# Patient Record
Sex: MALE | Race: BLACK OR AFRICAN AMERICAN | Employment: OTHER | URBAN - METROPOLITAN AREA
[De-identification: names, ages, dates, MRNs, and addresses within clinical notes are randomized per-mention and may not be internally consistent; named-entity substitution may affect disease eponyms.]

---

## 2017-01-05 ENCOUNTER — HOSPITAL ENCOUNTER (OUTPATIENT)
Dept: GENERAL RADIOLOGY | Age: 72
Discharge: HOME | End: 2017-01-05
Payer: MEDICARE

## 2017-01-05 DIAGNOSIS — R05.9 COUGH: ICD-10-CM

## 2017-01-05 PROCEDURE — 71020 XR CHEST PA LAT: CPT

## 2017-01-10 ENCOUNTER — HOSPITAL ENCOUNTER (OUTPATIENT)
Dept: CT IMAGING | Age: 72
Discharge: HOME | End: 2017-01-10
Attending: INTERNAL MEDICINE
Payer: MEDICARE

## 2017-01-10 DIAGNOSIS — C18.0 MALIGNANT NEOPLASM OF CECUM (HCC): ICD-10-CM

## 2017-01-10 DIAGNOSIS — C79.89 SECONDARY MALIGNANT NEOPLASM OF OTHER SPECIFIED SITES (HCC): ICD-10-CM

## 2017-01-10 DIAGNOSIS — M54.50 LOWER BACK PAIN: ICD-10-CM

## 2017-01-10 PROCEDURE — 74011636320 HC RX REV CODE- 636/320: Performed by: INTERNAL MEDICINE

## 2017-01-10 PROCEDURE — 74177 CT ABD & PELVIS W/CONTRAST: CPT

## 2017-01-10 RX ADMIN — IOPAMIDOL 100 ML: 755 INJECTION, SOLUTION INTRAVENOUS at 13:51

## 2017-01-12 ENCOUNTER — HOSPITAL ENCOUNTER (OUTPATIENT)
Dept: VASCULAR SURGERY | Age: 72
Discharge: HOME | End: 2017-01-12
Attending: INTERNAL MEDICINE

## 2017-01-12 DIAGNOSIS — I82.409 DVT (DEEP VENOUS THROMBOSIS) (HCC): ICD-10-CM

## 2017-01-26 ENCOUNTER — OFFICE VISIT (OUTPATIENT)
Dept: NEUROLOGY | Facility: CLINIC | Age: 72
End: 2017-01-26
Payer: MEDICARE

## 2017-01-26 VITALS
WEIGHT: 250 LBS | HEIGHT: 70 IN | BODY MASS INDEX: 35.79 KG/M2 | DIASTOLIC BLOOD PRESSURE: 84 MMHG | RESPIRATION RATE: 20 BRPM | HEART RATE: 86 BPM | SYSTOLIC BLOOD PRESSURE: 121 MMHG

## 2017-01-26 DIAGNOSIS — R41.3 MEMORY LOSS: Primary | ICD-10-CM

## 2017-01-26 DIAGNOSIS — I10 ESSENTIAL HYPERTENSION: ICD-10-CM

## 2017-01-26 PROCEDURE — 99999 PR PBB SHADOW E&M-EST. PATIENT-LVL III: CPT | Mod: PBBFAC,,, | Performed by: PSYCHIATRY & NEUROLOGY

## 2017-01-26 PROCEDURE — 99213 OFFICE O/P EST LOW 20 MIN: CPT | Mod: PBBFAC,PN | Performed by: PSYCHIATRY & NEUROLOGY

## 2017-01-26 PROCEDURE — 99204 OFFICE O/P NEW MOD 45 MIN: CPT | Mod: S$PBB,,, | Performed by: PSYCHIATRY & NEUROLOGY

## 2017-01-26 RX ORDER — DOXYCYCLINE 100 MG/1
1 CAPSULE ORAL DAILY
Refills: 2 | COMMUNITY
Start: 2016-12-14 | End: 2017-01-26 | Stop reason: SDUPTHER

## 2017-01-26 NOTE — MR AVS SNAPSHOT
[facility]  [address]  [place] [address]  Phone: [phone]  Fax: [phone]                  Rohan Abarca   2017 8:20 AM   Office Visit    Description:  Male : 1945   Provider:  Glenn Abad Jr., MD   Department:  [facility]           Reason for Visit     Memory Loss           Diagnoses this Visit        Comments    Memory loss    -  Primary            To Do List           Future Appointments        Provider Department Dept Phone    2017 8:00 AM LAB, [place] [facility] [phone]    2017 9:00 AM [facility] MRI1 [facility] [phone]    2017 9:00 AM Glenn Abad Jr., MD [facility] [phone]      Goals (5 Years of Data)     Increase water intake     Reduce fast food intake       [facility] On Call     [facility] -  Assistance  Registered nurses in the [facility] provide clinical advisement, health education, appointment booking, and other advisory services.  Call for this free service at [phone].             Medications           Message regarding Medications     Verify the changes and/or additions to your medication regime listed below are the same as discussed with your clinician today.  If any of these changes or additions are incorrect, please notify your healthcare provider.        STOP taking these medications     celecoxib (CELEBREX) 200 MG capsule Take 200 mg by mouth once daily.    MINOCYCLINE HCL (MINOCIN ORAL) 50 mg daily as needed (as needed for ten days when facial breakout).     doxycycline (VIBRAMYCIN) 100 MG Cap Take 1 capsule by mouth once daily.           Verify that the below list of medications is an accurate representation of the medications you are currently taking.  If none reported, the list may be blank. If incorrect, please contact your healthcare provider. Carry this list with you in case of emergency.           Current  "Medications     amlodipine (NORVASC) 5 MG tablet Take 1 tablet (5 mg total) by mouth once daily.    doxycycline (VIBRAMYCIN) 100 mg injection     enalapril (VASOTEC) 20 MG tablet Take 2 tablets (40 mg total) by mouth once daily.    VIT A/VIT C/VIT E/ZINC/COPPER (ICAPS AREDS ORAL) Take by mouth once daily.           Clinical Reference Information           Vital Signs - Last Recorded  Most recent update: 1/26/2017  8:28 AM by Naomy Brito LPN    BP Pulse Resp Ht Wt BMI    (!) 149/92 88 20 5' 9.5" (1.765 m) 113.4 kg (250 lb) 36.39 kg/m2      Blood Pressure          Most Recent Value    BP  (!)  149/92      Allergies as of 1/26/2017     No Known Drug Allergies      Immunizations Administered on Date of Encounter - 1/26/2017     None      Orders Placed During Today's Visit     Future Labs/Procedures Expected by Expires    Ammonia  1/26/2017 1/26/2018    Folate  1/26/2017 1/26/2018    MRI Brain Without Contrast  1/26/2017 1/26/2018    RPR  1/26/2017 1/26/2018    TSH  1/26/2017 1/26/2018    Vitamin B12  1/26/2017 1/26/2018    Vitamin B1  1/26/2017 3/27/2018    Neuropsychological testing  As directed 1/26/2018      "

## 2017-01-26 NOTE — LETTER
January 29, 2017      Homa Ocampo, FNP-C  [address] [place] [address]           [facility]  [address] [place] [address]  Phone: [phone]  Fax: [phone]          Patient: Rohan Abarca   MR Number: [id number]   YOB: 1945   Date of Visit: 1/26/2017       Dear Homa Ocampo:    Thank you for referring Rohan Abarca to me for evaluation. Attached you will find relevant portions of my assessment and plan of care.    If you have questions, please do not hesitate to call me. I look forward to following Rohan Abarca along with you.    Sincerely,    Glenn Abad Jr., MD    Enclosure  CC:  No Recipients    If you would like to receive this communication electronically, please contact [email or web address] or [phone] to request more information on [employer] Link access.    For providers and/or their staff who would like to refer a patient to [facility], please contact us through our one-stop-shop provider referral line, [facility], at [phone].    If you feel you have received this communication in error or would no longer like to receive these types of communications, please e-mail [email or web address]

## 2017-01-26 NOTE — PROGRESS NOTES
Date of service:  1/26/2017    Chief complaint:  Memory Loss    History of present illness:  The patient is a 72 y.o. male referred for evaluation of memory loss.  He was seen with his wife who supplied additional history. This issue began a few years ago. It involves short-term memory more than long-term memory.  He reports no difficulties with executive function.  There are no clear issues with multiple step processes. He has no problems with ADLs. He does not get lost in familiar areas. There are no hallucinations. There are no personality changes.  He does not endorse depression.      Past Medical History   Diagnosis Date    Bilateral cataracts      hx    Colon polyp      benign    HBP (high blood pressure)     History of detached retina repair     Prostate cancer     Rosacea        Past Surgical History   Procedure Laterality Date    Rotator cuff repair       bilateral    Bunionectomy Right     Knee surgery       left , right    Cataract extraction bilateral w/ anterior vitrectomy Bilateral     Retinal detachment surgery       left    Mastoid surgery Right     Hernia repair       x 2    Tonsillectomy, adenoidectomy      Prostatectomy  2012    Joint replacement Right 2012     per Dr. Heriberto Colón    Joint replacement Left 2004     per Dr. Heriberto Colón    Shoulder surgery Left      RCR per Dr. Heriberto Colón    Shoulder surgery Right      RCR per Dr. Heriberto Colón       Family History   Problem Relation Age of Onset    Cancer Sister     Stroke Sister     Cancer Sister     Stroke      Heart attack         Social History     Social History    Marital status:      Spouse name: N/A    Number of children: N/A    Years of education: N/A     Occupational History    Not on file.     Social History Main Topics    Smoking status: Former Smoker     Packs/day: 0.50     Years: 5.00     Types: Cigarettes     Quit date: 12/19/1968    Smokeless tobacco: Never Used    Alcohol use 1.0 oz/week     2  "Standard drinks or equivalent per week      Comment: social    Drug use: No    Sexual activity: Yes     Partners: Female     Other Topics Concern    Not on file     Social History Narrative    Was working 4 days a week delivering seafood, unloading and loading truck by hand, retired Friday    No dietary restrictions        Review of Systems   General/Constitutional:  No unintentional weight loss, No change in appetite  Eyes/Vision:  No change in vision, No double vision  ENT:  No frequent nose bleeds, No ringing in the ears  Respiratory:  No cough, No wheezing  Cardiovascular:  No chest pain, No palpitations  Gastrointestinal:  No jaundice, No nausea/vomiting  Genitourinary:  No incontinence, No burning with urination  Hematologic/Lymphatic:  No easy bruising/bleeding, No night sweats  Neurological:  No numbness, No weakness  Endocrine:  No fatigue, No heat/cold intolerance  Allergy/Immunologic:  No fevers, No chills  Musculoskeletal:  + muscle pain, + joint pain   Psychiatric:  No thoughts of harming self/others, No depression  Integumentary:  No rashes, No sores that do not heal       Physical exam:  Visit Vitals    BP (!) 149/92    Pulse 88    Resp 20    Ht 5' 9.5" (1.765 m)    Wt 113.4 kg (250 lb)    BMI 36.39 kg/m2     General: Well developed, well nourished.  No acute distress.  HEENT: Atraumatic, normocephalic.  Neck: Supple, trachea midline.  Cardiovascular: Regular rate and rhythm.  Pulmonary: No increased work of breathing.  Abdomen/GI: No guarding.  Musculoskeletal: No obvious joint deformities, moves all extremities well.    Neurological exam:  Mental status: Awake and alert.  Oriented x4.  Speech fluent and appropriate.  Recent and remote memory appear to be slightly impaired.  Fund of knowledge normal.  MMSE = 27/30.  Cranial nerves: Pupils equal round and reactive to light, extraocular movements intact, facial strength and sensation intact bilaterally, palate and tongue midline, hearing " grossly intact bilaterally.  Motor: 5 out of 5 strength throughout the upper and lower extremities bilaterally. Normal bulk and tone.  Sensation: Intact to light touch and temperature bilaterally.  DTR: 1+ at the knees and biceps bilaterally.  Coordination: Finger-nose-finger testing intact bilaterally.  Gait: Nonfocal gait. Mild difficulty with tandem.      Data base:  Notes of the referring provider were reviewed.  Briefly summarized, these related to a check-up and addressed a number of issues including HTN, hearing loss, memory loss, and carotid stenosis.    The patient's most recent labs (10/16) included an unremarkable CMP and CBC.    Assessment and plan:  The patient is a 72 y.o. male referred for evaluation of memory loss. I feel that the differential diagnosis includes both MCI and pseudodementia. I think a reasonable workup would feature MRI of the brain, serologies, and neuropsychological testing. We will plan on seeing the patient back after the testing is complete.    The patient was noted to be hypertensive in clinic today.  Repeat BP check confirmed this.  I have asked the patient to follow up with their PCP about this and my staff to message the patient's PCP.

## 2017-01-27 ENCOUNTER — LAB VISIT (OUTPATIENT)
Dept: LAB | Facility: HOSPITAL | Age: 72
End: 2017-01-27
Attending: FAMILY MEDICINE
Payer: MEDICARE

## 2017-01-27 DIAGNOSIS — R41.3 MEMORY LOSS: ICD-10-CM

## 2017-01-27 LAB
AMMONIA PLAS-SCNC: 26 UMOL/L
FOLATE SERPL-MCNC: 14.7 NG/ML
TSH SERPL DL<=0.005 MIU/L-ACNC: 1.01 UIU/ML
VIT B12 SERPL-MCNC: 527 PG/ML

## 2017-01-27 PROCEDURE — 82140 ASSAY OF AMMONIA: CPT

## 2017-01-27 PROCEDURE — 84425 ASSAY OF VITAMIN B-1: CPT

## 2017-01-27 PROCEDURE — 84443 ASSAY THYROID STIM HORMONE: CPT

## 2017-01-27 PROCEDURE — 86592 SYPHILIS TEST NON-TREP QUAL: CPT

## 2017-01-27 PROCEDURE — 82607 VITAMIN B-12: CPT

## 2017-01-27 PROCEDURE — 82746 ASSAY OF FOLIC ACID SERUM: CPT

## 2017-01-28 LAB — RPR SER QL: NORMAL

## 2017-01-31 LAB — VIT B1 SERPL-MCNC: 55 UG/L (ref 38–122)

## 2017-02-09 ENCOUNTER — HOSPITAL ENCOUNTER (OUTPATIENT)
Dept: RADIOLOGY | Facility: HOSPITAL | Age: 72
Discharge: HOME | End: 2017-02-09
Attending: PSYCHIATRY & NEUROLOGY
Payer: MEDICARE

## 2017-02-09 DIAGNOSIS — R41.3 MEMORY LOSS: ICD-10-CM

## 2017-02-09 PROCEDURE — 70551 MRI BRAIN STEM W/O DYE: CPT | Mod: TC,PO

## 2017-02-09 PROCEDURE — 70551 MRI BRAIN STEM W/O DYE: CPT | Mod: 26,,, | Performed by: RADIOLOGY

## 2017-03-08 ENCOUNTER — TELEPHONE (OUTPATIENT)
Dept: GASTROENTEROLOGY | Facility: CLINIC | Age: 72
End: 2017-03-08

## 2017-03-08 NOTE — TELEPHONE ENCOUNTER
----- Message from Kadie Mayo sent at 3/8/2017 12:02 PM CST -----  Contact: self  [phone]  Patient need to schedule procedure. Thanks!

## 2017-03-08 NOTE — TELEPHONE ENCOUNTER
Pt has been scheduled for colon on 3/20. Reviewed mg citrate prep. Pt is aware I will be mailing instructions and confirmation letter.

## 2017-03-16 RX ORDER — DOXYCYCLINE HYCLATE 100 MG
100 TABLET ORAL DAILY
COMMUNITY
End: 2017-12-15

## 2017-03-20 ENCOUNTER — SURGERY (OUTPATIENT)
Age: 72
End: 2017-03-20

## 2017-03-20 ENCOUNTER — ANESTHESIA (OUTPATIENT)
Dept: ENDOSCOPY | Facility: HOSPITAL | Age: 72
End: 2017-03-20
Payer: MEDICARE

## 2017-03-20 ENCOUNTER — ANESTHESIA EVENT (OUTPATIENT)
Dept: ENDOSCOPY | Facility: HOSPITAL | Age: 72
End: 2017-03-20
Payer: MEDICARE

## 2017-03-20 ENCOUNTER — HOSPITAL ENCOUNTER (OUTPATIENT)
Facility: HOSPITAL | Age: 72
Discharge: HOME | End: 2017-03-20
Attending: INTERNAL MEDICINE | Admitting: INTERNAL MEDICINE
Payer: MEDICARE

## 2017-03-20 VITALS — RESPIRATION RATE: 9 BRPM

## 2017-03-20 DIAGNOSIS — Z12.11 SCREENING FOR COLON CANCER: ICD-10-CM

## 2017-03-20 PROCEDURE — 37000008 HC ANESTHESIA 1ST 15 MINUTES: Mod: PO | Performed by: INTERNAL MEDICINE

## 2017-03-20 PROCEDURE — D9220A PRA ANESTHESIA: Mod: PT,ANES,, | Performed by: ANESTHESIOLOGY

## 2017-03-20 PROCEDURE — 37000009 HC ANESTHESIA EA ADD 15 MINS: Mod: PO | Performed by: INTERNAL MEDICINE

## 2017-03-20 PROCEDURE — 88305 TISSUE EXAM BY PATHOLOGIST: CPT | Mod: 26,,, | Performed by: PATHOLOGY

## 2017-03-20 PROCEDURE — 25000003 PHARM REV CODE 250: Mod: PO | Performed by: INTERNAL MEDICINE

## 2017-03-20 PROCEDURE — 25000003 PHARM REV CODE 250: Mod: PO | Performed by: NURSE ANESTHETIST, CERTIFIED REGISTERED

## 2017-03-20 PROCEDURE — 27201012 HC FORCEPS, HOT/COLD, DISP: Mod: PO | Performed by: INTERNAL MEDICINE

## 2017-03-20 PROCEDURE — 88305 TISSUE EXAM BY PATHOLOGIST: CPT | Performed by: PATHOLOGY

## 2017-03-20 PROCEDURE — 63600175 PHARM REV CODE 636 W HCPCS: Mod: PO | Performed by: NURSE ANESTHETIST, CERTIFIED REGISTERED

## 2017-03-20 PROCEDURE — D9220A PRA ANESTHESIA: Mod: PT,CRNA,, | Performed by: NURSE ANESTHETIST, CERTIFIED REGISTERED

## 2017-03-20 PROCEDURE — 45380 COLONOSCOPY AND BIOPSY: CPT | Mod: PO | Performed by: INTERNAL MEDICINE

## 2017-03-20 PROCEDURE — 45380 COLONOSCOPY AND BIOPSY: CPT | Mod: PT,,, | Performed by: INTERNAL MEDICINE

## 2017-03-20 RX ORDER — LIDOCAINE HCL/PF 100 MG/5ML
SYRINGE (ML) INTRAVENOUS
Status: DISCONTINUED | OUTPATIENT
Start: 2017-03-20 | End: 2017-03-20

## 2017-03-20 RX ORDER — LIDOCAINE HYDROCHLORIDE 10 MG/ML
1 INJECTION, SOLUTION EPIDURAL; INFILTRATION; INTRACAUDAL; PERINEURAL ONCE
Status: COMPLETED | OUTPATIENT
Start: 2017-03-20 | End: 2017-03-20

## 2017-03-20 RX ORDER — PROPOFOL 10 MG/ML
VIAL (ML) INTRAVENOUS
Status: DISCONTINUED | OUTPATIENT
Start: 2017-03-20 | End: 2017-03-20

## 2017-03-20 RX ORDER — SODIUM CHLORIDE, SODIUM LACTATE, POTASSIUM CHLORIDE, CALCIUM CHLORIDE 600; 310; 30; 20 MG/100ML; MG/100ML; MG/100ML; MG/100ML
INJECTION, SOLUTION INTRAVENOUS CONTINUOUS
Status: DISCONTINUED | OUTPATIENT
Start: 2017-03-20 | End: 2017-03-20 | Stop reason: HOSPADM

## 2017-03-20 RX ADMIN — LIDOCAINE HYDROCHLORIDE 10 MG: 10 INJECTION, SOLUTION EPIDURAL; INFILTRATION; INTRACAUDAL; PERINEURAL at 09:03

## 2017-03-20 RX ADMIN — PROPOFOL 50 MG: 10 INJECTION, EMULSION INTRAVENOUS at 09:03

## 2017-03-20 RX ADMIN — PROPOFOL 30 MG: 10 INJECTION, EMULSION INTRAVENOUS at 10:03

## 2017-03-20 RX ADMIN — LIDOCAINE HYDROCHLORIDE 50 MG: 20 INJECTION, SOLUTION INTRAVENOUS at 09:03

## 2017-03-20 RX ADMIN — SODIUM CHLORIDE, SODIUM LACTATE, POTASSIUM CHLORIDE, AND CALCIUM CHLORIDE: .6; .31; .03; .02 INJECTION, SOLUTION INTRAVENOUS at 09:03

## 2017-03-20 NOTE — ANESTHESIA POSTPROCEDURE EVALUATION
"Anesthesia Post Evaluation    Patient: Rohan Abarca    Procedure(s) Performed: Procedure(s) (LRB):  COLONOSCOPY (N/A)    Final Anesthesia Type: general  Patient location during evaluation: PACU  Patient participation: Yes- Able to Participate  Level of consciousness: awake and alert and oriented  Post-procedure vital signs: reviewed and stable  Pain management: adequate  Airway patency: patent  PONV status at discharge: No PONV  Anesthetic complications: no      Cardiovascular status: blood pressure returned to baseline  Respiratory status: unassisted, spontaneous ventilation and room air  Hydration status: euvolemic  Follow-up not needed.        Visit Vitals    /64 (BP Location: Left arm, Patient Position: Lying, BP Method: Automatic)    Pulse 68    Temp 36.7 °C (98.1 °F) (Skin)    Resp 16    Ht 5' 9" (1.753 m)    Wt 111.1 kg (245 lb)    SpO2 (!) 94%    BMI 36.18 kg/m2       Pain/Rabia Score: Pain Assessment Performed: Yes (3/20/2017 10:19 AM)  Presence of Pain: non-verbal indicators absent (3/20/2017 10:19 AM)  Rabia Score: 7 (3/20/2017 10:19 AM)      "

## 2017-03-20 NOTE — H&P
History & Physical - Short Stay  Gastroenterology      SUBJECTIVE:     Procedure: Colonoscopy    Chief Complaint/Indication for Procedure: Previous Polyps    PTA Medications   Medication Sig    amlodipine (NORVASC) 5 MG tablet Take 1 tablet (5 mg total) by mouth once daily.    doxycycline (VIBRA-TABS) 100 MG tablet Take 100 mg by mouth once daily.    enalapril (VASOTEC) 20 MG tablet Take 2 tablets (40 mg total) by mouth once daily.    VIT A/VIT C/VIT E/ZINC/COPPER (ICAPS AREDS ORAL) Take by mouth once daily.       Review of patient's allergies indicates:   Allergen Reactions    No known drug allergies         Past Medical History:   Diagnosis Date    Bilateral cataracts     hx    Colon polyp     benign    HBP (high blood pressure)     History of detached retina repair     left    REGAN (obstructive sleep apnea)     uses CPAP    Prostate cancer     Rosacea      Past Surgical History:   Procedure Laterality Date    BUNIONECTOMY Right     CATARACT EXTRACTION BILATERAL W/ ANTERIOR VITRECTOMY Bilateral     HERNIA REPAIR      x 2    JOINT REPLACEMENT Right 2012    per Dr. Heriberto Colón    JOINT REPLACEMENT Left 2004    per Dr. Heriberto Colón    KNEE SURGERY      left , right    MASTOID SURGERY Right     right    PROSTATECTOMY  2012    RETINAL DETACHMENT SURGERY      left    ROTATOR CUFF REPAIR      bilateral    SHOULDER SURGERY Left     RCR per Dr. Heriberto Colón    SHOULDER SURGERY Right     RCR per Dr. Heriberto Colón    TONSILLECTOMY, ADENOIDECTOMY       Family History   Problem Relation Age of Onset    Cancer Sister     Stroke Sister     Cancer Sister     Stroke      Heart attack       Social History   Substance Use Topics    Smoking status: Former Smoker     Packs/day: 0.50     Years: 5.00     Types: Cigarettes     Quit date: 12/19/1968    Smokeless tobacco: Never Used    Alcohol use 1.0 oz/week     2 Standard drinks or equivalent per week      Comment: social         OBJECTIVE:     Vital Signs  (Most Recent)  Temp: 98.8 °F (37.1 °C) (03/20/17 0932)  Pulse: 70 (03/20/17 0932)  Resp: 16 (03/20/17 0932)  BP: (!) 149/84 (03/20/17 0932)  SpO2: 95 % (03/20/17 0932)    Physical Exam:                                                       GENERAL:  Comfortable, in no acute distress.                                 HEENT EXAM:  Nonicteric.  No adenopathy.  Oropharynx is clear.               NECK:  Supple.                                                               LUNGS:  Clear.                                                               CARDIAC:  Regular rate and rhythm.  S1, S2.  No murmur.                      ABDOMEN:  Soft, positive bowel sounds, nontender.  No hepatosplenomegaly or masses.  No rebound or guarding.                                             EXTREMITIES:  No edema.     MENTAL STATUS:  Normal, alert and oriented.      ASSESSMENT/PLAN:     Assessment: Previous Polyps    Plan: Colonoscopy    Anesthesia Plan: General    ASA Grade: ASA 2 - Patient with mild systemic disease with no functional limitations    MALLAMPATI SCORE:  I (soft palate, uvula, fauces, and tonsillar pillars visible)

## 2017-03-20 NOTE — TRANSFER OF CARE
"Anesthesia Transfer of Care Note    Patient: Rohan Abarca    Procedure(s) Performed: Procedure(s) (LRB):  COLONOSCOPY (N/A)    Patient location: PACU    Anesthesia Type: general    Transport from OR: Transported from OR on room air with adequate spontaneous ventilation    Post pain: adequate analgesia    Post assessment: no apparent anesthetic complications and tolerated procedure well    Post vital signs: stable    Level of consciousness: awake and sedated    Nausea/Vomiting: no nausea/vomiting    Complications: none          Last vitals:   Visit Vitals    BP (!) 149/84 (BP Location: Right arm, BP Method: Automatic)    Pulse 70    Temp 37.1 °C (98.8 °F) (Skin)    Resp 16    Ht 5' 9" (1.753 m)    Wt 111.1 kg (245 lb)    SpO2 95%    BMI 36.18 kg/m2     "

## 2017-03-20 NOTE — DISCHARGE INSTRUCTIONS
Procedural Sedation (Adult)  You have been given medicine by vein to make you sleep during your surgery. This may have included both a pain medicine and sleeping medicine. Most of the effects have worn off. But you may still have some drowsiness for the next 6 to 8 hours.  Home care  Follow these guidelines when you get home:  · For the next 8 hours, you should be watched by a responsible adult. This person should make sure your condition is not getting worse.  · Don't take any medicine by mouth for pain or for sleep during the next 4 hours. These might react with the medicines you were given in the hospital. This could cause a much stronger response than usual.  · Don't drink any alcohol for the next 24 hours.  · Don't drive, operate dangerous machinery, or make important business or personal decisions during the next 24 hours.  Follow-up care  Follow up with your healthcare provider if you are not alert and back to your usual level of activity within 12 hours.  When to seek medical advice  Call your healthcare provider right away if any of these occur:  · Drowsiness gets worse  · Weakness or dizziness gets worse  · Repeated vomiting  · You cannot be awakened   Date Last Reviewed: 10/18/2016  © 3130-7591 [employer]. [address] [place] [address], [place] [address]. All rights reserved. This information is not intended as a substitute for professional medical care. Always follow your healthcare professional's instructions.

## 2017-03-20 NOTE — IP AVS SNAPSHOT
[facility]  [address]  [place] [address]  Phone: [phone]           Patient Discharge Instructions     Our goal is to set you up for success. This packet includes information on your condition, medications, and your home care. It will help you to care for yourself so you don't get sicker and need to go back to the hospital.     Please ask your nurse if you have any questions.        There are many details to remember when preparing to leave the hospital. Here is what you will need to do:    1. Take your medicine. If you are prescribed medications, review your Medication List in the following pages. You may have new medications to  at the pharmacy and others that you'll need to stop taking. Review the instructions for how and when to take your medications. Talk with your doctor or nurses if you are unsure of what to do.     2. Go to your follow-up appointments. Specific follow-up information is listed in the following pages. Your may be contacted by a transition nurse or clinical provider about future appointments. Be sure we have all of the phone numbers to reach you, if needed. Please contact your provider's office if you are unable to make an appointment.     3. Watch for warning signs. Your doctor or nurse will give you detailed warning signs to watch for and when to call for assistance. These instructions may also include educational information about your condition. If you experience any of warning signs to your health, call your doctor.               [facility]  Unless otherwise directed by your provider, please contact [facility], our nurse care line that is available for 24/7 assistance.     [phone] (toll-free)    Registered nurses in the [facility] provide clinical advisement, health education, appointment booking, and other advisory services.                    ** Verify the list of medication(s) below is accurate and up  to date. Carry this with you in case of emergency. If your medications have changed, please notify your healthcare provider.             Medication List      CONTINUE taking these medications        Additional Info                      amlodipine 5 MG tablet   Commonly known as:  NORVASC   Quantity:  90 tablet   Refills:  3   Dose:  5 mg    Instructions:  Take 1 tablet (5 mg total) by mouth once daily.     Begin Date    AM    Noon    PM    Bedtime       doxycycline 100 MG tablet   Commonly known as:  VIBRA-TABS   Refills:  0   Dose:  100 mg    Instructions:  Take 100 mg by mouth once daily.     Begin Date    AM    Noon    PM    Bedtime       enalapril 20 MG tablet   Commonly known as:  VASOTEC   Quantity:  180 tablet   Refills:  3   Dose:  40 mg    Instructions:  Take 2 tablets (40 mg total) by mouth once daily.     Begin Date    AM    Noon    PM    Bedtime       ICAPS AREDS ORAL   Refills:  0    Instructions:  Take by mouth once daily.     Begin Date    AM    Noon    PM    Bedtime                  Please bring to all follow up appointments:    1. A copy of your discharge instructions.  2. All medicines you are currently taking in their original bottles.  3. Identification and insurance card.    Please arrive 15 minutes ahead of scheduled appointment time.    Please call 24 hours in advance if you must reschedule your appointment and/or time.        Your Scheduled Appointments     May 11, 2017  9:00 AM CDT   Established Patient Visit with Glenn Abad Jr., MD   [facility])    [address] [place] [address]   [phone]              Your Future Surgeries/Procedures     Mar 20, 2017   Surgery with Carl Marcelino MD   [facility]    [address] [place] [address]   [phone]              Follow-up Information     Follow up with Nithin Zuñiga MD.    Specialty:  Family Medicine    Contact information:    [address]  [facility]  [place] [address]  [phone]            Discharge Instructions         Procedural Sedation (Adult)  You have been given medicine by vein to make you sleep during your surgery. This may have included both a pain medicine and sleeping medicine. Most of the effects have worn off. But you may still have some drowsiness for the next 6 to 8 hours.  Home care  Follow these guidelines when you get home:  · For the next 8 hours, you should be watched by a responsible adult. This person should make sure your condition is not getting worse.  · Don't take any medicine by mouth for pain or for sleep during the next 4 hours. These might react with the medicines you were given in the hospital. This could cause a much stronger response than usual.  · Don't drink any alcohol for the next 24 hours.  · Don't drive, operate dangerous machinery, or make important business or personal decisions during the next 24 hours.  Follow-up care  Follow up with your healthcare provider if you are not alert and back to your usual level of activity within 12 hours.  When to seek medical advice  Call your healthcare provider right away if any of these occur:  · Drowsiness gets worse  · Weakness or dizziness gets worse  · Repeated vomiting  · You cannot be awakened   Date Last Reviewed: 10/18/2016  © 0356-2943 [employer]. [address] [place] [address], [place] [address]. All rights reserved. This information is not intended as a substitute for professional medical care. Always follow your healthcare professional's instructions.            Admission Information     Date & Time Provider Department CSN    3/20/2017  8:57 AM Carl Marcelino MD [facility] [id number]      Care Providers     Provider Role Specialty Primary office phone    Carl Marcelino MD Attending Provider Gastroenterology [phone]    Carl Marcelino MD Surgeon  Gastroenterology [phone]      Your Vitals Were     BP Pulse Temp Resp  "Height Weight    106/64 (BP Location: Left arm, Patient Position: Lying, BP Method: Automatic) 68 98.1 °F (36.7 °C) (Skin) 16 5' 9" (1.753 m) 111.1 kg (245 lb)    SpO2 BMI             94% 36.18 kg/m2         Recent Lab Values     No lab values to display.      Pending Labs     Order Current Status    Specimen to Pathology - Surgery Collected (03/20/17 1013)      Allergies as of 3/20/2017        Reactions    No Known Drug Allergies       Advance Directives     An advance directive is a document which, in the event you are no longer able to make decisions for yourself, tells your healthcare team what kind of treatment you do or do not want to receive, or who you would like to make those decisions for you.  If you do not currently have an advance directive, [facility] encourages you to create one.  For more information call:  [phone] ([phone]), [phone] ([phone]),  or log on to [email or web address].        Smoking Cessation     If you would like to quit smoking:   You may be eligible for free services if you are a [place] resident and started smoking cigarettes before September 1, 1988.  Call the Smoking Cessation Trust (SCT) toll free at (049) 125-1178 or (181) 832-0774.   Call 4-682-QUIT-NOW if you do not meet the above criteria.            Language Assistance Services     ATTENTION: Language assistance services are available, free of charge. Please call [phone].      ATENCIÓN: Si habla español, tiene a velasquez disposición servicios gratuitos de asistencia lingüística. Llame al [phone].     [facility] Ý: N?u b?n nói Ti?ng Vi?t, có các d?ch v? h? tr? ngôn ng? mi?n phí dành cho b?n. G?i s? [phone].         [facility] complies with applicable Federal civil rights laws and does not discriminate on the basis of race, color, national origin, age, disability, or sex.        "

## 2017-03-20 NOTE — ANESTHESIA PREPROCEDURE EVALUATION
03/20/2017  Rohan Abarca is a 72 y.o., male.    [facility] Anesthesia Evaluation    I have reviewed the Patient Summary Reports.    I have reviewed the Nursing Notes.   I have reviewed the Medications.     Review of Systems  Anesthesia Hx:  No problems with previous Anesthesia Denies Hx of Anesthetic complications    Social:  Former Smoker    Cardiovascular:   Exercise tolerance: good Hypertension Denies MI.  Denies CAD.     Denies Angina. History notable for bilateral carotid bruits, prev evaluated by carotid duplex at outside facility, no interventions per patient, being followed by medicine, pt unsure of study results     Exercises on regular basis, cycling, denies any history of syncope or ramirez,    Pulmonary:   Denies COPD.  Denies Asthma. Sleep Apnea    Renal/:   Denies Chronic Renal Disease.     Hepatic/GI:   Denies GERD. Denies Liver Disease.    Musculoskeletal:   Arthritis     Neurological:   Denies TIA. Denies CVA. Denies Seizures.    Endocrine:   Denies Diabetes. Denies Hypothyroidism.        Physical Exam  General:  Well nourished, Obesity    Airway/Jaw/Neck:  Airway Findings: Mouth Opening: Normal Tongue: Normal  General Airway Assessment: Adult, Good  Mallampati: III  Improves to II with phonation.  TM Distance: 4-6 cm      Dental:  Dental Findings: In tact   Chest/Lungs:  Chest/Lungs Findings: Clear to auscultation, Normal Respiratory Rate     Heart/Vascular:  Heart Findings: Rate: Normal  Rhythm: Regular Rhythm  Sounds: Normal  Heart murmur: negative       Mental Status:  Mental Status Findings:  Cooperative, Alert and Oriented         Anesthesia Plan  Type of Anesthesia, risks & benefits discussed:  Anesthesia Type:  general  Patient's Preference:   Intra-op Monitoring Plan:   Intra-op Monitoring Plan Comments:   Post Op Pain Control Plan:   Post Op Pain Control Plan Comments:   Induction:    IV  Beta Blocker:  Patient is not currently on a Beta-Blocker (No further documentation required).       Informed Consent: Patient understands risks and agrees with Anesthesia plan.  Questions answered. Anesthesia consent signed with patient.  ASA Score: 2     Day of Surgery Review of History & Physical:    H&P update referred to the surgeon.         Ready For Surgery From Anesthesia Perspective.

## 2017-03-20 NOTE — DISCHARGE SUMMARY
Discharge Note  Short Stay      SUMMARY     Admit Date: 3/20/2017    Attending Physician: Carl Marcelino MD     Discharge Physician: Carl Marcelino MD    Discharge Date: 3/20/2017 10:16 AM    Final Diagnosis: Screen for colon cancer [Z12.11]    Disposition: HOME OR SELF CARE    Patient Instructions:   Current Discharge Medication List      CONTINUE these medications which have NOT CHANGED    Details   amlodipine (NORVASC) 5 MG tablet Take 1 tablet (5 mg total) by mouth once daily.  Qty: 90 tablet, Refills: 3    Associated Diagnoses: Essential hypertension      doxycycline (VIBRA-TABS) 100 MG tablet Take 100 mg by mouth once daily.      enalapril (VASOTEC) 20 MG tablet Take 2 tablets (40 mg total) by mouth once daily.  Qty: 180 tablet, Refills: 3    Associated Diagnoses: Essential hypertension      VIT A/VIT C/VIT E/ZINC/COPPER (ICAPS AREDS ORAL) Take by mouth once daily.             Discharge Procedure Orders (must include Diet, Follow-up, Activity)    Follow Up:  Follow up with PCP as previously scheduled  Resume routine diet.  Activity as tolerated.    No driving day of procedure.

## 2017-03-21 VITALS
OXYGEN SATURATION: 99 % | HEIGHT: 69 IN | BODY MASS INDEX: 36.29 KG/M2 | DIASTOLIC BLOOD PRESSURE: 86 MMHG | RESPIRATION RATE: 20 BRPM | TEMPERATURE: 98 F | SYSTOLIC BLOOD PRESSURE: 132 MMHG | WEIGHT: 245 LBS | HEART RATE: 58 BPM

## 2017-05-03 ENCOUNTER — TELEPHONE (OUTPATIENT)
Dept: NEUROLOGY | Facility: CLINIC | Age: 72
End: 2017-05-03

## 2017-05-03 NOTE — TELEPHONE ENCOUNTER
Spoke with pt, neuro-psych testing reschedule for June 2nd. Follow up Appt with Dr. Abad has been scheduled accordingly.

## 2017-05-03 NOTE — TELEPHONE ENCOUNTER
----- Message from Kadie Mayo sent at 5/3/2017  2:29 PM CDT -----  Contact: self-  [phone]  Patient called asking to speak with the nurse. The patient was not able to have scheduled test prior to seeing the doctor.  Thanks!

## 2017-05-03 NOTE — TELEPHONE ENCOUNTER
This patient was informed he had a test at [facility] today. He arrived for the appt at [facility] as instructed, was told they did not see him as having an appt today. The pt could not recall the name of the test he was to have (neuro-psych testing) and he was turned away. The patient can not recall who he spoke with. He then drove to our office to inform us he was unable to do his testing and was confused as to why. I spoke with Nahomi Savage Phychometrician and was informed the patient did in fact have an appt with Dr. Call today for neuro-psych testing. Please contact the patient to reschedule the appt as we will need to reschedule his follow up with Dr. Abad for these results. Please inform our office of the rescheduled appt date and time and let us know approximately how long to book out for results. Please see that this pt is fit in as soon as possible due to his inconvenience on [facility] part. Thank you.

## 2017-05-22 ENCOUNTER — TELEPHONE (OUTPATIENT)
Dept: NEUROLOGY | Facility: CLINIC | Age: 72
End: 2017-05-22

## 2017-05-22 NOTE — TELEPHONE ENCOUNTER
Patient will keep his appointment.  Offered cancellation and he declined.  Did not want to wait to August.

## 2017-06-02 ENCOUNTER — OFFICE VISIT (OUTPATIENT)
Dept: PSYCHIATRY | Facility: CLINIC | Age: 72
End: 2017-06-02
Payer: MEDICARE

## 2017-06-02 DIAGNOSIS — R41.3 MEMORY LOSS: ICD-10-CM

## 2017-06-02 DIAGNOSIS — R41.89 COGNITIVE IMPAIRMENT: Primary | ICD-10-CM

## 2017-06-02 PROCEDURE — 90791 PSYCH DIAGNOSTIC EVALUATION: CPT | Mod: S$PBB,,, | Performed by: PSYCHOLOGIST

## 2017-06-02 PROCEDURE — 96118 PR NEUROPSYCH TESTING BY PSYCH/PHYS: CPT | Mod: 59,S$PBB,, | Performed by: PSYCHOLOGIST

## 2017-06-02 PROCEDURE — 96119 PR NEUROPSYCH TESTING BY TECHNICIAN: CPT | Mod: 59,S$PBB,, | Performed by: PSYCHOLOGIST

## 2017-06-11 ENCOUNTER — TELEPHONE (OUTPATIENT)
Dept: PSYCHIATRY | Facility: CLINIC | Age: 72
End: 2017-06-11

## 2017-06-11 NOTE — PROGRESS NOTES
Psychiatry Initial Visit (PhD/LCSW)    Date:  6/2/2017    CPT Code: 56858    Referred by: Glenn Abad Jr., M.D.    Chief complaint/reason for encounter:  Neuropsychological Evaluation    Clinical status of patient:  Outpatient    Met with:  Patient and wife    History of present illness: Mr. Rohan Abarca is a 72 year old white  male referred for Neuropsychological Evaluation on an outpatient basis due to possible memory decline for the few years. He reported he sometimes does not recall what people have told him and sometimes cannot find the exact word he is looking for. He reported the word-finding difficulty has been present for 3-4 years. He is not concerned about this. His wife reported he has been forgetful for 2 years or so. She reported he is hard of hearing and wonders if sometimes he did not hear what she said as opposed to not remembering what she said. She also reported he has become a bit gruff or irritable. There is no violence and he does not even seem particularly angry, just gruff. He admitted he gets frustrated when he drops things as this never used to happen to him. They also reported that he misplaces his pocket knife; has to use lists/notes/calendar to remind himself of things; has left food cooking on the grill too long; has difficulty operating the TV remote at home; and loses his train of thought in conversation. Word-finding difficulty was not observed in interview.  Mr. Abarca drives, reads, and manages his medications without difficulty. He completes crossword puzzles daily. His wife has always managed the family finances. No precipitant could be identified. Regarding family history of memory problems/dementia, he reported his mother may have had memory problems but he believes this was due to numerous mini-strokes. Mr. Abarca has no prior psychiatric history. He also denied current adjustment problems. He was pleasant and cooperative in interview.     Pain scale:  noncontributory    Symptoms:  Mood: denied  Anxiety:  denied  Substance abuse: denied  Cognitive functioning: possible memory decline    Psychiatric history: none    Medical history: memory loss, hard of hearing, HTN, bilateral cataracts, colon polyp, prostate cancer S/P prostatectomy, lumbar spondylosis, and rosacea. MRI of the brain completed on 2/9/2017 yielded these results: 1. No acute intracranial abnormality appreciated. 2. Probable chronic microvascular ischemic changes within the periventricular and subcortical white matter of the supratentorial brain which appear greater in extent than one would expect for patient of this chronologic age. MMSE completed in Neurology on 1/26/2017 yielded a score of 27/30, in the average range.    Family history of psychiatric illness: nephew with paranoia, committed suicide    Social history (marriage, employment, etc.):  2 years of college. 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 . 2 children. Lives with wife and granddaughter. No Pentecostal preference. Enjoys reading and doing crossword puzzles.    Substance use:   Alcohol: little bit   Drugs: no   Tobacco: quit smoking 1967   Caffeine: 3 cups coffee, 3-4 glasses of tea per day    Strengths and Liabilities:   Strength:  Pt is expressive/articulate.   Liability:  Pt has possbile memory loss.    Mental Status Exam:  General appearance:  appears stated age, neatly dressed, well groomed  Speech:  normal rate and tone  Level of cooperation:  cooperative  Thought processes:  logical, goal-directed  Mood:  euthymic  Thought content:  no illusions, no visual hallucinations, no auditory hallucinations, no delusions, no active or passive homicidal thoughts, no active or passive suicidal ideation, no obsessions, no compulsions, no violence  Affect:  appropriate  Orientation:  oriented to person, place, and time  Memory:  Recent memory:  3 of 3  objects after brief delay.    Remote memory - intact  Attention span and concentration:  spelled HOUSE forward and backwards  Fund of general knowledge: 4 of 4 recent presidents  Abstract reasoning:    Similarities: concrete   Proverbs: abstract.  Judgment and insight: fair  Language:  intact    Diagnostic impressions:   Cognitive impairment R41.89  Memory loss R41.3          Plan:  Pt will complete Neuropsychological testing.  Report of Neuropsychological Evaluation will follow. It can be accessed through the Chart Review activity in Epic under the Notes tab.  It will be titled Psych Testing.    Return to clinic:  as scheduled    Length of time:  45 minutes

## 2017-06-11 NOTE — PSYCH TESTING
[facility] [address] [place]  [address]  [phone]    REPORT OF NEUROPSYCHOLOGICAL EVALUATION    NAME: Rohan Abarca  OC #: [id number]  : 2017    REFERRED BY: Glenn Abad Jr., M.D.    EVALUATED BY:  Mercedez Call, Ph.D., Clinical Psychologist  Ian Dixon M.S., Psychometrician    DATE OF EVALUATION: 2017    EVALUATION PROCEDURES AND TIME:  Conducted by Psychologist:  Interpretation and report of test data  Review and integration of diagnostic interview, medical record, and test data  Conducted by Technician:  Repeatable Battery for the Assessment of Neuropsychological Status (RBANS)  Temporal Orientation Test  Naming Test from the Neuropsychological Assessment Battery (NAB)  Controlled Oral Word Association Test  Facial Recognition Test  Alfredo Visual Motor Gestalt Test  Wechsler Memory Scale IV Logical Memory & Visual Reproduction Battery (WMS-IV LMVR)  Wisconsin Card Sorting Test - 64 (WCST-64)  Trail Making Test, Parts A & B  Cid Depression Inventory - II (BDI-II)  Cid Anxiety Inventory (MARILIN)  Time: CPT Code 14003 - 2 hours; CPT Code 41376 - 2 hours    EVALUATION FINDINGS:  The diagnostic interview revealed Mr. Rohan Abarca is a 72 year old right-handed white male referred for Neuropsychological Evaluation on an outpatient basis due to possible memory decline for the past few years. He reported he sometimes does not recall what people have told him and sometimes cannot find the exact word he is looking for. He reported the word-finding difficulty has been present for 3-4 years. He is not concerned about this. His wife reported he has been forgetful for 2 years or so. She reported he is hard of hearing and wonders if sometimes he did not hear what she said as opposed to not remembering what she said. She also reported he has become a bit gruff or irritable. There is no violence and he does not even seem particularly angry, just gruff. He admitted he gets  frustrated when he drops things as this never used to happen to him. They also reported that he misplaces his pocket knife; has to use lists/notes/calendar to remind himself of things; has left food cooking on the grill too long; has difficulty operating the TV remote at home; and loses his train of thought in conversation. Word-finding difficulty was not observed in interview.  Mr. Abarca drives, reads, and manages his medications without difficulty. He completes crossword puzzles daily. His wife has always managed the family finances. No precipitant could be identified. Regarding family history of memory problems/dementia, he reported his mother may have had memory problems but he believes this was due to numerous mini-strokes. Mr. Abarca has no prior psychiatric history. He also denied current adjustment problems. He was pleasant and cooperative in interview. The Mental Status Exam suggested reduced abstraction ability but was otherwise within normal limits.    The medical record also revealed prior medical history of memory loss, hard of hearing, HTN, bilateral cataracts, colon polyp, prostate cancer S/P prostatectomy, lumbar spondylosis, and rosacea. MRI of the brain completed on 2/9/2017 yielded these results: 1. No acute intracranial abnormality appreciated. 2. Probable chronic microvascular ischemic changes within the periventricular and subcortical white matter of the supratentorial brain which appear greater in extent than one would expect for patient of this chronologic age. MMSE completed in Neurology on 1/26/2017 yielded a score of 27/30, in the average range     TEST DATA:  Neuropsychological tests administered by the technician revealed that the patient reported he completed 2 years of college. 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 He retired in 2001 when [employer] was bought out. He was alert and cooperative during the evaluation.  Effort on all tests was  satisfactory to produce valid results.    Mr. Abarca obtained a total score of 106 on the RBANS, which is at the 66th percentile, suggesting average general cognitive functioning.  Five areas were tested.  The Immediate Memory Index revealed high average ability to remember verbal information immediately after it is presented, with a score of 112 at the 79th percentile.  The Visuospatial/Constructional Index revealed average ability to perceive spatial relations and to construct a spatially accurate copy of a drawing, with a score of 92 at the 30th percentile. Visuospatial perception and constructional ability were average. The Language Index revealed average ability to respond verbally to either naming or retrieving learned material, with a score of 99 at the 47th percentile. Naming and verbal fluency were average. Attention, or the capacity to remember and manipulate both visually and orally presented information in short-term storage, was high average, with a score of 112 at the 79th percentile. Delayed memory, or anterograde memory capacity, was high average, with a score of 110 at the 75th percentile.  Subtest performances revealed superior story recall, high average list recall, and average list recognition and figure recall. For reference, the expected average score on each index is 100, which is at the 50th percentile.    The Temporal Orientation Test indicated he was oriented to day of the week, day of the month, month, year, and time of day.  His score on this measure suggested average temporal orientation.    Naming, as assessed by the NAB Test, was above average.  Verbal fluency, as assessed by the Controlled Oral Word Association Test, was in the high average range.    Performance on the Facial Recognition Test suggested no prosopagnosia was present, as his score was in the high average range.  Reproductions of Alfredo Visual Motor Gestalt Test designs revealed no evidence of significant constructional  dyspraxia.    The WMS-IV LMVR was administered to further assess memory.  His Immediate Memory Index of 119 and his Delayed Memory Index of 118 were in the high average range. His Auditory Memory Index of 121 was in the superior range and his Visual Memory Index of 110 was in the high average range. The expected average score for each index is 100. Scaled scores of the memory indexes revealed superior immediate and delayed auditory memory and high average immediate and delayed visual memory.    The WCST-64 was administered to assess abstract reasoning and conceptualization.  His categories completed score (4) was in the average range. His total errors score (11) and his perseverative errors score (6) were in the above average range.  His performance suggested above average abstract reasoning skills.    His score on the Trail Making Test, Part A, (49 seconds for completion) indicated that psychomotor speed was in the moderately impaired range.  His score on Part B (130 seconds for completion) indicated that his ability to handle complex relationships which require rapid change of set, or mental flexibility, was in the moderately impaired range.    The BDI-II was administered to assess for depression.  His score of 5 suggested minimal depression was present.  The MARILIN was administered to assess for anxiety.  His score of 2 suggested minimal anxiety was present.    SUMMARY AND RECOMMENDATIONS:  Mr. Abarca was referred for Neuropsychological Evaluation on an outpatient basis due to possible memory decline for the past few years.  His general cognitive abilities as assessed by the RBANS were in the average range, with average visuospatial/constructional abilities and language; and high average immediate verbal memory, attention, and delayed memory.  Further assessment of specific cognitive abilities revealed no deficits in temporal orientation, naming, verbal fluency, facial recognition, constructional ability, or  abstract reasoning. Psychomotor speed and mental flexibility were moderately impaired.  Additional memory assessment revealed superior immediate and delayed auditory memory and high average immediate and delayed visual memory.  Personality test data revealed no evidence of significant depression or anxiety.  Neuropsychological test results are within normal limits with the exception of some relatively insignificant impairment in psychomotor speed and mental flexibility. Memory test performances, in particular, were in the average, high average, and superior ranges. Reassurance that his memory appears unimpaired may be helpful.        Interpretation and report and coding were completed on 6/11/2017.

## 2017-06-23 ENCOUNTER — OFFICE VISIT (OUTPATIENT)
Dept: NEUROLOGY | Facility: CLINIC | Age: 72
End: 2017-06-23
Payer: MEDICARE

## 2017-06-23 VITALS
HEIGHT: 69 IN | SYSTOLIC BLOOD PRESSURE: 129 MMHG | HEART RATE: 82 BPM | DIASTOLIC BLOOD PRESSURE: 77 MMHG | RESPIRATION RATE: 20 BRPM | WEIGHT: 252.88 LBS | BODY MASS INDEX: 37.45 KG/M2

## 2017-06-23 DIAGNOSIS — I10 ESSENTIAL HYPERTENSION: ICD-10-CM

## 2017-06-23 DIAGNOSIS — R41.89 SUBJECTIVE MEMORY COMPLAINTS: Primary | ICD-10-CM

## 2017-06-23 DIAGNOSIS — I67.9 CEREBROVASCULAR DISEASE: ICD-10-CM

## 2017-06-23 PROCEDURE — 99213 OFFICE O/P EST LOW 20 MIN: CPT | Mod: PBBFAC,PN | Performed by: PSYCHIATRY & NEUROLOGY

## 2017-06-23 PROCEDURE — 99999 PR PBB SHADOW E&M-EST. PATIENT-LVL III: CPT | Mod: PBBFAC,,, | Performed by: PSYCHIATRY & NEUROLOGY

## 2017-06-23 PROCEDURE — 1126F AMNT PAIN NOTED NONE PRSNT: CPT | Mod: ,,, | Performed by: PSYCHIATRY & NEUROLOGY

## 2017-06-23 PROCEDURE — 1159F MED LIST DOCD IN RCRD: CPT | Mod: ,,, | Performed by: PSYCHIATRY & NEUROLOGY

## 2017-06-23 PROCEDURE — 99214 OFFICE O/P EST MOD 30 MIN: CPT | Mod: S$PBB,,, | Performed by: PSYCHIATRY & NEUROLOGY

## 2017-06-23 NOTE — PROGRESS NOTES
Date of service:  6/23/2017    Chief complaint:  Memory Loss    History of present illness:  The patient is a 72 y.o. male referred for evaluation of memory loss.  He was seen with his wife who supplied additional history. This issue began a few years ago. It involves short-term memory more than long-term memory.  He reports no difficulties with executive function.  There are no clear issues with multiple step processes. He has no problems with ADLs. He does not get lost in familiar areas. There are no hallucinations. There are no personality changes.  He does not endorse depression.      Past Medical History:   Diagnosis Date    Bilateral cataracts     hx    Colon polyp     benign    HBP (high blood pressure)     History of detached retina repair     left    REGAN (obstructive sleep apnea)     uses CPAP    Prostate cancer     Rosacea        Past Surgical History:   Procedure Laterality Date    BUNIONECTOMY Right     CATARACT EXTRACTION BILATERAL W/ ANTERIOR VITRECTOMY Bilateral     COLONOSCOPY N/A 3/20/2017    Procedure: COLONOSCOPY;  Surgeon: Carl Marcelino MD;  Location[facility];  Service: Endoscopy;  Laterality: N/A;    HERNIA REPAIR      x 2    JOINT REPLACEMENT Right 2012    per Dr. Heriberto Colón    JOINT REPLACEMENT Left 2004    per Dr. Heriberto Colón    KNEE SURGERY      left , right    MASTOID SURGERY Right     right    PROSTATECTOMY  2012    RETINAL DETACHMENT SURGERY      left    ROTATOR CUFF REPAIR      bilateral    SHOULDER SURGERY Left     RCR per Dr. Heriberto Colón    SHOULDER SURGERY Right     RCR per Dr. Heriberto Colón    TONSILLECTOMY, ADENOIDECTOMY         Family History   Problem Relation Age of Onset    Cancer Sister     Stroke Sister     Cancer Sister     Stroke      Heart attack         Social History     Social History    Marital status:      Spouse name: N/A    Number of children: N/A    Years of education: N/A     Occupational History    Not on file.  "    Social History Main Topics    Smoking status: Former Smoker     Packs/day: 0.50     Years: 5.00     Types: Cigarettes     Quit date: 12/19/1968    Smokeless tobacco: Never Used    Alcohol use 1.0 oz/week     2 Standard drinks or equivalent per week      Comment: social    Drug use: No    Sexual activity: Yes     Partners: Female     Other Topics Concern    Not on file     Social History Narrative    Was working 4 days a week delivering seafood, unloading and loading truck by hand, retired Friday    No dietary restrictions        Review of Systems   General/Constitutional:  No unintentional weight loss, No change in appetite  Eyes/Vision:  No change in vision, No double vision  ENT:  No frequent nose bleeds, No ringing in the ears  Respiratory:  No cough, No wheezing  Cardiovascular:  No chest pain, No palpitations  Gastrointestinal:  No jaundice, No nausea/vomiting  Genitourinary:  No incontinence, No burning with urination  Hematologic/Lymphatic:  No easy bruising/bleeding, No night sweats  Neurological:  No numbness, No weakness  Endocrine:  No fatigue, No heat/cold intolerance  Allergy/Immunologic:  No fevers, No chills  Musculoskeletal:  + muscle pain, + joint pain   Psychiatric:  No thoughts of harming self/others, No depression  Integumentary:  No rashes, No sores that do not heal       Physical exam:  /77   Pulse 82   Resp 20   Ht 5' 9" (1.753 m)   Wt 114.7 kg (252 lb 13.9 oz)   BMI 37.34 kg/m²   General: Well developed, well nourished.  No acute distress.  HEENT: Atraumatic, normocephalic.  Neck: Supple, trachea midline.  Cardiovascular: Regular rate and rhythm.  Pulmonary: No increased work of breathing.  Abdomen/GI: No guarding.  Musculoskeletal: No obvious joint deformities, moves all extremities well.    Neurological exam:  Mental status: Awake and alert.  Oriented x4.  Speech fluent and appropriate.  Recent and remote memory appear to be slightly impaired.  Fund of knowledge normal. " " MMSE = 27/30.  Cranial nerves: Pupils equal round and reactive to light, extraocular movements intact, facial strength and sensation intact bilaterally, palate and tongue midline, hearing grossly intact bilaterally.  Motor: 5 out of 5 strength throughout the upper and lower extremities bilaterally. Normal bulk and tone.  Sensation: Intact to light touch and temperature bilaterally.  DTR: 1+ at the knees and biceps bilaterally.  Coordination: Finger-nose-finger testing intact bilaterally.  Gait: Nonfocal gait. Mild difficulty with tandem.      Data base:  Notes of the referring provider were reviewed.  Briefly summarized, these related to a check-up and addressed a number of issues including HTN, hearing loss, memory loss, and carotid stenosis.    Labs ordered at our initial visit were unremarkable.    MRI brain (1/17):  "1. No acute intracranial abnormality appreciated.  2.  Probable chronic microvascular ischemic changes within the periventricular and subcortical white matter of the supratentorial brain which appear greater in extent than one would expect for patient of this chronologic age.  3.  Sinus disease  4.  Probable poor pneumatization of the right mastoid air cells and mild mucoperiosteal thickening within the left mastoid air cells."    Neuropsychological testing (6/17):  "Mr. Abarca was referred for Neuropsychological Evaluation on an outpatient basis due to possible memory decline for the past few years.  His general cognitive abilities as assessed by the RBANS were in the average range, with average visuospatial/constructional abilities and language; and high average immediate verbal memory, attention, and delayed memory.  Further assessment of specific cognitive abilities revealed no deficits in temporal orientation, naming, verbal fluency, facial recognition, constructional ability, or abstract reasoning. Psychomotor speed and mental flexibility were moderately impaired.  Additional memory " "assessment revealed superior immediate and delayed auditory memory and high average immediate and delayed visual memory.  Personality test data revealed no evidence of significant depression or anxiety.  Neuropsychological test results are within normal limits with the exception of some relatively insignificant impairment in psychomotor speed and mental flexibility. Memory test performances, in particular, were in the average, high average, and superior ranges. Reassurance that his memory appears unimpaired may be helpful."    Assessment and plan:  The patient is a 72 y.o. male referred for evaluation of memory loss. Ultimately, it was determined that his memory is intact.  I have provided reassurance.      We also discussed vascular health issues in light of his MRI results.  I have advised the patient to take ASA 81 mg daily.  He is to follow with his PCP for other stroke risk reduction approaches including BP regulation, lipid profile optimization, and so forth.    We will see him back on a prn basis.  "

## 2017-07-18 ENCOUNTER — TELEPHONE (OUTPATIENT)
Dept: DERMATOLOGY | Facility: CLINIC | Age: 72
End: 2017-07-18

## 2017-07-18 NOTE — TELEPHONE ENCOUNTER
----- Message from Tu Solano sent at 7/18/2017  1:35 PM CDT -----  Contact: same  Patient called in and is an established patient at [facility] but would be new to Dr. Gomez.  Patient would like to schedule a consult to check out some moles and also patient stated he has rosacea.    Patient call back number is [phone]

## 2017-07-19 ENCOUNTER — OFFICE VISIT (OUTPATIENT)
Dept: DERMATOLOGY | Facility: CLINIC | Age: 72
End: 2017-07-19
Payer: MEDICARE

## 2017-07-19 ENCOUNTER — TELEPHONE (OUTPATIENT)
Dept: DERMATOLOGY | Facility: CLINIC | Age: 72
End: 2017-07-19

## 2017-07-19 VITALS — HEIGHT: 69 IN | BODY MASS INDEX: 37.33 KG/M2 | WEIGHT: 252 LBS

## 2017-07-19 DIAGNOSIS — L82.1 SEBORRHEIC KERATOSES: ICD-10-CM

## 2017-07-19 DIAGNOSIS — L21.9 SEBORRHEIC DERMATITIS: ICD-10-CM

## 2017-07-19 DIAGNOSIS — L71.1 RHINOPHYMA: Primary | ICD-10-CM

## 2017-07-19 DIAGNOSIS — L81.8 HYPERPIGMENTATION DUE TO MINOCYCLINE: ICD-10-CM

## 2017-07-19 DIAGNOSIS — T36.4X5A HYPERPIGMENTATION DUE TO MINOCYCLINE: ICD-10-CM

## 2017-07-19 DIAGNOSIS — L81.4 LENTIGINES: ICD-10-CM

## 2017-07-19 PROCEDURE — 1126F AMNT PAIN NOTED NONE PRSNT: CPT | Mod: ,,, | Performed by: DERMATOLOGY

## 2017-07-19 PROCEDURE — 99203 OFFICE O/P NEW LOW 30 MIN: CPT | Mod: S$PBB,,, | Performed by: DERMATOLOGY

## 2017-07-19 PROCEDURE — 1159F MED LIST DOCD IN RCRD: CPT | Mod: ,,, | Performed by: DERMATOLOGY

## 2017-07-19 PROCEDURE — 99999 PR PBB SHADOW E&M-EST. PATIENT-LVL II: CPT | Mod: PBBFAC,,, | Performed by: DERMATOLOGY

## 2017-07-19 PROCEDURE — 99212 OFFICE O/P EST SF 10 MIN: CPT | Mod: PBBFAC,PO | Performed by: DERMATOLOGY

## 2017-07-19 RX ORDER — FLUTICASONE PROPIONATE 0.5 MG/ML
LOTION TOPICAL
Qty: 120 ML | Refills: 0 | Status: SHIPPED | OUTPATIENT
Start: 2017-07-19 | End: 2020-08-14

## 2017-07-19 RX ORDER — MINOCYCLINE HYDROCHLORIDE 100 MG/1
CAPSULE ORAL
Qty: 30 CAPSULE | Refills: 1 | Status: SHIPPED | OUTPATIENT
Start: 2017-07-19 | End: 2017-09-17 | Stop reason: SDUPTHER

## 2017-07-19 NOTE — PROGRESS NOTES
Subjective:       Patient ID:  Rohan Abarca is a 72 y.o. male who presents for No chief complaint on file.    Presents today with complaint of rosacea that has not resolved with 100 mg monodox for last 6 months. Red and bumpy area to nose.  Been using some type of OTC cream advised by another dermatologist but has not improved.  Does not use sunscreen but uses a wide brim hat when out in sun.    Also has collection of growths behind left ear within last two years.     Neg PMHx skin ca.  Neg FMHx skin ca.    previously on minocycline for rosacea, feels works better than doxy  Has been followed for rosacea for 20 years, c/o enlargement of nose during this time period.     Using cutivate q week for scaling on beard, brows, asx. Desires refill cutivate    Previously on minocycline for years, c/o darkening of skin on neck and forehead.     White spots on legs, asx, no tx.     Review of Systems   Constitutional: Negative for weight loss, weight gain and fatigue.   Skin: Positive for wears hat. Negative for daily sunscreen use and activity-related sunscreen use.   Hematologic/Lymphatic: Does not bruise/bleed easily.        Objective:    Physical Exam   Constitutional: He appears well-developed and well-nourished. No distress.   HENT:   Mouth/Throat: Lips normal.    Eyes: Lids are normal.  No conjunctival no injection.   Cardiovascular: There is no local extremity swelling and no dependent edema.     Neurological: He is alert and oriented to person, place, and time. He is not disoriented.   Psychiatric: He has a normal mood and affect.   Skin:   Areas Examined (abnormalities noted in diagram):   Scalp / Hair Palpated and Inspected  Head / Face Inspection Performed  Neck Inspection Performed  Chest / Axilla Inspection Performed  Abdomen Inspection Performed  Back Inspection Performed  RUE Inspected  LUE Inspection Performed  RLE Inspected  LLE Inspection Performed                   Diagram Legend     Erythematous scaling  macule/papule c/w actinic keratosis       Vascular papule c/w angioma      Pigmented verrucoid papule/plaque c/w seborrheic keratosis      Yellow umbilicated papule c/w sebaceous hyperplasia      Irregularly shaped tan macule c/w lentigo     1-2 mm smooth white papules consistent with Milia      Movable subcutaneous cyst with punctum c/w epidermal inclusion cyst      Subcutaneous movable cyst c/w pilar cyst      Firm pink to brown papule c/w dermatofibroma      Pedunculated fleshy papule(s) c/w skin tag(s)      Evenly pigmented macule c/w junctional nevus     Mildly variegated pigmented, slightly irregular-bordered macule c/w mildly atypical nevus      Flesh colored to evenly pigmented papule c/w intradermal nevus       Pink pearly papule/plaque c/w basal cell carcinoma      Erythematous hyperkeratotic cursted plaque c/w SCC      Surgical scar with no sign of skin cancer recurrence      Open and closed comedones      Inflammatory papules and pustules      Verrucoid papule consistent consistent with wart     Erythematous eczematous patches and plaques     Dystrophic onycholytic nail with subungual debris c/w onychomycosis     Umbilicated papule    Erythematous-base heme-crusted tan verrucoid plaque consistent with inflamed seborrheic keratosis     Erythematous Silvery Scaling Plaque c/w Psoriasis     See annotation      Assessment / Plan:        Rhinophyma  Discussed changes seen today are predominantly chronic  Likely will not improve with topicals or oral therapies  Rosacea handout given, triggers discussed   Will refer to plastics for consideration resurfacing of rhinophyma  Declines doxy, declines topical therapies  Discussed minocycline will exacerbate hyperpigmentation. Pt aware and desires to proceed.   -     minocycline (MINOCIN,DYNACIN) 100 MG capsule; 1 po daily  Dispense: 30 capsule; Refill: 1  Discussed benefits and risks of minocycline therapy including but not limited to vertigo, tinnitus, lupus-like  syndrome, drug-induced hepatitis, increased sun sensitivity, and blue-black pigmentation of skin.    -     Ambulatory referral to Plastic Surgery    Seborrheic dermatitis  Mild  Discussed TCS can exacerbate rosacea  -     CUTIVATE 0.05 % Lotn; AAA qd  Dispense: 120 mL; Refill: 0  discussed avoiding chronic use and to use only on affected areas- risk atrophy, striae, ecchymoses, hypopigmentation      Seborrheic keratoses, trunk and postauricular  These are benign inherited growths without a malignant potential. Reassurance given to patient. No treatment is necessary.       Lentigines  This is a benign hyperpigmented sun induced lesion. Daily sun protection will reduce the number of new lesions. Treatment of these benign lesions are considered cosmetic.    Hyperpigmentation due to minocycline  Face and neck  Discussed benefits and risks of minocycline therapy including but not limited to vertigo, tinnitus, lupus-like syndrome, drug-induced hepatitis, increased sun sensitivity, and blue-black pigmentation of skin.             Return if symptoms worsen or fail to improve.

## 2017-07-20 NOTE — TELEPHONE ENCOUNTER
----- Message from Filomena Gomez MD sent at 7/20/2017  3:51 PM CDT -----  Contact: Xavier  That's fine. Ok to give pharmacy verbal order  ----- Message -----  From: Gabi Cueto MA  Sent: 7/20/2017   3:37 PM  To: Filomena Gomez MD        ----- Message -----  From: Dary Zamudio  Sent: 7/20/2017   3:02 PM  To: Patricia Butt Staff    Patient is asking for Rx Minocycline to be changed to generic due to cost. Please call [phone]. Thanks!

## 2017-09-17 DIAGNOSIS — L71.1 RHINOPHYMA: ICD-10-CM

## 2017-09-18 RX ORDER — MINOCYCLINE HYDROCHLORIDE 100 MG/1
CAPSULE ORAL
Qty: 30 CAPSULE | Refills: 0 | Status: SHIPPED | OUTPATIENT
Start: 2017-09-18 | End: 2017-12-06

## 2017-10-16 DIAGNOSIS — L71.1 RHINOPHYMA: ICD-10-CM

## 2017-10-16 RX ORDER — MINOCYCLINE HYDROCHLORIDE 100 MG/1
CAPSULE ORAL
Qty: 30 CAPSULE | Refills: 0 | Status: SHIPPED | OUTPATIENT
Start: 2017-10-16 | End: 2017-12-15

## 2017-11-02 ENCOUNTER — CLINICAL SUPPORT (OUTPATIENT)
Dept: OCCUPATIONAL MEDICINE | Facility: CLINIC | Age: 72
End: 2017-11-02

## 2017-11-02 DIAGNOSIS — Z02.89 ENCOUNTER FOR OCCUPATIONAL PHYSICAL EXAMINATION: Primary | ICD-10-CM

## 2017-11-02 LAB
BILIRUB UR QL STRIP: NEGATIVE
GLUCOSE UR QL STRIP: NEGATIVE
KETONES UR QL STRIP: NEGATIVE
LEUKOCYTE ESTERASE UR QL STRIP: POSITIVE
PH, POC UA: 7 (ref 5–8)
POC BLOOD, URINE: NEGATIVE
POC NITRATES, URINE: NEGATIVE
PROT UR QL STRIP: NEGATIVE
SP GR UR STRIP: 1.01 (ref 1–1.03)
UROBILINOGEN UR STRIP-ACNC: POSITIVE (ref 0.3–2.2)

## 2017-11-02 PROCEDURE — 99499 UNLISTED E&M SERVICE: CPT | Mod: S$GLB,,, | Performed by: FAMILY MEDICINE

## 2017-11-14 DIAGNOSIS — L71.1 RHINOPHYMA: ICD-10-CM

## 2017-11-14 RX ORDER — MINOCYCLINE HYDROCHLORIDE 100 MG/1
CAPSULE ORAL
Qty: 30 CAPSULE | Refills: 0 | Status: SHIPPED | OUTPATIENT
Start: 2017-11-14 | End: 2017-12-06

## 2017-12-06 ENCOUNTER — LAB VISIT (OUTPATIENT)
Dept: LAB | Facility: HOSPITAL | Age: 72
End: 2017-12-06
Attending: NURSE PRACTITIONER
Payer: MEDICARE

## 2017-12-06 ENCOUNTER — TELEPHONE (OUTPATIENT)
Dept: FAMILY MEDICINE | Facility: CLINIC | Age: 72
End: 2017-12-06

## 2017-12-06 ENCOUNTER — OFFICE VISIT (OUTPATIENT)
Dept: FAMILY MEDICINE | Facility: CLINIC | Age: 72
End: 2017-12-06
Payer: MEDICARE

## 2017-12-06 VITALS
OXYGEN SATURATION: 95 % | DIASTOLIC BLOOD PRESSURE: 98 MMHG | HEART RATE: 62 BPM | SYSTOLIC BLOOD PRESSURE: 160 MMHG | TEMPERATURE: 99 F

## 2017-12-06 DIAGNOSIS — Z90.79 S/P PROSTATECTOMY: ICD-10-CM

## 2017-12-06 DIAGNOSIS — I10 ESSENTIAL HYPERTENSION: Primary | ICD-10-CM

## 2017-12-06 DIAGNOSIS — Z12.5 ENCOUNTER FOR SCREENING FOR MALIGNANT NEOPLASM OF PROSTATE: ICD-10-CM

## 2017-12-06 DIAGNOSIS — Z85.46 PERSONAL HISTORY OF PROSTATE CANCER: ICD-10-CM

## 2017-12-06 DIAGNOSIS — I10 ESSENTIAL HYPERTENSION: ICD-10-CM

## 2017-12-06 LAB
ALBUMIN SERPL BCP-MCNC: 3.8 G/DL
ALP SERPL-CCNC: 65 U/L
ALT SERPL W/O P-5'-P-CCNC: 22 U/L
ANION GAP SERPL CALC-SCNC: 6 MMOL/L
AST SERPL-CCNC: 23 U/L
BASOPHILS # BLD AUTO: 0.03 K/UL
BASOPHILS NFR BLD: 0.5 %
BILIRUB SERPL-MCNC: 0.8 MG/DL
BUN SERPL-MCNC: 18 MG/DL
CALCIUM SERPL-MCNC: 9.8 MG/DL
CHLORIDE SERPL-SCNC: 106 MMOL/L
CHOLEST SERPL-MCNC: 182 MG/DL
CHOLEST/HDLC SERPL: 4.4 {RATIO}
CO2 SERPL-SCNC: 33 MMOL/L
COMPLEXED PSA SERPL-MCNC: <0.01 NG/ML
CREAT SERPL-MCNC: 1 MG/DL
DIFFERENTIAL METHOD: ABNORMAL
EOSINOPHIL # BLD AUTO: 0.1 K/UL
EOSINOPHIL NFR BLD: 1.5 %
ERYTHROCYTE [DISTWIDTH] IN BLOOD BY AUTOMATED COUNT: 12.7 %
EST. GFR  (AFRICAN AMERICAN): >60 ML/MIN/1.73 M^2
EST. GFR  (NON AFRICAN AMERICAN): >60 ML/MIN/1.73 M^2
GLUCOSE SERPL-MCNC: 100 MG/DL
HCT VFR BLD AUTO: 46.2 %
HDLC SERPL-MCNC: 41 MG/DL
HDLC SERPL: 22.5 %
HGB BLD-MCNC: 15.9 G/DL
IMM GRANULOCYTES # BLD AUTO: 0.06 K/UL
IMM GRANULOCYTES NFR BLD AUTO: 0.9 %
LDLC SERPL CALC-MCNC: 116.8 MG/DL
LYMPHOCYTES # BLD AUTO: 1.5 K/UL
LYMPHOCYTES NFR BLD: 23.4 %
MCH RBC QN AUTO: 31.7 PG
MCHC RBC AUTO-ENTMCNC: 34.4 G/DL
MCV RBC AUTO: 92 FL
MONOCYTES # BLD AUTO: 0.8 K/UL
MONOCYTES NFR BLD: 12.6 %
NEUTROPHILS # BLD AUTO: 4 K/UL
NEUTROPHILS NFR BLD: 61.1 %
NONHDLC SERPL-MCNC: 141 MG/DL
NRBC BLD-RTO: 0 /100 WBC
PLATELET # BLD AUTO: 192 K/UL
PMV BLD AUTO: 11.3 FL
POTASSIUM SERPL-SCNC: 4.4 MMOL/L
PROT SERPL-MCNC: 6.7 G/DL
RBC # BLD AUTO: 5.02 M/UL
SODIUM SERPL-SCNC: 145 MMOL/L
TRIGL SERPL-MCNC: 121 MG/DL
TSH SERPL DL<=0.005 MIU/L-ACNC: 2.06 UIU/ML
WBC # BLD AUTO: 6.53 K/UL

## 2017-12-06 PROCEDURE — 80061 LIPID PANEL: CPT

## 2017-12-06 PROCEDURE — 84153 ASSAY OF PSA TOTAL: CPT

## 2017-12-06 PROCEDURE — 85025 COMPLETE CBC W/AUTO DIFF WBC: CPT

## 2017-12-06 PROCEDURE — 80053 COMPREHEN METABOLIC PANEL: CPT

## 2017-12-06 PROCEDURE — 36415 COLL VENOUS BLD VENIPUNCTURE: CPT | Mod: PO

## 2017-12-06 PROCEDURE — 99214 OFFICE O/P EST MOD 30 MIN: CPT | Mod: S$PBB,,, | Performed by: NURSE PRACTITIONER

## 2017-12-06 PROCEDURE — 84443 ASSAY THYROID STIM HORMONE: CPT

## 2017-12-06 PROCEDURE — 99999 PR PBB SHADOW E&M-EST. PATIENT-LVL III: CPT | Mod: PBBFAC,,, | Performed by: NURSE PRACTITIONER

## 2017-12-06 PROCEDURE — 99213 OFFICE O/P EST LOW 20 MIN: CPT | Mod: PBBFAC,PO | Performed by: NURSE PRACTITIONER

## 2017-12-06 RX ORDER — AMLODIPINE BESYLATE 5 MG/1
5 TABLET ORAL DAILY
Qty: 90 TABLET | Refills: 3 | Status: SHIPPED | OUTPATIENT
Start: 2017-12-06 | End: 2018-12-06 | Stop reason: SDUPTHER

## 2017-12-06 RX ORDER — ENALAPRIL MALEATE 20 MG/1
40 TABLET ORAL DAILY
Qty: 180 TABLET | Refills: 3 | Status: SHIPPED | OUTPATIENT
Start: 2017-12-06 | End: 2020-08-14

## 2017-12-06 NOTE — PROGRESS NOTES
Subjective:       Patient ID: Rohan Abarca is a 72 y.o. male.    Chief Complaint: Annual Exam    Pt reports, he ran out of his amlodipine last Friday.       Hypertension   This is a chronic problem. The current episode started more than 1 year ago. Pertinent negatives include no blurred vision, chest pain, palpitations or shortness of breath. Risk factors for coronary artery disease include male gender. Past treatments include calcium channel blockers and ACE inhibitors. The current treatment provides significant improvement. Compliance problems: Pt ran out of his amlodipine.      Vitals:    12/06/17 0813   BP: (!) 160/98   Pulse: 62   Temp: 98.9 °F (37.2 °C)     Review of Systems   Constitutional: Negative for chills, diaphoresis and fever.   HENT: Negative for congestion, ear discharge, ear pain, postnasal drip, rhinorrhea, sinus pain, sinus pressure, sneezing and sore throat.    Eyes: Negative for blurred vision and visual disturbance.   Respiratory: Negative for cough, chest tightness and shortness of breath.    Cardiovascular: Negative for chest pain and palpitations.   Gastrointestinal: Negative for abdominal pain, constipation, diarrhea, nausea and vomiting.   Genitourinary: Negative for decreased urine volume and difficulty urinating.       Objective:      Physical Exam   Constitutional: He is oriented to person, place, and time. He appears well-developed and well-nourished. He is cooperative.   HENT:   Head: Normocephalic and atraumatic.   Right Ear: External ear normal. Tympanic membrane is scarred. Decreased hearing is noted.   Left Ear: External ear normal. Decreased hearing is noted.   Ears:    Nose: Nose normal.   Mouth/Throat: Uvula is midline, oropharynx is clear and moist and mucous membranes are normal.   Eyes: Conjunctivae, EOM and lids are normal. Pupils are equal, round, and reactive to light.   Neck: Trachea normal, normal range of motion and phonation normal. Neck supple.   Cardiovascular:  Normal rate, regular rhythm, S1 normal, S2 normal and normal heart sounds.    Pulses:       Posterior tibial pulses are 2+ on the right side, and 2+ on the left side.   Pulmonary/Chest: Effort normal and breath sounds normal. No respiratory distress.   Abdominal: Soft. Bowel sounds are normal. There is no tenderness.   Musculoskeletal: Normal range of motion.   Lymphadenopathy:        Head (right side): No submental, no submandibular, no tonsillar, no preauricular, no posterior auricular and no occipital adenopathy present.        Head (left side): No submental, no submandibular, no tonsillar, no preauricular, no posterior auricular and no occipital adenopathy present.     He has no cervical adenopathy.   Neurological: He is alert and oriented to person, place, and time. He has normal strength.   Skin: Skin is warm, dry and intact. Capillary refill takes less than 2 seconds.        Psychiatric: He has a normal mood and affect. His speech is normal and behavior is normal. Judgment and thought content normal. Cognition and memory are normal.   Nursing note and vitals reviewed.      Assessment & Plan:       Essential hypertension  -     amLODIPine (NORVASC) 5 MG tablet; Take 1 tablet (5 mg total) by mouth once daily.  Dispense: 90 tablet; Refill: 3  -     CBC auto differential; Future; Expected date: 12/06/2017  -     Comprehensive metabolic panel; Future; Expected date: 12/06/2017  -     Lipid panel; Future; Expected date: 12/06/2017  -     TSH; Future; Expected date: 12/06/2017  -     enalapril (VASOTEC) 20 MG tablet; Take 2 tablets (40 mg total) by mouth once daily.  Dispense: 180 tablet; Refill: 3    S/P prostatectomy  -     PSA, Screening; Future; Expected date: 12/06/2017    Personal history of prostate cancer  -     PSA, Screening; Future; Expected date: 12/06/2017    Encounter for screening for malignant neoplasm of prostate   -     PSA, Screening; Future; Expected date: 12/06/2017    Pt declined  flu,   pneumonia, and tetanus  vaccines today.   Will consider Zoster vaccine.  Will call pt with lab results.       Return if symptoms worsen or fail to improve.

## 2017-12-06 NOTE — TELEPHONE ENCOUNTER
Spoke with pt on the telephone.  Notified that his labs were within acceptable range. Pt verbalized understanding.

## 2017-12-11 ENCOUNTER — OFFICE VISIT (OUTPATIENT)
Dept: OTOLARYNGOLOGY | Facility: CLINIC | Age: 72
End: 2017-12-11
Payer: MEDICARE

## 2017-12-11 VITALS
HEIGHT: 69 IN | BODY MASS INDEX: 38.33 KG/M2 | DIASTOLIC BLOOD PRESSURE: 87 MMHG | WEIGHT: 258.81 LBS | SYSTOLIC BLOOD PRESSURE: 152 MMHG

## 2017-12-11 DIAGNOSIS — H61.22 IMPACTED CERUMEN OF LEFT EAR: ICD-10-CM

## 2017-12-11 DIAGNOSIS — Q16.1 EXTERNAL AUDITORY CANAL ATRESIA: Primary | ICD-10-CM

## 2017-12-11 DIAGNOSIS — H90.3 ASYMMETRICAL SENSORINEURAL HEARING LOSS: ICD-10-CM

## 2017-12-11 PROCEDURE — 99213 OFFICE O/P EST LOW 20 MIN: CPT | Mod: PBBFAC,PO,25 | Performed by: NURSE PRACTITIONER

## 2017-12-11 PROCEDURE — 99213 OFFICE O/P EST LOW 20 MIN: CPT | Mod: 25,S$PBB,, | Performed by: NURSE PRACTITIONER

## 2017-12-11 PROCEDURE — 99999 PR PBB SHADOW E&M-EST. PATIENT-LVL III: CPT | Mod: PBBFAC,,, | Performed by: NURSE PRACTITIONER

## 2017-12-11 PROCEDURE — 69210 REMOVE IMPACTED EAR WAX UNI: CPT | Mod: S$PBB,LT,, | Performed by: NURSE PRACTITIONER

## 2017-12-11 PROCEDURE — 69210 REMOVE IMPACTED EAR WAX UNI: CPT | Mod: PBBFAC,PO,LT | Performed by: NURSE PRACTITIONER

## 2017-12-11 NOTE — PROGRESS NOTES
"Subjective:       Patient ID: Rohan Abarca is a 72 y.o. male.    Chief Complaint: Cerumen Impaction    HPI   Patient recently told at his annual physical that he has cerumen impaction AS. He has noticed decrease hearing AS X past couple of weeks. He has a hearing aid AS but hardly ever wears it. He is "deaf" AD.     Review of Systems   Constitutional: Negative.    HENT: Positive for hearing loss.    Eyes: Negative.    Respiratory: Negative.    Cardiovascular: Negative.    Gastrointestinal: Negative.    Musculoskeletal: Negative.    Skin: Negative.    Neurological: Negative.    Hematological: Negative.    Psychiatric/Behavioral: Negative.        Objective:      Physical Exam   Constitutional: He is oriented to person, place, and time. Vital signs are normal. He appears well-developed and well-nourished. He is cooperative. He does not appear ill. No distress.   HENT:   Head: Normocephalic and atraumatic.   Right Ear: External ear normal. Decreased hearing is noted.   Left Ear: Tympanic membrane, external ear and ear canal normal. Tympanic membrane is not erythematous.  No middle ear effusion. Decreased hearing is noted.   Nose: Nose normal. No mucosal edema or rhinorrhea. Right sinus exhibits no maxillary sinus tenderness and no frontal sinus tenderness. Left sinus exhibits no maxillary sinus tenderness and no frontal sinus tenderness.   Mouth/Throat: Uvula is midline, oropharynx is clear and moist and mucous membranes are normal. Mucous membranes are not pale, not dry and not cyanotic. No oral lesions. No oropharyngeal exudate, posterior oropharyngeal edema or posterior oropharyngeal erythema.     SEPARATE PROCEDURE IN OFFICE:   Procedure: Removal of impacted cerumen, LEFT  Pre Procedure Diagnosis: Cerumen Impaction   Post Procedure Diagnosis: Cerumen Impaction   Verbal informed consent in regards to risk of trauma to ear canal, ear drum or hearing, discomfort during procedure and/or inability to remove cerumen " impaction in one session or unforeseen events or complications.   No anesthesia.     Procedure in detail:   Ear canal visualized bilateral with appropriate size ear speculum utilizing Operating Head Binocular Otomicroscope   Utilizing the following: Ring curet, delicate alligator forceps, and/or suction cannula. The impacted cerumen of the ear canals was removed atraumatically. The TM and EAC were then inspected and found to be clear of wax. See description of TMs/EACs in PE above.   Complications: No   Condition: Improved/Good     Eyes: Conjunctivae, EOM and lids are normal. Pupils are equal, round, and reactive to light. Right eye exhibits no discharge. Left eye exhibits no discharge. No scleral icterus.   Neck: Trachea normal and normal range of motion. Neck supple. No tracheal deviation present. No thyroid mass and no thyromegaly present.   Cardiovascular: Normal rate.    Pulmonary/Chest: Effort normal. No stridor. No respiratory distress. He has no wheezes.   Musculoskeletal: Normal range of motion.   Lymphadenopathy:        Head (right side): No submental, no submandibular, no tonsillar, no preauricular and no posterior auricular adenopathy present.        Head (left side): No submental, no submandibular, no tonsillar, no preauricular and no posterior auricular adenopathy present.     He has no cervical adenopathy.        Right cervical: No superficial cervical and no posterior cervical adenopathy present.       Left cervical: No superficial cervical and no posterior cervical adenopathy present.   Neurological: He is alert and oriented to person, place, and time. He has normal strength. Coordination and gait normal.   Skin: Skin is warm, dry and intact. No lesion and no rash noted. He is not diaphoretic. No cyanosis. No pallor.   Psychiatric: He has a normal mood and affect. His speech is normal and behavior is normal. Judgment and thought content normal. Cognition and memory are normal.   Nursing note and  vitals reviewed.      Assessment:     Left cerumen impaction removed    Right EAC atresia  Plan:     Patient deferred audiogram today. He has a hearing aid AS from [employer] that he rarely uses.     Return to clinic as needed for any ENT concerns

## 2017-12-15 DIAGNOSIS — L71.1 RHINOPHYMA: ICD-10-CM

## 2017-12-15 RX ORDER — MINOCYCLINE HYDROCHLORIDE 100 MG/1
CAPSULE ORAL
Qty: 30 CAPSULE | Refills: 0 | Status: SHIPPED | OUTPATIENT
Start: 2017-12-15 | End: 2018-01-16 | Stop reason: SDUPTHER

## 2018-01-09 ENCOUNTER — OFFICE VISIT (OUTPATIENT)
Dept: PRIMARY CARE CLINIC | Facility: CLINIC | Age: 73
End: 2018-01-09
Payer: MEDICARE

## 2018-01-09 VITALS
SYSTOLIC BLOOD PRESSURE: 128 MMHG | HEART RATE: 80 BPM | BODY MASS INDEX: 36.47 KG/M2 | WEIGHT: 246.25 LBS | HEIGHT: 69 IN | OXYGEN SATURATION: 95 % | RESPIRATION RATE: 18 BRPM | DIASTOLIC BLOOD PRESSURE: 84 MMHG

## 2018-01-09 DIAGNOSIS — R05.9 COUGH: Primary | ICD-10-CM

## 2018-01-09 PROCEDURE — 99213 OFFICE O/P EST LOW 20 MIN: CPT | Mod: S$PBB,,, | Performed by: NURSE PRACTITIONER

## 2018-01-09 PROCEDURE — 99214 OFFICE O/P EST MOD 30 MIN: CPT | Mod: PBBFAC,PO | Performed by: NURSE PRACTITIONER

## 2018-01-09 PROCEDURE — 99999 PR PBB SHADOW E&M-EST. PATIENT-LVL IV: CPT | Mod: PBBFAC,,, | Performed by: NURSE PRACTITIONER

## 2018-01-09 PROCEDURE — 96372 THER/PROPH/DIAG INJ SC/IM: CPT | Mod: PBBFAC,PO

## 2018-01-09 RX ADMIN — METHYLPREDNISOLONE SODIUM SUCCINATE 125 MG: 125 INJECTION, POWDER, FOR SOLUTION INTRAMUSCULAR; INTRAVENOUS at 05:01

## 2018-01-09 NOTE — PATIENT INSTRUCTIONS
Your examination today is normal except for the cough.  We've given steroid injection to calm inflammation.  We'll call in 2 days and see how you're doing.    If you have any questions, please call.  You can reach us at [phone] Tuesday through Friday (except holidays) 1- a.m. to 6 p.m.    Thank you for using the [facility]!    JASMYNE George, CNP, FNP-BC  [facility]  [facility]

## 2018-01-09 NOTE — PROGRESS NOTES
Rohan Abarca is a 72 y.o. male patient of HEATH Parnell MD who presents to the clinic today for   Chief Complaint   Patient presents with    Cough     nonproductive [id number]   .    HPI    Pt, who is not known to me, reports a new problem to me:  Dry cough.    No RN, earache, ST, not ill, no fever .  Hard to fall asleep but, once asleep, he sleeps all night.    These symptoms began 1 mo  ago and status is unchanged.     Symptoms are + acute.    Pt denies the following symptoms:  Fever, illness, ST, RN.    Aggravating factors include nothing .    Relieving factors include nothing .    OTC Medications tried are none.    Prescription medications taken for symptoms are prescription cough suppressant.    Pertinent medical history:  H/o cough like this that lingered.  Got a shot and that helped him.  ? H/o allergies.    Smoking status:  Remote h/o smoking    ROS    Constitutional:   No  fever,.    Head:   No headache  Ears:   no pain.  Eyes:  No sxs  Nose:   No sinus pain, no congestion  Throat:  No ST pain.    Heart:  No palpitations, chest pain.    Lungs:  No difficulty breathing, + coughing, no sputum production, not wheezing.               Uses CPAP at night.  Coughing pushes the mask off but is able to go to sleep.    GI/:  No sxs.  Never gets heartburn.    MS:  No new bone, joint or muscle problems.    Skin:  No rashes, itching.      PAST MEDICAL HISTORY:  Past Medical History:   Diagnosis Date    Bilateral cataracts     hx    Colon polyp     benign    HBP (high blood pressure)     History of detached retina repair     left    REGAN (obstructive sleep apnea)     uses CPAP    Prostate cancer     Rosacea        PAST SURGICAL HISTORY:  Past Surgical History:   Procedure Laterality Date    BUNIONECTOMY Right     CATARACT EXTRACTION BILATERAL W/ ANTERIOR VITRECTOMY Bilateral     COLONOSCOPY N/A 3/20/2017    Procedure: COLONOSCOPY;  Surgeon: Carl Marcelino MD;  Location: [facility];  Service:  Endoscopy;  Laterality: N/A;    HERNIA REPAIR      x 2    JOINT REPLACEMENT Right 2012    per Dr. Heriberto Colón    JOINT REPLACEMENT Left 2004    per Dr. Heriberto Colón    KNEE SURGERY      left , right    MASTOID SURGERY Right     right    PROSTATECTOMY  2012    RETINAL DETACHMENT SURGERY      left    ROTATOR CUFF REPAIR      bilateral    SHOULDER SURGERY Left     RCR per Dr. Heriberto Colón    SHOULDER SURGERY Right     RCR per Dr. Heriberto Colón    TONSILLECTOMY, ADENOIDECTOMY         SOCIAL HISTORY:  Social History     Social History    Marital status:      Spouse name: N/A    Number of children: N/A    Years of education: N/A     Occupational History    Not on file.     Social History Main Topics    Smoking status: Former Smoker     Packs/day: 0.50     Years: 5.00     Types: Cigarettes     Quit date: 12/19/1968    Smokeless tobacco: Never Used    Alcohol use 1.0 oz/week     2 Standard drinks or equivalent per week      Comment: social    Drug use: No    Sexual activity: Yes     Partners: Female     Other Topics Concern    Not on file     Social History Narrative    Was working 4 days a week delivering seafood, unloading and loading truck by hand, retired Friday    No dietary restrictions       FAMILY HISTORY:  Family History   Problem Relation Age of Onset    Cancer Sister     Stroke Sister     Cancer Sister     Stroke      Heart attack         ALLERGIES AND MEDICATIONS: updated and reviewed.  Review of patient's allergies indicates:   Allergen Reactions    No known drug allergies      Current Outpatient Prescriptions   Medication Sig Dispense Refill    amLODIPine (NORVASC) 5 MG tablet Take 1 tablet (5 mg total) by mouth once daily. 90 tablet 3    CUTIVATE 0.05 % Lotn AAA qd 120 mL 0    enalapril (VASOTEC) 20 MG tablet Take 2 tablets (40 mg total) by mouth once daily. 180 tablet 3    minocycline (MINOCIN,DYNACIN) 100 MG capsule TAKE 1 CAPSULE BY MOUTH DAILY 30 capsule 0    VIT A/VIT  C/VIT E/ZINC/COPPER (PRESERVISION AREDS ORAL) Take by mouth.      VIT A/VIT C/VIT E/ZINC/COPPER (ICAPS AREDS ORAL) Take by mouth once daily.       No current facility-administered medications for this visit.              PHYSICAL EXAM    Alert, coop 72 y.o. male patient in no acute distress.    Vitals:    01/09/18 1604   BP: 128/84   Pulse: 80   Resp: 18     VS reviewed.  VS stable.  CC, nursing note, medications & PMH all reviewed today.    Head:  Normocephalic, atraumatic.    EENT:  EACs patent.  TMs no erythema, dull LR, no effusions, no TM perforation.                              Eye lids normal, no discharge present.       Sinus tenderness to palp--none.               Nares--no edema, no d/c present.    Pharynx not injected.                Tonsils not erythematous , not enlarged, no exudate present.    Bilat small anterior, no posterior cervical lymph nodes palpable.    No submental, submandibular or supraclavicular lymph nodes palp.             Resp:  Respirations even, unlabored.              Lungs CTA bilat.  No wheezing.  No crackles.  Moves air to bases bilat.    Heart:  RRR, no MRG.    MS:  Ambulates normally.             NEURO:  Alert and oriented x 4.  Responds appropriately during interaction.    Skin:  Warm, dry, color good.    Cough  -     methylPREDNISolone sod suc(PF) injection 125 mg; Inject 125 mg into the muscle one time.      Pt today presents with cough mostly during the daytime for nearly a month.  Is not ill.  Cough nonproductive.    H/o same about 30 yrs ago.  States he got a shot and that resolved the problem..    This is a new problem to me.  No work up is planned.        Pt advised to perform comfort measures recommended on patient instruction sheet .    If not better in 2 days, I will discuss the cough with Dr. Parnell.  If worse or concerns, the patient is advised to call us.  Explained exam findings, diagnosis and treatment plan to patient.  Questions answered and patient states  understanding.

## 2018-01-09 NOTE — Clinical Note
HEATH Parnell MD,  I saw your patient today in the [facility].  If you have any questions, please do not hesitate to contact me.  Thank you!  KIMBER George

## 2018-01-16 DIAGNOSIS — L71.1 RHINOPHYMA: ICD-10-CM

## 2018-01-18 RX ORDER — MINOCYCLINE HYDROCHLORIDE 100 MG/1
CAPSULE ORAL
Qty: 30 CAPSULE | Refills: 0 | Status: SHIPPED | OUTPATIENT
Start: 2018-01-18 | End: 2018-02-14 | Stop reason: SDUPTHER

## 2018-02-14 DIAGNOSIS — L71.1 RHINOPHYMA: ICD-10-CM

## 2018-02-14 RX ORDER — MINOCYCLINE HYDROCHLORIDE 100 MG/1
CAPSULE ORAL
Qty: 30 CAPSULE | Refills: 0 | Status: SHIPPED | OUTPATIENT
Start: 2018-02-14 | End: 2018-03-16 | Stop reason: SDUPTHER

## 2018-03-14 DIAGNOSIS — L71.1 RHINOPHYMA: ICD-10-CM

## 2018-03-14 RX ORDER — MINOCYCLINE HYDROCHLORIDE 100 MG/1
CAPSULE ORAL
Qty: 30 CAPSULE | Refills: 0 | OUTPATIENT
Start: 2018-03-14

## 2018-03-16 DIAGNOSIS — L71.1 RHINOPHYMA: ICD-10-CM

## 2018-03-19 RX ORDER — MINOCYCLINE HYDROCHLORIDE 100 MG/1
CAPSULE ORAL
Qty: 30 CAPSULE | Refills: 0 | Status: SHIPPED | OUTPATIENT
Start: 2018-03-19 | End: 2018-04-15 | Stop reason: SDUPTHER

## 2018-04-15 DIAGNOSIS — L71.1 RHINOPHYMA: ICD-10-CM

## 2018-04-17 RX ORDER — MINOCYCLINE HYDROCHLORIDE 100 MG/1
CAPSULE ORAL
Qty: 30 CAPSULE | Refills: 0 | Status: SHIPPED | OUTPATIENT
Start: 2018-04-17 | End: 2018-05-14 | Stop reason: SDUPTHER

## 2018-05-14 DIAGNOSIS — L71.1 RHINOPHYMA: ICD-10-CM

## 2018-05-15 RX ORDER — MINOCYCLINE HYDROCHLORIDE 100 MG/1
CAPSULE ORAL
Qty: 30 CAPSULE | Refills: 2 | Status: SHIPPED | OUTPATIENT
Start: 2018-05-15 | End: 2018-08-07 | Stop reason: SDUPTHER

## 2018-05-16 ENCOUNTER — OFFICE VISIT (OUTPATIENT)
Dept: FAMILY MEDICINE | Facility: CLINIC | Age: 73
End: 2018-05-16
Payer: MEDICARE

## 2018-05-16 VITALS
SYSTOLIC BLOOD PRESSURE: 138 MMHG | OXYGEN SATURATION: 97 % | RESPIRATION RATE: 18 BRPM | BODY MASS INDEX: 36.94 KG/M2 | WEIGHT: 249.38 LBS | HEIGHT: 69 IN | DIASTOLIC BLOOD PRESSURE: 86 MMHG | TEMPERATURE: 98 F | HEART RATE: 61 BPM

## 2018-05-16 DIAGNOSIS — M18.12 OSTEOARTHRITIS OF LEFT THUMB: ICD-10-CM

## 2018-05-16 DIAGNOSIS — Z85.46 HISTORY OF PROSTATE CANCER: ICD-10-CM

## 2018-05-16 DIAGNOSIS — Z11.59 ENCOUNTER FOR HEPATITIS C SCREENING TEST FOR LOW RISK PATIENT: ICD-10-CM

## 2018-05-16 DIAGNOSIS — Z23 NEED FOR SHINGLES VACCINE: ICD-10-CM

## 2018-05-16 DIAGNOSIS — Z23 NEED FOR PROPHYLACTIC VACCINATION AGAINST STREPTOCOCCUS PNEUMONIAE (PNEUMOCOCCUS): ICD-10-CM

## 2018-05-16 DIAGNOSIS — G47.33 OSA (OBSTRUCTIVE SLEEP APNEA): ICD-10-CM

## 2018-05-16 DIAGNOSIS — R35.1 NOCTURIA: ICD-10-CM

## 2018-05-16 DIAGNOSIS — E78.5 HYPERLIPIDEMIA, UNSPECIFIED HYPERLIPIDEMIA TYPE: ICD-10-CM

## 2018-05-16 DIAGNOSIS — I10 ESSENTIAL HYPERTENSION: Primary | ICD-10-CM

## 2018-05-16 DIAGNOSIS — Z23 NEED FOR DIPHTHERIA-TETANUS-PERTUSSIS (TDAP) VACCINE: ICD-10-CM

## 2018-05-16 PROCEDURE — G0009 ADMIN PNEUMOCOCCAL VACCINE: HCPCS | Mod: S$GLB,,, | Performed by: INTERNAL MEDICINE

## 2018-05-16 PROCEDURE — 99214 OFFICE O/P EST MOD 30 MIN: CPT | Mod: S$GLB,,, | Performed by: INTERNAL MEDICINE

## 2018-05-16 PROCEDURE — 90670 PCV13 VACCINE IM: CPT | Mod: S$GLB,,, | Performed by: INTERNAL MEDICINE

## 2018-05-16 RX ORDER — ASPIRIN 81 MG/1
81 TABLET ORAL DAILY
Refills: 0 | COMMUNITY
Start: 2018-05-16 | End: 2018-07-02

## 2018-05-16 RX ORDER — ATORVASTATIN CALCIUM 10 MG/1
10 TABLET, FILM COATED ORAL DAILY
Qty: 90 TABLET | Refills: 3 | Status: SHIPPED | OUTPATIENT
Start: 2018-05-16 | End: 2019-05-11 | Stop reason: SDUPTHER

## 2018-05-16 NOTE — PROGRESS NOTES
Subjective:       Patient ID: Rohan Abarca is a 73 y.o. male.    Medication List with Changes/Refills   New Medications    ASPIRIN (ECOTRIN) 81 MG EC TABLET    Take 1 tablet (81 mg total) by mouth once daily.    ATORVASTATIN (LIPITOR) 10 MG TABLET    Take 1 tablet (10 mg total) by mouth once daily.   Current Medications    AMLODIPINE (NORVASC) 5 MG TABLET    Take 1 tablet (5 mg total) by mouth once daily.    CUTIVATE 0.05 % LOTN    AAA qd    ENALAPRIL (VASOTEC) 20 MG TABLET    Take 2 tablets (40 mg total) by mouth once daily.    MINOCYCLINE (MINOCIN,DYNACIN) 100 MG CAPSULE    TAKE ONE CAPSULE BY MOUTH ONCE DAILY    VIT A/VIT C/VIT E/ZINC/COPPER (PRESERVISION AREDS ORAL)    Take by mouth.   Discontinued Medications    VIT A/VIT C/VIT E/ZINC/COPPER (ICAPS AREDS ORAL)    Take by mouth once daily.       Chief Complaint: Annual Exam; Advice Only, CPAP Supplies; and Establish Care  He is here today to establish care.     He has hypertension that is controlled on enalapril 20 mg qday and amlodipine 5 mg qday.  He has no known CAD. He denies chest pain or shortness of breath.     He has hyperlipidemia but is not on treatment. His last lipids were on 12/2017 were 182/121/41/116. He did have an MRI of the brain that showed chronic microvascular ischemic changes.  He is not on aspirin.     He has REGAN and feels great when he uses CPAP. He has a problem with his mask and needs a new one but can not find a sleep supply store.  He has been sleeping in his recliner because he can not use.     He has history of prostate cancer s/p radical prostatectomy in 2012.  He did not go on hormonal treatment. He no longer follows with urology.  His last PSA was undetectable on 12/2017.     He complains of frequency of urination and nocturia. He was tried on flomax in the past but this did not help. He does a lot better when he uses his CPAP at night.     He has left thumb OA and was seen yesterday by orthopedics and had small joint injection  with steroids.     He had complained of memory issues. He was seen by neurology in 6/2017 who ordered MRI (chronic microvascular ischemic changes) and neuropsych testing which was normal.  He was advised to start statin and aspirin.      He has rosacea on face and is followed annually by dermatology. He is using minocycline oral and cutivate topically.     He tries to eat healthy.  He does go to the gym 4-5 times a week and does stationary bike with weights.  He denies any balance issues.      Colonoscopy----3/2017 repeat in 8 years  Tdap---more than 10 years  Pneumovax----never  Prevnar---never  Influenza vaccine----none  Shingles vaccine----none  AAA screen---10/2016 neg    Review of Systems   Constitutional: Negative for activity change, appetite change, fatigue, fever and unexpected weight change.   HENT: Positive for hearing loss. Negative for congestion, ear pain, rhinorrhea, sore throat and trouble swallowing.    Eyes: Negative for pain, discharge and visual disturbance.   Respiratory: Negative for cough, chest tightness, shortness of breath and wheezing.    Cardiovascular: Negative for chest pain, palpitations and leg swelling.   Gastrointestinal: Negative for abdominal pain, blood in stool, constipation, diarrhea, nausea and vomiting.   Endocrine: Negative for polydipsia and polyuria.   Genitourinary: Positive for frequency and urgency. Negative for difficulty urinating, dysuria and hematuria.   Musculoskeletal: Positive for arthralgias (right and left thumb). Negative for back pain, joint swelling, myalgias and neck pain.   Skin: Negative for rash.   Neurological: Negative for dizziness, weakness, numbness and headaches.   Hematological: Does not bruise/bleed easily.   Psychiatric/Behavioral: Negative for confusion, dysphoric mood, sleep disturbance and suicidal ideas. The patient is not nervous/anxious.        Objective:      Vitals:    05/16/18 0914   BP: 138/86   BP Location: Left arm   Patient  "Position: Sitting   BP Method: Large (Manual)   Pulse: 61   Resp: 18   Temp: 98.1 °F (36.7 °C)   TempSrc: Oral   SpO2: 97%   Weight: 113.1 kg (249 lb 6.4 oz)   Height: 5' 9" (1.753 m)     Body mass index is 36.83 kg/m².  Physical Exam    General appearance: No acute distress, cooperative  Eyes: PERRL, EOMI, conjunctiva clear  Ears: normal external ear and pinna, tm clear without drainage, canals clear  Nose: Normal mucosa without drainage  Throat: no exudates or erythema, tonsils not enlarged  Mouth: no sores or lesions, moist mucous membranes  Neck: FROM, soft, supple, no thyromegaly, no bruits  Lymph: no anterior or posterior cervical adenopathy  Heart::  Regular rate and rhythm, no murmur  Lung: Clear to ascultation bilaterally, no wheezing, no rales, no rhonchi, no distress  Abdomen: Soft, nontender, no distention, no hepatosplenomegaly, bowel sounds normal, no guarding, no rebound, no peritoneal signs  Skin: no rashes, thickened skin on face and neck, sebaceous cysts on back, nose with erythematous papules (rhinophyma)  Extremities: 1+ pitting edema, no cyanosis  Neuro: CN 2-12 intact, 5/5 muscle strength upper and lower extremity bilaterally, 2+ DTRs UE and LE bilaterally, normal gait, normal sensation  Peripheral pulses: 2+ pedal pulses bilaterally, good perfusion and color  Musculoskeletal: FROM, good strenth, no tenderness  Joint: normal appearance, no swelling, no warmth, no deformity in all joints    Assessment:       1. Essential hypertension    2. Hyperlipidemia, unspecified hyperlipidemia type    3. REGAN (obstructive sleep apnea)    4. History of prostate cancer    5. Nocturia    6. Osteoarthritis of left thumb    7. Encounter for hepatitis C screening test for low risk patient    8. Need for prophylactic vaccination against Streptococcus pneumoniae (pneumococcus)    9. Need for diphtheria-tetanus-pertussis (Tdap) vaccine    10. Need for shingles vaccine        Plan:       Essential hypertension  Good " control on this regimen.     Hyperlipidemia, unspecified hyperlipidemia type  Uncontrolled and will start atorvastatin today. Will recheck lipids in 3 months.  Will start aspirin daily.   -     atorvastatin (LIPITOR) 10 MG tablet; Take 1 tablet (10 mg total) by mouth once daily.  Dispense: 90 tablet; Refill: 3  -     aspirin (ECOTRIN) 81 MG EC tablet; Take 1 tablet (81 mg total) by mouth once daily.; Refill: 0  -     Lipid panel; Future; Expected date: 08/16/2018  -     Comprehensive metabolic panel; Future; Expected date: 08/16/2018    REGAN (obstructive sleep apnea)  He does well when he uses his CPAP machine. Rx given for a new mask today.   -     CPAP/BIPAP SUPPLIES    History of prostate cancer with Nocturia  He no longer follows with urology. His most recent PSA is undetectable.  He tried flomax in the past but he reports that it did not help. He gets the most relief when he uses CPAP.     Osteoarthritis of left thumb  S/p steroid injection by orthopedics yesterday.     Encounter for hepatitis C screening test for low risk patient  -     Hepatitis C antibody; Future; Expected date: 08/16/2018    Need for prophylactic vaccination against Streptococcus pneumoniae (pneumococcus)  -     Pneumococcal Conjugate Vaccine (13 Valent) (IM)    Need for diphtheria-tetanus-pertussis (Tdap) vaccine  -     diphth,pertus,acell,,tetanus (BOOSTRIX TDAP) 2.5-8-5 Lf-mcg-Lf/0.5mL Susp; Inject 0.5 mLs into the muscle once.  Dispense: 0.5 mL; Refill: 0    Need for shingles vaccine  -     varicella-zoster [id number], PF, (SHINGRIX, PF,) 50 mcg/0.5 mL injection; Inject 0.5 mLs into the muscle once.  Dispense: 0.5 mL; Refill: 1    Follow-up in about 6 months (around 11/16/2018) for chronic medical issues.

## 2018-06-29 ENCOUNTER — TELEPHONE (OUTPATIENT)
Dept: DERMATOLOGY | Facility: CLINIC | Age: 73
End: 2018-06-29

## 2018-06-29 NOTE — TELEPHONE ENCOUNTER
Appt scheduled with Dr Gomez for 8/7/2018.  Patient request sooner appt with Dr. Lancaster if staff can get in next week.

## 2018-06-29 NOTE — TELEPHONE ENCOUNTER
----- Message from Jennifer Benson sent at 6/29/2018  2:09 PM CDT -----  Contact: Pt  Type:  Sooner Apoointment Request    Caller is requesting a sooner appointment.  Caller declined first available appointment listed below.  Caller will not accept being placed on the waitlist and is requesting a message be sent to doctor.    Name of Caller:  Rohan   When is the first available appointment?  10/2/18  Symptoms:  RT wrist has a spot that is crusting over and discolored/sensitive to the touch  Best Call Back Number:     Additional Information:  n/a

## 2018-07-02 ENCOUNTER — OFFICE VISIT (OUTPATIENT)
Dept: DERMATOLOGY | Facility: CLINIC | Age: 73
End: 2018-07-02
Payer: MEDICARE

## 2018-07-02 DIAGNOSIS — D48.5 NEOPLASM OF UNCERTAIN BEHAVIOR OF SKIN: ICD-10-CM

## 2018-07-02 DIAGNOSIS — T36.4X5A HYPERPIGMENTATION DUE TO MINOCYCLINE: Primary | ICD-10-CM

## 2018-07-02 DIAGNOSIS — L81.8 HYPERPIGMENTATION DUE TO MINOCYCLINE: Primary | ICD-10-CM

## 2018-07-02 PROCEDURE — 99214 OFFICE O/P EST MOD 30 MIN: CPT | Mod: 25,S$PBB,, | Performed by: DERMATOLOGY

## 2018-07-02 PROCEDURE — 11100 PR BIOPSY OF SKIN LESION: CPT | Mod: PBBFAC,PO | Performed by: DERMATOLOGY

## 2018-07-02 PROCEDURE — 99999 PR PBB SHADOW E&M-EST. PATIENT-LVL II: CPT | Mod: PBBFAC,,, | Performed by: DERMATOLOGY

## 2018-07-02 PROCEDURE — 11100 PR BIOPSY OF SKIN LESION: CPT | Mod: S$PBB,,, | Performed by: DERMATOLOGY

## 2018-07-02 PROCEDURE — 99212 OFFICE O/P EST SF 10 MIN: CPT | Mod: PBBFAC,PO | Performed by: DERMATOLOGY

## 2018-07-02 PROCEDURE — 88305 TISSUE EXAM BY PATHOLOGIST: CPT | Performed by: PATHOLOGY

## 2018-07-02 NOTE — PROGRESS NOTES
Subjective:       Patient ID:  Rohan Abarca is a 73 y.o. male who presents for   Chief Complaint   Patient presents with    Lesion     72 yo M presents today for initial visit with complaints of firm lesion on his right hand present for 3 weeks, tender to touch, not treated  On minocycline x 20 years; was not responding to doxycycline per his other dermatologist.  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  Neg FMHx skin ca.        Past Medical History:   Diagnosis Date    Bilateral cataracts     hx    Colon polyp     benign    HBP (high blood pressure)     History of detached retina repair     left    REGAN (obstructive sleep apnea)     uses CPAP    Prostate cancer     Rosacea      Review of Systems   Skin: Positive for wears hat. Negative for rash, daily sunscreen use, sensitivity to bandage adhesive and recent sunburn.   Hematologic/Lymphatic: Does not bruise/bleed easily.        Objective:    Physical Exam   Constitutional: He appears well-developed and well-nourished.   Neurological: He is alert and oriented to person, place, and time.   Psychiatric: He has a normal mood and affect.   Skin:   Areas Examined (abnormalities noted in diagram):   Scalp / Hair Palpated and Inspected  Head / Face Inspection Performed  Neck Inspection Performed  RUE Inspected  LUE Inspection Performed                      Diagram Legend     Erythematous scaling macule/papule c/w actinic keratosis       Vascular papule c/w angioma      Pigmented verrucoid papule/plaque c/w seborrheic keratosis      Yellow umbilicated papule c/w sebaceous hyperplasia      Irregularly shaped tan macule c/w lentigo     1-2 mm smooth white papules consistent with Milia      Movable subcutaneous cyst with punctum c/w epidermal inclusion cyst      Subcutaneous movable cyst c/w pilar cyst      Firm pink to brown papule c/w dermatofibroma      Pedunculated fleshy papule(s) c/w skin tag(s)      Evenly  pigmented macule c/w junctional nevus     Mildly variegated pigmented, slightly irregular-bordered macule c/w mildly atypical nevus      Flesh colored to evenly pigmented papule c/w intradermal nevus       Pink pearly papule/plaque c/w basal cell carcinoma      Erythematous hyperkeratotic cursted plaque c/w SCC      Surgical scar with no sign of skin cancer recurrence      Open and closed comedones      Inflammatory papules and pustules      Verrucoid papule consistent consistent with wart     Erythematous eczematous patches and plaques     Dystrophic onycholytic nail with subungual debris c/w onychomycosis     Umbilicated papule    Erythematous-base heme-crusted tan verrucoid plaque consistent with inflamed seborrheic keratosis     Erythematous Silvery Scaling Plaque c/w Psoriasis     See annotation      Assessment / Plan:      Pathology Orders:     Normal Orders This Visit    Tissue Specimen To Pathology, Dermatology     Questions:    Directional Terms:  Other(comment)    Clinical information:  r/o HAK vs SCC    Specific Site:  R dorsal hand        Neoplasm of uncertain behavior of skin  -     Tissue Specimen To Pathology, Dermatology  Shave biopsy procedure note:    Shave biopsy performed after verbal consent including risk of infection, scar, recurrence, need for additional treatment of site. Area prepped with alcohol, anesthetized with approximately 1.0cc of 1% lidocaine with epinephrine. Lesional tissue shaved with razor blade. Hemostasis achieved with application of aluminum chloride followed by hyfrecation. No complications. Dressing applied. Wound care explained.      Hyperpigmentation due to minocycline  Pt is aware that this discoloration is due to his long term minocycline use  Will consider switching back to doxycycline           Follow-up for pending Pathology.

## 2018-07-20 ENCOUNTER — TELEPHONE (OUTPATIENT)
Dept: DERMATOLOGY | Facility: CLINIC | Age: 73
End: 2018-07-20

## 2018-07-20 NOTE — TELEPHONE ENCOUNTER
----- Message from Sharon Lancaster MD sent at 7/19/2018  5:09 PM CDT -----  Skin, right dorsal hand, shave biopsy:  - INVASIVE SQUAMOUS CELL CARCINOMA, WELL-DIFFERENTIATED.  - THE TUMOR FOCALLY EXTENDS TO THE DEEP BIOPSY MARGIN.    Recommend meeting with Dr Auguste to discuss Mohs.  Photo in EPIC.  Please notify.  Thanks

## 2018-07-20 NOTE — TELEPHONE ENCOUNTER
7-2-18 Called patient with path report and he is scheduled for a consult with Dr. Auguste on 7-25-18

## 2018-07-25 ENCOUNTER — INITIAL CONSULT (OUTPATIENT)
Dept: DERMATOLOGY | Facility: CLINIC | Age: 73
End: 2018-07-25
Payer: MEDICARE

## 2018-07-25 VITALS
HEIGHT: 69 IN | HEART RATE: 66 BPM | BODY MASS INDEX: 36.29 KG/M2 | WEIGHT: 245 LBS | SYSTOLIC BLOOD PRESSURE: 157 MMHG | DIASTOLIC BLOOD PRESSURE: 104 MMHG

## 2018-07-25 DIAGNOSIS — C44.622 SQUAMOUS CELL CANCER OF SKIN OF RIGHT HAND: Primary | ICD-10-CM

## 2018-07-25 PROCEDURE — 99214 OFFICE O/P EST MOD 30 MIN: CPT | Mod: S$PBB,,, | Performed by: DERMATOLOGY

## 2018-07-25 PROCEDURE — 99999 PR PBB SHADOW E&M-EST. PATIENT-LVL III: CPT | Mod: PBBFAC,,, | Performed by: DERMATOLOGY

## 2018-07-25 PROCEDURE — 99213 OFFICE O/P EST LOW 20 MIN: CPT | Mod: PBBFAC,PO | Performed by: DERMATOLOGY

## 2018-07-25 NOTE — PROGRESS NOTES
ALLERGIES:  No known drug allergies    CHIEF COMPLAINT:  This 73 y.o. 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 Consultation requested by Sharon Lancaster M.D..    HISTORY OF PRESENT ILLNESS:   Location: right hand  Duration: 3 months or more  Quality: persistent  Context: status post biopsy by Sharon Lancaster M.D..; path = squamous cell carcinoma; pathology accession #[id number], [facility]   Prior Treatment: none    See also the handwritten notes/diagrams scanned to chart for additional details.    Defibrillator: No  Pacemaker: No  Artificial heart valves: No  Artificial joints: Yes:   bilateral knee    REVIEW OF SYSTEMS:   General: general health good  Skin: has no previous history of skin cancer(s)  CV: has hypertension, no artificial valves, has no chest pain  Resp: has no shortness of breath  Endo: has no diabetes  Hem/Lymph: not taking prescribed anticoagulants, has easy bruising/bleeding  Allergy/Immuno: has no allergies as noted above  GI: has no history of hepatitis  MS: as noted above     PAST MEDICAL HISTORY:  Past Medical History:   Diagnosis Date    Bilateral cataracts     hx    Colon polyp     benign    HBP (high blood pressure)     History of detached retina repair     left    REGAN (obstructive sleep apnea)     uses CPAP    Prostate cancer     Rosacea        PAST SURGICAL HISTORY:  Past Surgical History:   Procedure Laterality Date    BUNIONECTOMY Right     CATARACT EXTRACTION BILATERAL W/ ANTERIOR VITRECTOMY Bilateral     COLONOSCOPY N/A 3/20/2017    Procedure: COLONOSCOPY;  Surgeon: Carl Marcelino MD;  Location: [facility];  Service: Endoscopy;  Laterality: N/A;    HERNIA REPAIR      x 2    JOINT REPLACEMENT Right 2012    per Dr. Heriberto Colón    JOINT REPLACEMENT Left 2004    per Dr. Heriberto Colón    KNEE SURGERY      left , right    MASTOID SURGERY Right     right     PROSTATECTOMY  2012    RETINAL DETACHMENT SURGERY      left    ROTATOR CUFF REPAIR      bilateral    SHOULDER SURGERY Left     RCR per Dr. Heriberto Colón    SHOULDER SURGERY Right     RCR per Dr. Heriberto Colón    TONSILLECTOMY, ADENOIDECTOMY          SOCIAL HISTORY:  Dependencies: smoking status as noted below  Social History   Substance Use Topics    Smoking status: Former Smoker     Packs/day: 0.50     Years: 5.00     Types: Cigarettes     Quit date: 12/19/1968    Smokeless tobacco: Never Used    Alcohol use 1.0 oz/week     2 Standard drinks or equivalent per week      Comment: social       PERTINENT MEDICATIONS:  See medications list.    Current Outpatient Prescriptions:     amLODIPine (NORVASC) 5 MG tablet, Take 1 tablet (5 mg total) by mouth once daily., Disp: 90 tablet, Rfl: 3    atorvastatin (LIPITOR) 10 MG tablet, Take 1 tablet (10 mg total) by mouth once daily., Disp: 90 tablet, Rfl: 3    CUTIVATE 0.05 % Lotn, AAA qd, Disp: 120 mL, Rfl: 0    enalapril (VASOTEC) 20 MG tablet, Take 2 tablets (40 mg total) by mouth once daily., Disp: 180 tablet, Rfl: 3    minocycline (MINOCIN,DYNACIN) 100 MG capsule, TAKE ONE CAPSULE BY MOUTH ONCE DAILY, Disp: 30 capsule, Rfl: 2    VIT A/VIT C/VIT E/ZINC/COPPER (PRESERVISION AREDS ORAL), Take by mouth., Disp: , Rfl:     ALLERGIES:  No known drug allergies    EXAM:  See also the handwritten notes/diagrams scanned to chart for additional details.  Constitutional  General appearance: well-developed, well-nourished, well-kempt older white male    Eyes  Inspection of conjunctivae and lids reveals no abnormalities; sclerae anicteric  Neurologic/Psychiatric  Alert,  normal orientation to time, place, person  Normal mood and affect with no evidence of depression, anxiety, agitation  Skin: see photo(s)  Head: background marked solar damage to exposed areas of skin  Neck: examination reveals marked chronic solar damage  Right upper extremity: examination reveals marked  chronic solar damage; some ecchymosis/ecchymoses; on the right dorsal proximal hand there is an approximately 8 mm eschar which feels freely movable over the underlying tissues on palpation; site(s) confirmed by reference to the photograph(s) in the chart taken at the time of the biopsy/biopsies by the referring physician and he confirmed this as the site of the prior biopsy  Left upper extremity: examination reveals marked chronic solar damage; some ecchymosis/ecchymoses     ASSESSMENT: 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 In particular, the discussion included, but was not limited to, the following:    One alternative at this point would be to defer further treatment and observe the lesion. With small skin cancers of this kind, it is possible that a biopsy can be sufficient to definitively treat a small skin cancer of this kind. Alternatively, some skin cancers are slow growing and do not require immediate treatment. The potential advantage of this choice would be to avoid the need for possibly unnecessary additional surgery. Among the potential disadvantages of this would be the possibility of enlargement of the lesion, more extensive spread of the lesion or recurrence at a later date, which might necessitate a larger and more complex surgery.    Radiation treatment can be an effective treatment for this type of skin cancer. The usual course of treatment is every weekday for several weeks. Local irritation will result from treatment, although no systemic side effects are expected. The potential advantage of  radiation treatment is that it avoids the need for surgery. Among the disadvantages of radiation treatment are the length of treatment, the local inflammatory response, the absence of pathologic confirmation of the removal of the skin cancer, a possible increased risk of additional skin cancer in the treated area in later years, and a somewhat increased risk of recurrence at a later date.     Excisional surgery can be an effective treatment for this type of skin cancer. This would involve excision of the lesion with margin evaluation by submitting the specimen to a pathologist for either immediate marginal assessment via frozen section processing, or delayed marginal assessment by fixed-tissue processing. The potential advantage of this technique is that it offers a way of treating the lesion with some degree of histologic confirmation of tumor removal. Among the disadvantages of this treatment are the possible need for re-excision if marginal involvement is identified, a somewhat greater likelihood of recurrence as compared to Mohs' surgery because of the less comprehensive margin evaluation inherent in the technique, and the general potential risks of surgery, including allergic reactions to the anesthetic and other materials used, infection, injury to nerves in the area with consequent loss of sensation or muscle function, and scarring or distortion of surrounding structures.    Mohs' surgery is a very effective treatment for this type of skin cancer. The potential advantage of Mohs' surgery is that this technique offers the greatest possible certainty of knowing that the skin cancer has been completely removed, with the removal of the least amount of normal tissue. The potential disadvantages of Mohs' surgery include the duration of the surgery, the possible need for a separate surgery for reconstruction following tumor removal, and scarring as a result. In addition, general potential risks of surgery as noted  above also apply to treatment via Mohs' surgery.    In light of the nature of this tumor and the location on the hand in an area of increased risk of recurrence,  Mohs' micrographic surgery was thought to be the most appropriate management choice, and this diagnosis is appropriate for treatment by Mohs' micrographic surgery.     Sufficient time was available for questions, and all questions were answered to his satisfaction. He fully understands the aims, risks, alternatives, and possible complications, and has elected to proceed with the surgery, and verbally consented to do so. The procedure will be scheduled in the near future.    Routine pre-op instructions were given to him.    --------------------------------------  Note: Some or all of this note may have been generated using voice recognition software. There may be voice recognition errors including grammatical and/or spelling errors found in the text. Attempts were made to correct these errors prior to signature.

## 2018-07-25 NOTE — LETTER
July 25, 2018      Sharon Lancaster MD  [address]  [facility] [address]           [facility]  [address] [facility] [address] [place] [address]  Phone: [phone]          Patient: Rohan Abarca   MR Number: [id number]   YOB: 1945   Date of Visit: 7/25/2018       Dear Dr. Sharon Lancaster:    Thank you for referring Rohan Abarca to me for evaluation. Attached you will find relevant portions of my assessment and plan of care.    If you have questions, please do not hesitate to call me. I look forward to following Rohan Abarca along with you.    Sincerely,    Shawn Auguste MD    Enclosure  CC:  No Recipients    If you would like to receive this communication electronically, please contact [email or web address] or [phone] to request more information on [employer] access.    For providers and/or their staff who would like to refer a patient to [facility], please contact us through our one-stop-shop provider referral line, [facility], at [phone].    If you feel you have received this communication in error or would no longer like to receive these types of communications, please e-mail [email or web address]

## 2018-08-07 DIAGNOSIS — L71.1 RHINOPHYMA: ICD-10-CM

## 2018-08-07 RX ORDER — MINOCYCLINE HYDROCHLORIDE 100 MG/1
CAPSULE ORAL
Qty: 30 CAPSULE | Refills: 0 | Status: SHIPPED | OUTPATIENT
Start: 2018-08-07 | End: 2018-09-04 | Stop reason: SDUPTHER

## 2018-08-19 NOTE — PROGRESS NOTES
ALLERGIES:   No known drug allergies     Current Outpatient Medications:     amLODIPine (NORVASC) 5 MG tablet, Take 1 tablet (5 mg total) by mouth once daily., Disp: 90 tablet, Rfl: 3    atorvastatin (LIPITOR) 10 MG tablet, Take 1 tablet (10 mg total) by mouth once daily., Disp: 90 tablet, Rfl: 3    CUTIVATE 0.05 % Lotn, AAA qd, Disp: 120 mL, Rfl: 0    enalapril (VASOTEC) 20 MG tablet, Take 2 tablets (40 mg total) by mouth once daily., Disp: 180 tablet, Rfl: 3    minocycline (MINOCIN,DYNACIN) 100 MG capsule, TAKE ONE CAPSULE BY MOUTH ONCE DAILY, Disp: 30 capsule, Rfl: 0    VIT A/VIT C/VIT E/ZINC/COPPER (PRESERVISION AREDS ORAL), Take by mouth., Disp: , Rfl:   -------------------------------------------------------------  PROCEDURE: Mohs' Micrographic Surgery    SITE: right dorsal hand    INDICATION: squamous cell carcinoma in an area at increased risk of recurrence    CASE NUMBER: [id number]      ANESTHETIC: 2.5 mL 1% Lidocaine with Epinephrine 1:100,000    SURGICAL PREP: Ethanol and Hibiclens    SURGEON: Shawn Auguste MD    ASSISTANTS: Laura Mtz MD and  Veronica Dyer CST     PREOPERATIVE DIAGNOSIS: squamous cell carcinoma     POSTOPERATIVE DIAGNOSIS: squamous cell carcinoma     PATHOLOGIC DIAGNOSIS: squamous cell carcinoma     STAGES OF MOHS' SURGERY PERFORMED: one    TUMOR-FREE PLANE ACHIEVED: yes    HEMOSTASIS: Hyfrecation     SPECIMENS: one (one in stage A)    INITIAL LESION SIZE: 1.5 x 2.0 cm    FINAL DEFECT SIZE: 1.6 x 1.8 cm    WOUND REPAIR/DISPOSITION: see below    NARRATIVE:    The patient is a 73 y.o.male referred by Sharon Lancaster MD with a history of cancer on the right dorsal hand which was biopsied - pathology accession #[id number], [facility]. Findings revealed squamous cell carcinoma. Examination revealed a pink, sclerotic plaque on the right dorsal proximal hand at the site of prior biopsy, which was confirmed by reference to the photograph taken at the previous patient visit.  "In light of the nature of this tumor and the location on the hand, Mohs' micrographic surgery was thought to be the most appropriate management choice, and this diagnosis is appropriate for treatment by Mohs' micrographic surgery.  I discussed it with the patient and he fully understands the aims, risks, alternatives, and possible complications, and elects to proceed.  There are no medical or surgical contraindications to the procedure.     A signed informed consent was obtained.    PROCEDURE:  The patient was placed in the semi-recumbent position on the operating table in the Mohs' Surgery Suite, with his arm resting on a arm board. The area in question was thoroughly prepped with ethanol and Hibiclens. A sterile surgical marker was used to outline the clinically apparent margins of the involved area, and a narrow margin of normal-appearing skin. Reference marks were made at the periphery of the outlined area with the surgical marker. The proposed area of excision was measured and photographed. Local anesthesia as noted above was administered.  The total volume of anesthetic used throughout this portion of the procedure was as documented above. The area was prepared and draped in the standard manner. All of the grossly identifiable area of clinically abnormal tissue and an underlying/peripheral layer was taken and processed by the Mohs' technique.  Hemostasis was obtained with the hyfrecator. Tissue was taken from any areas of residual marginal involvement (if present) and processed by the Mohs' technique in as many stages as needed until a tumor-free plane was achieved.    Colors of inks used in the reference nicks at epidermal margins (if present) and/or inking of non-epithelial edges, if applicable, is represented on the Mohs map as follows: solid lines represent red ink, dots represent blue ink, jagged lines represent black ink, curlicues represent green ink, "xxx" represents yellow ink.    The first Mohs' " layer consisted of one section(s) with 3 slide(s) evaluated. No residual tumor was noted at the margins of the first Mohs' layer. Histology of the specimen(s) showed changes consistent with chronic solar damage.     A total of one section(s) and 3 slide(s) were examined under the microscope via the Mohs technique.  A cancer free plane was reached after layer number one. Defect final size was as noted above.      The wound was covered with a nonadherent dressing between stages, and the patient allowed to wait in the waiting area during these periods. The final defect was photographed at the completion of the Mohs' procedure.      See the separate procedure note which follows regarding repair of the defect following Mohs' surgery.        -----------------------------------------------    REPAIR FOLLOWING MOHS' MICROGRAPHIC SURGERY    PREOPERATIVE DIAGNOSIS: defect following Mohs' surgery for a squamous cell carcinoma    POSTOPERATIVE DIAGNOSIS: same    PROCEDURE PERFORMED: intermediate (layered) closure     ANESTHETIC: 6 mL 1% Lidocaine with Epinephrine 1:100,000     SURGICAL PREP: Hibiclens    SURGEON: Shawn Auguste MD     ASSISTANTS: as above    LOCATION: right hand      INDICATIONS:  Earlier in the day, the patient underwent Mohs' micrographic surgical excision of a squamous cell carcinoma on the right dorsal hand. Tumor free margins were achieved after layer number one.  Later in the day, the management of the resulting wound was addressed with the patient. I discussed the various wound management options with the patient and he fully understands the aims, risks, alternatives, and possible complications of the alternatives, and he elects to proceed with closure of the defect in the manner noted below.  There are no medical or surgical contraindications to the procedure.    A signed informed consent was previously obtained.    PROCEDURE:  Repair via intermediate closure:  The patient was returned to the  procedure room following completion of the Mohs' procedure and final slide review. Because of the size, shape and location of the defect, simple closure could not be achieved without excessive tension on the wound margins and an unacceptable risk of wound dehiscence and standing cone deformities. After surgical prepping, additional anesthetic was infiltrated into the tissues surrounding the defect and the anticipated area of repair, to maintain anesthesia during the procedure. Preparation of the site was carried out by extending the defect through excision of small triangles of superfluous tissue on either side of the wound to square the shoulders of the defect and to allow closure without distortion by standing cone deformities, creating a fusiform defect measuring 1.6 x 5.0 cm in size. Wound margins were minimally undermined to allow closure with minimal tension. After hemostasis was achieved with the hyfrecator, closure was accomplished in layered fashion with:      multiple #3-0 buried interrupted Vicryl suture(s) and    multiple #3-0 simple interrupted and vertical mattress Prolene suture(s) for final approximation of the wound margins.    Total length of the final closure was 5.0 cm.     The site was photographed following completion of the repair. Final dressing consisted of petrolatum, Telfa and tape.    Estimated blood loss for the total procedure was less than 5 mL.    Total operative time including tissue processing in the Mohs' laboratory and microscopic Mohs' frozen section slide review was 2 hour(s). Verbal and written wound care instructions were given to the patient, and he expressed understanding of these instructions. The patient tolerated the procedure well and left the operating room in good condition; he is to return in two weeks for suture removal.     Dr. Auguste's cell phone number was given to the patient with instructions to call prn with any problems.

## 2018-08-20 ENCOUNTER — PROCEDURE VISIT (OUTPATIENT)
Dept: DERMATOLOGY | Facility: CLINIC | Age: 73
End: 2018-08-20
Payer: MEDICARE

## 2018-08-20 VITALS
HEART RATE: 69 BPM | SYSTOLIC BLOOD PRESSURE: 132 MMHG | BODY MASS INDEX: 36.29 KG/M2 | WEIGHT: 245 LBS | HEIGHT: 69 IN | DIASTOLIC BLOOD PRESSURE: 91 MMHG

## 2018-08-20 DIAGNOSIS — C44.622 SQUAMOUS CELL CANCER OF SKIN OF HAND, RIGHT: Primary | ICD-10-CM

## 2018-08-20 PROCEDURE — 17311 MOHS 1 STAGE H/N/HF/G: CPT | Mod: PBBFAC | Performed by: DERMATOLOGY

## 2018-08-20 PROCEDURE — 99499 UNLISTED E&M SERVICE: CPT | Mod: S$PBB,,, | Performed by: DERMATOLOGY

## 2018-08-20 PROCEDURE — 12042 INTMD RPR N-HF/GENIT2.6-7.5: CPT | Mod: PBBFAC | Performed by: DERMATOLOGY

## 2018-08-20 PROCEDURE — 12042 INTMD RPR N-HF/GENIT2.6-7.5: CPT | Mod: S$PBB,51,, | Performed by: DERMATOLOGY

## 2018-08-20 PROCEDURE — 17311 MOHS 1 STAGE H/N/HF/G: CPT | Mod: S$PBB,,, | Performed by: DERMATOLOGY

## 2018-08-22 ENCOUNTER — PATIENT MESSAGE (OUTPATIENT)
Dept: FAMILY MEDICINE | Facility: CLINIC | Age: 73
End: 2018-08-22

## 2018-08-22 ENCOUNTER — LAB VISIT (OUTPATIENT)
Dept: LAB | Facility: HOSPITAL | Age: 73
End: 2018-08-22
Attending: INTERNAL MEDICINE
Payer: MEDICARE

## 2018-08-22 DIAGNOSIS — E78.5 HYPERLIPIDEMIA, UNSPECIFIED HYPERLIPIDEMIA TYPE: ICD-10-CM

## 2018-08-22 DIAGNOSIS — Z11.59 ENCOUNTER FOR HEPATITIS C SCREENING TEST FOR LOW RISK PATIENT: ICD-10-CM

## 2018-08-22 LAB
ALBUMIN SERPL BCP-MCNC: 4.1 G/DL
ALP SERPL-CCNC: 68 U/L
ALT SERPL W/O P-5'-P-CCNC: 23 U/L
ANION GAP SERPL CALC-SCNC: 7 MMOL/L
AST SERPL-CCNC: 21 U/L
BILIRUB SERPL-MCNC: 1 MG/DL
BUN SERPL-MCNC: 19 MG/DL
CALCIUM SERPL-MCNC: 9.9 MG/DL
CHLORIDE SERPL-SCNC: 109 MMOL/L
CHOLEST SERPL-MCNC: 115 MG/DL
CHOLEST/HDLC SERPL: 2.9 {RATIO}
CO2 SERPL-SCNC: 29 MMOL/L
CREAT SERPL-MCNC: 0.9 MG/DL
EST. GFR  (AFRICAN AMERICAN): >60 ML/MIN/1.73 M^2
EST. GFR  (NON AFRICAN AMERICAN): >60 ML/MIN/1.73 M^2
GLUCOSE SERPL-MCNC: 106 MG/DL
HDLC SERPL-MCNC: 40 MG/DL
HDLC SERPL: 34.8 %
LDLC SERPL CALC-MCNC: 62.2 MG/DL
NONHDLC SERPL-MCNC: 75 MG/DL
POTASSIUM SERPL-SCNC: 4.3 MMOL/L
PROT SERPL-MCNC: 6.6 G/DL
SODIUM SERPL-SCNC: 145 MMOL/L
TRIGL SERPL-MCNC: 64 MG/DL

## 2018-08-22 PROCEDURE — 80053 COMPREHEN METABOLIC PANEL: CPT

## 2018-08-22 PROCEDURE — 86803 HEPATITIS C AB TEST: CPT

## 2018-08-22 PROCEDURE — 80061 LIPID PANEL: CPT

## 2018-08-22 PROCEDURE — 36415 COLL VENOUS BLD VENIPUNCTURE: CPT | Mod: PO

## 2018-08-23 ENCOUNTER — PATIENT MESSAGE (OUTPATIENT)
Dept: FAMILY MEDICINE | Facility: CLINIC | Age: 73
End: 2018-08-23

## 2018-08-23 LAB — HCV AB SERPL QL IA: NEGATIVE

## 2018-08-29 ENCOUNTER — TELEPHONE (OUTPATIENT)
Dept: DERMATOLOGY | Facility: CLINIC | Age: 73
End: 2018-08-29

## 2018-08-29 NOTE — TELEPHONE ENCOUNTER
----- Message from Angeline Ames sent at 8/29/2018  1:00 PM CDT -----  Contact: patient   Needs Advice    Reason for call:  Patient would like to reschedule appt      Communication Preference: [phone]

## 2018-08-29 NOTE — TELEPHONE ENCOUNTER
----- Message from Tara Melchor sent at 8/29/2018 12:35 PM CDT -----  Contact: self  Patient would like to rescheduled appointment. Please call patient at [phone]. Thanks!

## 2018-09-04 DIAGNOSIS — L71.1 RHINOPHYMA: ICD-10-CM

## 2018-09-04 RX ORDER — MINOCYCLINE HYDROCHLORIDE 100 MG/1
CAPSULE ORAL
Qty: 30 CAPSULE | Refills: 0 | Status: SHIPPED | OUTPATIENT
Start: 2018-09-04 | End: 2018-10-01 | Stop reason: SDUPTHER

## 2018-09-06 ENCOUNTER — OFFICE VISIT (OUTPATIENT)
Dept: DERMATOLOGY | Facility: CLINIC | Age: 73
End: 2018-09-06
Payer: MEDICARE

## 2018-09-06 DIAGNOSIS — Z48.02 VISIT FOR SUTURE REMOVAL: Primary | ICD-10-CM

## 2018-09-06 PROCEDURE — 99212 OFFICE O/P EST SF 10 MIN: CPT | Mod: PBBFAC | Performed by: DERMATOLOGY

## 2018-09-06 PROCEDURE — 99999 PR PBB SHADOW E&M-EST. PATIENT-LVL II: CPT | Mod: PBBFAC,,, | Performed by: DERMATOLOGY

## 2018-09-06 PROCEDURE — 99024 POSTOP FOLLOW-UP VISIT: CPT | Mod: POP,,, | Performed by: DERMATOLOGY

## 2018-09-06 NOTE — PROGRESS NOTES
CC: 73 y.o.male patient is here for suture removal.     HPI: Patient is two week(s) s/p Mohs' micrographic surgery, fresh tissue technique of a squamous cell carcinoma on the right hand, with subsequent repair   Patient reports no problems.    EXAM:  Sutures intact.  Wound healing well.  Good approximation of skin edges.  No undue erythema to surrounding skin or signs or symptoms of infection.    IMPRESSION:  Healing well post Mohs' micrographic surgery and repair    PLAN:  Site cleaned with peroxide, sutures removed  Dressed with petrolatum   Reviewed further care and expected course  Followup 6 weeks; call prn sooner

## 2018-10-01 DIAGNOSIS — L71.1 RHINOPHYMA: ICD-10-CM

## 2018-10-02 RX ORDER — MINOCYCLINE HYDROCHLORIDE 100 MG/1
CAPSULE ORAL
Qty: 30 CAPSULE | Refills: 0 | Status: SHIPPED | OUTPATIENT
Start: 2018-10-02 | End: 2018-10-29 | Stop reason: SDUPTHER

## 2018-10-25 ENCOUNTER — OFFICE VISIT (OUTPATIENT)
Dept: DERMATOLOGY | Facility: CLINIC | Age: 73
End: 2018-10-25
Payer: MEDICARE

## 2018-10-25 DIAGNOSIS — C44.622 SQUAMOUS CELL CANCER OF SKIN OF HAND, RIGHT: ICD-10-CM

## 2018-10-25 DIAGNOSIS — D48.5 NEOPLASM OF UNCERTAIN BEHAVIOR OF SKIN: Primary | ICD-10-CM

## 2018-10-25 PROCEDURE — 17271 DSTR MAL LES S/N/H/F/G 0.6-1: CPT | Mod: 79,PBBFAC | Performed by: DERMATOLOGY

## 2018-10-25 PROCEDURE — 99212 OFFICE O/P EST SF 10 MIN: CPT | Mod: PBBFAC | Performed by: DERMATOLOGY

## 2018-10-25 PROCEDURE — 99999 PR PBB SHADOW E&M-EST. PATIENT-LVL II: CPT | Mod: PBBFAC,,, | Performed by: DERMATOLOGY

## 2018-10-25 PROCEDURE — 88305 TISSUE EXAM BY PATHOLOGIST: CPT | Performed by: PATHOLOGY

## 2018-10-25 PROCEDURE — 88305 TISSUE EXAM BY PATHOLOGIST: CPT | Mod: 26,,, | Performed by: PATHOLOGY

## 2018-10-25 PROCEDURE — 17271 DSTR MAL LES S/N/H/F/G 0.6-1: CPT | Mod: S$PBB,,, | Performed by: DERMATOLOGY

## 2018-10-25 NOTE — PROGRESS NOTES
CC: 73 y.o.male patient is here for followup     HPI: Patient is 8-9 week(s) s/p Mohs' micrographic surgery, fresh tissue technique, of a squamous cell carcinoma on the right hand; with subsequent repair   Patient reports a new growth to the area x several weeks    EXAM: Site appears well healed. Near the medial end of the scar, slightly distal, is an approximately 7 mm prurigo-like nodule which is clinically suggestive of a small keratoacanthoma-type squamous cell carcinoma vs prurigo nodularis    IMPRESSION: Well healed post Mohs' micrographic surgery  keratoacanthoma-type squamous cell carcinoma vs prurigo nodularis vs other    PLAN:  The diagnosis, options, risks, benefits and alternatives were discussed with the patient.   Will proceed with biopsy/destruction today given clinical findings  See the procedure note below    PROCEDURE NOTE: SHAVE BIOPSY AND DESTRUCTION    The diagnosis and management options and risk, benefits and alternatives were discussed with the patient. All questions were answered. Verbal consent was obtained.    Site: right hand  Indication: clinically suspicious lesion; suggestive of malignancy  Size: 0.7 cm  Prep: Alcohol  Anesthesia: 1% lidocaine plain, less than 2 mL  Shave biopsy performed  Electrodesiccation and curettage carried out for destruction, 3 repetitions  Hemostasis with electrodesiccation  Dressed with petrolatum and bandaid  Specimen placed in formalin to be submitted to pathology    Routine care instructions given  Followup: 2 weeks

## 2018-10-29 DIAGNOSIS — L71.1 RHINOPHYMA: ICD-10-CM

## 2018-10-30 RX ORDER — MINOCYCLINE HYDROCHLORIDE 100 MG/1
CAPSULE ORAL
Qty: 30 CAPSULE | Refills: 0 | Status: SHIPPED | OUTPATIENT
Start: 2018-10-30 | End: 2018-11-30 | Stop reason: SDUPTHER

## 2018-11-02 ENCOUNTER — TELEPHONE (OUTPATIENT)
Dept: DERMATOLOGY | Facility: CLINIC | Age: 73
End: 2018-11-02

## 2018-11-02 ENCOUNTER — PATIENT OUTREACH (OUTPATIENT)
Dept: ADMINISTRATIVE | Facility: HOSPITAL | Age: 73
End: 2018-11-02

## 2018-11-08 ENCOUNTER — OFFICE VISIT (OUTPATIENT)
Dept: DERMATOLOGY | Facility: CLINIC | Age: 73
End: 2018-11-08
Payer: MEDICARE

## 2018-11-08 DIAGNOSIS — D04.61 SQUAMOUS CELL CARCINOMA IN SITU OF SKIN OF HAND, RIGHT: Primary | ICD-10-CM

## 2018-11-08 PROCEDURE — 99213 OFFICE O/P EST LOW 20 MIN: CPT | Mod: S$PBB,,, | Performed by: DERMATOLOGY

## 2018-11-08 PROCEDURE — 99999 PR PBB SHADOW E&M-EST. PATIENT-LVL II: CPT | Mod: PBBFAC,,, | Performed by: DERMATOLOGY

## 2018-11-08 PROCEDURE — 99212 OFFICE O/P EST SF 10 MIN: CPT | Mod: PBBFAC | Performed by: DERMATOLOGY

## 2018-11-08 NOTE — PROGRESS NOTES
ALLERGIES:  No known drug allergies    CHIEF COMPLAINT: followup post biopsy    HISTORY OF PRESENT ILLNESS:  Location: right dorsal hand  Timing: biopsy 2 week(s) ago   Context: pathology as noted below; see pathology report in chart  See previous notes; he is status post Mohs surgery to a squamous cell carcinoma to this vicinity in August; this appears to be somewhat lateral/ulnar to the site of prior surgery, along the suture line; see photos below  Quality: scabbed at present    PATH:   FINAL PATHOLOGIC DIAGNOSIS  Skin, right hand, shave biopsy:  -SUPERFICIALLY INVASIVE SQUAMOUS CELL CARCINOMA, see comment  COMMENT: The superficially invasive squamous cell carcinoma is completely excised in the planes of section  examined. However, there is a hair follicle with actinic damage which extends to the deep edge of the biopsy. More  importantly, there are areas of squamous cell carcinoma in-situ which extend to the peripheral biopsy edge.  Diagnosed by: Liam Kelley  (Electronically Signed: 2018-10-30 18:21:08)    PAST MEDICAL HISTORY:  Past Medical History:   Diagnosis Date    Bilateral cataracts     hx    Colon polyp     benign    HBP (high blood pressure)     History of detached retina repair     left    REGAN (obstructive sleep apnea)     uses CPAP    Prostate cancer     Rosacea        PAST SURGICAL HISTORY:  Past Surgical History:   Procedure Laterality Date    BUNIONECTOMY Right     CATARACT EXTRACTION BILATERAL W/ ANTERIOR VITRECTOMY Bilateral     COLONOSCOPY N/A 3/20/2017    Procedure: COLONOSCOPY;  Surgeon: Carl Marcelino MD;  Location: [facility] END[facility];  Service: Endoscopy;  Laterality: N/A;    COLONOSCOPY N/A 3/20/2017    Performed by Carl Marcelino MD at [facility] END[facility]    HERNIA REPAIR      x 2    JOINT REPLACEMENT Right 2012    per Dr. Heriberto Colón    JOINT REPLACEMENT Left 2004    per Dr. Heriberto Colón    KNEE SURGERY      left , right    MASTOID SURGERY Right     right    PROSTATECTOMY  2012     RETINAL DETACHMENT SURGERY      left    ROTATOR CUFF REPAIR      bilateral    SHOULDER SURGERY Left     RCR per Dr. Heriberto Colón    SHOULDER SURGERY Right     RCR per Dr. Heriberto Colón    TONSILLECTOMY, ADENOIDECTOMY         SOCIAL HISTORY:  Social History     Tobacco Use    Smoking status: Former Smoker     Packs/day: 0.50     Years: 5.00     Pack years: 2.50     Types: Cigarettes     Last attempt to quit: 1968     Years since quittin.9    Smokeless tobacco: Never Used   Substance Use Topics    Alcohol use: Yes     Alcohol/week: 1.0 oz     Types: 2 Standard drinks or equivalent per week     Comment: social    Drug use: No        PERTINENT MEDICATIONS:  See medications list.    Current Outpatient Medications:     amLODIPine (NORVASC) 5 MG tablet, Take 1 tablet (5 mg total) by mouth once daily., Disp: 90 tablet, Rfl: 3    atorvastatin (LIPITOR) 10 MG tablet, Take 1 tablet (10 mg total) by mouth once daily., Disp: 90 tablet, Rfl: 3    CUTIVATE 0.05 % Lotn, AAA qd, Disp: 120 mL, Rfl: 0    enalapril (VASOTEC) 20 MG tablet, Take 2 tablets (40 mg total) by mouth once daily., Disp: 180 tablet, Rfl: 3    minocycline (MINOCIN,DYNACIN) 100 MG capsule, TAKE ONE CAPSULE BY MOUTH ONCE DAILY, Disp: 30 capsule, Rfl: 0    VIT A/VIT C/VIT E/ZINC/COPPER (PRESERVISION AREDS ORAL), Take by mouth., Disp: , Rfl:     EXAM:  Constitutional  General appearance: well-developed, well-nourished, well-kempt older white male    Eyes  Inspection of conjunctivae and lids reveals no abnormalities; sclerae anicteric  Neurologic/Psychiatric  Alert,  normal orientation to time, place, person  Normal mood and affect with no evidence of depression, anxiety, agitation  Skin: see photo(s)  Head: background marked solar damage to exposed areas of skin  Right upper extremity: on the right dorsal hand there is a residual 8-9 mm eschar at the site of recent biopsy                 ASSESSMENT:   status post biopsy, superficially  invasive squamous cell carcinoma; path as noted above; 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 In particular, the discussion included, but was not limited to, the following:    One alternative at this point would be to defer further treatment and observe the lesion. With small skin cancers of this kind, it is possible that a biopsy can be sufficient to definitively treat a small skin cancer of this kind. Alternatively, some skin cancers are slow growing and do not require immediate treatment. The potential advantage of this choice would be to avoid the need for possibly unnecessary additional surgery. Among the potential disadvantages of this would be the possibility of enlargement of the lesion, more extensive spread of the lesion or recurrence at a later date, which might necessitate a larger and more complex surgery.    Topical treatment with 5-fluorouracil or imiquimod or another similar agent could be an effective treatment for a superficial, early skin cancer of this kind.  The usual course of treatment is application of the medication for 2-3 months. Local irritation will result from treatment, although no systemic side effects are expected. The potential advantage of topical treatment is that it avoids the need for surgery. Among the disadvantages of this treatment are the length of treatment, the local inflammatory  response, the absence of pathologic confirmation of the removal of the skin cancer, and the possible risk of recurrence at a later date.    Radiation treatment can be an effective treatment for this type of skin cancer. The usual course of treatment is every weekday for several weeks. Local irritation will result from treatment, although no systemic side effects are expected. The potential advantage of radiation treatment is that it avoids the need for surgery. Among the disadvantages of radiation treatment are the length of treatment, the local inflammatory response, the absence of pathologic confirmation of the removal of the skin cancer, a possible increased risk of additional skin cancer in the treated area in later years, and a somewhat increased risk of recurrence at a later date.     Excisional surgery can be an effective treatment for this type of skin cancer. This would involve excision of the lesion with margin evaluation by submitting the specimen to a pathologist for either immediate marginal assessment via frozen section processing, or delayed marginal assessment by fixed-tissue processing. The potential advantage of this technique is that it offers a way of treating the lesion with some degree of histologic confirmation of tumor removal. Among the disadvantages of this treatment are the possible need for re-excision if marginal involvement is identified, a somewhat greater likelihood of recurrence as compared to Mohs' surgery because of the less comprehensive margin evaluation inherent in the technique, and the general potential risks of surgery, including allergic reactions to the anesthetic and other materials used, infection, injury to nerves in the area with consequent loss of sensation or muscle function, and scarring or distortion of surrounding structures.    Mohs' surgery is a very effective treatment for this type of skin cancer. The potential advantage of Mohs' surgery is that this technique  offers the greatest possible certainty of knowing that the skin cancer has been completely removed, with the removal of the least amount of normal tissue. The potential disadvantages of Mohs' surgery include the duration of the surgery, the possible need for a separate surgery for reconstruction following tumor removal, and scarring as a result. In addition, general potential risks of surgery as noted above also apply to treatment via Mohs' surgery.    Given the current clinical findings, we will defer treatment for the moment. He is to follow-up in two months for reevaluation, or sooner in the event that any changes arise to the area in the meantime.    I anticipate reassessment at his follow-up visit, and possible treatment with Efudex/imiquimod at that time, as a more effective way of addressing any residual squamous cell carcinoma in situ to the area and the surrounding vicinity.    Sufficient time was available for questions, and all questions were answered to his satisfaction.    --------------------------------------  Note: Some or all of this note may have been generated using voice recognition software. There may be voice recognition errors including grammatical and/or spelling errors found in the text. Attempts were made to correct these errors prior to signature.

## 2018-11-19 NOTE — TELEPHONE ENCOUNTER
----- Message from Angeline Ames sent at 8/29/2018  1:00 PM CDT -----  Contact: patient   Needs Advice    Reason for call:  Patient would like to reschedule appt      Communication Preference: [phone]      Bill [id number] For Specimen Handling/Conveyance To Laboratory?: no Electrodesiccation Text: The wound bed was treated with electrodesiccation after the biopsy was performed. Anesthesia Volume In Cc: 0 Depth Of Biopsy: dermis Size Of Lesion In Cm: 0.3 Type Of Destruction Used: Curettage Post-Care Instructions: I reviewed with the patient in detail post-care instructions. Patient is to keep the biopsy site bandaged overnight, and then wash once daily with soap and water and then apply a thin layer of petrolatum once daily until healed. Biopsy Type: H and E Electrodesiccation And Curettage Text: The wound bed was treated with electrodesiccation and curettage after the biopsy was performed. Billing Type: Third-Party Bill Hemostasis: Aluminum Chloride and Electrocautery Silver Nitrate Text: The wound bed was treated with silver nitrate after the biopsy was performed. Dressing: bandage Was A Bandage Applied: Yes Notification Instructions: Patient will be notified of biopsy results. However, patient instructed to call the office if not contacted within 2 weeks. Consent: Written consent was obtained and risks were reviewed including but not limited to scarring, infection, bleeding, scabbing, incomplete removal, nerve damage and allergy to anesthesia. Lab: 253 Curettage Text: The wound bed was treated with curettage after the biopsy was performed. Cryotherapy Text: The wound bed was treated with cryotherapy after the biopsy was performed. Wound Care: Petrolatum Detail Level: Detailed Lab Facility:  Anesthesia Type: 1% lidocaine with epinephrine

## 2018-11-28 ENCOUNTER — OFFICE VISIT (OUTPATIENT)
Dept: OTOLARYNGOLOGY | Facility: CLINIC | Age: 73
End: 2018-11-28
Payer: MEDICARE

## 2018-11-28 VITALS — WEIGHT: 242.94 LBS | BODY MASS INDEX: 35.98 KG/M2 | HEIGHT: 69 IN

## 2018-11-28 DIAGNOSIS — H61.22 IMPACTED CERUMEN OF LEFT EAR: Primary | ICD-10-CM

## 2018-11-28 DIAGNOSIS — Q16.1 EXTERNAL AUDITORY CANAL ATRESIA: ICD-10-CM

## 2018-11-28 DIAGNOSIS — H90.3 ASYMMETRICAL SENSORINEURAL HEARING LOSS: ICD-10-CM

## 2018-11-28 PROCEDURE — 99999 PR PBB SHADOW E&M-EST. PATIENT-LVL III: CPT | Mod: PBBFAC,,, | Performed by: NURSE PRACTITIONER

## 2018-11-28 PROCEDURE — 69210 REMOVE IMPACTED EAR WAX UNI: CPT | Mod: 50,PBBFAC,PO | Performed by: NURSE PRACTITIONER

## 2018-11-28 PROCEDURE — 99213 OFFICE O/P EST LOW 20 MIN: CPT | Mod: 25,S$PBB,, | Performed by: NURSE PRACTITIONER

## 2018-11-28 PROCEDURE — 69210 REMOVE IMPACTED EAR WAX UNI: CPT | Mod: S$PBB,,, | Performed by: NURSE PRACTITIONER

## 2018-11-28 PROCEDURE — 99213 OFFICE O/P EST LOW 20 MIN: CPT | Mod: PBBFAC,PO | Performed by: NURSE PRACTITIONER

## 2018-11-30 DIAGNOSIS — L71.1 RHINOPHYMA: ICD-10-CM

## 2018-11-30 RX ORDER — MINOCYCLINE HYDROCHLORIDE 100 MG/1
CAPSULE ORAL
Qty: 30 CAPSULE | Refills: 0 | Status: SHIPPED | OUTPATIENT
Start: 2018-11-30 | End: 2019-01-02 | Stop reason: SDUPTHER

## 2018-12-06 ENCOUNTER — OFFICE VISIT (OUTPATIENT)
Dept: FAMILY MEDICINE | Facility: CLINIC | Age: 73
End: 2018-12-06
Payer: MEDICARE

## 2018-12-06 VITALS
DIASTOLIC BLOOD PRESSURE: 76 MMHG | TEMPERATURE: 99 F | HEIGHT: 69 IN | HEART RATE: 68 BPM | WEIGHT: 243.19 LBS | BODY MASS INDEX: 36.02 KG/M2 | SYSTOLIC BLOOD PRESSURE: 120 MMHG

## 2018-12-06 DIAGNOSIS — L30.9 DERMATITIS: ICD-10-CM

## 2018-12-06 DIAGNOSIS — E78.5 HYPERLIPIDEMIA, UNSPECIFIED HYPERLIPIDEMIA TYPE: ICD-10-CM

## 2018-12-06 DIAGNOSIS — G47.33 OSA (OBSTRUCTIVE SLEEP APNEA): ICD-10-CM

## 2018-12-06 DIAGNOSIS — I10 ESSENTIAL HYPERTENSION: ICD-10-CM

## 2018-12-06 DIAGNOSIS — Z85.46 HISTORY OF PROSTATE CANCER: ICD-10-CM

## 2018-12-06 DIAGNOSIS — I10 ESSENTIAL HYPERTENSION: Primary | ICD-10-CM

## 2018-12-06 DIAGNOSIS — E66.09 CLASS 2 OBESITY DUE TO EXCESS CALORIES WITH BODY MASS INDEX (BMI) OF 35.0 TO 35.9 IN ADULT, UNSPECIFIED WHETHER SERIOUS COMORBIDITY PRESENT: ICD-10-CM

## 2018-12-06 DIAGNOSIS — Z23 NEED FOR SHINGLES VACCINE: ICD-10-CM

## 2018-12-06 DIAGNOSIS — C44.622 SQUAMOUS CELL CANCER OF SKIN OF HAND, RIGHT: ICD-10-CM

## 2018-12-06 DIAGNOSIS — Z23 NEED FOR IMMUNIZATION AGAINST INFLUENZA: ICD-10-CM

## 2018-12-06 DIAGNOSIS — Z23 NEED FOR PROPHYLACTIC VACCINATION AGAINST STREPTOCOCCUS PNEUMONIAE (PNEUMOCOCCUS): ICD-10-CM

## 2018-12-06 DIAGNOSIS — Z12.5 SCREENING FOR PROSTATE CANCER: ICD-10-CM

## 2018-12-06 DIAGNOSIS — Z23 NEED FOR DIPHTHERIA-TETANUS-PERTUSSIS (TDAP) VACCINE: ICD-10-CM

## 2018-12-06 DIAGNOSIS — R73.09 ELEVATED GLUCOSE: ICD-10-CM

## 2018-12-06 DIAGNOSIS — L71.9 ROSACEA: ICD-10-CM

## 2018-12-06 PROCEDURE — 99214 OFFICE O/P EST MOD 30 MIN: CPT | Mod: 25,S$GLB,, | Performed by: INTERNAL MEDICINE

## 2018-12-06 PROCEDURE — G0009 ADMIN PNEUMOCOCCAL VACCINE: HCPCS | Mod: S$GLB,,, | Performed by: INTERNAL MEDICINE

## 2018-12-06 PROCEDURE — G0008 ADMIN INFLUENZA VIRUS VAC: HCPCS | Mod: S$GLB,,, | Performed by: INTERNAL MEDICINE

## 2018-12-06 PROCEDURE — 90732 PPSV23 VACC 2 YRS+ SUBQ/IM: CPT | Mod: S$GLB,,, | Performed by: INTERNAL MEDICINE

## 2018-12-06 PROCEDURE — 90662 IIV NO PRSV INCREASED AG IM: CPT | Mod: S$GLB,,, | Performed by: INTERNAL MEDICINE

## 2018-12-06 RX ORDER — CLOBETASOL PROPIONATE 0.5 MG/G
CREAM TOPICAL 2 TIMES DAILY
Qty: 60 G | Refills: 3 | Status: SHIPPED | OUTPATIENT
Start: 2018-12-06 | End: 2020-08-14

## 2018-12-06 RX ORDER — AMLODIPINE BESYLATE 5 MG/1
5 TABLET ORAL DAILY
Qty: 90 TABLET | Refills: 3 | Status: SHIPPED | OUTPATIENT
Start: 2018-12-06 | End: 2019-11-21 | Stop reason: SDUPTHER

## 2018-12-06 NOTE — TELEPHONE ENCOUNTER
----- Message from Vicky Hewitt sent at 12/6/2018 11:30 AM CST -----  Contact: Patient  Type:  RX Refill Request    Who Called:  Patient  Refill or New Rx:  refill  RX Name and Strength:  amLODIPine (NORVASC) 5 MG tablet  How is the patient currently taking it? (ex. 1XDay):  1x day  Is this a 30 day or 90 day RX:  90 day  Preferred Pharmacy with phone number:    [facility] [id number] [facility] [id number] [place] [address]  [id number] [place] [id number]  Phone: [phone] Fax: [phone]    Local or Mail Order:  local  Ordering Provider:  Rosa Reddy Call Back Number:  [phone] (home)    Additional Information:  florecita

## 2018-12-06 NOTE — PROGRESS NOTES
Subjective:       Patient ID: Rohan Abarca is a 73 y.o. male.       Medication List           Accurate as of 12/6/18  3:07 PM. If you have any questions, ask your nurse or doctor.               START taking these medications    clobetasol 0.05 % cream  Commonly known as:  TEMOVATE  Apply topically 2 (two) times daily.  Started by:  Rosa Powell DO     diphth,pertus(acell),tetanus 2.5-8-5 Lf-mcg-Lf/0.5mL Susp  Commonly known as:  BOOSTRIX TDAP  Inject 0.5 mLs into the muscle once. for 1 dose  Started by:  Rosa Powell DO     varicella-zoster gE-AS01B (PF) 50 mcg/0.5 mL injection  Commonly known as:  SHINGRIX (PF)  Inject 0.5 mLs into the muscle once. for 1 dose  Started by:  Rosa Powell DO        CONTINUE taking these medications    amLODIPine 5 MG tablet  Commonly known as:  NORVASC  Take 1 tablet (5 mg total) by mouth once daily.     atorvastatin 10 MG tablet  Commonly known as:  LIPITOR  Take 1 tablet (10 mg total) by mouth once daily.     CUTIVATE 0.05 % Lotn  Generic drug:  fluticasone  AAA qd     enalapril 20 MG tablet  Commonly known as:  VASOTEC  Take 2 tablets (40 mg total) by mouth once daily.     minocycline 100 MG capsule  Commonly known as:  MINOCIN,DYNACIN  TAKE ONE CAPSULE BY MOUTH ONCE DAILY     PRESERVISION AREDS ORAL           Where to Get Your Medications      These medications were sent to [facility] [address] [place] [address] AT [facility] OF [address] [address]    Phone:  [phone]   · amLODIPine 5 MG tablet  · clobetasol 0.05 % cream     You can get these medications from any pharmacy    Bring a paper prescription for each of these medications  · diphth,pertus(acell),tetanus 2.5-8-5 Lf-mcg-Lf/0.5mL Susp  · varicella-zoster gE-AS01B (PF) 50 mcg/0.5 mL injection         Chief Complaint: Follow-up  He is here today to f/u on chronic medical issues. He has no complaints.     He has hypertension that is controlled on enalapril  20 mg qday and amlodipine 5 mg qday.  He has no known CAD. He denies chest pain or shortness of breath.      He has hyperlipidemia and is taking atorvastatin 10 mg qday. HIs last lipoids were on 8/2018 were 115/64/40/62.  He did have an MRI of the brain that showed chronic microvascular ischemic changes.  He is not on aspirin.      He has REGAN and and uses CPAP nightly. He is tolerating well.      He has history of prostate cancer s/p radical prostatectomy in 2012.  He did not go on hormonal treatment. He no longer follows with urology.  His last PSA was undetectable on 12/2017.      He complains of frequency of urination and nocturia. He was tried on flomax in the past but this did not help. He does a lot better when he uses his CPAP at night.      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 He had multiple excisions but bx still showed residual cancer.  He is to f/u with dermatology in 1/2019 for treatment.     He had complained of memory issues. He was seen by neurology in 6/2017 who ordered MRI (chronic microvascular ischemic changes) and neuropsych testing which was normal.  He was advised to start statin and aspirin.       He has rosacea on face and is followed annually by dermatology. He is using minocycline oral and cutivate topically.     He has a rash on left and right upper thigh x 2 months that is itchy.  It is red and getting larger.  He tried topical abx ointment but this has not improved rash.       He tries to eat healthy.  He does go to the gym 4-5 times a week and does stationary bike with weights.  He denies any balance issues.       Colonoscopy----3/2017 repeat in 8 years  Tdap---more than 10 years  Pneumovax----never  Prevnar----5/2018  Influenza vaccine----due  Shingles vaccine----none  AAA screen---10/2016 neg    Review of Systems   Constitutional: Negative for appetite change, fatigue, fever and unexpected weight change.   HENT: Negative for congestion, ear pain,  "hearing loss, sore throat and trouble swallowing.    Eyes: Negative for pain and visual disturbance.   Respiratory: Negative for cough, chest tightness, shortness of breath and wheezing.    Cardiovascular: Negative for chest pain, palpitations and leg swelling.   Gastrointestinal: Negative for abdominal pain, blood in stool, constipation, diarrhea, nausea and vomiting.   Endocrine: Negative for polyuria.   Genitourinary: Negative for dysuria and hematuria.   Musculoskeletal: Negative for arthralgias, back pain and myalgias.   Skin: Negative for rash.   Neurological: Negative for dizziness, weakness, numbness and headaches.   Hematological: Does not bruise/bleed easily.   Psychiatric/Behavioral: Negative for dysphoric mood, sleep disturbance and suicidal ideas. The patient is not nervous/anxious.        Objective:      Vitals:    12/06/18 0853   BP: 120/76   Pulse: 68   Temp: 98.6 °F (37 °C)   TempSrc: Oral   Weight: 110.3 kg (243 lb 3.2 oz)   Height: 5' 9" (1.753 m)     Body mass index is 35.91 kg/m².  Physical Exam    General appearance: No acute distress, cooperative  Neck: FROM, soft, supple, no thyromegaly, no bruits  Lymph: no anterior or posterior cervical adenopathy  Heart::  Regular rate and rhythm, no murmur  Lung: Clear to ascultation bilaterally, no wheezing, no rales, no rhonchi, no distress  Abdomen: Soft, nontender, no distention, no hepatosplenomegaly, bowel sounds normal, no guarding, no rebound, no peritoneal signs  Skin: upper left and right erythematous rash raised with slivery flakes, no satellite lesions  Extremities: no edema, no cyanosis  Neuro: no focal abnormalities, strength 5/5 b/l UE and LE, 2+ DTRs b/l UE and LE, normal gait  Peripheral pulses: 2+ pedal pulses bilaterally, good perfusion and color  Musculoskeletal: FROM, good strenth, no tenderness  Joint: normal appearance, no swelling, no warmth, no deformity in all joints    Assessment:       1. Essential hypertension    2. " Hyperlipidemia, unspecified hyperlipidemia type    3. REGAN (obstructive sleep apnea)    4. Squamous cell cancer of skin of hand, right    5. Rosacea    6. Dermatitis    7. History of prostate cancer    8. Class 2 obesity due to excess calories with body mass index (BMI) of 35.0 to 35.9 in adult, unspecified whether serious comorbidity present    9. Elevated glucose    10. Screening for prostate cancer    11. Need for diphtheria-tetanus-pertussis (Tdap) vaccine    12. Need for immunization against influenza    13. Need for prophylactic vaccination against Streptococcus pneumoniae (pneumococcus)    14. Need for shingles vaccine        Plan:       Essential hypertension  Good control on this regimen.  He is due for labs.   -     CBC auto differential; Future; Expected date: 12/06/2018  -     Comprehensive metabolic panel; Future; Expected date: 12/06/2018  -     Lipid panel; Future; Expected date: 12/06/2018  -     TSH; Future; Expected date: 12/06/2018    Hyperlipidemia, unspecified hyperlipidemia type  Good control since starting atorvastatin.      REGAN (obstructive sleep apnea)  Controlled and he is compliant with CPAP.     Squamous cell cancer of skin of hand, right  Still with residual disease. He continues to follow with dermatology and due to f/u in 1/2019 for treatment.     Rosacea  Controlled on minocycline and followed by dermatology.     Dermatitis  Uncontrolled. Will apply topical steroids bid and see if resolves. He will f/u with dermatology to have this checked.   -     clobetasol (TEMOVATE) 0.05 % cream; Apply topically 2 (two) times daily.  Dispense: 60 g; Refill: 3    History of prostate cancer  No active disease.  Will check PSA.     Class 2 obesity due to excess calories with body mass index (BMI) of 35.0 to 35.9 in adult, unspecified whether serious comorbidity present  Discussed the need for healthy eating. He is very active and goes to the gym regularly.     Elevated glucose  -     Hemoglobin A1c;  Future; Expected date: 12/06/2018    Screening for prostate cancer  -     PSA, Screening; Future; Expected date: 12/06/2018    Need for diphtheria-tetanus-pertussis (Tdap) vaccine  -     diphth,pertus,acell,,tetanus (BOOSTRIX TDAP) 2.5-8-5 Lf-mcg-Lf/0.5mL Susp; Inject 0.5 mLs into the muscle once. for 1 dose  Dispense: 0.5 mL; Refill: 0    Need for immunization against influenza  -     Influenza - High Dose (65+) (PF) (IM)    Need for prophylactic vaccination against Streptococcus pneumoniae (pneumococcus)  -     Pneumococcal Polysaccharide Vaccine (23 Valent) (SQ/IM)    Need for shingles vaccine  -     varicella-zoster gE-AS01B, PF, (SHINGRIX, PF,) 50 mcg/0.5 mL injection; Inject 0.5 mLs into the muscle once. for 1 dose  Dispense: 0.5 mL; Refill: 1    Follow-up in about 6 months (around 6/6/2019) for chronic medical issues.

## 2018-12-07 ENCOUNTER — LAB VISIT (OUTPATIENT)
Dept: LAB | Facility: HOSPITAL | Age: 73
End: 2018-12-07
Attending: INTERNAL MEDICINE
Payer: MEDICARE

## 2018-12-07 DIAGNOSIS — I10 ESSENTIAL HYPERTENSION: ICD-10-CM

## 2018-12-07 DIAGNOSIS — Z12.5 SCREENING FOR PROSTATE CANCER: ICD-10-CM

## 2018-12-07 DIAGNOSIS — R73.09 ELEVATED GLUCOSE: ICD-10-CM

## 2018-12-07 LAB
ALBUMIN SERPL BCP-MCNC: 3.8 G/DL
ALP SERPL-CCNC: 80 U/L
ALT SERPL W/O P-5'-P-CCNC: 27 U/L
ANION GAP SERPL CALC-SCNC: 8 MMOL/L
AST SERPL-CCNC: 33 U/L
BASOPHILS # BLD AUTO: 0.04 K/UL
BASOPHILS NFR BLD: 0.5 %
BILIRUB SERPL-MCNC: 0.8 MG/DL
BUN SERPL-MCNC: 13 MG/DL
CALCIUM SERPL-MCNC: 9.4 MG/DL
CHLORIDE SERPL-SCNC: 106 MMOL/L
CHOLEST SERPL-MCNC: 103 MG/DL
CHOLEST/HDLC SERPL: 2.5 {RATIO}
CO2 SERPL-SCNC: 27 MMOL/L
COMPLEXED PSA SERPL-MCNC: <0.01 NG/ML
CREAT SERPL-MCNC: 0.9 MG/DL
DIFFERENTIAL METHOD: ABNORMAL
EOSINOPHIL # BLD AUTO: 0.1 K/UL
EOSINOPHIL NFR BLD: 1.5 %
ERYTHROCYTE [DISTWIDTH] IN BLOOD BY AUTOMATED COUNT: 12.1 %
EST. GFR  (AFRICAN AMERICAN): >60 ML/MIN/1.73 M^2
EST. GFR  (NON AFRICAN AMERICAN): >60 ML/MIN/1.73 M^2
ESTIMATED AVG GLUCOSE: 100 MG/DL
GLUCOSE SERPL-MCNC: 104 MG/DL
HBA1C MFR BLD HPLC: 5.1 %
HCT VFR BLD AUTO: 45.9 %
HDLC SERPL-MCNC: 41 MG/DL
HDLC SERPL: 39.8 %
HGB BLD-MCNC: 15.3 G/DL
IMM GRANULOCYTES # BLD AUTO: 0.03 K/UL
IMM GRANULOCYTES NFR BLD AUTO: 0.4 %
LDLC SERPL CALC-MCNC: 50.6 MG/DL
LYMPHOCYTES # BLD AUTO: 1.5 K/UL
LYMPHOCYTES NFR BLD: 20.2 %
MCH RBC QN AUTO: 31.4 PG
MCHC RBC AUTO-ENTMCNC: 33.3 G/DL
MCV RBC AUTO: 94 FL
MONOCYTES # BLD AUTO: 0.8 K/UL
MONOCYTES NFR BLD: 10.8 %
NEUTROPHILS # BLD AUTO: 4.9 K/UL
NEUTROPHILS NFR BLD: 66.6 %
NONHDLC SERPL-MCNC: 62 MG/DL
NRBC BLD-RTO: 0 /100 WBC
PLATELET # BLD AUTO: 218 K/UL
PMV BLD AUTO: 11.1 FL
POTASSIUM SERPL-SCNC: 3.8 MMOL/L
PROT SERPL-MCNC: 6.5 G/DL
RBC # BLD AUTO: 4.88 M/UL
SODIUM SERPL-SCNC: 141 MMOL/L
TRIGL SERPL-MCNC: 57 MG/DL
TSH SERPL DL<=0.005 MIU/L-ACNC: 1.07 UIU/ML
WBC # BLD AUTO: 7.42 K/UL

## 2018-12-07 PROCEDURE — 80053 COMPREHEN METABOLIC PANEL: CPT

## 2018-12-07 PROCEDURE — 80061 LIPID PANEL: CPT

## 2018-12-07 PROCEDURE — 84443 ASSAY THYROID STIM HORMONE: CPT

## 2018-12-07 PROCEDURE — 36415 COLL VENOUS BLD VENIPUNCTURE: CPT | Mod: PO

## 2018-12-07 PROCEDURE — 85025 COMPLETE CBC W/AUTO DIFF WBC: CPT

## 2018-12-07 PROCEDURE — 84153 ASSAY OF PSA TOTAL: CPT

## 2018-12-07 PROCEDURE — 83036 HEMOGLOBIN GLYCOSYLATED A1C: CPT

## 2018-12-11 ENCOUNTER — PATIENT MESSAGE (OUTPATIENT)
Dept: FAMILY MEDICINE | Facility: CLINIC | Age: 73
End: 2018-12-11

## 2019-01-02 DIAGNOSIS — L71.1 RHINOPHYMA: ICD-10-CM

## 2019-01-02 RX ORDER — MINOCYCLINE HYDROCHLORIDE 100 MG/1
CAPSULE ORAL
Qty: 30 CAPSULE | Refills: 4 | Status: SHIPPED | OUTPATIENT
Start: 2019-01-02 | End: 2019-05-25 | Stop reason: SDUPTHER

## 2019-01-10 ENCOUNTER — TELEPHONE (OUTPATIENT)
Dept: DERMATOLOGY | Facility: CLINIC | Age: 74
End: 2019-01-10

## 2019-01-15 ENCOUNTER — OFFICE VISIT (OUTPATIENT)
Dept: PRIMARY CARE CLINIC | Facility: CLINIC | Age: 74
End: 2019-01-15
Payer: MEDICARE

## 2019-01-15 VITALS
TEMPERATURE: 98 F | BODY MASS INDEX: 34.87 KG/M2 | DIASTOLIC BLOOD PRESSURE: 68 MMHG | RESPIRATION RATE: 18 BRPM | WEIGHT: 235.44 LBS | HEART RATE: 76 BPM | HEIGHT: 69 IN | OXYGEN SATURATION: 97 % | SYSTOLIC BLOOD PRESSURE: 106 MMHG

## 2019-01-15 DIAGNOSIS — J98.8 VIRAL RESPIRATORY ILLNESS: Primary | ICD-10-CM

## 2019-01-15 DIAGNOSIS — B97.89 VIRAL RESPIRATORY ILLNESS: Primary | ICD-10-CM

## 2019-01-15 DIAGNOSIS — R05.9 COUGH: ICD-10-CM

## 2019-01-15 PROCEDURE — 99214 PR OFFICE/OUTPT VISIT, EST, LEVL IV, 30-39 MIN: ICD-10-PCS | Mod: S$PBB,,, | Performed by: NURSE PRACTITIONER

## 2019-01-15 PROCEDURE — 99214 OFFICE O/P EST MOD 30 MIN: CPT | Mod: S$PBB,,, | Performed by: NURSE PRACTITIONER

## 2019-01-15 PROCEDURE — 99215 OFFICE O/P EST HI 40 MIN: CPT | Mod: PBBFAC,PO | Performed by: NURSE PRACTITIONER

## 2019-01-15 PROCEDURE — 99999 PR PBB SHADOW E&M-EST. PATIENT-LVL V: ICD-10-PCS | Mod: PBBFAC,,, | Performed by: NURSE PRACTITIONER

## 2019-01-15 PROCEDURE — 99999 PR PBB SHADOW E&M-EST. PATIENT-LVL V: CPT | Mod: PBBFAC,,, | Performed by: NURSE PRACTITIONER

## 2019-01-15 RX ORDER — PROMETHAZINE HYDROCHLORIDE AND DEXTROMETHORPHAN HYDROBROMIDE 6.25; 15 MG/5ML; MG/5ML
5 SYRUP ORAL NIGHTLY PRN
Qty: 120 ML | Refills: 0 | Status: SHIPPED | OUTPATIENT
Start: 2019-01-15 | End: 2019-01-25

## 2019-01-15 RX ORDER — GUAIFENESIN 600 MG/1
1200 TABLET, EXTENDED RELEASE ORAL 2 TIMES DAILY PRN
COMMUNITY
End: 2019-06-06

## 2019-01-15 RX ORDER — BENZONATATE 200 MG/1
CAPSULE ORAL
Qty: 30 CAPSULE | Refills: 1 | Status: SHIPPED | OUTPATIENT
Start: 2019-01-15 | End: 2019-06-06

## 2019-01-15 NOTE — PROGRESS NOTES
"Rohan Abarca is a 73 y.o. male patient of Rosa Powell DO who presents to the clinic today for No chief complaint on file.  .    HPI    Pt, who is known to me, reports a new problem to me: typically gets a cold and it goes away in 2-3 days and doesn't get in his chest.  This one has been there in a week.   Cough occ productive.  No problems sleeping.  Ribs are sore from coughing.  Started with nasal congestion, drip, sore throat, then the coughing started.  No fever.  Concerned for pneumonia.  Has had his pneumonia shot.    These symptoms began 1 week  ago and status is "hair better".     Symptoms are + acute.    Pt denies the following symptoms:  fever    Aggravating factors include nothing .    Relieving factors include Mucinex--may be helping .    OTC Medications tried are Mucinex.    Prescription medications taken for symptoms are none.    Pertinent medical history:  No h/o lung problems..    Smoking status:  Remote h/o smoking    ROS    Constitutional:   No  fever, no fatigue, no change in appetite.    Head:   No headache  Ears:   No pain.  Eyes:  Mild achiness  Nose:   No sinus pain, mild congestion, + runny nose, + post nasal drip.  Throat:  + ST pain.    Heart:  No palpitations, chest pain.    Lungs:  No difficulty breathing, + coughing, occ sputum production, not wheezing.              Symptoms are community acquired.  No known sick contacts    GI/:  No sxs    MS:  No new bone, joint or muscle problems.    Skin:  No rashes, itching.      PAST MEDICAL HISTORY:  Past Medical History:   Diagnosis Date    Bilateral cataracts     hx    Colon polyp     benign    HBP (high blood pressure)     History of detached retina repair     left    REGAN (obstructive sleep apnea)     uses CPAP    Prostate cancer     Rosacea        PAST SURGICAL HISTORY:  Past Surgical History:   Procedure Laterality Date    BUNIONECTOMY Right     CATARACT EXTRACTION BILATERAL W/ ANTERIOR VITRECTOMY Bilateral     COLONOSCOPY " N/A 3/20/2017    [facility]formed by [facility]    HERNIA REPAIR      x 2    JOINT REPLACEMENT Right     per Dr. Heriberto Colón    JOINT REPLACEMENT Left     per Dr. Heriberto Colón    KNEE SURGERY      left , right    MASTOID SURGERY Right     right    PROSTATECTOMY  2012    RETINAL DETACHMENT SURGERY      left    ROTATOR CUFF REPAIR      bilateral    SHOULDER SURGERY Left     RCR per Dr. Heriberto Colón    SHOULDER SURGERY Right     RCR per Dr. Heriberto Colón    TONSILLECTOMY, ADENOIDECTOMY         SOCIAL HISTORY:  Social History     Socioeconomic History    Marital status:      Spouse name: Not on file    Number of children: Not on file    Years of education: Not on file    Highest education level: Not on file   Social Needs    Financial resource strain: Not on file    Food insecurity - worry: Not on file    Food insecurity - inability: Not on file    Transportation needs - medical: Not on file    Transportation needs - non-medical: Not on file   Occupational History    Not on file   Tobacco Use    Smoking status: Former Smoker     Packs/day: 0.50     Years: 5.00     Pack years: 2.50     Types: Cigarettes     Last attempt to quit: 1968     Years since quittin.1    Smokeless tobacco: Never Used   Substance and Sexual Activity    Alcohol use: Yes     Alcohol/week: 1.0 oz     Types: 2 Standard drinks or equivalent per week     Comment: social    Drug use: No    Sexual activity: Yes     Partners: Female   Other Topics Concern    Not on file   Social History Narrative    Was working 4 days a week delivering seafood, unloading and loading truck by hand, retired Friday    No dietary restrictions       FAMILY HISTORY:  Family History   Problem Relation Age of Onset    Cancer Sister     Stroke Sister     Cancer Sister     Stroke Unknown     Heart attack Unknown        ALLERGIES AND MEDICATIONS: updated and reviewed.  Review of patient's allergies indicates:  "  Allergen Reactions    No known drug allergies      Current Outpatient Medications   Medication Sig Dispense Refill    amLODIPine (NORVASC) 5 MG tablet Take 1 tablet (5 mg total) by mouth once daily. 90 tablet 3    atorvastatin (LIPITOR) 10 MG tablet Take 1 tablet (10 mg total) by mouth once daily. 90 tablet 3    clobetasol (TEMOVATE) 0.05 % cream Apply topically 2 (two) times daily. 60 g 3    CUTIVATE 0.05 % Lotn AAA qd 120 mL 0    enalapril (VASOTEC) 20 MG tablet Take 2 tablets (40 mg total) by mouth once daily. 180 tablet 3    minocycline (MINOCIN,DYNACIN) 100 MG capsule TAKE ONE CAPSULE BY MOUTH ONCE DAILY 30 capsule 4    VIT A/VIT C/VIT E/ZINC/COPPER (PRESERVISION AREDS ORAL) Take by mouth.       No current facility-administered medications for this visit.              PHYSICAL EXAM    Alert, coop 73 y.o. male patient in no acute distress.    Vitals:    01/15/19 1000   BP: 106/68   BP Location: Right arm   Patient Position: Sitting   Pulse: 76   Resp: 18   Temp: 98.2 °F (36.8 °C)   TempSrc: Oral   SpO2: 97%   Weight: 106.8 kg (235 lb 7.2 oz)   Height: 5' 9" (1.753 m)       VS reviewed.  VS stable.  CC, nursing note, medications & PMH all reviewed today.    Head:  Normocephalic, atraumatic.    EENT:  EACs patent.  Left TM only: no erythema, diffuse LR, no effusions, no TM perforation.                              Eye lids normal, no discharge present.       Sinus tenderness to palp--none.               Nares--+ edema, no d/c present.    Pharynx not injected.                Tonsils absent.    Several small, NT, bilat cervical lymph nodes palp.             Resp:  Respirations even, unlabored.  Coughing during the visit.   Lungs CTA bilat.  No wheezing.  No crackles.  Moves air to bases bilat.    Heart:  RRR, no MRG.                MS:  Ambulates normally.             NEURO:  Alert and oriented x 4.  Responds appropriately during interaction.    Skin:  Warm, dry, color good.    Viral respiratory " illness    Cough  -     benzonatate (TESSALON) 200 MG capsule; Take 1 capsule every 8 hours as needed for daytime cough.  Dispense: 30 capsule; Refill: 1  -     promethazine-dextromethorphan (PROMETHAZINE-DM) 6.25-15 mg/5 mL Syrp; Take 5 mLs by mouth nightly as needed.  Dispense: 120 mL; Refill: 0      Pt today presents with report of 1 week of cold sxs that are slightly better.    He's concerned about pneumonia.  Exam shows nasal congestion, cough but lungs are clear..    This is a new problem to me.  No work up is planned.        Pt advised to perform comfort measures recommended on patient instruction sheet .    If not better in 5-7 days, the patient is advised to call us.  If worse or concerns, the patient is advised to call us.  Explained exam findings, diagnosis and treatment plan to patient.  Questions answered and patient states understanding.

## 2019-01-15 NOTE — PATIENT INSTRUCTIONS
Your symptoms today are consistent with viral respiratory illness and cough.  This is likely a viral illness that needs to run its course.    Comfort measures:  Increase fluids  Get plenty of rest  Vaporizer or frequent showers may be helpful.  Take tylenol of ibuprofen as needed for pain or fever  For thick mucus secretions, you can take over the counter guaifenesin (mucinex), drink plenty of water while taking this to help loosen mucus.  The following is also prescribed for the cough benzonatate cough capsules every 8 hours during the daytime as needed and cough syrup at bedtime as needed.    Salt water gargles.  Saline nasal spray  Wash cloth with warm water placed over the sinus area can lessen discomfort and pressure.  Sleep with head of bed elevated to decrease drainage, cough and congestion.    I recommend frequent handwashing to limit spread of infection to others     If you are not better in 5-7 days, if worse or you have concerns or questions, please do not hesitate to call.  You can reach us at [phone] Monday through Thursday (except holidays) 10 a.m. to 5 p.m.  Or call Rosa Powell DO's nurse.   NOTE:  I do not work on Fridays.  If you have concerns on Fridays, call your primary care office.    Evenings and weekends [facility] is also available if symptoms worsen or fail to improve.  When you call the number, a registered nurse answers and takes your information.  The nurse can look at your medical record and make recommendations or call the on call physician, if warranted.      Urgent Cares associated with [facility] are open M-F evenings and on the weekends, as well.    [facility] Address: [address] [place] [address] [address], [place] [address] Phone: [phone]    [facility] Address:  Address: [address] [place] [address] [address] [place], [place] [address] Phone: [phone]    Thank you for using the [facility]!    Snehal Millan, APRN, CNP, FNP-BC  [facility]

## 2019-01-17 ENCOUNTER — OFFICE VISIT (OUTPATIENT)
Dept: DERMATOLOGY | Facility: CLINIC | Age: 74
End: 2019-01-17
Payer: MEDICARE

## 2019-01-17 DIAGNOSIS — D04.61 SQUAMOUS CELL CARCINOMA IN SITU OF SKIN OF HAND, RIGHT: Primary | ICD-10-CM

## 2019-01-17 DIAGNOSIS — L57.0 ACTINIC KERATOSIS: ICD-10-CM

## 2019-01-17 PROCEDURE — 99999 PR PBB SHADOW E&M-EST. PATIENT-LVL I: ICD-10-PCS | Mod: PBBFAC,,, | Performed by: DERMATOLOGY

## 2019-01-17 PROCEDURE — 99212 OFFICE O/P EST SF 10 MIN: CPT | Mod: S$PBB,,, | Performed by: DERMATOLOGY

## 2019-01-17 PROCEDURE — 99212 PR OFFICE/OUTPT VISIT, EST, LEVL II, 10-19 MIN: ICD-10-PCS | Mod: S$PBB,,, | Performed by: DERMATOLOGY

## 2019-01-17 PROCEDURE — 99999 PR PBB SHADOW E&M-EST. PATIENT-LVL I: CPT | Mod: PBBFAC,,, | Performed by: DERMATOLOGY

## 2019-01-17 PROCEDURE — 99211 OFF/OP EST MAY X REQ PHY/QHP: CPT | Mod: PBBFAC | Performed by: DERMATOLOGY

## 2019-01-17 RX ORDER — FLUOROURACIL 50 MG/G
CREAM TOPICAL
Qty: 40 G | Refills: 1 | Status: SHIPPED | OUTPATIENT
Start: 2019-01-17 | End: 2019-06-06 | Stop reason: ALTCHOICE

## 2019-01-17 NOTE — PROGRESS NOTES
CHIEF COMPLAINT: followup biopsy    HISTORY OF PRESENT ILLNESS:  Location: right dorsal hand  Timing: I last saw him 2 months ago  Quality: no real change noted  Context: see the previous notes; path from biopsy as noted below  He returns today to consider further options, and in particular treatment of the area with topical 5-FU    PATH:   FINAL PATHOLOGIC DIAGNOSIS  Skin, right hand, shave biopsy:  -SUPERFICIALLY INVASIVE SQUAMOUS CELL CARCINOMA, see comment  COMMENT: The superficially invasive squamous cell carcinoma is completely excised in the planes of section  examined. However, there is a hair follicle with actinic damage which extends to the deep edge of the biopsy. More  importantly, there are areas of squamous cell carcinoma in-situ which extend to the peripheral biopsy edge.  Diagnosed by: Liam Kelley  (Electronically Signed: 2018-10-30 18:21:08)     REVIEW OF SYSTEMS:  Skin:  He has a persistent rough area on his nose tip that he asks about  previous history of skin cancer on the right dorsal hand; see previous notes    PAST MEDICAL HISTORY:  Past Medical History:   Diagnosis Date    Bilateral cataracts     hx    Colon polyp     benign    HBP (high blood pressure)     History of detached retina repair     left    REGAN (obstructive sleep apnea)     uses CPAP    Prostate cancer     Rosacea        PAST SURGICAL HISTORY:  Past Surgical History:   Procedure Laterality Date    BUNIONECTOMY Right     CATARACT EXTRACTION BILATERAL W/ ANTERIOR VITRECTOMY Bilateral     COLONOSCOPY N/A 3/20/2017    [facility]    HERNIA REPAIR      x 2    JOINT REPLACEMENT Right 2012    per Dr. Heriberto Colón    JOINT REPLACEMENT Left 2004    per Dr. Heriberto Colón    KNEE SURGERY      left , right    MASTOID SURGERY Right     right    PROSTATECTOMY  2012    RETINAL DETACHMENT SURGERY      left    ROTATOR CUFF REPAIR      bilateral    SHOULDER SURGERY Left     RCR per Dr. Louis  Eldon    SHOULDER SURGERY Right     RCR per Dr. Heriberto Colón    TONSILLECTOMY, ADENOIDECTOMY          PERTINENT MEDICATIONS:  See medications list.  Current Outpatient Medications on File Prior to Visit   Medication Sig Dispense Refill    amLODIPine (NORVASC) 5 MG tablet Take 1 tablet (5 mg total) by mouth once daily. 90 tablet 3    atorvastatin (LIPITOR) 10 MG tablet Take 1 tablet (10 mg total) by mouth once daily. 90 tablet 3    benzonatate (TESSALON) 200 MG capsule Take 1 capsule every 8 hours as needed for daytime cough. 30 capsule 1    clobetasol (TEMOVATE) 0.05 % cream Apply topically 2 (two) times daily. 60 g 3    CUTIVATE 0.05 % Lotn AAA qd 120 mL 0    enalapril (VASOTEC) 20 MG tablet Take 2 tablets (40 mg total) by mouth once daily. 180 tablet 3    guaiFENesin (MUCINEX) 600 mg 12 hr tablet Take 1,200 mg by mouth 2 (two) times daily as needed for Congestion.      minocycline (MINOCIN,DYNACIN) 100 MG capsule TAKE ONE CAPSULE BY MOUTH ONCE DAILY 30 capsule 4    promethazine-dextromethorphan (PROMETHAZINE-DM) 6.25-15 mg/5 mL Syrp Take 5 mLs by mouth nightly as needed. 120 mL 0    VIT A/VIT C/VIT E/ZINC/COPPER (PRESERVISION AREDS ORAL) Take by mouth.       No current facility-administered medications on file prior to visit.        ALLERGIES:  No known drug allergies    EXAM:  See also the handwritten notes/diagrams scanned to chart for additional details  Constitutional  - General appearance: well-developed, well-nourished, well-kempt older white male    Skin: see photo(s)  -Face:  On his nose tip there is an approximately 5-6 mm area of irregular hyperkeratosis which is consistent with an actinic keratosis  - Left Upper Extremity:  There are several irregular hyperkeratotic lesions on his left dorsal hand which are consistent with actinic keratoses  - Right Upper Extremity:  See the photo below; there is some residual slight induration at the site of previous biopsy on the ulnar dorsal hand; in some  slight irregular hyperkeratosis surrounding this, and some other areas of irregular hyperkeratosis on his dorsal hand which are consistent with actinic keratoses    Photo from time of biopsy      Photos from previous Mohs' surgery          ASSESSMENT:   Actinic keratoses, nose tip, left dorsal hand, right dorsal hand  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 After discussing the alternatives and potential advantages and disadvantages of each, I feel that the most appropriate option would be to begin treatment with 5-fluorouracil cream (Efudex) to the areas in question, with the goal of clearing the lesions as thoroughly as possible. I reviewed in detail the use of the medication and anticipated effects of the treatment and the anticipated course with the patient. He  is to followup in two weeks, or sooner in the event that any problems arise in the meantime.

## 2019-01-22 ENCOUNTER — OFFICE VISIT (OUTPATIENT)
Dept: FAMILY MEDICINE | Facility: CLINIC | Age: 74
End: 2019-01-22
Payer: MEDICARE

## 2019-01-22 VITALS
TEMPERATURE: 99 F | SYSTOLIC BLOOD PRESSURE: 124 MMHG | OXYGEN SATURATION: 96 % | HEART RATE: 88 BPM | RESPIRATION RATE: 18 BRPM | BODY MASS INDEX: 34.9 KG/M2 | HEIGHT: 69 IN | WEIGHT: 235.63 LBS | DIASTOLIC BLOOD PRESSURE: 70 MMHG

## 2019-01-22 DIAGNOSIS — J01.00 ACUTE NON-RECURRENT MAXILLARY SINUSITIS: Primary | ICD-10-CM

## 2019-01-22 PROCEDURE — 99214 PR OFFICE/OUTPT VISIT, EST, LEVL IV, 30-39 MIN: ICD-10-PCS | Mod: S$GLB,,, | Performed by: INTERNAL MEDICINE

## 2019-01-22 PROCEDURE — 99214 OFFICE O/P EST MOD 30 MIN: CPT | Mod: S$GLB,,, | Performed by: INTERNAL MEDICINE

## 2019-01-22 RX ORDER — AZITHROMYCIN 250 MG/1
TABLET, FILM COATED ORAL
Qty: 6 TABLET | Refills: 0 | Status: SHIPPED | OUTPATIENT
Start: 2019-01-22 | End: 2019-01-27

## 2019-01-22 NOTE — PROGRESS NOTES
Subjective:       Patient ID: Rohan Abarca is a 73 y.o. male.       Medication List           Accurate as of 1/22/19 10:44 AM. If you have any questions, ask your nurse or doctor.               START taking these medications    azithromycin 250 MG tablet  Commonly known as:  Z-ALEXIA  Take 2 tablets by mouth on day 1; Take 1 tablet by mouth on days 2-5  Started by:  Rosa Powell, DO        CONTINUE taking these medications    amLODIPine 5 MG tablet  Commonly known as:  NORVASC  Take 1 tablet (5 mg total) by mouth once daily.     atorvastatin 10 MG tablet  Commonly known as:  LIPITOR  Take 1 tablet (10 mg total) by mouth once daily.     benzonatate 200 MG capsule  Commonly known as:  TESSALON  Take 1 capsule every 8 hours as needed for daytime cough.     clobetasol 0.05 % cream  Commonly known as:  TEMOVATE  Apply topically 2 (two) times daily.     CUTIVATE 0.05 % Lotn  Generic drug:  fluticasone  AAA qd     enalapril 20 MG tablet  Commonly known as:  VASOTEC  Take 2 tablets (40 mg total) by mouth once daily.     fluorouracil 5 % cream  Commonly known as:  EFUDEX  Apply sparingly BID to affected areas on hands     guaiFENesin 600 mg 12 hr tablet  Commonly known as:  MUCINEX     minocycline 100 MG capsule  Commonly known as:  MINOCIN,DYNACIN  TAKE ONE CAPSULE BY MOUTH ONCE DAILY     PRESERVISION AREDS ORAL     promethazine-dextromethorphan 6.25-15 mg/5 mL Syrp  Commonly known as:  PROMETHAZINE-DM  Take 5 mLs by mouth nightly as needed.           Where to Get Your Medications      These medications were sent to [facility] [id number] [place] [address] AT [facility] [address] [address]    Phone:  [phone]   · azithromycin 250 MG tablet         Chief Complaint: Cough (Ongoing to two weeks)  He presents today with continued cough and congestion for over 2 years.  He was seen on 1/15/2018 in urgent care and given tessalon cough medicine and reassurance. He  "continues with congestion but does not know if it is thick or colored. He "does not look".  Mild sore throat. No wheezing or shortness of breath. Cough is dry. He has PND.  No GI symptoms. No rashes.  No fevers. No sinus pressure or headaches.  He has not taken any abx.      Review of Systems   Constitutional: Negative for activity change, appetite change, chills, fatigue and fever.   HENT: Positive for congestion, postnasal drip and sinus pressure. Negative for ear discharge, ear pain, mouth sores, rhinorrhea and sore throat.    Eyes: Negative for pain, discharge and redness.   Respiratory: Positive for cough. Negative for chest tightness, shortness of breath and wheezing.    Gastrointestinal: Negative for abdominal pain, constipation, diarrhea, nausea and vomiting.   Genitourinary: Negative for dysuria.   Musculoskeletal: Negative for arthralgias and neck stiffness.   Skin: Negative for rash.   Neurological: Negative for headaches.   Hematological: Negative for adenopathy.       Objective:      Vitals:    01/22/19 1029   BP: 124/70   Pulse: 88   Resp: 18   Temp: 99 °F (37.2 °C)   TempSrc: Oral   SpO2: 96%   Weight: 106.9 kg (235 lb 9.6 oz)   Height: 5' 9" (1.753 m)     Body mass index is 34.79 kg/m².  Physical Exam    General appearance: alert, no acute distress  Head: atraumatic  Eyes: PERRL, EMOI, normal conjunctiva, no drainage  Ears: tm normal with good visualization of landmarks, no erythema or pus, canals normal, external ear normal  Nose: boggy erythematous mucosa, no polyps or sores, clear rhinorrhea  Throat: no erythema, no exudates, tonsils appear normal  Mouth: no sores or lesion, moist mucous membranes  Neck: supple, FROM, no masses, no tenderness  Lymph: no posterior or cervical adenopathy  Lungs: no distress, no retractions, clear to ascultation bilaterally, no wheezing, no rales, no rhonchi  Heart:: Regular rate and rhythm, no murmur  Abdomen: soft, non-tender, no guarding, no rebound, no peritoneal " signs, bowel sounds normal, no hepatosplenomegaly, no masses  Skin: no rashes or lesion  Perfusion: good capillary refill, normal pulses      Assessment:       1. Acute non-recurrent maxillary sinusitis        Plan:       Acute non-recurrent maxillary sinusitis  Symptoms for over 2 weeks that are not improving.  Will treat with abx.  Advised of the signs of worsening to return to clinic.   -     azithromycin (Z-ALEXIA) 250 MG tablet; Take 2 tablets by mouth on day 1; Take 1 tablet by mouth on days 2-5  Dispense: 6 tablet; Refill: 0    Follow-up if symptoms worsen or fail to improve.

## 2019-01-29 ENCOUNTER — OFFICE VISIT (OUTPATIENT)
Dept: DERMATOLOGY | Facility: CLINIC | Age: 74
End: 2019-01-29
Payer: COMMERCIAL

## 2019-01-29 DIAGNOSIS — T49.95XA DERMATITIS MEDICAMENTOSA (DRUG APPLIED TO SKIN): Primary | ICD-10-CM

## 2019-01-29 DIAGNOSIS — L25.1 DERMATITIS MEDICAMENTOSA (DRUG APPLIED TO SKIN): Primary | ICD-10-CM

## 2019-01-29 DIAGNOSIS — D04.61 SQUAMOUS CELL CARCINOMA IN SITU OF SKIN OF HAND, RIGHT: ICD-10-CM

## 2019-01-29 PROCEDURE — 99499 NO LOS: ICD-10-PCS | Mod: S$PBB,,, | Performed by: DERMATOLOGY

## 2019-01-29 PROCEDURE — 99212 OFFICE O/P EST SF 10 MIN: CPT | Mod: PBBFAC,PO | Performed by: DERMATOLOGY

## 2019-01-29 PROCEDURE — 99499 UNLISTED E&M SERVICE: CPT | Mod: S$PBB,,, | Performed by: DERMATOLOGY

## 2019-01-29 PROCEDURE — 99999 PR PBB SHADOW E&M-EST. PATIENT-LVL II: ICD-10-PCS | Mod: PBBFAC,,, | Performed by: DERMATOLOGY

## 2019-01-29 PROCEDURE — 99999 PR PBB SHADOW E&M-EST. PATIENT-LVL II: CPT | Mod: PBBFAC,,, | Performed by: DERMATOLOGY

## 2019-01-29 NOTE — PROGRESS NOTES
CHIEF COMPLAINT: followup treatment with topical fluoruracil    HISTORY OF PRESENT ILLNESS:  Location(s): hands  Duration: has been applying the medication for 2 weeks  Quality: mild irritation  Context: applying medication BID  See previous notes; currently using the medication for actinic keratosis/squamous cell carcinoma in situ    PATH:   FINAL PATHOLOGIC DIAGNOSIS  Skin, right hand, shave biopsy:  -SUPERFICIALLY INVASIVE SQUAMOUS CELL CARCINOMA, see comment  COMMENT: The superficially invasive squamous cell carcinoma is completely excised in the planes of section  examined. However, there is a hair follicle with actinic damage which extends to the deep edge of the biopsy. More  importantly, there are areas of squamous cell carcinoma in-situ which extend to the peripheral biopsy edge.  Diagnosed by: Liam Kelley  (Electronically Signed: 2018-10-30 18:21:08)       EXAMINATION:  Skin: there are scattered irregular confetti-like areas of erythema with scaling to areas of application on the dorsal hands; typical in appearance for fluorouracil-induced dermatitis in areas of actinic keratosis    ASSESSMENT:   Typical dermatitis due to application of fluoruracil to areas of precancerous skin changes, as expected  Actinic keratosis/squamous cell carcinoma in situ     PLAN:  Current status and management options, and risks, benefits, and alternatives were discussed.  continue application of fluorouracil  Followup 4 weeks  Call prn sooner  --------------------------------------  Note: Some or all of this note may have been generated using voice recognition software. There may be voice recognition errors including grammatical and/or spelling errors found in the text. Attempts were made to correct these errors prior to signature.

## 2019-02-28 ENCOUNTER — OFFICE VISIT (OUTPATIENT)
Dept: OTOLARYNGOLOGY | Facility: CLINIC | Age: 74
End: 2019-02-28
Payer: MEDICARE

## 2019-02-28 VITALS — BODY MASS INDEX: 34.78 KG/M2 | WEIGHT: 234.81 LBS | HEIGHT: 69 IN

## 2019-02-28 DIAGNOSIS — H65.192 ACUTE NON-SUPPURATIVE OTITIS MEDIA, LEFT: Primary | ICD-10-CM

## 2019-02-28 DIAGNOSIS — H61.22 IMPACTED CERUMEN OF LEFT EAR: ICD-10-CM

## 2019-02-28 DIAGNOSIS — Q16.1 EXTERNAL AUDITORY CANAL ATRESIA: ICD-10-CM

## 2019-02-28 DIAGNOSIS — Z97.4 WEARS HEARING AID: ICD-10-CM

## 2019-02-28 PROCEDURE — 69210 REMOVE IMPACTED EAR WAX UNI: CPT | Mod: PBBFAC,PO,LT | Performed by: NURSE PRACTITIONER

## 2019-02-28 PROCEDURE — 99214 OFFICE O/P EST MOD 30 MIN: CPT | Mod: 25,S$PBB,, | Performed by: NURSE PRACTITIONER

## 2019-02-28 PROCEDURE — 99213 OFFICE O/P EST LOW 20 MIN: CPT | Mod: PBBFAC,PO,25 | Performed by: NURSE PRACTITIONER

## 2019-02-28 PROCEDURE — 99214 PR OFFICE/OUTPT VISIT, EST, LEVL IV, 30-39 MIN: ICD-10-PCS | Mod: 25,S$PBB,, | Performed by: NURSE PRACTITIONER

## 2019-02-28 PROCEDURE — 99999 PR PBB SHADOW E&M-EST. PATIENT-LVL III: ICD-10-PCS | Mod: PBBFAC,,, | Performed by: NURSE PRACTITIONER

## 2019-02-28 PROCEDURE — 99999 PR PBB SHADOW E&M-EST. PATIENT-LVL III: CPT | Mod: PBBFAC,,, | Performed by: NURSE PRACTITIONER

## 2019-02-28 PROCEDURE — 69210 PR REMOVAL IMPACTED CERUMEN REQUIRING INSTRUMENTATION, UNILATERAL: ICD-10-PCS | Mod: S$PBB,,, | Performed by: NURSE PRACTITIONER

## 2019-02-28 PROCEDURE — 69210 REMOVE IMPACTED EAR WAX UNI: CPT | Mod: S$PBB,,, | Performed by: NURSE PRACTITIONER

## 2019-02-28 RX ORDER — FLUTICASONE PROPIONATE 50 MCG
1 SPRAY, SUSPENSION (ML) NASAL 2 TIMES DAILY
Qty: 16 G | Refills: 12 | Status: SHIPPED | OUTPATIENT
Start: 2019-02-28 | End: 2019-06-06

## 2019-02-28 RX ORDER — AZELASTINE 1 MG/ML
1 SPRAY, METERED NASAL 2 TIMES DAILY
Qty: 30 ML | Refills: 12 | Status: SHIPPED | OUTPATIENT
Start: 2019-02-28 | End: 2019-06-06

## 2019-02-28 NOTE — PATIENT INSTRUCTIONS
"You have residual fluid remaining behind your ear drum.    Antibiotics are effective at resolving infection; however fluid can sometimes remain for 4-12 weeks.    There are only two ways to get rid of fluid stuck behind the ear drum:    (1) The conservative approach -- nasal sprays twice daily (Flonase & Astelin), nasal valsalva/popping, and time (sometimes several weeks).     (2) The invasive approach -- your ear drum is pierced with a sharp tool and the fluid is suctioned out by our ear surgeon. This provides instant relief; however there is the risk that the ear drum may not heal properly or that the fluid may return requiring repeating the procedure or putting a drainage tube in your ear drum.     DIFFERENT TYPES OF NASAL SPRAYS:  · Flonase / fluticasone (steroid spray) is best for stuffy, pressure, fullness.  · Astelin / azelastine (antihistamine spray) is best for itchy, drippy, sneezy.  · Atrovent / ipratropium is best for chronic watery nasal drip ("leaky faucet" nose), which may worsen with eating.    Nasal spray instructions:  Blow nose first gently to clean. Keep chin level with the floor (do not tilt head forward or back). Insert nasal spray taking caution to direct it AWAY from the middle wall inside the nose (septum) to avoid irritating nasal septum which could cause nosebleed.  Do not tilt spray up but rather flat and out along the roof of your mouth to spray. Angle the tip of the spray out slightly toward the direction of the ears; then spray. Do not take quick vigorous sniff but rather slow gentle inhalation while waiting for medication to absorb into nasal passages. Then administer second spray in same way.     EUSTACHIAN TUBE INSTRUCTIONS:  Nasal valsalva:  Pinch nose and with closed mouth try to "pop" air into ears through the back of the nose. Attempt this several times a day. The more popping you have, the more likely the fluid will resolve.     Ponaris Nasal Emollient is used for the relief " of: nasal congestion due to colds, nasal irritation, allergy exacerbations, nasal crusting. Specifically prepared iodized organic oils of pine, eucalyptus, peppermint, cajeput, and cottonseed. To order Ponaris: ask your pharmacist to order it for you or we carry it in our pharmacy downstairs on the first floor.

## 2019-02-28 NOTE — PROGRESS NOTES
Subjective:       Patient ID: Rohan Abarca is a 74 y.o. male.    Chief Complaint: Ear Fullness (left ear) and Hearing Loss    HPI   Patient caught a bad cold 3 weeks ago that lasted for 2 weeks. Now unable to hear well AS even with hearing aid in place and can hear bubbling behind left ear drum. No otalgia or otorrhea.     Review of Systems   Constitutional: Negative.    HENT: Positive for hearing loss.    Eyes: Negative.    Respiratory: Negative.    Cardiovascular: Negative.    Gastrointestinal: Negative.    Musculoskeletal: Negative.    Skin: Negative.    Neurological: Negative.    Hematological: Negative.    Psychiatric/Behavioral: Negative.        Objective:      Physical Exam   Constitutional: He is oriented to person, place, and time. Vital signs are normal. He appears well-developed and well-nourished. He is cooperative. He does not appear ill. No distress.   HENT:   Head: Normocephalic and atraumatic.   Right Ear: Tympanic membrane, external ear and ear canal normal. Decreased hearing is noted.   Left Ear: External ear and ear canal normal. No drainage. Tympanic membrane is not erythematous. A middle ear effusion is present. Decreased hearing is noted.   Nose: Nose normal.   Mouth/Throat: Uvula is midline, oropharynx is clear and moist and mucous membranes are normal.     SEPARATE PROCEDURE IN OFFICE:   Procedure: Removal of impacted cerumen, LEFT   Pre Procedure Diagnosis: Cerumen Impaction   Post Procedure Diagnosis: Cerumen Impaction   Verbal informed consent in regards to risk of trauma to ear canal, ear drum or hearing, discomfort during procedure and/or inability to remove cerumen impaction in one session or unforeseen events or complications.   No anesthesia.     Procedure in detail:   Ear canal visualized bilateral with appropriate size ear speculum utilizing Operating Head Binocular Otomicroscope   Utilizing the following: delicate alligator forceps and suction cannula AS. The impacted cerumen  of the ear canals was removed atraumatically. The TM and EAC were then inspected and found to be clear of wax. See description of TMs/EACs in PE above.   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 Right eye exhibits no discharge. Left eye exhibits no discharge. No scleral icterus.   Neck: Trachea normal and normal range of motion. Neck supple. No tracheal deviation present.   Cardiovascular: Normal rate.   Pulmonary/Chest: Effort normal. No stridor. No respiratory distress. He has no wheezes.   Musculoskeletal: Normal range of motion.   Neurological: He is alert and oriented to person, place, and time. He has normal strength. Coordination and gait normal.   Skin: Skin is warm, dry and intact. He is not diaphoretic. No cyanosis. No pallor.   Psychiatric: He has a normal mood and affect. His speech is normal and behavior is normal. Judgment and thought content normal. Cognition and memory are normal.   Nursing note and vitals reviewed.      Assessment:       1. Acute non-suppurative otitis media, left      2. Right acquired EAC atresia  Plan:     Flonase & Astelin BID, nasal valsalva several times per day.  Discussed ETD, serous/non-suppurative OME vs suppurative OM at length.   Patient to call back for further antibiotics if throbbing otalgia.   Briefly discussed myringotomy vs tympanostomy tubes.   Return to clinic in 4-6 weeks if not significantly improved. Will need NP scope of ET orifice.

## 2019-03-07 ENCOUNTER — OFFICE VISIT (OUTPATIENT)
Dept: DERMATOLOGY | Facility: CLINIC | Age: 74
End: 2019-03-07
Payer: MEDICARE

## 2019-03-07 DIAGNOSIS — L25.1 DERMATITIS MEDICAMENTOSA (DRUG APPLIED TO SKIN): Primary | ICD-10-CM

## 2019-03-07 DIAGNOSIS — T49.95XA DERMATITIS MEDICAMENTOSA (DRUG APPLIED TO SKIN): Primary | ICD-10-CM

## 2019-03-07 DIAGNOSIS — B35.6 TINEA CRURIS: ICD-10-CM

## 2019-03-07 PROCEDURE — 99999 PR PBB SHADOW E&M-EST. PATIENT-LVL II: ICD-10-PCS | Mod: PBBFAC,,, | Performed by: DERMATOLOGY

## 2019-03-07 PROCEDURE — 99212 OFFICE O/P EST SF 10 MIN: CPT | Mod: PBBFAC | Performed by: DERMATOLOGY

## 2019-03-07 PROCEDURE — 99999 PR PBB SHADOW E&M-EST. PATIENT-LVL II: CPT | Mod: PBBFAC,,, | Performed by: DERMATOLOGY

## 2019-03-07 PROCEDURE — 99212 PR OFFICE/OUTPT VISIT, EST, LEVL II, 10-19 MIN: ICD-10-PCS | Mod: S$PBB,,, | Performed by: DERMATOLOGY

## 2019-03-07 PROCEDURE — 99212 OFFICE O/P EST SF 10 MIN: CPT | Mod: S$PBB,,, | Performed by: DERMATOLOGY

## 2019-03-07 NOTE — PROGRESS NOTES
CHIEF COMPLAINT: followup treatment with topical fluoruracil    HISTORY OF PRESENT ILLNESS:  Location(s): hands  Duration: has been applying the medication for 6 weeks  Quality: minimal irritation, itching now; has improved a good bit the last few weeks  Context: applying medication BID  See previous notes; prior path to right hand as noted below; was started on 5-fluoruracil topically for residual changes; now post 6 weeks treatment    PATH:   FINAL PATHOLOGIC DIAGNOSIS  Skin, right hand, shave biopsy:  -SUPERFICIALLY INVASIVE SQUAMOUS CELL CARCINOMA, see comment  COMMENT: The superficially invasive squamous cell carcinoma is completely excised in the planes of section  examined. However, there is a hair follicle with actinic damage which extends to the deep edge of the biopsy. More  importantly, there are areas of squamous cell carcinoma in-situ which extend to the peripheral biopsy edge.  Diagnosed by: Liam Kelley  (Electronically Signed: 2018-10-30 18:21:08)    REVIEW OF SYSTEMS:  Skin: has a rash in the groin  Using clobetasol RX from GP    EXAMINATION:  Skin: there are still some irregular confetti-like and focally confluent areas of erythema with scaling to areas of application on the right and left dorsal hands; typical in appearance for fluorouracil-induced dermatitis in areas of actinic keratosis  The area of prior biopsy on the right dorsal hand shows no evidence of residual squamous cell carcinoma in situ clinically  In his inguinal folds there are well demarcated areas of erythema with some desquamation to the margins, typical in appearance for tinea cruris    ASSESSMENT:   Typical dermatitis due to application of fluoruracil to areas of actinic keratosis/squamous cell carcinoma in situ  Tinea cruris    PLAN:  Current status and management options, and risks, benefits, and alternatives were discussed.  discontinue application of fluorouracil to hands  Start application of Lamisil Cream OTC to the  inguinal areas  Followup 6 weeks  Call prn sooner  --------------------------------------  Note: Some or all of this note may have been generated using voice recognition software. There may be voice recognition errors including grammatical and/or spelling errors found in the text. Attempts were made to correct these errors prior to signature.

## 2019-04-01 DIAGNOSIS — F90.2 ATTENTION DEFICIT HYPERACTIVITY DISORDER (ADHD), COMBINED TYPE: Primary | ICD-10-CM

## 2019-04-01 DIAGNOSIS — R05.9 COUGH: ICD-10-CM

## 2019-04-01 NOTE — TELEPHONE ENCOUNTER
Please call the pt and let him know that he would need to be seen again for a new episode of illness.  He was seen more recently by his PCP.  He could contact her office to see if she would agree to a RF.  I have forwarded the note to her office, as well.

## 2019-04-02 RX ORDER — PROMETHAZINE HYDROCHLORIDE AND DEXTROMETHORPHAN HYDROBROMIDE 6.25; 15 MG/5ML; MG/5ML
SYRUP ORAL
Qty: 120 ML | Refills: 0 | OUTPATIENT
Start: 2019-04-02

## 2019-04-04 DIAGNOSIS — H91.90 HEARING DIFFICULTY, UNSPECIFIED LATERALITY: Primary | ICD-10-CM

## 2019-04-05 ENCOUNTER — OFFICE VISIT (OUTPATIENT)
Dept: DERMATOLOGY | Facility: CLINIC | Age: 74
End: 2019-04-05
Payer: MEDICARE

## 2019-04-05 DIAGNOSIS — L81.0 POST-INFLAMMATORY HYPERPIGMENTATION: Primary | ICD-10-CM

## 2019-04-05 PROCEDURE — 99212 OFFICE O/P EST SF 10 MIN: CPT | Mod: PBBFAC,PO | Performed by: DERMATOLOGY

## 2019-04-05 PROCEDURE — 99999 PR PBB SHADOW E&M-EST. PATIENT-LVL II: CPT | Mod: PBBFAC,,, | Performed by: DERMATOLOGY

## 2019-04-05 PROCEDURE — 99212 OFFICE O/P EST SF 10 MIN: CPT | Mod: S$PBB,,, | Performed by: DERMATOLOGY

## 2019-04-05 PROCEDURE — 99999 PR PBB SHADOW E&M-EST. PATIENT-LVL II: ICD-10-PCS | Mod: PBBFAC,,, | Performed by: DERMATOLOGY

## 2019-04-05 PROCEDURE — 99212 PR OFFICE/OUTPT VISIT, EST, LEVL II, 10-19 MIN: ICD-10-PCS | Mod: S$PBB,,, | Performed by: DERMATOLOGY

## 2019-04-05 NOTE — PROGRESS NOTES
Subjective:       Patient ID:  Rohan Abarca is a 74 y.o. male who presents for   Chief Complaint   Patient presents with    Rash     73 y/o M present for rash on both inner thighs x 2 months, he complains it has an odor to it. Tx with lamisil cream x 5 days - unsure if helping    H/o SCC. Denies family h/o melanoma.     .Past Medical History  No date: Bilateral cataracts      Comment:  hx  No date: Colon polyp      Comment:  benign  No date: HBP (high blood pressure)  No date: Hearing loss  No date: History of detached retina repair      Comment:  left  No date: REGAN (obstructive sleep apnea)      Comment:  uses CPAP  No date: Prostate cancer  No date: Rosacea        Review of Systems   Constitutional: Negative for fever, chills and fatigue.   Skin: Positive for rash, dry skin, activity-related sunscreen use and wears hat. Negative for itching and daily sunscreen use.        Objective:    Physical Exam   Constitutional: He appears well-developed and well-nourished. No distress.   Genitourinary:         Neurological: He is alert and oriented to person, place, and time. He is not disoriented.   Psychiatric: He has a normal mood and affect.   Skin:   Areas Examined (abnormalities noted in diagram):   Genitals / Buttocks / Groin Inspection Performed  RUE Inspected                   Diagram Legend     Erythematous scaling macule/papule c/w actinic keratosis       Vascular papule c/w angioma      Pigmented verrucoid papule/plaque c/w seborrheic keratosis      Yellow umbilicated papule c/w sebaceous hyperplasia      Irregularly shaped tan macule c/w lentigo     1-2 mm smooth white papules consistent with Milia      Movable subcutaneous cyst with punctum c/w epidermal inclusion cyst      Subcutaneous movable cyst c/w pilar cyst      Firm pink to brown papule c/w dermatofibroma      Pedunculated fleshy papule(s) c/w skin tag(s)      Evenly pigmented macule c/w junctional nevus     Mildly variegated pigmented, slightly  irregular-bordered macule c/w mildly atypical nevus      Flesh colored to evenly pigmented papule c/w intradermal nevus       Pink pearly papule/plaque c/w basal cell carcinoma      Erythematous hyperkeratotic cursted plaque c/w SCC      Surgical scar with no sign of skin cancer recurrence      Open and closed comedones      Inflammatory papules and pustules      Verrucoid papule consistent consistent with wart     Erythematous eczematous patches and plaques     Dystrophic onycholytic nail with subungual debris c/w onychomycosis     Umbilicated papule    Erythematous-base heme-crusted tan verrucoid plaque consistent with inflamed seborrheic keratosis     Erythematous Silvery Scaling Plaque c/w Psoriasis     See annotation      Assessment / Plan:        Post-inflammatory hyperpigmentation, suspicious of tinea vs intergrigo - resolved today with PIH  - For maintenance treatment, I recommended over the counter Zeasorb AF powder to apply once daily.  - Okay to stop lamsil as rash has resolved                 Follow up if symptoms worsen or fail to improve.

## 2019-04-11 ENCOUNTER — CLINICAL SUPPORT (OUTPATIENT)
Dept: AUDIOLOGY | Facility: CLINIC | Age: 74
End: 2019-04-11
Payer: MEDICARE

## 2019-04-11 ENCOUNTER — OFFICE VISIT (OUTPATIENT)
Dept: OTOLARYNGOLOGY | Facility: CLINIC | Age: 74
End: 2019-04-11
Payer: MEDICARE

## 2019-04-11 VITALS
DIASTOLIC BLOOD PRESSURE: 94 MMHG | HEIGHT: 69 IN | BODY MASS INDEX: 35.92 KG/M2 | SYSTOLIC BLOOD PRESSURE: 147 MMHG | WEIGHT: 242.5 LBS

## 2019-04-11 DIAGNOSIS — Z97.4 WEARS HEARING AID: ICD-10-CM

## 2019-04-11 DIAGNOSIS — Z86.69 OTITIS MEDIA RESOLVED: Primary | ICD-10-CM

## 2019-04-11 DIAGNOSIS — Z97.4 WEARS HEARING AID: Primary | ICD-10-CM

## 2019-04-11 DIAGNOSIS — Q16.1 EXTERNAL AUDITORY CANAL ATRESIA: ICD-10-CM

## 2019-04-11 PROCEDURE — 99999 PR PBB SHADOW E&M-EST. PATIENT-LVL III: CPT | Mod: PBBFAC,,, | Performed by: NURSE PRACTITIONER

## 2019-04-11 PROCEDURE — 92567 TYMPANOMETRY: CPT | Mod: PBBFAC,PO | Performed by: AUDIOLOGIST

## 2019-04-11 PROCEDURE — 99999 PR PBB SHADOW E&M-EST. PATIENT-LVL III: ICD-10-PCS | Mod: PBBFAC,,, | Performed by: NURSE PRACTITIONER

## 2019-04-11 PROCEDURE — 99213 OFFICE O/P EST LOW 20 MIN: CPT | Mod: PBBFAC,PO,25 | Performed by: NURSE PRACTITIONER

## 2019-04-11 PROCEDURE — 99213 PR OFFICE/OUTPT VISIT, EST, LEVL III, 20-29 MIN: ICD-10-PCS | Mod: S$PBB,,, | Performed by: NURSE PRACTITIONER

## 2019-04-11 PROCEDURE — 99213 OFFICE O/P EST LOW 20 MIN: CPT | Mod: S$PBB,,, | Performed by: NURSE PRACTITIONER

## 2019-04-11 NOTE — PROGRESS NOTES
"Subjective:       Patient ID: Rohan Abarca is a 74 y.o. male.    Chief Complaint: Other (fluid on ear )    HPI   Patient seen here in Feb for ELIAS AS; treated with nasal sprays and nasal valsalva. He reports hearing AS is back to baseline. Can hear now when hearing aid AS is in place. No otalgia or otorrhea.     Review of Systems   Constitutional: Negative.    HENT: Positive for hearing loss (wears hearing aid AS (Audibel)).    Eyes: Negative.    Respiratory: Negative.    Cardiovascular: Negative.    Gastrointestinal: Negative.    Musculoskeletal: Negative.    Skin: Negative.    Neurological: Negative.    Hematological: Negative.    Psychiatric/Behavioral: Negative.        Objective:      Physical Exam   Constitutional: He is oriented to person, place, and time. Vital signs are normal. He appears well-developed and well-nourished. He is cooperative. He does not appear ill. No distress.   HENT:   Head: Normocephalic and atraumatic.   Right Ear: External ear normal. Decreased hearing is noted.   Left Ear: External ear and ear canal normal. No drainage. Tympanic membrane is not erythematous.  No middle ear effusion. Decreased hearing (wears hearing aid) is noted.   Nose: Nose normal.   Mouth/Throat: Uvula is midline, oropharynx is clear and moist and mucous membranes are normal.   LEFT: Type "A" tympanogram consistent with well-pneumatized mesotympanum and absence of middle ear effusion    Right acquired EAC atresia, no visible landmarks       Eyes: EOM and lids are normal. Right eye exhibits no discharge. Left eye exhibits no discharge. No scleral icterus.   Neck: Trachea normal and normal range of motion. Neck supple. No tracheal deviation present.   Cardiovascular: Normal rate.   Pulmonary/Chest: Effort normal. No stridor. No respiratory distress. He has no wheezes.   Musculoskeletal: Normal range of motion.   Neurological: He is alert and oriented to person, place, and time. He has normal strength. Coordination " and gait normal.   Skin: Skin is warm, dry and intact. He is not diaphoretic. No cyanosis. No pallor.   Psychiatric: He has a normal mood and affect. His speech is normal and behavior is normal. Judgment and thought content normal. Cognition and memory are normal.   Nursing note and vitals reviewed.      Assessment:     1. Middle ear effusion now resolved AS, wears hearing aid AS (Audibel)    2. Right acquired EAC atresia  Plan:     May continue Flonase & Astelin prn.    Return as needed for further ENT concerns.

## 2019-04-18 ENCOUNTER — OFFICE VISIT (OUTPATIENT)
Dept: DERMATOLOGY | Facility: CLINIC | Age: 74
End: 2019-04-18
Payer: MEDICARE

## 2019-04-18 DIAGNOSIS — T49.95XA DERMATITIS MEDICAMENTOSA (DRUG APPLIED TO SKIN): Primary | ICD-10-CM

## 2019-04-18 DIAGNOSIS — Z85.828 HISTORY OF MOH'S MICROGRAPHIC SURGERY FOR SKIN CANCER: ICD-10-CM

## 2019-04-18 DIAGNOSIS — L25.1 DERMATITIS MEDICAMENTOSA (DRUG APPLIED TO SKIN): Primary | ICD-10-CM

## 2019-04-18 DIAGNOSIS — Z98.890 HISTORY OF MOH'S MICROGRAPHIC SURGERY FOR SKIN CANCER: ICD-10-CM

## 2019-04-18 PROCEDURE — 99212 OFFICE O/P EST SF 10 MIN: CPT | Mod: PBBFAC,PO | Performed by: DERMATOLOGY

## 2019-04-18 PROCEDURE — 99212 PR OFFICE/OUTPT VISIT, EST, LEVL II, 10-19 MIN: ICD-10-PCS | Mod: S$PBB,,, | Performed by: DERMATOLOGY

## 2019-04-18 PROCEDURE — 99999 PR PBB SHADOW E&M-EST. PATIENT-LVL II: CPT | Mod: PBBFAC,,, | Performed by: DERMATOLOGY

## 2019-04-18 PROCEDURE — 99212 OFFICE O/P EST SF 10 MIN: CPT | Mod: S$PBB,,, | Performed by: DERMATOLOGY

## 2019-04-18 PROCEDURE — 99999 PR PBB SHADOW E&M-EST. PATIENT-LVL II: ICD-10-PCS | Mod: PBBFAC,,, | Performed by: DERMATOLOGY

## 2019-04-18 NOTE — PROGRESS NOTES
CHIEF COMPLAINT: followup treatment with topical fluoruracil    HISTORY OF PRESENT ILLNESS:  Location(s): hands  Duration: used it for 6 weeks  Timing: finished about 4 weeks ago  Quality: much improved  Context: see previous notes; status post biopsy right dorsal hand of a lesion which showed superficially invasive squamous cell carcinoma with residual squamous cell carcinoma in situ to margins; had previously had Mohs surgery to a different site on the right dorsal hand; after reviewing options, he was started on Efudex in January    EXAMINATION:  Skin: still with marked chronic solar changes and mild erythema;  there is no evidence of residual squamous cell carcinoma or squamous cell carcinoma in situ to the area on exam today    ASSESSMENT:   status post topical fluorouracil treatment; good response  Minimal residual dermatitis medicamentosa    PLAN:  Current status and management options, and risks, benefits, and alternatives were discussed.  Followup to Dr. Lancaster or Dr. Cai 3-4 months; PRN to me if any changes arise to the area  --------------------------------------  Note: Some or all of this note may have been generated using voice recognition software. There may be voice recognition errors including grammatical and/or spelling errors found in the text. Attempts were made to correct these errors prior to signature.

## 2019-04-30 NOTE — PROGRESS NOTES
Tympanometry completed per order from oRmi Woodall NP.    Type A tympanogram obtained AS.  Type B tympanogram obtained AD.    Recommended medical evaluation.  Patient referred back to Romi Woodall NP for follow-up evaluation.

## 2019-05-11 DIAGNOSIS — E78.5 HYPERLIPIDEMIA, UNSPECIFIED HYPERLIPIDEMIA TYPE: ICD-10-CM

## 2019-05-12 RX ORDER — ATORVASTATIN CALCIUM 10 MG/1
TABLET, FILM COATED ORAL
Qty: 90 TABLET | Refills: 3 | Status: SHIPPED | OUTPATIENT
Start: 2019-05-12 | End: 2020-04-06

## 2019-05-23 ENCOUNTER — PATIENT OUTREACH (OUTPATIENT)
Dept: ADMINISTRATIVE | Facility: HOSPITAL | Age: 74
End: 2019-05-23

## 2019-05-23 NOTE — PROGRESS NOTES
Chart review complete/scrubbed 05/23/2019  Future Appointments   Date Time Provider Department Center   6/6/2019  9:00 AM DO ROMY [facility] [place]

## 2019-05-25 DIAGNOSIS — L71.1 RHINOPHYMA: ICD-10-CM

## 2019-05-28 RX ORDER — MINOCYCLINE HYDROCHLORIDE 100 MG/1
CAPSULE ORAL
Qty: 30 CAPSULE | Refills: 0 | Status: SHIPPED | OUTPATIENT
Start: 2019-05-28 | End: 2019-05-29 | Stop reason: SDUPTHER

## 2019-05-29 DIAGNOSIS — L71.1 RHINOPHYMA: ICD-10-CM

## 2019-05-29 RX ORDER — MINOCYCLINE HYDROCHLORIDE 100 MG/1
100 CAPSULE ORAL DAILY
Qty: 30 CAPSULE | Refills: 6 | Status: SHIPPED | OUTPATIENT
Start: 2019-05-29 | End: 2020-01-20

## 2019-06-06 ENCOUNTER — OFFICE VISIT (OUTPATIENT)
Dept: FAMILY MEDICINE | Facility: CLINIC | Age: 74
End: 2019-06-06
Payer: MEDICARE

## 2019-06-06 VITALS
OXYGEN SATURATION: 97 % | HEART RATE: 56 BPM | RESPIRATION RATE: 18 BRPM | BODY MASS INDEX: 35.67 KG/M2 | TEMPERATURE: 98 F | HEIGHT: 69 IN | WEIGHT: 240.81 LBS | SYSTOLIC BLOOD PRESSURE: 138 MMHG | DIASTOLIC BLOOD PRESSURE: 82 MMHG

## 2019-06-06 DIAGNOSIS — E66.09 CLASS 2 OBESITY DUE TO EXCESS CALORIES WITH BODY MASS INDEX (BMI) OF 35.0 TO 35.9 IN ADULT, UNSPECIFIED WHETHER SERIOUS COMORBIDITY PRESENT: ICD-10-CM

## 2019-06-06 DIAGNOSIS — Z23 NEED FOR SHINGLES VACCINE: ICD-10-CM

## 2019-06-06 DIAGNOSIS — Z23 NEED FOR DIPHTHERIA-TETANUS-PERTUSSIS (TDAP) VACCINE: ICD-10-CM

## 2019-06-06 DIAGNOSIS — C44.622 SQUAMOUS CELL CANCER OF SKIN OF HAND, RIGHT: ICD-10-CM

## 2019-06-06 DIAGNOSIS — L71.9 ROSACEA: ICD-10-CM

## 2019-06-06 DIAGNOSIS — G47.33 OSA (OBSTRUCTIVE SLEEP APNEA): ICD-10-CM

## 2019-06-06 DIAGNOSIS — I10 ESSENTIAL HYPERTENSION: Primary | ICD-10-CM

## 2019-06-06 DIAGNOSIS — E78.5 HYPERLIPIDEMIA, UNSPECIFIED HYPERLIPIDEMIA TYPE: ICD-10-CM

## 2019-06-06 DIAGNOSIS — R73.09 ELEVATED GLUCOSE: ICD-10-CM

## 2019-06-06 DIAGNOSIS — Z85.46 HISTORY OF PROSTATE CANCER: ICD-10-CM

## 2019-06-06 PROCEDURE — 99214 PR OFFICE/OUTPT VISIT, EST, LEVL IV, 30-39 MIN: ICD-10-PCS | Mod: S$GLB,,, | Performed by: INTERNAL MEDICINE

## 2019-06-06 PROCEDURE — 99214 OFFICE O/P EST MOD 30 MIN: CPT | Mod: S$GLB,,, | Performed by: INTERNAL MEDICINE

## 2019-06-06 NOTE — PROGRESS NOTES
Subjective:       Patient ID: Rohan Abarca is a 74 y.o. male.    Medication List with Changes/Refills   New Medications    DIPHTH,PERTUS,ACELL,,TETANUS (BOOSTRIX TDAP) 2.5-8-5 LF-MCG-LF/0.5ML SUSP    Inject 0.5 mLs into the muscle once. for 1 dose    VARICELLA-ZOSTER GE-AS01B, PF, (SHINGRIX, PF,) 50 MCG/0.5 ML INJECTION    Inject 0.5 mLs into the muscle once. for 1 dose   Current Medications    AMLODIPINE (NORVASC) 5 MG TABLET    Take 1 tablet (5 mg total) by mouth once daily.    ATORVASTATIN (LIPITOR) 10 MG TABLET    TAKE 1 TABLET(10 MG) BY MOUTH EVERY DAY    CLOBETASOL (TEMOVATE) 0.05 % CREAM    Apply topically 2 (two) times daily.    CUTIVATE 0.05 % LOTN    AAA qd    ENALAPRIL (VASOTEC) 20 MG TABLET    Take 2 tablets (40 mg total) by mouth once daily.    MINOCYCLINE (MINOCIN,DYNACIN) 100 MG CAPSULE    Take 1 capsule (100 mg total) by mouth once daily.    VIT A/VIT C/VIT E/ZINC/COPPER (PRESERVISION AREDS ORAL)    Take by mouth.   Discontinued Medications    AZELASTINE (ASTELIN) 137 MCG (0.1 %) NASAL SPRAY    1 spray (137 mcg total) by Nasal route 2 (two) times daily.    BENZONATATE (TESSALON) 200 MG CAPSULE    Take 1 capsule every 8 hours as needed for daytime cough.    FLUOROURACIL (EFUDEX) 5 % CREAM    Apply sparingly BID to affected areas on hands    FLUTICASONE (FLONASE) 50 MCG/ACTUATION NASAL SPRAY    1 spray (50 mcg total) by Each Nare route 2 (two) times daily.    GUAIFENESIN (MUCINEX) 600 MG 12 HR TABLET    Take 1,200 mg by mouth 2 (two) times daily as needed for Congestion.       Chief Complaint: Hypertension (6 month follow up )  He is here today to f/u on chronic medical issues. He has no complaints.     He has hypertension that is controlled on enalapril 40 mg qday and amlodipine 5 mg qday.  He has no known CAD. He denies chest pain or shortness of breath. He does not check his BP at home.      He has hyperlipidemia and is taking atorvastatin 10 mg qday. HIs last lipid were on 12/2018 were  103/57/41/50.  He is on aspirin daily.      He has REGAN and and uses CPAP nightly. He is tolerating well.      He has history of prostate cancer s/p radical prostatectomy in 2012.  He did not go on hormonal treatment. He no longer follows with urology.  His last PSA was undetectable on 12/2018.      He complains of frequency of urination and nocturia. He was tried on flomax in the past but this did not help. He does a lot better when he uses his CPAP at night.      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 He had multiple excisions but bx still showed residual cancer.  He was treated with fluorouracil and did very well. He continues to follow with dermatology every 4 months.      He had complained of memory issues. He was seen by neurology in 6/2017 who ordered MRI (chronic microvascular ischemic changes) and neuropsych testing which was normal.  He was advised to start statin and aspirin.       He has rosacea on face and is followed annually by dermatology. He is using minocycline oral and cutivate topically.      He tries to eat healthy.  He is not exercising. 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  He denies any balance issues or falls.        Colonoscopy----3/2017 repeat in 8 years  Tdap---more than 10 years  Pneumovax---12/2018  Prevnar----5/2018  Influenza vaccine----12/2018  Shingles vaccine----none  AAA screen---10/2016 neg    Review of Systems   Constitutional: Negative for appetite change, fatigue, fever and unexpected weight change.   HENT: Negative for congestion, ear pain, hearing loss, sore throat and trouble swallowing.    Eyes: Negative for pain and visual disturbance.   Respiratory: Negative for cough, chest tightness, shortness of breath and wheezing.    Cardiovascular: Negative for chest pain, palpitations and leg swelling.   Gastrointestinal: Negative for abdominal pain, blood in stool, constipation, diarrhea, nausea and vomiting.  "  Endocrine: Negative for polyuria.   Genitourinary: Negative for dysuria and hematuria.   Musculoskeletal: Positive for back pain. Negative for arthralgias and myalgias.   Skin: Negative for rash.   Neurological: Negative for dizziness, weakness, numbness and headaches.   Hematological: Does not bruise/bleed easily.   Psychiatric/Behavioral: Negative for dysphoric mood, sleep disturbance and suicidal ideas. The patient is not nervous/anxious.        Objective:      Vitals:    06/06/19 0850 06/06/19 0900   BP: (!) 140/74 138/82   Pulse: (!) 56    Resp: 18    Temp: 98.4 °F (36.9 °C)    TempSrc: Oral    SpO2: 97%    Weight: 109.2 kg (240 lb 12.8 oz)    Height: 5' 9" (1.753 m)      Body mass index is 35.56 kg/m².  Physical Exam    General appearance: No acute distress, cooperative  Eyes: PERRL, EOMI, conjunctiva clear  Ears: normal external ear and pinna, tm clear without drainage, canals clear  Nose: Normal mucosa without drainage  Throat: no exudates or erythema, tonsils not enlarged  Mouth: no sores or lesions, moist mucous membranes  Neck: FROM, soft, supple, no thyromegaly, no bruits  Lymph: no anterior or posterior cervical adenopathy  Heart::  Regular rate and rhythm, no murmur  Lung: Clear to ascultation bilaterally, no wheezing, no rales, no rhonchi, no distress  Abdomen: Soft, nontender, no distention, no hepatosplenomegaly, bowel sounds normal, no guarding, no rebound, no peritoneal signs  Skin: no rashes, multiple skin lesions on face and upper chest, 2 large sebaceous cysts on back without erythema or tenderness  Extremities: no edema, no cyanosis  Neuro: CN 2-12 intact, 5/5 muscle strength upper and lower extremity bilaterally, 2+ DTRs UE and LE bilaterally, normal gait, normal sensation  Peripheral pulses: 2+ pedal pulses bilaterally, good perfusion and color  Musculoskeletal: FROM, good strenth, no tenderness  Joint: normal appearance, no swelling, no warmth, no deformity in all joints    Assessment:    "    1. Essential hypertension    2. Hyperlipidemia, unspecified hyperlipidemia type    3. Rosacea    4. Squamous cell cancer of skin of hand, right    5. History of prostate cancer    6. REGAN (obstructive sleep apnea)    7. Class 2 obesity due to excess calories with body mass index (BMI) of 35.0 to 35.9 in adult, unspecified whether serious comorbidity present    8. Elevated glucose    9. Need for diphtheria-tetanus-pertussis (Tdap) vaccine    10. Need for shingles vaccine        Plan:       Essential hypertension  Good control on this regimen. He is due for labs prior to his next visit.   -     Comprehensive metabolic panel; Future; Expected date: 12/01/2019  -     CBC auto differential; Future; Expected date: 12/01/2019  -     TSH; Future; Expected date: 12/01/2019  -     Lipid panel; Future; Expected date: 12/01/2019    Hyperlipidemia, unspecified hyperlipidemia type  Good control on low dose atorvastatin. He is on aspirin daily.     Rosacea  Controlled on minocycline and followed by dermatology.     Squamous cell cancer of skin of hand, right  S/p removal and continues to follow with dermatology every 3-4 months.     History of prostate cancer  No active disease.     REGAN (obstructive sleep apnea)  Good control and he is compliant with CPAP.     Class 2 obesity due to excess calories with body mass index (BMI) of 35.0 to 35.9 in adult, unspecified whether serious comorbidity present  Discussed the need for regular exercise and healthy eating to help lose weight.     Elevated glucose  -     Hemoglobin A1c; Future; Expected date: 12/01/2019    Need for diphtheria-tetanus-pertussis (Tdap) vaccine  -     diphth,pertus,acell,,tetanus (BOOSTRIX TDAP) 2.5-8-5 Lf-mcg-Lf/0.5mL Susp; Inject 0.5 mLs into the muscle once. for 1 dose  Dispense: 0.5 mL; Refill: 0    Need for shingles vaccine  -     varicella-zoster [id number]-AS01B, PF, (SHINGRIX, PF,) 50 mcg/0.5 mL injection; Inject 0.5 mLs into the muscle once. for 1 dose  Dispense:  0.5 mL; Refill: 1    Follow up in about 6 months (around 12/6/2019) for chronic medical issues.

## 2019-06-20 ENCOUNTER — OFFICE VISIT (OUTPATIENT)
Dept: PODIATRY | Facility: CLINIC | Age: 74
End: 2019-06-20
Payer: MEDICARE

## 2019-06-20 VITALS
HEIGHT: 69 IN | HEART RATE: 83 BPM | WEIGHT: 241.88 LBS | DIASTOLIC BLOOD PRESSURE: 88 MMHG | SYSTOLIC BLOOD PRESSURE: 138 MMHG | BODY MASS INDEX: 35.82 KG/M2

## 2019-06-20 DIAGNOSIS — B35.1 ONYCHOMYCOSIS DUE TO DERMATOPHYTE: Primary | ICD-10-CM

## 2019-06-20 PROCEDURE — 99999 PR PBB SHADOW E&M-EST. PATIENT-LVL III: CPT | Mod: PBBFAC,,, | Performed by: PODIATRIST

## 2019-06-20 PROCEDURE — 99203 OFFICE O/P NEW LOW 30 MIN: CPT | Mod: S$PBB,,, | Performed by: PODIATRIST

## 2019-06-20 PROCEDURE — 99999 PR PBB SHADOW E&M-EST. PATIENT-LVL III: ICD-10-PCS | Mod: PBBFAC,,, | Performed by: PODIATRIST

## 2019-06-20 PROCEDURE — 99203 PR OFFICE/OUTPT VISIT, NEW, LEVL III, 30-44 MIN: ICD-10-PCS | Mod: S$PBB,,, | Performed by: PODIATRIST

## 2019-06-20 PROCEDURE — 99213 OFFICE O/P EST LOW 20 MIN: CPT | Mod: PBBFAC,PN | Performed by: PODIATRIST

## 2019-06-20 NOTE — PATIENT INSTRUCTIONS
- Apply white vinegar soaked cotton balls to affected toenails for 5 minutes daily or perform 25% white vinegar and 75% lukewarm water foot soaks for 10 minutes daily to reduce fungal debris under toenails.  Apply thin layer of Vicks vaporub on top of and under affected nails to create environment that doesn't support fungal growth.  Don't perform any of these antifungal therapies if open wounds are present.    - Looks for Costello sneakers.    - Notify clinic if any new or worsening condition arises.    - Follow up as needed or for proc B appointment for routine nail care.

## 2019-07-01 PROBLEM — B35.1 ONYCHOMYCOSIS DUE TO DERMATOPHYTE: Status: ACTIVE | Noted: 2019-07-01

## 2019-07-01 NOTE — PROGRESS NOTES
"Subjective:      Patient ID: Rohan Abarca is a 74 y.o. male.    Chief Complaint: Nail Problem ("fungus") and Other Misc (PCP:  Dr Powell  6/6/19)      HPI:  Rohan Abarca is a 74 y.o. male who presents to clinic with a chief complaint of discolored, thickened toenails on both feet.  Patient states his toenails have been like this for several years but has noticed they have recently started to worsen.  He is having difficulty trimming his toenails and wants to try to get rid of with oversew causing them to worsen.  Patient wants to avoid any medications at this time.  He denies any methods of self-treatment.  Patient denies any other pedal complaints at this time.    PCP:  Rosa Powell, DO  Date last seen:  6/6/19    Review of Systems   Constitutional: Negative for appetite change, fever, chills, fatigue and unexpected weight change.   Respiratory: Negative for cough, wheezing, and shortness of breath.   Cardiovascular: Negative for chest pain, claudication, cyanosis, and leg swelling.  Endocrine:  Negative for intolerance to cold, intolerance to heat, polydipsia, polyphagia, and polyuria.    Gastrointestinal: Negative for nausea, vomiting, diarrhea, and constipation.   Musculoskeletal: Negative for back pain, arthritis, joint pain, joint swelling, myalgias, and stiffness.   Skin: Negative for rash, itching, poor wound healing, suspicious lesion, and unusual hair distribution.  Positive for nail bed changes, discoloration.  Neurological: Negative for loss of balance, sensory change, paresthesias, and numbness.   Hematological: Negative for adenopathy, bleeding, and bruising easily.   Psychiatric/Behavioral: The patient is not nervous/anxious.  Negative for altered mental status.    Hemoglobin A1C   Date Value Ref Range Status   12/07/2018 5.1 4.0 - 5.6 % Final     Comment:     ADA Screening Guidelines:  5.7-6.4%  Consistent with prediabetes  >or=6.5%  Consistent with diabetes  High levels of fetal hemoglobin " interfere with the HbA1C  assay. Heterozygous hemoglobin variants (HbS, HgC, etc)do  not significantly interfere with this assay.   However, presence of multiple variants may affect accuracy.         Past Medical History:   Diagnosis Date    Bilateral cataracts     hx    Colon polyp     benign    HBP (high blood pressure)     Hearing loss     History of detached retina repair     left    REGAN (obstructive sleep apnea)     uses CPAP    Prostate cancer     Rosacea      Past Surgical History:   Procedure Laterality Date    BUNIONECTOMY Right     CATARACT EXTRACTION BILATERAL W/ ANTERIOR VITRECTOMY Bilateral     COLONOSCOPY N/A 3/20/2017    Performed by [facility] at [facility]    HERNIA REPAIR      x 2    JOINT REPLACEMENT Right 2012    per Dr. Heriberto Colón    JOINT REPLACEMENT Left 2004    per Dr. Heriberto Colón    KNEE SURGERY      left , right    MASTOID SURGERY Right     right    PROSTATECTOMY  2012    RETINAL DETACHMENT SURGERY      left    ROTATOR CUFF REPAIR      bilateral    SHOULDER SURGERY Left     RCR per Dr. Heriberto Colón    SHOULDER SURGERY Right     RCR per Dr. Heriberto Colón    TONSILLECTOMY, ADENOIDECTOMY       Family History   Problem Relation Age of Onset    Cancer Sister     Stroke Sister     Cancer Sister     Stroke Unknown     Heart attack Unknown      Social History     Socioeconomic History    Marital status:      Spouse name: Not on file    Number of children: Not on file    Years of education: Not on file    Highest education level: Not on file   Occupational History    Not on file   Social Needs    Financial resource strain: Not on file    Food insecurity:     Worry: Not on file     Inability: Not on file    Transportation needs:     Medical: Not on file     Non-medical: Not on file   Tobacco Use    Smoking status: Former Smoker     Packs/day: 0.50     Years: 5.00     Pack years: 2.50     Types: Cigarettes     Last attempt to quit: 12/19/1968      "Years since quittin.5    Smokeless tobacco: Never Used   Substance and Sexual Activity    Alcohol use: Yes     Alcohol/week: 1.0 oz     Types: 2 Standard drinks or equivalent per week     Comment: social    Drug use: No    Sexual activity: Yes     Partners: Female   Lifestyle    Physical activity:     Days per week: Not on file     Minutes per session: Not on file    Stress: Not on file   Relationships    Social connections:     Talks on phone: Not on file     Gets together: Not on file     Attends Protestant service: Not on file     Active member of club or organization: Not on file     Attends meetings of clubs or organizations: Not on file     Relationship status: Not on file   Other Topics Concern    Not on file   Social History Narrative    Was working 4 days a week delivering seafood, unloading and loading truck by hand, retired Friday    No dietary restrictions           Objective:        /88   Pulse 83   Ht 5' 9" (1.753 m)   Wt 109.7 kg (241 lb 13.5 oz)   BMI 35.71 kg/m²     Physical Exam   Constitutional: Patient is oriented to person, place, and time. Patient appears well-developed and well-nourished. No acute distress.     Psychiatric: Patient has a normal mood and affect. Patient's speech is normal and behavior is normal. Judgment is normal. Cognition and memory are normal.     Bilateral pedal exam was performed today.  Vascular: Pedal pulses palpable 1/4 DP & PT.  CFT is = 3 seconds to the hallux.  Skin temperature is warm to cool proximal tibia to distal toes without localized increase in calor noted.  No erythema, edema, or ecchymosis noted to the foot or ankle.  Hair growth present distally to the LE.     Musculoskeletal: Ankle joint ROM is decreased. Subtalar joint ROM is decreased.  Midtarsal joint ROM is decreased.  1st ray ROM is decreased.  1st  MTPJ ROM is decreased.  Ankle joint dorsiflexion is restricted with the knee extended and flexed per Silfverskiold exam.    Muscle " strength is 5/5 for all LE muscle groups tested.    Neurological:  Epicritic sensation is grossly Intact to the foot.   Achilles DTR and Chaddock STR are Intact to bilateral lower extremities.   Negative Babinski sign bilateral lower extremities.  Negative clonus sign bilateral lower extremities.    Dermatological: Toenails 1-5 bilateral are WNL in length but are thickened, dystrophic, brittle, discolored, and mycotic with subungal debris present..  Webspaces 1-4 bilateral are clean, dry, and intact.  Skin turgor is supple.  No dry, flaky skin noted to the LE.  No open wounds or suspicious pigmented lesions appreciable to the foot or ankle.    Nursing note and vitals reviewed.        Assessment:       Encounter Diagnosis   Name Primary?    Onychomycosis due to dermatophyte Yes         Plan:       Rohan was seen today for nail problem and other misc.    Diagnoses and all orders for this visit:    Onychomycosis due to dermatophyte      I counseled the patient on his conditions, their implications and medical management.    - Discussed with patient antifungal treatment options.  Patient opted for home remedies at this time.    - Informed patient of proc B appointments available for routine nail care as patient is not qualify for this as a covered benefit.  Patient verbalized all understanding.    Patient was given the following recommendations and instructions:  Patient Instructions   - Apply white vinegar soaked cotton balls to affected toenails for 5 minutes daily or perform 25% white vinegar and 75% lukewarm water foot soaks for 10 minutes daily to reduce fungal debris under toenails.  Apply thin layer of Vicks vaporub on top of and under affected nails to create environment that doesn't support fungal growth.  Don't perform any of these antifungal therapies if open wounds are present.    - Looks for Costello sneakers.    - Notify clinic if any new or worsening condition arises.    - Follow up as needed or for proc  B appointment for routine nail care.        Trish Bernard DPM        Dictation was performed using M*Modal Fluency.  Transcription errors may be present.

## 2019-07-17 ENCOUNTER — TELEPHONE (OUTPATIENT)
Dept: FAMILY MEDICINE | Facility: CLINIC | Age: 74
End: 2019-07-17

## 2019-07-17 DIAGNOSIS — G47.33 OSA (OBSTRUCTIVE SLEEP APNEA): Primary | ICD-10-CM

## 2019-07-17 NOTE — TELEPHONE ENCOUNTER
----- Message from Alex Baez sent at 7/17/2019  1:54 PM CDT -----  Contact: Mayra Nunez called for authorization to change the pressure on the pt's CPAP machine. Pt states it is to high at this time. Fax or verbal auth is ok    Call Back #: [phone]  Fax: [phone]  Thanks

## 2019-07-17 NOTE — TELEPHONE ENCOUNTER
Mayra with [facility] states patient is requesting pressure change to CPAP as he feels it is too high at this time, please advise.

## 2019-07-18 NOTE — TELEPHONE ENCOUNTER
Patient requesting to receive new/updated CPAP machine order. Spoke to Mayra with [facility], CPAP machine is at 20cm (highest), states he is experiencing difficulty breathing with machine. Needs referral for sleep medicine. Please advise.

## 2019-11-21 DIAGNOSIS — I10 ESSENTIAL HYPERTENSION: ICD-10-CM

## 2019-11-21 RX ORDER — AMLODIPINE BESYLATE 5 MG/1
TABLET ORAL
Qty: 90 TABLET | Refills: 3 | Status: SHIPPED | OUTPATIENT
Start: 2019-11-21 | End: 2020-11-06

## 2019-11-22 ENCOUNTER — OFFICE VISIT (OUTPATIENT)
Dept: PODIATRY | Facility: CLINIC | Age: 74
End: 2019-11-22
Payer: MEDICARE

## 2019-11-22 VITALS
SYSTOLIC BLOOD PRESSURE: 131 MMHG | DIASTOLIC BLOOD PRESSURE: 87 MMHG | BODY MASS INDEX: 35.7 KG/M2 | HEART RATE: 67 BPM | HEIGHT: 69 IN | WEIGHT: 241 LBS

## 2019-11-22 DIAGNOSIS — L60.0 ONYCHOCRYPTOSIS: Primary | ICD-10-CM

## 2019-11-22 DIAGNOSIS — B35.1 ONYCHOMYCOSIS DUE TO DERMATOPHYTE: ICD-10-CM

## 2019-11-22 PROCEDURE — 99999 PR PBB SHADOW E&M-EST. PATIENT-LVL III: ICD-10-PCS | Mod: PBBFAC,,, | Performed by: PODIATRIST

## 2019-11-22 PROCEDURE — 99999 PR PBB SHADOW E&M-EST. PATIENT-LVL III: CPT | Mod: PBBFAC,,, | Performed by: PODIATRIST

## 2019-11-22 PROCEDURE — 1159F PR MEDICATION LIST DOCUMENTED IN MEDICAL RECORD: ICD-10-PCS | Mod: ,,, | Performed by: PODIATRIST

## 2019-11-22 PROCEDURE — 99213 PR OFFICE/OUTPT VISIT, EST, LEVL III, 20-29 MIN: ICD-10-PCS | Mod: S$PBB,,, | Performed by: PODIATRIST

## 2019-11-22 PROCEDURE — 1126F PR PAIN SEVERITY QUANTIFIED, NO PAIN PRESENT: ICD-10-PCS | Mod: ,,, | Performed by: PODIATRIST

## 2019-11-22 PROCEDURE — 99213 OFFICE O/P EST LOW 20 MIN: CPT | Mod: PBBFAC,PN | Performed by: PODIATRIST

## 2019-11-22 PROCEDURE — 1126F AMNT PAIN NOTED NONE PRSNT: CPT | Mod: ,,, | Performed by: PODIATRIST

## 2019-11-22 PROCEDURE — 1159F MED LIST DOCD IN RCRD: CPT | Mod: ,,, | Performed by: PODIATRIST

## 2019-11-22 PROCEDURE — 99213 OFFICE O/P EST LOW 20 MIN: CPT | Mod: S$PBB,,, | Performed by: PODIATRIST

## 2019-11-25 ENCOUNTER — PATIENT OUTREACH (OUTPATIENT)
Dept: ADMINISTRATIVE | Facility: HOSPITAL | Age: 74
End: 2019-11-25

## 2019-12-02 NOTE — PATIENT INSTRUCTIONS
- Perform wound care daily after shower/bath and whenever dressing becomes soiled or wet via removing dressing, cleansing with betadine or performing foot soak, patting area dry, applying antibiotic cream, and covering with bandaid.    - Apply white vinegar soaked cotton balls to affected toenails for 5 minutes daily or perform 25% white vinegar and 75% lukewarm water foot soaks for 10 minutes daily to reduce fungal debris under toenails.  Apply thin layer of Vicks vaporub on top of and under affected nails to create environment that doesn't support fungal growth.  Don't perform any of these antifungal therapies if open wounds are present.    - Notify clinic if any new or worsening condition arises.   Intake Note    Chief Complaint   Patient presents with   • Back Pain   • Fall         HPI: Pt is a 11 year old female who presents to the ED c/o lower back pain which began after she fell down 5 steps a few days ago. She also reports having mild neck pain. The pt has not taken any medication to manage her pain, but her pain has improved when a family member massages her back.    PE: TTP to lumbar and thoracic spine.    Plan: an XR of her T and L spine, further assess in [facility].     ________________________________________________________________    I have reviewed the information recorded by the scribe for accuracy and agree with its contents.    Brianne Rae acting as scribe for Inés Edmondson PA-C     Scribe: Brianne Rae  Physician's Assistant: Inés Edmondson PA-C   1/16/2017       Inés Edmondson PA-C  01/16/17 [id number]

## 2019-12-02 NOTE — PROGRESS NOTES
Subjective:      Patient ID: Rohan Abarca is a 74 y.o. male.    Chief Complaint: Nail Problem (fungus Dr Powell 6/2019 5.1 12/2018 )      HPI:  Rohan Abarca is a 74 y.o. male who presents to clinic with a chief complaint of ingrown toenail right 2nd toe.  Patient states he began noticing ingrown toenail couple days ago. He reports being unable to reach the toenail to try to trim it himself.  He relates pain occurs with pressure but denies any at this time.  Patient denies any other pedal complaints at this time.    PCP:  Rosa Powell, DO  Date last seen:  6/6/19    Review of Systems   Constitutional: Negative for appetite change, fever, chills, fatigue and unexpected weight change.   Cardiovascular: Negative for chest pain, claudication, cyanosis, and leg swelling.  Musculoskeletal: Negative for back pain, arthritis, joint pain, joint swelling, myalgias, and stiffness.   Skin: Negative for rash, itching, poor wound healing, suspicious lesion, and unusual hair distribution.  Positive for nail bed changes, discoloration.  Neurological: Negative for loss of balance, sensory change, paresthesias, and numbness.  Positive for pain.  Hematological: Negative for adenopathy, bleeding, and bruising easily.   Psychiatric/Behavioral: The patient is not nervous/anxious.  Negative for altered mental status.    Hemoglobin A1C   Date Value Ref Range Status   12/07/2018 5.1 4.0 - 5.6 % Final     Comment:     ADA Screening Guidelines:  5.7-6.4%  Consistent with prediabetes  >or=6.5%  Consistent with diabetes  High levels of fetal hemoglobin interfere with the HbA1C  assay. Heterozygous hemoglobin variants (HbS, HgC, etc)do  not significantly interfere with this assay.   However, presence of multiple variants may affect accuracy.         Past Medical History:   Diagnosis Date    Bilateral cataracts     hx    Colon polyp     benign    HBP (high blood pressure)     Hearing loss     History of detached retina repair      left    REGAN (obstructive sleep apnea)     uses CPAP    Prostate cancer     Rosacea      Past Surgical History:   Procedure Laterality Date    BUNIONECTOMY Right     CATARACT EXTRACTION BILATERAL W/ ANTERIOR VITRECTOMY Bilateral     COLONOSCOPY N/A 3/20/2017    Procedure: COLONOSCOPY;  Surgeon: Carl Marcelino MD;  Location: [facility];  Service: Endoscopy;  Laterality: N/A;    HERNIA REPAIR      x 2    JOINT REPLACEMENT Right     per Dr. Heriberto Colón    JOINT REPLACEMENT Left 2004    per Dr. Heriberto Colón    KNEE SURGERY      left , right    MASTOID SURGERY Right     right    PROSTATECTOMY  2012    RETINAL DETACHMENT SURGERY      left    ROTATOR CUFF REPAIR      bilateral    SHOULDER SURGERY Left     RCR per Dr. Heriberto Colón    SHOULDER SURGERY Right     RCR per Dr. Heriberto Colón    TONSILLECTOMY, ADENOIDECTOMY       Family History   Problem Relation Age of Onset    Cancer Sister     Stroke Sister     Cancer Sister     Stroke Unknown     Heart attack Unknown      Social History     Socioeconomic History    Marital status:      Spouse name: Not on file    Number of children: Not on file    Years of education: Not on file    Highest education level: Not on file   Occupational History    Not on file   Social Needs    Financial resource strain: Not on file    Food insecurity:     Worry: Not on file     Inability: Not on file    Transportation needs:     Medical: Not on file     Non-medical: Not on file   Tobacco Use    Smoking status: Former Smoker     Packs/day: 0.50     Years: 5.00     Pack years: 2.50     Types: Cigarettes     Last attempt to quit: 1968     Years since quittin.9    Smokeless tobacco: Never Used   Substance and Sexual Activity    Alcohol use: Yes     Alcohol/week: 1.7 standard drinks     Types: 2 Standard drinks or equivalent per week     Comment: social    Drug use: No    Sexual activity: Yes     Partners: Female   Lifestyle    Physical activity:  "    Days per week: Not on file     Minutes per session: Not on file    Stress: Not on file   Relationships    Social connections:     Talks on phone: Not on file     Gets together: Not on file     Attends Religion service: Not on file     Active member of club or organization: Not on file     Attends meetings of clubs or organizations: Not on file     Relationship status: Not on file   Other Topics Concern    Not on file   Social History Narrative    Was working 4 days a week delivering seafood, unloading and loading truck by hand, retired Friday    No dietary restrictions           Objective:        /87   Pulse 67   Ht 5' 9" (1.753 m)   Wt 109.3 kg (241 lb)   BMI 35.59 kg/m²     Physical Exam   Constitutional: Patient is oriented to person, place, and time. Patient appears well-developed and well-nourished. No acute distress.     Psychiatric: Patient has a normal mood and affect. Patient's speech is normal and behavior is normal. Judgment is normal. Cognition and memory are normal.     Bilateral pedal exam was performed today.  Vascular: Pedal pulses palpable 1/4 DP & PT.  CFT is = 3 seconds to the hallux.  Skin temperature is warm to cool proximal tibia to distal toes without localized increase in calor noted.  Minimal, localized erythema and edema noted to the medial aspect of the right 2nd toenail.  No ecchymosis noted to the foot or ankle.  Hair growth present distally to the LE.     Musculoskeletal: Ankle joint ROM is decreased. Subtalar joint ROM is decreased.  Midtarsal joint ROM is decreased.  1st ray ROM is decreased.  1st  MTPJ ROM is decreased.  Ankle joint dorsiflexion is restricted with the knee extended and flexed per Silfverskiold exam.    Muscle strength is 5/5 for all LE muscle groups tested.    Neurological:  Epicritic sensation is grossly Intact to the foot.   Achilles DTR and Chaddock STR are Intact to bilateral lower extremities.   Tenderness to palpation noted to the medial " border of the right 2nd toenail.    Dermatological: Toenails 1-5 bilateral are elongated, thickened, dystrophic, brittle, discolored, and mycotic with subungal debris present and incurvation of the right 2nd toenail medial border without adjacent wound appreciable..  Webspaces 1-4 bilateral are clean, dry, and intact.  Skin turgor is supple.  No dry, flaky skin noted to the LE.  No open wounds or suspicious pigmented lesions appreciable to the foot or ankle.    Nursing note and vitals reviewed.        Assessment:       Encounter Diagnoses   Name Primary?    Onychocryptosis Yes    Onychomycosis due to dermatophyte          Plan:       Rohan was seen today for nail problem.    Diagnoses and all orders for this visit:    Onychocryptosis    Onychomycosis due to dermatophyte      I counseled the patient on his conditions, their implications and medical management.    - Discussed with patient ingrown toenail treatment options.  Patient opted for slant back debridement at this time.    - With the patient's permission, performed slant back debridement of right 2nd toe medial nail border via nail nippers to patient's tolerance without incident.  Applied antibiotic cream and Band-Aid to toe.    - With the patient's permission, toenails 1, 2, 3, 4, and 5 of the right foot and 1, 2, 3, 4, and 5 of the left foot were debrided in length and thickness via nail nippers and electric  to patient's tolerance without incident as a courtesy to patient today.    Patient was given the following recommendations and instructions:  Patient Instructions   - Perform wound care daily after shower/bath and whenever dressing becomes soiled or wet via removing dressing, cleansing with betadine or performing foot soak, patting area dry, applying antibiotic cream, and covering with bandaid.    - Apply white vinegar soaked cotton balls to affected toenails for 5 minutes daily or perform 25% white vinegar and 75% lukewarm water foot soaks  for 10 minutes daily to reduce fungal debris under toenails.  Apply thin layer of Vicks vaporub on top of and under affected nails to create environment that doesn't support fungal growth.  Don't perform any of these antifungal therapies if open wounds are present.    - Notify clinic if any new or worsening condition arises.        Trish Bernard DPM        Dictation was performed using M*Modal Fluency.  Transcription errors may be present.

## 2019-12-03 ENCOUNTER — OFFICE VISIT (OUTPATIENT)
Dept: FAMILY MEDICINE | Facility: CLINIC | Age: 74
End: 2019-12-03
Payer: MEDICARE

## 2019-12-03 ENCOUNTER — LAB VISIT (OUTPATIENT)
Dept: LAB | Facility: HOSPITAL | Age: 74
End: 2019-12-03
Attending: INTERNAL MEDICINE
Payer: MEDICARE

## 2019-12-03 VITALS
OXYGEN SATURATION: 96 % | SYSTOLIC BLOOD PRESSURE: 140 MMHG | HEART RATE: 66 BPM | DIASTOLIC BLOOD PRESSURE: 90 MMHG | WEIGHT: 239.88 LBS | HEIGHT: 69 IN | BODY MASS INDEX: 35.53 KG/M2

## 2019-12-03 DIAGNOSIS — I10 ESSENTIAL HYPERTENSION: ICD-10-CM

## 2019-12-03 DIAGNOSIS — Z00.00 ENCOUNTER FOR PREVENTIVE HEALTH EXAMINATION: Primary | ICD-10-CM

## 2019-12-03 DIAGNOSIS — R73.09 ELEVATED GLUCOSE: ICD-10-CM

## 2019-12-03 LAB
ALBUMIN SERPL BCP-MCNC: 3.8 G/DL (ref 3.5–5.2)
ALP SERPL-CCNC: 79 U/L (ref 55–135)
ALT SERPL W/O P-5'-P-CCNC: 21 U/L (ref 10–44)
ANION GAP SERPL CALC-SCNC: 10 MMOL/L (ref 8–16)
AST SERPL-CCNC: 19 U/L (ref 10–40)
BASOPHILS # BLD AUTO: 0.03 K/UL (ref 0–0.2)
BASOPHILS NFR BLD: 0.6 % (ref 0–1.9)
BILIRUB SERPL-MCNC: 0.7 MG/DL (ref 0.1–1)
BUN SERPL-MCNC: 13 MG/DL (ref 8–23)
CALCIUM SERPL-MCNC: 9.3 MG/DL (ref 8.7–10.5)
CHLORIDE SERPL-SCNC: 107 MMOL/L (ref 95–110)
CHOLEST SERPL-MCNC: 118 MG/DL (ref 120–199)
CHOLEST/HDLC SERPL: 2.4 {RATIO} (ref 2–5)
CO2 SERPL-SCNC: 27 MMOL/L (ref 23–29)
CREAT SERPL-MCNC: 0.9 MG/DL (ref 0.5–1.4)
DIFFERENTIAL METHOD: NORMAL
EOSINOPHIL # BLD AUTO: 0.1 K/UL (ref 0–0.5)
EOSINOPHIL NFR BLD: 2.1 % (ref 0–8)
ERYTHROCYTE [DISTWIDTH] IN BLOOD BY AUTOMATED COUNT: 12.5 % (ref 11.5–14.5)
EST. GFR  (AFRICAN AMERICAN): >60 ML/MIN/1.73 M^2
EST. GFR  (NON AFRICAN AMERICAN): >60 ML/MIN/1.73 M^2
ESTIMATED AVG GLUCOSE: 105 MG/DL (ref 68–131)
GLUCOSE SERPL-MCNC: 96 MG/DL (ref 70–110)
HBA1C MFR BLD HPLC: 5.3 % (ref 4–5.6)
HCT VFR BLD AUTO: 46.6 % (ref 40–54)
HDLC SERPL-MCNC: 50 MG/DL (ref 40–75)
HDLC SERPL: 42.4 % (ref 20–50)
HGB BLD-MCNC: 15.1 G/DL (ref 14–18)
IMM GRANULOCYTES # BLD AUTO: 0.01 K/UL (ref 0–0.04)
IMM GRANULOCYTES NFR BLD AUTO: 0.2 % (ref 0–0.5)
LDLC SERPL CALC-MCNC: 61.8 MG/DL (ref 63–159)
LYMPHOCYTES # BLD AUTO: 1.5 K/UL (ref 1–4.8)
LYMPHOCYTES NFR BLD: 27.5 % (ref 18–48)
MCH RBC QN AUTO: 30.8 PG (ref 27–31)
MCHC RBC AUTO-ENTMCNC: 32.4 G/DL (ref 32–36)
MCV RBC AUTO: 95 FL (ref 82–98)
MONOCYTES # BLD AUTO: 0.7 K/UL (ref 0.3–1)
MONOCYTES NFR BLD: 12.2 % (ref 4–15)
NEUTROPHILS # BLD AUTO: 3.1 K/UL (ref 1.8–7.7)
NEUTROPHILS NFR BLD: 57.4 % (ref 38–73)
NONHDLC SERPL-MCNC: 68 MG/DL
NRBC BLD-RTO: 0 /100 WBC
PLATELET # BLD AUTO: 197 K/UL (ref 150–350)
PMV BLD AUTO: 11.3 FL (ref 9.2–12.9)
POTASSIUM SERPL-SCNC: 3.8 MMOL/L (ref 3.5–5.1)
PROT SERPL-MCNC: 6.3 G/DL (ref 6–8.4)
RBC # BLD AUTO: 4.9 M/UL (ref 4.6–6.2)
SODIUM SERPL-SCNC: 144 MMOL/L (ref 136–145)
TRIGL SERPL-MCNC: 31 MG/DL (ref 30–150)
TSH SERPL DL<=0.005 MIU/L-ACNC: 1.4 UIU/ML (ref 0.4–4)
WBC # BLD AUTO: 5.31 K/UL (ref 3.9–12.7)

## 2019-12-03 PROCEDURE — G0439 PR MEDICARE ANNUAL WELLNESS SUBSEQUENT VISIT: ICD-10-PCS | Mod: S$GLB,,, | Performed by: NURSE PRACTITIONER

## 2019-12-03 PROCEDURE — 80061 LIPID PANEL: CPT

## 2019-12-03 PROCEDURE — 36415 COLL VENOUS BLD VENIPUNCTURE: CPT | Mod: PO

## 2019-12-03 PROCEDURE — G0439 PPPS, SUBSEQ VISIT: HCPCS | Mod: S$GLB,,, | Performed by: NURSE PRACTITIONER

## 2019-12-03 PROCEDURE — 99999 PR PBB SHADOW E&M-EST. PATIENT-LVL IV: CPT | Mod: PBBFAC,,, | Performed by: NURSE PRACTITIONER

## 2019-12-03 PROCEDURE — 99999 PR PBB SHADOW E&M-EST. PATIENT-LVL IV: ICD-10-PCS | Mod: PBBFAC,,, | Performed by: NURSE PRACTITIONER

## 2019-12-03 PROCEDURE — 83036 HEMOGLOBIN GLYCOSYLATED A1C: CPT

## 2019-12-03 PROCEDURE — 84443 ASSAY THYROID STIM HORMONE: CPT

## 2019-12-03 PROCEDURE — 85025 COMPLETE CBC W/AUTO DIFF WBC: CPT

## 2019-12-03 PROCEDURE — 99214 OFFICE O/P EST MOD 30 MIN: CPT | Mod: PBBFAC,PO | Performed by: NURSE PRACTITIONER

## 2019-12-03 PROCEDURE — 80053 COMPREHEN METABOLIC PANEL: CPT

## 2019-12-03 NOTE — PATIENT INSTRUCTIONS
Counseling and Referral of Other Preventative  (Italic type indicates deductible and co-insurance are waived)    Patient Name: Rohan Abarca  Today's Date: 12/3/2019    Health Maintenance       Date Due Completion Date    TETANUS VACCINE 12/06/2019 (Originally 1/25/1963) ---    Influenza Vaccine (1) 12/01/2020 (Originally 9/1/2019) 12/6/2018    Shingles Vaccine (1 of 2) 12/03/2020 (Originally 1/25/1994) ---    Colonoscopy 03/20/2022 3/20/2017    Lipid Panel 12/07/2023 12/7/2018        No orders of the defined types were placed in this encounter.    The following information is provided to all patients.  This information is to help you find resources for any of the problems found today that may be affecting your health:                Living healthy guide: www.[facility].[place].[facility]      Understanding Diabetes: www.diabetes.org      Eating healthy: www.cdc.gov/healthyweight      CDC home safety checklist: www.cdc.gov/steadi/patient.html      Agency on Aging: www.goea.[place].gov      Alcoholics anonymous (AA): www.aa.org      Physical Activity: www.[place].nih.gov/nh2ujqf      Tobacco use: www.quitwithusla.org

## 2019-12-03 NOTE — PROGRESS NOTES
"Rohan Abarca presented for a  Medicare AWV and comprehensive Health Risk Assessment today. The following components were reviewed and updated:    · Medical history  · Family History  · Social history  · Allergies and Current Medications  · Health Risk Assessment  · Health Maintenance  · Care Team     ** See Completed Assessments for Annual Wellness Visit within the encounter summary.**       The following assessments were completed:  · Living Situation  · CAGE  · Depression Screening  · Timed Get Up and Go  · Whisper Test  · Cognitive Function Screening          · Nutrition Screening  · ADL Screening  · PAQ Screening    Vitals:    12/03/19 0850 12/03/19 0920   BP: (!) 142/82 (!) 140/90   BP Location: Left arm    Patient Position: Sitting    BP Method: Medium (Manual)    Pulse: 66    SpO2: 96%    Weight: 108.8 kg (239 lb 13.8 oz)    Height: 5' 9" (1.753 m)      Body mass index is 35.42 kg/m².  Physical Exam   Constitutional: He appears well-nourished. No distress.   HENT:   Head: Normocephalic.   Eyes: Conjunctivae are normal.   Cardiovascular: Normal rate and regular rhythm.   Pulmonary/Chest: Effort normal and breath sounds normal.   Skin: Skin is warm.   Psychiatric: He has a normal mood and affect. His behavior is normal.   Vitals reviewed.        Diagnoses and health risks identified today and associated recommendations/orders:    1. Encounter for preventive health examination  Reviewed health maintenance and provided recommendations    declines flu and shingrix vaccines at this time    2. Essential hypertension  Continue to monitor  Followed by DO Sushil Monzon nurse visit f/u .        Provided Rohan with a 5-10 year written screening schedule and personal prevention plan. Recommendations were developed using the USPSTF age appropriate recommendations. Education, counseling, and referrals were provided as needed. After Visit Summary printed and given to patient which includes a list of " additional screenings\tests needed.    Follow up in about 1 year (around 12/3/2020).    Ella Mendiola NP  I offered to discuss end of life issues, including information on how to make advance directives that the patient could use to name someone who would make medical decisions on their behalf if they became too ill to make themselves.    ___Patient declined  _X_Patient is interested, I provided paper work and offered to discuss.

## 2019-12-04 ENCOUNTER — PATIENT MESSAGE (OUTPATIENT)
Dept: FAMILY MEDICINE | Facility: CLINIC | Age: 74
End: 2019-12-04

## 2019-12-11 ENCOUNTER — OFFICE VISIT (OUTPATIENT)
Dept: FAMILY MEDICINE | Facility: CLINIC | Age: 74
End: 2019-12-11
Payer: MEDICARE

## 2019-12-11 VITALS
WEIGHT: 241.88 LBS | SYSTOLIC BLOOD PRESSURE: 132 MMHG | DIASTOLIC BLOOD PRESSURE: 84 MMHG | HEIGHT: 69 IN | HEART RATE: 66 BPM | BODY MASS INDEX: 35.82 KG/M2 | OXYGEN SATURATION: 99 % | TEMPERATURE: 99 F

## 2019-12-11 DIAGNOSIS — Z85.46 HISTORY OF PROSTATE CANCER: ICD-10-CM

## 2019-12-11 DIAGNOSIS — G31.84 MCI (MILD COGNITIVE IMPAIRMENT): ICD-10-CM

## 2019-12-11 DIAGNOSIS — I10 ESSENTIAL HYPERTENSION: Primary | ICD-10-CM

## 2019-12-11 DIAGNOSIS — Z23 NEED FOR SHINGLES VACCINE: ICD-10-CM

## 2019-12-11 DIAGNOSIS — Z23 NEED FOR DIPHTHERIA-TETANUS-PERTUSSIS (TDAP) VACCINE: ICD-10-CM

## 2019-12-11 DIAGNOSIS — L71.9 ROSACEA: ICD-10-CM

## 2019-12-11 DIAGNOSIS — E78.5 HYPERLIPIDEMIA, UNSPECIFIED HYPERLIPIDEMIA TYPE: ICD-10-CM

## 2019-12-11 PROCEDURE — 1126F PR PAIN SEVERITY QUANTIFIED, NO PAIN PRESENT: ICD-10-PCS | Mod: S$GLB,,, | Performed by: INTERNAL MEDICINE

## 2019-12-11 PROCEDURE — 1159F MED LIST DOCD IN RCRD: CPT | Mod: S$GLB,,, | Performed by: INTERNAL MEDICINE

## 2019-12-11 PROCEDURE — 99214 OFFICE O/P EST MOD 30 MIN: CPT | Mod: S$GLB,,, | Performed by: INTERNAL MEDICINE

## 2019-12-11 PROCEDURE — 99214 PR OFFICE/OUTPT VISIT, EST, LEVL IV, 30-39 MIN: ICD-10-PCS | Mod: S$GLB,,, | Performed by: INTERNAL MEDICINE

## 2019-12-11 PROCEDURE — 1159F PR MEDICATION LIST DOCUMENTED IN MEDICAL RECORD: ICD-10-PCS | Mod: S$GLB,,, | Performed by: INTERNAL MEDICINE

## 2019-12-11 PROCEDURE — 1126F AMNT PAIN NOTED NONE PRSNT: CPT | Mod: S$GLB,,, | Performed by: INTERNAL MEDICINE

## 2019-12-11 NOTE — PROGRESS NOTES
Subjective:       Patient ID: Rohan Abarca is a 74 y.o. male.    Medication List with Changes/Refills   New Medications    DIPHTH,PERTUS,ACELL,,TETANUS (BOOSTRIX TDAP) 2.5-8-5 LF-MCG-LF/0.5ML SUSP    Inject 0.5 mLs into the muscle once. for 1 dose    VARICELLA-ZOSTER GE-AS01B, PF, (SHINGRIX, PF,) 50 MCG/0.5 ML INJECTION    Inject 0.5 mLs into the muscle once. for 1 dose   Current Medications    AMLODIPINE (NORVASC) 5 MG TABLET    TAKE 1 TABLET(5 MG) BY MOUTH EVERY DAY    ATORVASTATIN (LIPITOR) 10 MG TABLET    TAKE 1 TABLET(10 MG) BY MOUTH EVERY DAY    CLOBETASOL (TEMOVATE) 0.05 % CREAM    Apply topically 2 (two) times daily.    CUTIVATE 0.05 % LOTN    AAA qd    ENALAPRIL (VASOTEC) 20 MG TABLET    Take 2 tablets (40 mg total) by mouth once daily.    MINOCYCLINE (MINOCIN,DYNACIN) 100 MG CAPSULE    Take 1 capsule (100 mg total) by mouth once daily.    VIT A/VIT C/VIT E/ZINC/COPPER (PRESERVISION AREDS ORAL)    Take by mouth.       Chief Complaint: Follow-up  He is here today to f/u on chronic medical issues. He has no complaints.     He has hypertension that is controlled on enalapril 40 mg qday and amlodipine 5 mg qday.  He has no known CAD. He denies chest pain or shortness of breath. He does not check his BP at home.      He has hyperlipidemia and is taking atorvastatin 10 mg qday. HIs last lipid were on 12/2019 were 118/31/50/61.  He is on aspirin daily.      He has REGAN and and uses CPAP nightly. He is tolerating well.      He has history of prostate cancer s/p radical prostatectomy in 2012.  He did not go on hormonal treatment. He no longer follows with urology.  His last PSA was undetectable on 12/2018.      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 He had multiple excisions but bx still showed residual cancer.  He was treated with fluorouracil and did very well. He continues to follow with dermatology every 4 months.      He had complained of memory issues. He was seen by  "neurology in 6/2017 who ordered MRI (chronic microvascular ischemic changes) and neuropsych testing which was normal.  He was advised to start statin and aspirin.       He has rosacea on face and is followed annually by dermatology. He is using minocycline oral and cutivate topically.      He tries to eat healthy.  He is not exercising. 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  He denies any balance issues or falls.        Colonoscopy----3/2017 repeat in 8 years  Tdap---more than 10 years  Pneumovax---12/2018  Prevnar----5/2018  Influenza vaccine----12/2018---refused  Shingles vaccine----none  AAA screen---10/2016 neg    Review of Systems   Constitutional: Negative for appetite change, fatigue, fever and unexpected weight change.   HENT: Negative for congestion, ear pain, hearing loss, sore throat and trouble swallowing.    Eyes: Negative for pain and visual disturbance.   Respiratory: Negative for cough, chest tightness, shortness of breath and wheezing.    Cardiovascular: Negative for chest pain, palpitations and leg swelling.   Gastrointestinal: Negative for abdominal pain, blood in stool, constipation, diarrhea, nausea and vomiting.   Endocrine: Negative for polyuria.   Genitourinary: Negative for dysuria and hematuria.   Musculoskeletal: Negative for arthralgias, back pain and myalgias.   Skin: Negative for rash.   Neurological: Negative for dizziness, weakness, numbness and headaches.   Hematological: Does not bruise/bleed easily.   Psychiatric/Behavioral: Negative for dysphoric mood, sleep disturbance and suicidal ideas. The patient is not nervous/anxious.        Objective:      Vitals:    12/11/19 1139   BP: 132/84   Pulse: 66   Temp: 99 °F (37.2 °C)   TempSrc: Oral   SpO2: 99%   Weight: 109.7 kg (241 lb 13.5 oz)   Height: 5' 9" (1.753 m)     Body mass index is 35.71 kg/m².  Physical Exam    General appearance: No acute distress, cooperative  Eyes: PERRL, EOMI, conjunctiva " clear  HEENT: ears normal, nose without rhinorrhea, normal turbinates,mouth no sores or lesions, throat no erythema or pus  Neck: FROM, soft, supple, no thyromegaly, no bruits  Lymph: no anterior or posterior cervical adenopathy  Heart::  Regular rate and rhythm, no murmur  Lung: Clear to ascultation bilaterally, no wheezing, no rales, no rhonchi, no distress  Abdomen: Soft, nontender, no distention, no hepatosplenomegaly, bowel sounds normal, no guarding, no rebound, no peritoneal signs  Skin: no rashes, no lesions  Extremities: no edema, no cyanosis  Neuro: no focal abnormalities, strength 5/5 b/l UE and LE, 2+ DTRs b/l UE and LE, normal gait  Peripheral pulses: 2+ pedal pulses bilaterally, good perfusion and color  Musculoskeletal: FROM, good strenth, no tenderness  Joint: normal appearance, no swelling, no warmth, no deformity in all joints    Assessment:       1. Essential hypertension    2. Hyperlipidemia, unspecified hyperlipidemia type    3. Rosacea    4. MCI (mild cognitive impairment)    5. History of prostate cancer    6. Need for diphtheria-tetanus-pertussis (Tdap) vaccine    7. Need for shingles vaccine        Plan:       Essential hypertension  Good control on current regimen.     Hyperlipidemia, unspecified hyperlipidemia type  Good control on atorvastatin.     Rosacea  Improved on minocycline.     MCI (mild cognitive impairment)  STable and continue to follow.     History of prostate cancer  No active disease.     Need for diphtheria-tetanus-pertussis (Tdap) vaccine  -     diphth,pertus,acell,,tetanus (BOOSTRIX TDAP) 2.5-8-5 Lf-mcg-Lf/0.5mL Susp; Inject 0.5 mLs into the muscle once. for 1 dose  Dispense: 0.5 mL; Refill: 0    Need for shingles vaccine  -     varicella-zoster gE-AS01B, PF, (SHINGRIX, PF,) 50 mcg/0.5 mL injection; Inject 0.5 mLs into the muscle once. for 1 dose  Dispense: 0.5 mL; Refill: 0    Follow up in about 9 months (around 9/11/2020) for chronic medical issues.

## 2020-01-06 ENCOUNTER — CLINICAL SUPPORT (OUTPATIENT)
Dept: AUDIOLOGY | Facility: CLINIC | Age: 75
End: 2020-01-06
Payer: MEDICARE

## 2020-01-06 ENCOUNTER — OFFICE VISIT (OUTPATIENT)
Dept: OTOLARYNGOLOGY | Facility: CLINIC | Age: 75
End: 2020-01-06
Payer: MEDICARE

## 2020-01-06 VITALS — HEIGHT: 69 IN | BODY MASS INDEX: 37.03 KG/M2 | WEIGHT: 250 LBS

## 2020-01-06 DIAGNOSIS — Q16.1 EXTERNAL AUDITORY CANAL ATRESIA: Primary | ICD-10-CM

## 2020-01-06 DIAGNOSIS — Z97.4 WEARS HEARING AID IN LEFT EAR: Primary | ICD-10-CM

## 2020-01-06 DIAGNOSIS — Z97.4 WEARS HEARING AID IN LEFT EAR: ICD-10-CM

## 2020-01-06 PROCEDURE — 92567 TYMPANOMETRY: CPT | Mod: 52,PBBFAC,PO | Performed by: AUDIOLOGIST

## 2020-01-06 PROCEDURE — 99213 OFFICE O/P EST LOW 20 MIN: CPT | Mod: PBBFAC,PO,25 | Performed by: NURSE PRACTITIONER

## 2020-01-06 PROCEDURE — 1126F AMNT PAIN NOTED NONE PRSNT: CPT | Mod: ,,, | Performed by: NURSE PRACTITIONER

## 2020-01-06 PROCEDURE — 99213 PR OFFICE/OUTPT VISIT, EST, LEVL III, 20-29 MIN: ICD-10-PCS | Mod: S$PBB,,, | Performed by: NURSE PRACTITIONER

## 2020-01-06 PROCEDURE — 1126F PR PAIN SEVERITY QUANTIFIED, NO PAIN PRESENT: ICD-10-PCS | Mod: ,,, | Performed by: NURSE PRACTITIONER

## 2020-01-06 PROCEDURE — 1159F PR MEDICATION LIST DOCUMENTED IN MEDICAL RECORD: ICD-10-PCS | Mod: ,,, | Performed by: NURSE PRACTITIONER

## 2020-01-06 PROCEDURE — 99999 PR PBB SHADOW E&M-EST. PATIENT-LVL III: ICD-10-PCS | Mod: PBBFAC,,, | Performed by: NURSE PRACTITIONER

## 2020-01-06 PROCEDURE — 1159F MED LIST DOCD IN RCRD: CPT | Mod: ,,, | Performed by: NURSE PRACTITIONER

## 2020-01-06 PROCEDURE — 99999 PR PBB SHADOW E&M-EST. PATIENT-LVL III: CPT | Mod: PBBFAC,,, | Performed by: NURSE PRACTITIONER

## 2020-01-06 PROCEDURE — 99213 OFFICE O/P EST LOW 20 MIN: CPT | Mod: S$PBB,,, | Performed by: NURSE PRACTITIONER

## 2020-01-06 NOTE — PROGRESS NOTES
Tympanometry completed AS per order from Romi Woodall NP.    Type A tympanogram obtained AS    Patient referred back to Romi Woodall NP for follow-up evaluation.

## 2020-01-06 NOTE — PROGRESS NOTES
"Subjective:       Patient ID: Rohan Abarca is a 74 y.o. male.    Chief Complaint: Ear Fullness    Ear Fullness    Associated symptoms include hearing loss (wears hearing aid AS ([employer])).      Patient was last seen 9 months ago for f/u left ELIAS. 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 He wears hearing aid AS only through [employer]. He states he cannot hear well even with left hearing aid in place.     Review of Systems   Constitutional: Negative.    HENT: Positive for hearing loss (wears hearing aid AS ([employer])).    Eyes: Negative.    Respiratory: Negative.    Cardiovascular: Negative.    Gastrointestinal: Negative.    Musculoskeletal: Negative.    Skin: Negative.    Neurological: Negative.    Hematological: Negative.    Psychiatric/Behavioral: Negative.        Objective:      Physical Exam   Constitutional: He is oriented to person, place, and time. Vital signs are normal. He appears well-developed and well-nourished. He is cooperative. He does not appear ill. No distress.   HENT:   Head: Normocephalic and atraumatic.   Right Ear: External ear normal. Decreased hearing is noted.   Left Ear: External ear and ear canal normal. No drainage. Tympanic membrane is not erythematous.  No middle ear effusion. Decreased hearing (wears hearing aid) is noted.   Nose: Nose normal.   Mouth/Throat: Uvula is midline, oropharynx is clear and moist and mucous membranes are normal.   LEFT: Type "A" tympanogram consistent with well-pneumatized mesotympanum and absence of middle ear effusion    Right acquired EAC atresia, no visible landmarks       Eyes: EOM and lids are normal. Right eye exhibits no discharge. Left eye exhibits no discharge. No scleral icterus.   Neck: Trachea normal and normal range of motion. Neck supple. No tracheal deviation present.   Cardiovascular: Normal rate.   Pulmonary/Chest: Effort normal. No stridor. No respiratory distress. He has no wheezes.   Musculoskeletal: Normal range of motion. " "  Neurological: He is alert and oriented to person, place, and time. He has normal strength. Coordination and gait normal.   Skin: Skin is warm, dry and intact. He is not diaphoretic. No cyanosis. No pallor.   Psychiatric: He has a normal mood and affect. His speech is normal and behavior is normal. Judgment and thought content normal. Cognition and memory are normal.   Nursing note and vitals reviewed.      Assessment:     1. Left middle ear clear with type "A" tympanogram    2. Right acquired EAC atresia  Plan:     Suggest he return to [facility] for hearing aid check.    Return as needed for further ENT concerns.   "

## 2020-01-20 DIAGNOSIS — L71.1 RHINOPHYMA: ICD-10-CM

## 2020-01-20 RX ORDER — MINOCYCLINE HYDROCHLORIDE 100 MG/1
CAPSULE ORAL
Qty: 30 CAPSULE | Refills: 6 | Status: SHIPPED | OUTPATIENT
Start: 2020-01-20 | End: 2020-09-11

## 2020-04-04 DIAGNOSIS — E78.5 HYPERLIPIDEMIA, UNSPECIFIED HYPERLIPIDEMIA TYPE: ICD-10-CM

## 2020-04-06 RX ORDER — ATORVASTATIN CALCIUM 10 MG/1
TABLET, FILM COATED ORAL
Qty: 90 TABLET | Refills: 3 | Status: SHIPPED | OUTPATIENT
Start: 2020-04-06 | End: 2021-03-08

## 2020-04-21 DIAGNOSIS — H65.192 ACUTE NON-SUPPURATIVE OTITIS MEDIA, LEFT: ICD-10-CM

## 2020-04-21 RX ORDER — FLUTICASONE PROPIONATE 50 MCG
SPRAY, SUSPENSION (ML) NASAL
Qty: 16 G | Refills: 12 | Status: SHIPPED | OUTPATIENT
Start: 2020-04-21 | End: 2020-08-14

## 2020-05-05 ENCOUNTER — PATIENT MESSAGE (OUTPATIENT)
Dept: ADMINISTRATIVE | Facility: HOSPITAL | Age: 75
End: 2020-05-05

## 2020-05-31 ENCOUNTER — PATIENT OUTREACH (OUTPATIENT)
Dept: ADMINISTRATIVE | Facility: OTHER | Age: 75
End: 2020-05-31

## 2020-06-02 ENCOUNTER — OFFICE VISIT (OUTPATIENT)
Dept: PODIATRY | Facility: CLINIC | Age: 75
End: 2020-06-02
Payer: MEDICARE

## 2020-06-02 VITALS — WEIGHT: 250 LBS | HEIGHT: 69 IN | BODY MASS INDEX: 37.03 KG/M2 | TEMPERATURE: 98 F

## 2020-06-02 DIAGNOSIS — B35.1 ONYCHOMYCOSIS DUE TO DERMATOPHYTE: Primary | ICD-10-CM

## 2020-06-02 PROCEDURE — 99213 OFFICE O/P EST LOW 20 MIN: CPT | Mod: PBBFAC,PN | Performed by: PODIATRIST

## 2020-06-02 PROCEDURE — 99213 OFFICE O/P EST LOW 20 MIN: CPT | Mod: S$PBB,,, | Performed by: PODIATRIST

## 2020-06-02 PROCEDURE — 99999 PR PBB SHADOW E&M-EST. PATIENT-LVL III: CPT | Mod: PBBFAC,,, | Performed by: PODIATRIST

## 2020-06-02 PROCEDURE — 99999 PR PBB SHADOW E&M-EST. PATIENT-LVL III: ICD-10-PCS | Mod: PBBFAC,,, | Performed by: PODIATRIST

## 2020-06-02 PROCEDURE — 99213 PR OFFICE/OUTPT VISIT, EST, LEVL III, 20-29 MIN: ICD-10-PCS | Mod: S$PBB,,, | Performed by: PODIATRIST

## 2020-06-14 NOTE — PATIENT INSTRUCTIONS
- Perform wound care daily after shower/bath and whenever dressing becomes soiled or wet via removing dressing, cleansing with betadine or performing foot soak, patting area dry, applying antibiotic cream, and covering with bandaid.    - Apply white vinegar soaked cotton balls to affected toenails for 5 minutes daily or perform 25% white vinegar and 75% lukewarm water foot soaks for 10 minutes daily to reduce fungal debris under toenails.  Apply thin layer of Vicks vaporub on top of and under affected nails to create environment that doesn't support fungal growth.  Don't perform any of these antifungal therapies if open wounds are present.    - Notify clinic if any new or worsening condition arises.

## 2020-06-14 NOTE — PROGRESS NOTES
Subjective:      Patient ID: Rohan Abarca is a 75 y.o. male.    Chief Complaint: Nail Care      HPI:  Rohan Abarca is a 75 y.o. male who presents to clinic with a chief complaint of discolored, thickened toenails.  Patient states he didn't get prescription medication when it was last prescribed but would like another prescription as he thinks he would be better able to comply with treatment regimen.  He informs that he has not been performing home remedies and had only tried them for about 2 weeks after his last visit.  He denies any pain today.  Patient denies any other pedal complaints at this time.    PCP:  Rosa Powell,   Date last seen:  12/11/2019    Review of Systems   Constitutional: Negative for appetite change, fever, chills, fatigue and unexpected weight change.   Respiratory: Negative for cough, wheezing, shortness of breath.  Cardiovascular: Negative for chest pain, claudication, cyanosis, and leg swelling.  Musculoskeletal: Negative for back pain, arthritis, joint pain, joint swelling, myalgias, and stiffness.   Skin: Negative for rash, itching, poor wound healing, suspicious lesion, and unusual hair distribution.  Positive for nail bed changes, discoloration.  Neurological: Negative for loss of balance, sensory change, paresthesias, and numbness.  Positive for pain.  Hematological: Negative for adenopathy, bleeding, and bruising easily.   Psychiatric/Behavioral: The patient is not nervous/anxious.  Negative for altered mental status.    Hemoglobin A1C   Date Value Ref Range Status   12/03/2019 5.3 4.0 - 5.6 % Final     Comment:     ADA Screening Guidelines:  5.7-6.4%  Consistent with prediabetes  >or=6.5%  Consistent with diabetes  High levels of fetal hemoglobin interfere with the HbA1C  assay. Heterozygous hemoglobin variants (HbS, HgC, etc)do  not significantly interfere with this assay.   However, presence of multiple variants may affect accuracy.     12/07/2018 5.1 4.0 - 5.6 % Final      Comment:     ADA Screening Guidelines:  5.7-6.4%  Consistent with prediabetes  >or=6.5%  Consistent with diabetes  High levels of fetal hemoglobin interfere with the HbA1C  assay. Heterozygous hemoglobin variants (HbS, HgC, etc)do  not significantly interfere with this assay.   However, presence of multiple variants may affect accuracy.         Past Medical History:   Diagnosis Date    Bilateral cataracts     hx    Colon polyp     benign    HBP (high blood pressure)     Hearing loss     History of detached retina repair     left    REGAN (obstructive sleep apnea)     uses CPAP    Prostate cancer     Rosacea      Past Surgical History:   Procedure Laterality Date    BUNIONECTOMY Right     CATARACT EXTRACTION BILATERAL W/ ANTERIOR VITRECTOMY Bilateral     COLONOSCOPY N/A 3/20/2017    Procedure: COLONOSCOPY;  Surgeon: Carl Marcelino MD;  Location: [facility];  Service: Endoscopy;  Laterality: N/A;    HERNIA REPAIR      x 2    JOINT REPLACEMENT Right 2012    per Dr. Heriberto Colón    JOINT REPLACEMENT Left 2004    per Dr. Heriberto Colón    KNEE SURGERY      left , right    MASTOID SURGERY Right     right    PROSTATECTOMY  2012    RETINAL DETACHMENT SURGERY      left    ROTATOR CUFF REPAIR      bilateral    SHOULDER SURGERY Left     RCR per Dr. Heriberto Colón    SHOULDER SURGERY Right     RCR per Dr. Heriberto Colón    TONSILLECTOMY, ADENOIDECTOMY       Family History   Problem Relation Age of Onset    Cancer Sister     Stroke Sister     Cancer Sister     Stroke Unknown     Heart attack Unknown      Social History     Socioeconomic History    Marital status:      Spouse name: Not on file    Number of children: Not on file    Years of education: Not on file    Highest education level: Not on file   Occupational History    Not on file   Social Needs    Financial resource strain: Not on file    Food insecurity     Worry: Not on file     Inability: Not on file    Transportation needs      "Medical: Not on file     Non-medical: Not on file   Tobacco Use    Smoking status: Former Smoker     Packs/day: 0.50     Years: 5.00     Pack years: 2.50     Types: Cigarettes     Quit date: 1968     Years since quittin.5    Smokeless tobacco: Never Used   Substance and Sexual Activity    Alcohol use: Yes     Alcohol/week: 1.7 standard drinks     Types: 2 Standard drinks or equivalent per week     Comment: social    Drug use: No    Sexual activity: Yes     Partners: Female   Lifestyle    Physical activity     Days per week: Not on file     Minutes per session: Not on file    Stress: Not on file   Relationships    Social connections     Talks on phone: Not on file     Gets together: Not on file     Attends Confucianist service: Not on file     Active member of club or organization: Not on file     Attends meetings of clubs or organizations: Not on file     Relationship status: Not on file   Other Topics Concern    Not on file   Social History Narrative    Was working 4 days a week delivering seafood, unloading and loading truck by hand, retired Friday    No dietary restrictions           Objective:        Temp 98.1 °F (36.7 °C)   Ht 5' 9" (1.753 m)   Wt 113.4 kg (250 lb)   BMI 36.92 kg/m²     Physical Exam   Constitutional: Patient is oriented to person, place, and time. Patient appears well-developed and well-nourished. No acute distress.     Psychiatric: Patient has a normal mood and affect. Patient's speech is normal and behavior is normal. Judgment is normal. Cognition and memory are normal.     Bilateral pedal exam was performed today.  Vascular: Pedal pulses palpable 1/4 DP & PT.  CFT is = 3 seconds to the hallux.  Skin temperature is warm to cool proximal tibia to distal toes without localized increase in calor noted.  No erythema, edema, or ecchymosis noted to the foot or ankle.  Hair growth present distally to the LE.     Musculoskeletal: Ankle joint ROM is decreased. Subtalar joint ROM " is decreased.  Midtarsal joint ROM is decreased.  1st ray ROM is decreased.  1st MTPJ ROM is decreased.  Ankle joint dorsiflexion is restricted with the knee extended and flexed per Silfverskiold exam.  Muscle strength is 5/5 for all LE muscle groups tested.    Neurological:  Epicritic sensation is grossly Intact to the foot.   Chaddock STR is Intact to the LE.   No tenderness to palpation noted to the foot or ankle.    Dermatological: Toenails 1-5 bilateral are elongated, thickened, dystrophic, brittle, discolored, and mycotic with subungal debris present and incurvation of the right 2nd toenail medial border without adjacent wound appreciable..  Webspaces 1-4 bilateral are clean, dry, and intact.  Skin turgor is supple.    Nursing note and vitals reviewed.        Assessment:       Encounter Diagnosis   Name Primary?    Onychomycosis due to dermatophyte Yes         Plan:       Rohan was seen today for nail care.    Diagnoses and all orders for this visit:    Onychomycosis due to dermatophyte      I counseled the patient on his conditions, their implications and medical management.    - Reviewed with patient need for continued use of antifungal regimen.  Patient opted for new prescription of topical antifungal compounded solution.    - Prescribed topical compound antifungal nail solution through [facility].    - With the patient's permission, toenails 1, 2, 3, 4, and 5 of the right foot and 1, 2, 3, 4, and 5 of the left foot were debrided in length and thickness via nail nippers and electric  to patient's tolerance without incident as a courtesy to patient today.    Patient was given the following recommendations and instructions:  Patient Instructions   - Perform wound care daily after shower/bath and whenever dressing becomes soiled or wet via removing dressing, cleansing with betadine or performing foot soak, patting area dry, applying antibiotic cream, and covering with bandaid.    -  Apply white vinegar soaked cotton balls to affected toenails for 5 minutes daily or perform 25% white vinegar and 75% lukewarm water foot soaks for 10 minutes daily to reduce fungal debris under toenails.  Apply thin layer of Vicks vaporub on top of and under affected nails to create environment that doesn't support fungal growth.  Don't perform any of these antifungal therapies if open wounds are present.    - Notify clinic if any new or worsening condition arises.        Trish Bernard DPM        Dictation was performed using M*Modal Fluency.  Transcription errors may be present.

## 2020-07-15 ENCOUNTER — TELEPHONE (OUTPATIENT)
Dept: FAMILY MEDICINE | Facility: CLINIC | Age: 75
End: 2020-07-15

## 2020-07-15 NOTE — TELEPHONE ENCOUNTER
I spoke to Mr. Abarca and offered the 7a for Friday. No availability at this time on Thursday. Pt declined 7a appt~ states it is too early. He r/s for next Tuesday at 2:30p. Pt voiced understanding of date/ time of new appt.

## 2020-07-15 NOTE — TELEPHONE ENCOUNTER
----- Message from Cierra Barrera sent at 7/14/2020  3:29 PM CDT -----  Regarding: reschedule AWV  Contact: TERESA REGALADO [[id number]]  TERESA REGALADO [[id number]], PH#[phone]   Called rescheduling to reschedule his AWV until later this week.  Please call upon request.

## 2020-07-21 ENCOUNTER — TELEPHONE (OUTPATIENT)
Dept: FAMILY MEDICINE | Facility: CLINIC | Age: 75
End: 2020-07-21

## 2020-07-21 NOTE — TELEPHONE ENCOUNTER
----- Message from Nicolasa Vasquez sent at 7/21/2020 12:22 PM CDT -----  Regarding: pt needs to reschedule annual wellness w/ NP  Contact: Rohan Davidson: Needs Medical Advice  Who Called:  Vickey Reddy Call Back Number: [phone]  Additional Information: Pls call pt to reschedule his appt for today. He can't make it,. Computer will not allow me to reschedule that type of appt

## 2020-07-21 NOTE — TELEPHONE ENCOUNTER
Left msg asking pt to contact the office at     Reason: assist with scheduling his AWV appt. Pt was unable to make his appt today.

## 2020-07-24 ENCOUNTER — OFFICE VISIT (OUTPATIENT)
Dept: FAMILY MEDICINE | Facility: CLINIC | Age: 75
End: 2020-07-24
Payer: MEDICARE

## 2020-07-24 VITALS
DIASTOLIC BLOOD PRESSURE: 80 MMHG | HEART RATE: 68 BPM | WEIGHT: 242.75 LBS | HEIGHT: 69 IN | OXYGEN SATURATION: 98 % | SYSTOLIC BLOOD PRESSURE: 136 MMHG | BODY MASS INDEX: 35.95 KG/M2

## 2020-07-24 DIAGNOSIS — Z00.00 ENCOUNTER FOR PREVENTIVE HEALTH EXAMINATION: Primary | ICD-10-CM

## 2020-07-24 DIAGNOSIS — R09.89 BILATERAL CAROTID BRUITS: ICD-10-CM

## 2020-07-24 DIAGNOSIS — I65.23 BILATERAL CAROTID ARTERY STENOSIS: ICD-10-CM

## 2020-07-24 DIAGNOSIS — I10 ESSENTIAL HYPERTENSION: ICD-10-CM

## 2020-07-24 PROCEDURE — G0439 PPPS, SUBSEQ VISIT: HCPCS | Mod: S$GLB,,, | Performed by: NURSE PRACTITIONER

## 2020-07-24 PROCEDURE — 99999 PR PBB SHADOW E&M-EST. PATIENT-LVL III: ICD-10-PCS | Mod: PBBFAC,,, | Performed by: NURSE PRACTITIONER

## 2020-07-24 PROCEDURE — 99213 OFFICE O/P EST LOW 20 MIN: CPT | Mod: PBBFAC,PO | Performed by: NURSE PRACTITIONER

## 2020-07-24 PROCEDURE — G0439 PR MEDICARE ANNUAL WELLNESS SUBSEQUENT VISIT: ICD-10-PCS | Mod: S$GLB,,, | Performed by: NURSE PRACTITIONER

## 2020-07-24 PROCEDURE — 99999 PR PBB SHADOW E&M-EST. PATIENT-LVL III: CPT | Mod: PBBFAC,,, | Performed by: NURSE PRACTITIONER

## 2020-07-24 NOTE — PROGRESS NOTES
"  Rohan Abarca presented for a  Medicare AWV and comprehensive Health Risk Assessment today. The following components were reviewed and updated:    · Medical history  · Family History  · Social history  · Allergies and Current Medications  · Health Risk Assessment  · Health Maintenance  · Care Team     ** See Completed Assessments for Annual Wellness Visit within the encounter summary.**         The following assessments were completed:  · Living Situation  · CAGE  · Depression Screening  · Timed Get Up and Go  · Whisper Test  · Cognitive Function Screening          · Nutrition Screening  · ADL Screening  · PAQ Screening        Vitals:    07/24/20 0859   BP: 136/80   BP Location: Left arm   Patient Position: Sitting   BP Method: Large (Manual)   Pulse: 68   SpO2: 98%   Weight: 110.1 kg (242 lb 11.6 oz)   Height: 5' 9" (1.753 m)     Body mass index is 35.84 kg/m².  Physical Exam  Vitals signs reviewed.   Constitutional:       General: He is not in acute distress.  HENT:      Head: Normocephalic.   Eyes:      Conjunctiva/sclera: Conjunctivae normal.   Cardiovascular:      Rate and Rhythm: Normal rate and regular rhythm.   Pulmonary:      Effort: Pulmonary effort is normal.      Breath sounds: Normal breath sounds. No wheezing.   Skin:     General: Skin is warm.   Neurological:      Mental Status: He is alert.   Psychiatric:         Behavior: Behavior normal.               Diagnoses and health risks identified today and associated recommendations/orders:    1. Encounter for preventive health examination  Reviewed health maintenance and provided recommendations   Declines shingrix vaccine at this time     2. Essential hypertension  Continue to monitor  Followed by  Rosa Powell DO      3. Bilateral carotid bruits  Continue to monitor  Followed by Rosa Powell DO .      4. Bilateral carotid artery stenosis  Continue to monitor  Followed by Rosa Powell DO   Taking statin .        Provided Rohan with a 5-10 " year written screening schedule and personal prevention plan. Recommendations were developed using the USPSTF age appropriate recommendations. Education, counseling, and referrals were provided as needed. After Visit Summary printed and given to patient which includes a list of additional screenings\tests needed.    Follow up in one year  Ella Mendiola NP  I offered to discuss end of life issues, including information on how to make advance directives that the patient could use to name someone who would make medical decisions on their behalf if they became too ill to make themselves.    ___Patient declined  _X_Patient is interested, I provided paper work and offered to discuss.

## 2020-07-30 PROBLEM — I65.23 BILATERAL CAROTID ARTERY STENOSIS: Status: ACTIVE | Noted: 2020-07-30

## 2020-08-12 ENCOUNTER — PATIENT OUTREACH (OUTPATIENT)
Dept: ADMINISTRATIVE | Facility: OTHER | Age: 75
End: 2020-08-12

## 2020-08-13 NOTE — PROGRESS NOTES
Health Maintenance Due   Topic Date Due    TETANUS VACCINE  01/25/1963    Aspirin/Antiplatelet Therapy  01/25/1963     Updates were requested from care everywhere.  Chart was reviewed for overdue Proactive [facility] Encounters ([facility]) topics (CRS, Breast Cancer Screening, Eye exam)  Health Maintenance has been updated.  LINKS immunization registry triggered.  Immunizations were reconciled.

## 2020-08-14 ENCOUNTER — OFFICE VISIT (OUTPATIENT)
Dept: DERMATOLOGY | Facility: CLINIC | Age: 75
End: 2020-08-14
Payer: MEDICARE

## 2020-08-14 ENCOUNTER — IMMUNIZATION (OUTPATIENT)
Dept: PHARMACY | Facility: CLINIC | Age: 75
End: 2020-08-14
Payer: MEDICARE

## 2020-08-14 DIAGNOSIS — L82.1 SEBORRHEIC KERATOSES: ICD-10-CM

## 2020-08-14 DIAGNOSIS — L71.9 ROSACEA: Primary | ICD-10-CM

## 2020-08-14 DIAGNOSIS — L81.8 HYPERPIGMENTATION DUE TO MINOCYCLINE: ICD-10-CM

## 2020-08-14 DIAGNOSIS — L71.1 RHINOPHYMA: ICD-10-CM

## 2020-08-14 DIAGNOSIS — T36.4X5A HYPERPIGMENTATION DUE TO MINOCYCLINE: ICD-10-CM

## 2020-08-14 DIAGNOSIS — L72.0 EPIDERMAL INCLUSION CYST: ICD-10-CM

## 2020-08-14 PROCEDURE — 99213 PR OFFICE/OUTPT VISIT, EST, LEVL III, 20-29 MIN: ICD-10-PCS | Mod: S$PBB,,, | Performed by: DERMATOLOGY

## 2020-08-14 PROCEDURE — 99999 PR PBB SHADOW E&M-EST. PATIENT-LVL III: CPT | Mod: PBBFAC,,, | Performed by: DERMATOLOGY

## 2020-08-14 PROCEDURE — 99213 OFFICE O/P EST LOW 20 MIN: CPT | Mod: S$PBB,,, | Performed by: DERMATOLOGY

## 2020-08-14 PROCEDURE — 99213 OFFICE O/P EST LOW 20 MIN: CPT | Mod: PBBFAC,PO | Performed by: DERMATOLOGY

## 2020-08-14 PROCEDURE — 99999 PR PBB SHADOW E&M-EST. PATIENT-LVL III: ICD-10-PCS | Mod: PBBFAC,,, | Performed by: DERMATOLOGY

## 2020-08-14 RX ORDER — MINOCYCLINE HYDROCHLORIDE 100 MG/1
100 CAPSULE ORAL EVERY OTHER DAY
Qty: 15 CAPSULE | Refills: 1 | Status: SHIPPED | OUTPATIENT
Start: 2020-08-14 | End: 2020-10-07

## 2020-08-14 RX ORDER — METRONIDAZOLE 10 MG/G
GEL TOPICAL
Qty: 60 G | Refills: 6 | Status: ON HOLD | OUTPATIENT
Start: 2020-08-14 | End: 2020-12-30

## 2020-08-14 NOTE — PROGRESS NOTES
Subjective:       Patient ID:  Rohan Abarca is a 75 y.o. male who presents for   Chief Complaint   Patient presents with    Rash     LOV: 04/05/2019    Patient present today for a follow up for rosacea (last seen by LT for Rosacea on 7/2017) and medication refill. Patient requesting Minocycline.     yes Phx of NMSC. (SCC 8/20/18 on right dorsal hand, SCC on right hand bx 10/25/18, efudex)  no Fhx of melanoma.    Denies use of OTC creams for rosacea.   Past Medical History:  No date: Bilateral cataracts      Comment:  hx  No date: Colon polyp      Comment:  benign  No date: HBP (high blood pressure)  No date: Hearing loss  No date: History of detached retina repair      Comment:  left  No date: REGAN (obstructive sleep apnea)      Comment:  uses CPAP  No date: Prostate cancer  No date: Rosacea        Review of Systems   Constitutional: Negative for fever and chills.   HENT: Negative for congestion and sore throat.    Respiratory: Negative for cough and shortness of breath.    Gastrointestinal: Negative for nausea and vomiting.   Skin: Positive for wears hat. Negative for itching, rash, dry skin, daily sunscreen use, activity-related sunscreen use and recent sunburn.        Objective:    Physical Exam   Skin:   Areas Examined (abnormalities noted in diagram):   Scalp / Hair Palpated and Inspected  Head / Face Inspection Performed  Neck Inspection Performed  Chest / Axilla Inspection Performed  Back Inspection Performed                   Diagram Legend     Erythematous scaling macule/papule c/w actinic keratosis       Vascular papule c/w angioma      Pigmented verrucoid papule/plaque c/w seborrheic keratosis      Yellow umbilicated papule c/w sebaceous hyperplasia      Irregularly shaped tan macule c/w lentigo     1-2 mm smooth white papules consistent with Milia      Movable subcutaneous cyst with punctum c/w epidermal inclusion cyst      Subcutaneous movable cyst c/w pilar cyst      Firm pink to brown papule c/w  dermatofibroma      Pedunculated fleshy papule(s) c/w skin tag(s)      Evenly pigmented macule c/w junctional nevus     Mildly variegated pigmented, slightly irregular-bordered macule c/w mildly atypical nevus      Flesh colored to evenly pigmented papule c/w intradermal nevus       Pink pearly papule/plaque c/w basal cell carcinoma      Erythematous hyperkeratotic cursted plaque c/w SCC      Surgical scar with no sign of skin cancer recurrence      Open and closed comedones      Inflammatory papules and pustules      Verrucoid papule consistent consistent with wart     Erythematous eczematous patches and plaques     Dystrophic onycholytic nail with subungual debris c/w onychomycosis     Umbilicated papule    Erythematous-base heme-crusted tan verrucoid plaque consistent with inflamed seborrheic keratosis     Erythematous Silvery Scaling Plaque c/w Psoriasis     See annotation      Assessment / Plan:        Rosacea and rhinophyma   -     minocycline (MINOCIN,DYNACIN) 100 MG capsule; Take 1 capsule (100 mg total) by mouth every other day.  Dispense: 15 capsule; Refill: 1  -     metronidazole 1% (METROGEL) 1 % Gel; AAA face qday  Dispense: 60 g; Refill: 6    - Pt is hesitant to discontinue Minocycline. Discussed switching to doxycyline. Discussed weaning off minocycline slowly. Pt is okay with taking minocycline every other day. Discussed the goal would be to wean him off an oral antibiotic and treat with topicals/avoids of triggers. Pt is unsure of his triggers due to long term use of minocycline and rare flares.   - Recommend minocycline every other day along with metrogel.  - Avoid possible triggers.   - discussed minocycline hyperpigmentation - pt is aware.   - discussed High-Frequency Electrosurgery with wire loop electrocautery for rhinophyma    Hyperpigmentation due to minocycline  - pt is aware. Will try to wean off now. Consider doxycycline in the future    Seborrheic keratoses  These are benign inherited  growths without a malignant potential. Reassurance given to patient. No treatment is necessary.       Epidermal inclusion cyst  - The benign nature of these lesions was discussed with the patient and that no treatment is indicated today.   - Discussed that when not inflamed, definite treatment includes surgical excision.                 Follow up in about 3 months (around 11/14/2020) for rosacea.

## 2020-09-08 ENCOUNTER — OFFICE VISIT (OUTPATIENT)
Dept: PODIATRY | Facility: CLINIC | Age: 75
End: 2020-09-08
Payer: MEDICARE

## 2020-09-08 VITALS — WEIGHT: 242 LBS | HEIGHT: 69 IN | BODY MASS INDEX: 35.84 KG/M2

## 2020-09-08 DIAGNOSIS — B35.1 ONYCHOMYCOSIS DUE TO DERMATOPHYTE: Primary | ICD-10-CM

## 2020-09-08 PROCEDURE — 99999 PR PBB SHADOW E&M-EST. PATIENT-LVL III: ICD-10-PCS | Mod: PBBFAC,,, | Performed by: PODIATRIST

## 2020-09-08 PROCEDURE — 99213 PR OFFICE/OUTPT VISIT, EST, LEVL III, 20-29 MIN: ICD-10-PCS | Mod: S$PBB,,, | Performed by: PODIATRIST

## 2020-09-08 PROCEDURE — 99213 OFFICE O/P EST LOW 20 MIN: CPT | Mod: PBBFAC,PN | Performed by: PODIATRIST

## 2020-09-08 PROCEDURE — 99213 OFFICE O/P EST LOW 20 MIN: CPT | Mod: S$PBB,,, | Performed by: PODIATRIST

## 2020-09-08 PROCEDURE — 99999 PR PBB SHADOW E&M-EST. PATIENT-LVL III: CPT | Mod: PBBFAC,,, | Performed by: PODIATRIST

## 2020-09-11 ENCOUNTER — OFFICE VISIT (OUTPATIENT)
Dept: FAMILY MEDICINE | Facility: CLINIC | Age: 75
End: 2020-09-11
Payer: MEDICARE

## 2020-09-11 VITALS
BODY MASS INDEX: 36.13 KG/M2 | HEIGHT: 69 IN | DIASTOLIC BLOOD PRESSURE: 80 MMHG | OXYGEN SATURATION: 98 % | RESPIRATION RATE: 20 BRPM | HEART RATE: 74 BPM | TEMPERATURE: 98 F | WEIGHT: 243.94 LBS | SYSTOLIC BLOOD PRESSURE: 144 MMHG

## 2020-09-11 DIAGNOSIS — E66.01 CLASS 2 SEVERE OBESITY WITH SERIOUS COMORBIDITY AND BODY MASS INDEX (BMI) OF 36.0 TO 36.9 IN ADULT, UNSPECIFIED OBESITY TYPE: ICD-10-CM

## 2020-09-11 DIAGNOSIS — Z85.46 HISTORY OF PROSTATE CANCER: ICD-10-CM

## 2020-09-11 DIAGNOSIS — I10 ESSENTIAL HYPERTENSION: Primary | ICD-10-CM

## 2020-09-11 DIAGNOSIS — R60.0 LOWER LEG EDEMA: ICD-10-CM

## 2020-09-11 DIAGNOSIS — E78.5 HYPERLIPIDEMIA, UNSPECIFIED HYPERLIPIDEMIA TYPE: ICD-10-CM

## 2020-09-11 DIAGNOSIS — G31.84 MCI (MILD COGNITIVE IMPAIRMENT): ICD-10-CM

## 2020-09-11 DIAGNOSIS — R94.31 ABNORMAL EKG: ICD-10-CM

## 2020-09-11 DIAGNOSIS — M16.11 PRIMARY OSTEOARTHRITIS OF RIGHT HIP: ICD-10-CM

## 2020-09-11 DIAGNOSIS — L71.9 ROSACEA: ICD-10-CM

## 2020-09-11 DIAGNOSIS — G47.33 OSA (OBSTRUCTIVE SLEEP APNEA): ICD-10-CM

## 2020-09-11 DIAGNOSIS — I65.23 BILATERAL CAROTID ARTERY STENOSIS: ICD-10-CM

## 2020-09-11 DIAGNOSIS — R01.1 MURMUR: ICD-10-CM

## 2020-09-11 DIAGNOSIS — Z12.5 SCREENING FOR PROSTATE CANCER: ICD-10-CM

## 2020-09-11 PROBLEM — E66.812 CLASS 2 SEVERE OBESITY WITH SERIOUS COMORBIDITY AND BODY MASS INDEX (BMI) OF 36.0 TO 36.9 IN ADULT: Status: ACTIVE | Noted: 2020-09-11

## 2020-09-11 PROCEDURE — 93010 EKG 12-LEAD: ICD-10-PCS | Mod: S$GLB,,, | Performed by: INTERNAL MEDICINE

## 2020-09-11 PROCEDURE — 93005 EKG 12-LEAD: ICD-10-PCS | Mod: S$GLB,,, | Performed by: INTERNAL MEDICINE

## 2020-09-11 PROCEDURE — 99214 OFFICE O/P EST MOD 30 MIN: CPT | Mod: S$GLB,,, | Performed by: INTERNAL MEDICINE

## 2020-09-11 PROCEDURE — 93005 ELECTROCARDIOGRAM TRACING: CPT | Mod: S$GLB,,, | Performed by: INTERNAL MEDICINE

## 2020-09-11 PROCEDURE — 99214 PR OFFICE/OUTPT VISIT, EST, LEVL IV, 30-39 MIN: ICD-10-PCS | Mod: S$GLB,,, | Performed by: INTERNAL MEDICINE

## 2020-09-11 PROCEDURE — 93010 ELECTROCARDIOGRAM REPORT: CPT | Mod: S$GLB,,, | Performed by: INTERNAL MEDICINE

## 2020-09-11 RX ORDER — ASPIRIN 81 MG/1
81 TABLET ORAL DAILY
Qty: 100 TABLET | Refills: 3 | Status: SHIPPED | OUTPATIENT
Start: 2020-09-11 | End: 2022-03-28

## 2020-09-11 RX ORDER — ENALAPRIL MALEATE 20 MG/1
20 TABLET ORAL DAILY
Qty: 90 TABLET | Refills: 3 | Status: SHIPPED | OUTPATIENT
Start: 2020-09-11 | End: 2020-10-23

## 2020-09-11 NOTE — PROGRESS NOTES
Subjective:       Patient ID: Rohan Abarca is a 75 y.o. male.    Medication List with Changes/Refills   New Medications    ASPIRIN (ECOTRIN) 81 MG EC TABLET    Take 1 tablet (81 mg total) by mouth once daily.   Current Medications    AMLODIPINE (NORVASC) 5 MG TABLET    TAKE 1 TABLET(5 MG) BY MOUTH EVERY DAY    ATORVASTATIN (LIPITOR) 10 MG TABLET    TAKE 1 TABLET(10 MG) BY MOUTH EVERY DAY    METRONIDAZOLE 1% (METROGEL) 1 % GEL    AAA face qday    MINOCYCLINE (MINOCIN,DYNACIN) 100 MG CAPSULE    Take 1 capsule (100 mg total) by mouth every other day.    UNABLE TO FIND    CLEAN NAILS, SHAKE BOTTLE WELL, APPLY TWICE DAILY TO ALL AFFECTED NAILS USING APPLICATOR. (UP TO 2 MLS/DAY)    VIT A/VIT C/VIT E/ZINC/COPPER (PRESERVISION AREDS ORAL)    Take by mouth.   Changed and/or Refilled Medications    Modified Medication Previous Medication    ENALAPRIL (VASOTEC) 20 MG TABLET enalapril (VASOTEC) 20 MG tablet       Take 1 tablet (20 mg total) by mouth once daily.    Take 2 tablets (40 mg total) by mouth once daily.   Discontinued Medications    MINOCYCLINE (MINOCIN,DYNACIN) 100 MG CAPSULE    TAKE 1 CAPSULE(100 MG) BY MOUTH EVERY DAY       Chief Complaint: Follow-up  He is here today to f/u on chronic medical issues.      He has hypertension and is taking amlodipine 5 mg qday. In the past he was on enalapril 40 mg qday but stopped for unknown reason. He reports his home BP run 135-140s/70-80s.  He has no known CAD. He denies chest pain or shortness of breath but he does have worsening lower leg swelling and increasingly poor endurance.      He had a carotid u/s in 2014 that showed 50% stenosis at carotid bifurcations but no significant stenosis.       He has hyperlipidemia and is taking atorvastatin 10 mg qday. HIs last lipid were on 12/2019 were 118/31/50/61.  He stopped taking aspirin daily.      He has REGAN and and uses CPAP nightly. He is tolerating well.      He has history of prostate cancer s/p radical prostatectomy in  2012.  He did not go on hormonal treatment. He no longer follows with urology.  His last PSA was undetectable on 12/2018.      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 He had multiple excisions but bx still showed residual cancer.  He was treated with fluorouracil and did very well. He continues to follow with dermatology every 4 months.     He has rosacea and was started on minocycline 100 mg qday. He does have some discoloration around his neck and on his face but does not want to stop treatment. He was last seen by dermatology on 8/2020.       He had complained of memory issues. He was seen by neurology in 6/2017 who ordered MRI (chronic microvascular ischemic changes) and neuropsych testing which was normal.  He was advised to start statin and aspirin.       He complain of continue right hip pain diagnosed by xray as OA. He was seen by orthopedics on 6/2020 and has a steroid injection without much relief. He is not able to walk or do cardio exercises at the gym due to his right hip pain. His pain is a constant dull low ache.  Worse with walking and flexing. Better with rest.       He lives with his wife and feels safe at home. He is exercising at the gym 4 days a week. He tries to eat healthy.  He is not exercising. He denies any balance issues or falls.        Colonoscopy----3/2017 repeat in 8 years  Tdap---more than 10 years  Pneumovax---12/2018  Prevnar----5/2018  Influenza vaccine----12/2018---refused  Shingrex vaccine----8/2020  AAA screen---10/2016 neg     Review of Systems   Constitutional: Negative for appetite change, fatigue, fever and unexpected weight change.   HENT: Negative for congestion, ear pain, hearing loss, sore throat and trouble swallowing.    Eyes: Negative for pain and visual disturbance.   Respiratory: Negative for cough, chest tightness, shortness of breath and wheezing.    Cardiovascular: Positive for leg swelling. Negative for chest pain and  "palpitations.   Gastrointestinal: Negative for abdominal pain, blood in stool, constipation, diarrhea, nausea and vomiting.   Endocrine: Negative for polyuria.   Genitourinary: Negative for dysuria and hematuria.   Musculoskeletal: Negative for arthralgias, back pain and myalgias.   Skin: Negative for rash.   Neurological: Negative for dizziness, weakness, numbness and headaches.   Hematological: Does not bruise/bleed easily.   Psychiatric/Behavioral: Negative for dysphoric mood, sleep disturbance and suicidal ideas. The patient is not nervous/anxious.        Objective:      Vitals:    09/11/20 0752   BP: (!) 144/80   Pulse: 74   Resp: 20   Temp: 98.1 °F (36.7 °C)   TempSrc: Temporal   SpO2: 98%   Weight: 110.6 kg (243 lb 15 oz)   Height: 5' 9" (1.753 m)     Body mass index is 36.02 kg/m².  Physical Exam    General appearance: No acute distress, cooperative  Eyes: PERRL, EOMI, conjunctiva clear  Ears: normal external ear and pinna, tm clear without drainage, canals clear  Nose: Normal mucosa without drainage  Throat: no exudates or erythema, tonsils not enlarged  Mouth: no sores or lesions, moist mucous membranes, enlarged tongue  Neck: FROM, soft, supple, no thyromegaly, no bruits  Lymph: no anterior or posterior cervical adenopathy  Heart::  Regular rate and rhythm, 2/6 systolic murmur  Lung: Clear to ascultation bilaterally, no wheezing, no rales, no rhonchi, no distress  Abdomen: Soft, nontender, no distention, no hepatosplenomegaly, bowel sounds normal, no guarding, no rebound, no peritoneal signs  Skin: no rashes, purplish hyperpigmentation on neck and forehead  Extremities: 1+ pitting  edema, no cyanosis  Neuro: CN 2-12 intact, 5/5 muscle strength upper and lower extremity bilaterally, 2+ DTRs UE and LE bilaterally, normal gai  Peripheral pulses: 2+ pedal pulses bilaterally, good perfusion and color  Musculoskeletal: FROM, good strenth, no tenderness  Joint: normal appearance, no swelling, no warmth, no " deformity in all joints    Assessment:       1. Essential hypertension    2. Hyperlipidemia, unspecified hyperlipidemia type    3. Murmur    4. Abnormal EKG    5. Lower leg edema    6. Bilateral carotid artery stenosis    7. Rosacea    8. MCI (mild cognitive impairment)    9. History of prostate cancer    10. REGAN (obstructive sleep apnea)    11. Primary osteoarthritis of right hip    12. Class 2 severe obesity with serious comorbidity and body mass index (BMI) of 36.0 to 36.9 in adult, unspecified obesity type    13. Screening for prostate cancer        Plan:       Essential hypertension  Uncontrolled. Will restart enalapril at 20 mg qday and continue amlodipine. Recheck BP at his next visit. He is due for labs.   -     CBC auto differential; Future; Expected date: 09/11/2020  -     Comprehensive metabolic panel; Future; Expected date: 09/11/2020  -     Lipid Panel; Future; Expected date: 09/11/2020  -     TSH; Future; Expected date: 09/11/2020  -     IN OFFICE EKG 12-LEAD (to Muse)  -     enalapril (VASOTEC) 20 MG tablet; Take 1 tablet (20 mg total) by mouth once daily.  Dispense: 90 tablet; Refill: 3    Hyperlipidemia, unspecified hyperlipidemia type  Continue atorvastatin and will restart aspirin daily.   -     aspirin (ECOTRIN) 81 MG EC tablet; Take 1 tablet (81 mg total) by mouth once daily.  Dispense: 100 tablet; Refill: 3    Murmur  Noted on exam with new lower leg swelling. Will get echo and BNP.    -     Echo Color Flow Doppler? Yes    Abnormal EKG  Abnormal EKG and concern about his change in endurance. Will get nuclear stress test.   -     Nuclear Stress - Cardiology Interpreted; Future    Lower leg edema  Mild but new so will check echo.   -     Brain Natriuretic Peptide; Future; Expected date: 09/11/2020    Bilateral carotid artery stenosis  Continue atorvastatin and start aspirin daily.  Will check carotid u/s because last done over 6 years ago.   -     US Carotid Bilateral; Future; Expected date:  09/11/2020    Rosacea  Improved on minocycline but has significant discoloration. He is followed by dermatology and would like to continue treatment.     MCI (mild cognitive impairment)  STable and workup was reassuring.     History of prostate cancer  No active disease. Due to recheck PSA.     REGAN (obstructive sleep apnea)  Stable and patient is compliant with use. Patient benefits from regular use of CPAP.     Primary osteoarthritis of right hip  Uncontrolled.  Will address at his f/u. He has seen orthopedics and did not get relief from steroid injection. Okay to use tylenol for pain.     Class 2 severe obesity with serious comorbidity and body mass index (BMI) of 36.0 to 36.9 in adult, unspecified obesity type  Long discussion on the benefits of healthy eating and regular exercise to help lose weight and help control hypertension, hyperlipidemia, REGAN and hip arthritis.     Screening for prostate cancer  -     PSA, Screening; Future; Expected date: 09/11/2020    Follow up in about 4 weeks (around 10/9/2020) for recheck nuclear stress/echo/ carotid u/s and labs and recheck BP .

## 2020-09-11 NOTE — PATIENT INSTRUCTIONS
Continue amlodipine and add enalapril 20 mg once a day to medications.     Start aspirin once a day

## 2020-09-21 NOTE — PROGRESS NOTES
Subjective:      Patient ID: Rohan Abarca is a 75 y.o. male.    Chief Complaint: No chief complaint on file.      HPI:  Rohan Abarca is a 75 y.o. male who presents to clinic with a chief complaint of discolored, thickened toenails.  Patient states he has been inconsistently applying antifungal medication to toenails.  Patient denies any other pedal complaints at this time.    PCP:  Rosa Powell,   Date last seen:  9/11/2020    Review of Systems   Constitutional: Negative for appetite change, fever, chills, fatigue and unexpected weight change.   Respiratory: Negative for cough, wheezing, shortness of breath.  Cardiovascular: Negative for chest pain, claudication, cyanosis, and leg swelling.  Musculoskeletal: Negative for back pain, arthritis, joint pain, joint swelling, myalgias, and stiffness.   Skin: Negative for rash, itching, poor wound healing, suspicious lesion, and unusual hair distribution.  Positive for nail bed changes, discoloration.  Neurological: Negative for loss of balance, sensory change, paresthesias, and numbness.  Positive for pain.  Hematological: Negative for adenopathy, bleeding, and bruising easily.   Psychiatric/Behavioral: The patient is not nervous/anxious.  Negative for altered mental status.    Hemoglobin A1C   Date Value Ref Range Status   12/03/2019 5.3 4.0 - 5.6 % Final     Comment:     ADA Screening Guidelines:  5.7-6.4%  Consistent with prediabetes  >or=6.5%  Consistent with diabetes  High levels of fetal hemoglobin interfere with the HbA1C  assay. Heterozygous hemoglobin variants (HbS, HgC, etc)do  not significantly interfere with this assay.   However, presence of multiple variants may affect accuracy.     12/07/2018 5.1 4.0 - 5.6 % Final     Comment:     ADA Screening Guidelines:  5.7-6.4%  Consistent with prediabetes  >or=6.5%  Consistent with diabetes  High levels of fetal hemoglobin interfere with the HbA1C  assay. Heterozygous hemoglobin variants (HbS, HgC,  etc)do  not significantly interfere with this assay.   However, presence of multiple variants may affect accuracy.         Past Medical History:   Diagnosis Date    Bilateral cataracts     hx    Colon polyp     benign    HBP (high blood pressure)     Hearing loss     History of detached retina repair     left    REGAN (obstructive sleep apnea)     uses CPAP    Prostate cancer     Rosacea      Past Surgical History:   Procedure Laterality Date    BUNIONECTOMY Right     CATARACT EXTRACTION BILATERAL W/ ANTERIOR VITRECTOMY Bilateral     COLONOSCOPY N/A 3/20/2017    Procedure: COLONOSCOPY;  Surgeon: Carl Marcelino MD;  Location: [facility];  Service: Endoscopy;  Laterality: N/A;    HERNIA REPAIR      x 2    JOINT REPLACEMENT Right 2012    per Dr. Heriberto Colón    JOINT REPLACEMENT Left 2004    per Dr. Heriberto Colón    KNEE SURGERY      left , right    MASTOID SURGERY Right     right    PROSTATECTOMY  2012    RETINAL DETACHMENT SURGERY      left    ROTATOR CUFF REPAIR      bilateral    SHOULDER SURGERY Left     RCR per Dr. Heriberto Colón    SHOULDER SURGERY Right     RCR per Dr. Heriberto Colón    TONSILLECTOMY, ADENOIDECTOMY       Family History   Problem Relation Age of Onset    Cancer Sister     Stroke Sister     Cancer Sister     Stroke Unknown     Heart attack Unknown      Social History     Socioeconomic History    Marital status:      Spouse name: Not on file    Number of children: Not on file    Years of education: Not on file    Highest education level: Not on file   Occupational History    Not on file   Social Needs    Financial resource strain: Not on file    Food insecurity     Worry: Not on file     Inability: Not on file    Transportation needs     Medical: Not on file     Non-medical: Not on file   Tobacco Use    Smoking status: Former Smoker     Packs/day: 0.50     Years: 5.00     Pack years: 2.50     Types: Cigarettes     Quit date: 12/19/1968     Years since quitting:  "51.7    Smokeless tobacco: Never Used   Substance and Sexual Activity    Alcohol use: Yes     Alcohol/week: 1.7 standard drinks     Types: 2 Standard drinks or equivalent per week     Comment: social    Drug use: No    Sexual activity: Yes     Partners: Female   Lifestyle    Physical activity     Days per week: Not on file     Minutes per session: Not on file    Stress: Not on file   Relationships    Social connections     Talks on phone: Not on file     Gets together: Not on file     Attends Mosque service: Not on file     Active member of club or organization: Not on file     Attends meetings of clubs or organizations: Not on file     Relationship status: Not on file   Other Topics Concern    Not on file   Social History Narrative    Was working 4 days a week delivering seafood, unloading and loading truck by hand, retired Friday    No dietary restrictions           Objective:        Ht 5' 9" (1.753 m)   Wt 109.8 kg (242 lb)   BMI 35.74 kg/m²     Physical Exam   Constitutional: Patient is oriented to person, place, and time. Patient appears well-developed and well-nourished. No acute distress.     Psychiatric: Patient has a normal mood and affect. Patient's speech is normal and behavior is normal. Judgment is normal. Cognition and memory are normal.     Bilateral pedal exam was performed today.  Vascular: Pedal pulses palpable 1/4 DP & PT.  CFT is = 3 seconds to the hallux.  Skin temperature is warm to cool proximal tibia to distal toes without localized increase in calor noted.  No erythema, edema, or ecchymosis noted to the foot or ankle.  Hair growth present distally to the LE.     Musculoskeletal: Ankle and pedal joint ROM are decreased.  Ankle joint dorsiflexion is restricted with the knee extended and flexed per Silfverskiold exam.  Muscle strength is 5/5 for all LE muscle groups tested.    Neurological:  Epicritic sensation is grossly Intact to the foot.   Chaddock STR is Intact to the LE.   No " tenderness to palpation noted to the foot or ankle.    Dermatological: Toenails 1-5 bilateral are elongated, thickened, dystrophic, brittle, discolored, and mycotic with subungal debris present and incurvation of the right 2nd toenail medial border without adjacent wound appreciable..  Webspaces 1-4 bilateral are clean, dry, and intact.  Skin turgor is supple.    Nursing note and vitals reviewed.        Assessment:       Encounter Diagnosis   Name Primary?    Onychomycosis due to dermatophyte Yes         Plan:       Diagnoses and all orders for this visit:    Onychomycosis due to dermatophyte      I counseled the patient on his conditions, their implications and medical management.    - Instructed patient to continue applying topical antifungal compounded solution as prescribed.    - With the patient's permission, toenails 1, 2, 3, 4, and 5 of the right foot and 1, 2, 3, 4, and 5 of the left foot were debrided in length and thickness via nail nippers and electric  to patient's tolerance without incident - performed as a courtesy to patient today.    Patient was given the following recommendations and instructions:  Patient Instructions   - Perform wound care daily after shower/bath and whenever dressing becomes soiled or wet via removing dressing, cleansing with betadine or performing foot soak, patting area dry, applying antibiotic cream, and covering with bandaid.    - Apply white vinegar soaked cotton balls to affected toenails for 5 minutes daily or perform 25% white vinegar and 75% lukewarm water foot soaks for 10 minutes daily to reduce fungal debris under toenails.  Apply thin layer of Vicks vaporub on top of and under affected nails to create environment that doesn't support fungal growth.  Don't perform any of these antifungal therapies if open wounds are present.    - Notify clinic if any new or worsening condition arises.        Trish Bernard DPM        Dictation was performed using M*Modal  Fluency.  Transcription errors may be present.

## 2020-09-29 ENCOUNTER — PATIENT MESSAGE (OUTPATIENT)
Dept: OTHER | Facility: OTHER | Age: 75
End: 2020-09-29

## 2020-09-29 NOTE — PATIENT INSTRUCTIONS
Counseling and Referral of Other Preventative  (Italic type indicates deductible and co-insurance are waived)    Patient Name: Rohan Abarca  Today's Date: 7/24/2020    Health Maintenance       Date Due Completion Date    TETANUS VACCINE 01/25/1963 ---    Shingles Vaccine (1 of 2) 12/03/2020 (Originally 1/25/1994) ---    Influenza Vaccine (1) 09/01/2020 12/6/2018    Colonoscopy 03/20/2022 3/20/2017    Lipid Panel 12/03/2024 12/3/2019        No orders of the defined types were placed in this encounter.    The following information is provided to all patients.  This information is to help you find resources for any of the problems found today that may be affecting your health:                Living healthy guide: www.[facility].[place].gov      Understanding Diabetes: www.diabetes.org      Eating healthy: www.cdc.gov/healthyweight      [facility] home safety checklist: www.cdc.gov/steadi/patient.html      Agency on Aging: www.goea.[place].gov      Alcoholics anonymous (AA): www.aa.org      Physical Activity: www.[place].nih.gov/we9absr      Tobacco use: www.quitwithusla.org      Food & Nutrition  Education    Diet Education: stroke   Time Spent: 1 minute  Learners: patient    Nutrition Education provided with handouts:   Stroke nutrition therapy     Comments:  Pt EDIN at time of visit. Lunch tray to bedside untouched. Provided handouts on stroke nutrition therapy to bedside. Will follow up and review with pt.     All questions and concerns answered. Dietitian's contact information provided.     Follow-Up: 10/5/20    Please Re-consult as needed    Thanks!  Almaz Sanders RD, LDN

## 2020-10-05 ENCOUNTER — PATIENT OUTREACH (OUTPATIENT)
Dept: ADMINISTRATIVE | Facility: HOSPITAL | Age: 75
End: 2020-10-05

## 2020-10-05 NOTE — PROGRESS NOTES
Chart review completed 10/05/2020.  Care Everywhere updates requested and reviewed.  Immunizations reconciled. Media reports reviewed.  Duplicate HM overrides and  orders removed.  Overdue HM topic chart audit and/or requested.  Overdue lab testing linked to upcoming lab appointments if applies.        Health Maintenance Due   Topic Date Due    TETANUS VACCINE  1963

## 2020-10-13 ENCOUNTER — HOSPITAL ENCOUNTER (OUTPATIENT)
Dept: RADIOLOGY | Facility: HOSPITAL | Age: 75
Discharge: HOME | End: 2020-10-13
Attending: INTERNAL MEDICINE
Payer: MEDICARE

## 2020-10-13 ENCOUNTER — CLINICAL SUPPORT (OUTPATIENT)
Dept: CARDIOLOGY | Facility: CLINIC | Age: 75
End: 2020-10-13
Attending: INTERNAL MEDICINE
Payer: MEDICARE

## 2020-10-13 VITALS — WEIGHT: 243 LBS | HEIGHT: 69 IN | BODY MASS INDEX: 35.99 KG/M2

## 2020-10-13 DIAGNOSIS — I65.23 BILATERAL CAROTID ARTERY STENOSIS: ICD-10-CM

## 2020-10-13 DIAGNOSIS — R94.31 ABNORMAL EKG: ICD-10-CM

## 2020-10-13 LAB
CV STRESS BASE HR: 53 BPM
DIASTOLIC BLOOD PRESSURE: 79 MMHG
NUC REST EJECTION FRACTION: 62
OHS CV CPX 1 MINUTE RECOVERY HEART RATE: 75 BPM
OHS CV CPX 85 PERCENT MAX PREDICTED HEART RATE MALE: 122
OHS CV CPX MAX PREDICTED HEART RATE: 144
OHS CV CPX PATIENT IS FEMALE: 0
OHS CV CPX PATIENT IS MALE: 1
OHS CV CPX PEAK DIASTOLIC BLOOD PRESSURE: 80 MMHG
OHS CV CPX PEAK HEAR RATE: 86 BPM
OHS CV CPX PEAK RATE PRESSURE PRODUCT: NORMAL
OHS CV CPX PEAK SYSTOLIC BLOOD PRESSURE: 144 MMHG
OHS CV CPX PERCENT MAX PREDICTED HEART RATE ACHIEVED: 60
OHS CV CPX RATE PRESSURE PRODUCT PRESENTING: 7473
SYSTOLIC BLOOD PRESSURE: 141 MMHG

## 2020-10-13 PROCEDURE — 93018 PR CARDIAC STRESS TST,INTERP/REPT ONLY: ICD-10-PCS | Mod: ,,, | Performed by: INTERNAL MEDICINE

## 2020-10-13 PROCEDURE — 93880 EXTRACRANIAL BILAT STUDY: CPT | Mod: 26,,, | Performed by: RADIOLOGY

## 2020-10-13 PROCEDURE — 93016 STRESS TEST WITH MYOCARDIAL PERFUSION (CUPID ONLY): ICD-10-PCS | Mod: ,,, | Performed by: INTERNAL MEDICINE

## 2020-10-13 PROCEDURE — 93016 CV STRESS TEST SUPVJ ONLY: CPT | Mod: ,,, | Performed by: INTERNAL MEDICINE

## 2020-10-13 PROCEDURE — 93018 CV STRESS TEST I&R ONLY: CPT | Mod: ,,, | Performed by: INTERNAL MEDICINE

## 2020-10-13 PROCEDURE — 99211 OFF/OP EST MAY X REQ PHY/QHP: CPT | Mod: PBBFAC,25,PO

## 2020-10-13 PROCEDURE — 99999 PR PBB SHADOW E&M-EST. PATIENT-LVL I: ICD-10-PCS | Mod: PBBFAC,,,

## 2020-10-13 PROCEDURE — 93880 US CAROTID BILATERAL: ICD-10-PCS | Mod: 26,,, | Performed by: RADIOLOGY

## 2020-10-13 PROCEDURE — 78452 STRESS TEST WITH MYOCARDIAL PERFUSION (CUPID ONLY): ICD-10-PCS | Mod: 26,,, | Performed by: INTERNAL MEDICINE

## 2020-10-13 PROCEDURE — 93880 EXTRACRANIAL BILAT STUDY: CPT | Mod: TC,PO

## 2020-10-13 PROCEDURE — 78452 HT MUSCLE IMAGE SPECT MULT: CPT | Mod: 26,,, | Performed by: INTERNAL MEDICINE

## 2020-10-13 PROCEDURE — 93017 CV STRESS TEST TRACING ONLY: CPT

## 2020-10-13 PROCEDURE — 99999 PR PBB SHADOW E&M-EST. PATIENT-LVL I: CPT | Mod: PBBFAC,,,

## 2020-10-16 ENCOUNTER — PATIENT MESSAGE (OUTPATIENT)
Dept: FAMILY MEDICINE | Facility: CLINIC | Age: 75
End: 2020-10-16

## 2020-10-16 ENCOUNTER — CLINICAL SUPPORT (OUTPATIENT)
Dept: CARDIOLOGY | Facility: CLINIC | Age: 75
End: 2020-10-16
Attending: INTERNAL MEDICINE
Payer: MEDICARE

## 2020-10-16 VITALS — HEIGHT: 69 IN | HEART RATE: 75 BPM | WEIGHT: 243 LBS | BODY MASS INDEX: 35.99 KG/M2

## 2020-10-16 LAB
ASCENDING AORTA: 3.29 CM
AV INDEX (PROSTH): 0.53
AV MEAN GRADIENT: 8 MMHG
AV PEAK GRADIENT: 14 MMHG
AV VALVE AREA: 1.82 CM2
AV VELOCITY RATIO: 0.56
BSA FOR ECHO PROCEDURE: 2.32 M2
CV ECHO LV RWT: 0.45 CM
DOP CALC AO PEAK VEL: 1.9 M/S
DOP CALC AO VTI: 47.54 CM
DOP CALC LVOT AREA: 3.5 CM2
DOP CALC LVOT DIAMETER: 2.1 CM
DOP CALC LVOT PEAK VEL: 1.06 M/S
DOP CALC LVOT STROKE VOLUME: 86.51 CM3
DOP CALCLVOT PEAK VEL VTI: 24.99 CM
E WAVE DECELERATION TIME: 273.96 MSEC
E/A RATIO: 1
E/E' RATIO: 19 M/S
ECHO LV POSTERIOR WALL: 1.19 CM (ref 0.6–1.1)
FRACTIONAL SHORTENING: 32 % (ref 28–44)
INTERVENTRICULAR SEPTUM: 1.16 CM (ref 0.6–1.1)
IVRT: 108.47 MSEC
LA MAJOR: 4.84 CM
LA MINOR: 5.63 CM
LA WIDTH: 4.05 CM
LEFT ATRIUM SIZE: 3.62 CM
LEFT ATRIUM VOLUME INDEX: 28.9 ML/M2
LEFT ATRIUM VOLUME: 64.87 CM3
LEFT INTERNAL DIMENSION IN SYSTOLE: 3.59 CM (ref 2.1–4)
LEFT VENTRICLE DIASTOLIC VOLUME INDEX: 60.51 ML/M2
LEFT VENTRICLE DIASTOLIC VOLUME: 135.77 ML
LEFT VENTRICLE MASS INDEX: 111 G/M2
LEFT VENTRICLE SYSTOLIC VOLUME INDEX: 24.1 ML/M2
LEFT VENTRICLE SYSTOLIC VOLUME: 53.98 ML
LEFT VENTRICULAR INTERNAL DIMENSION IN DIASTOLE: 5.31 CM (ref 3.5–6)
LEFT VENTRICULAR MASS: 249.98 G
LV LATERAL E/E' RATIO: 19 M/S
LV SEPTAL E/E' RATIO: 19 M/S
MV PEAK A VEL: 1.14 M/S
MV PEAK E VEL: 1.14 M/S
MV STENOSIS PRESSURE HALF TIME: 79.45 MS
MV VALVE AREA P 1/2 METHOD: 2.77 CM2
PISA MRMAX VEL: 0.06 M/S
PISA TR MAX VEL: 2.56 M/S
PULM VEIN S/D RATIO: 1.15
PV PEAK D VEL: 0.41 M/S
PV PEAK S VEL: 0.47 M/S
RA MAJOR: 6.09 CM
RA PRESSURE: 3 MMHG
RA WIDTH: 4.18 CM
RIGHT VENTRICULAR END-DIASTOLIC DIMENSION: 4.2 CM
SINUS: 3.5 CM
STJ: 2.92 CM
TDI LATERAL: 0.06 M/S
TDI SEPTAL: 0.06 M/S
TDI: 0.06 M/S
TR MAX PG: 26 MMHG
TRICUSPID ANNULAR PLANE SYSTOLIC EXCURSION: 2.16 CM
TV REST PULMONARY ARTERY PRESSURE: 29 MMHG

## 2020-10-16 PROCEDURE — 99211 OFF/OP EST MAY X REQ PHY/QHP: CPT | Mod: PBBFAC,PO

## 2020-10-16 PROCEDURE — 99999 PR PBB SHADOW E&M-EST. PATIENT-LVL I: ICD-10-PCS | Mod: PBBFAC,,,

## 2020-10-16 PROCEDURE — 99999 PR PBB SHADOW E&M-EST. PATIENT-LVL I: CPT | Mod: PBBFAC,,,

## 2020-10-17 ENCOUNTER — PATIENT MESSAGE (OUTPATIENT)
Dept: FAMILY MEDICINE | Facility: CLINIC | Age: 75
End: 2020-10-17

## 2020-10-23 ENCOUNTER — OFFICE VISIT (OUTPATIENT)
Dept: FAMILY MEDICINE | Facility: CLINIC | Age: 75
End: 2020-10-23
Payer: MEDICARE

## 2020-10-23 VITALS
DIASTOLIC BLOOD PRESSURE: 82 MMHG | HEART RATE: 75 BPM | TEMPERATURE: 98 F | SYSTOLIC BLOOD PRESSURE: 148 MMHG | WEIGHT: 244.19 LBS | HEIGHT: 69 IN | RESPIRATION RATE: 18 BRPM | BODY MASS INDEX: 36.17 KG/M2

## 2020-10-23 DIAGNOSIS — E78.5 HYPERLIPIDEMIA, UNSPECIFIED HYPERLIPIDEMIA TYPE: ICD-10-CM

## 2020-10-23 DIAGNOSIS — I10 ESSENTIAL HYPERTENSION: Primary | ICD-10-CM

## 2020-10-23 DIAGNOSIS — R60.0 LOWER LEG EDEMA: ICD-10-CM

## 2020-10-23 PROCEDURE — 99213 OFFICE O/P EST LOW 20 MIN: CPT | Mod: S$GLB,,, | Performed by: INTERNAL MEDICINE

## 2020-10-23 PROCEDURE — 99213 PR OFFICE/OUTPT VISIT, EST, LEVL III, 20-29 MIN: ICD-10-PCS | Mod: S$GLB,,, | Performed by: INTERNAL MEDICINE

## 2020-10-23 RX ORDER — VORICONAZOLE 200 MG/1
TABLET, FILM COATED ORAL
Status: ON HOLD | COMMUNITY
Start: 2020-09-29 | End: 2020-12-30

## 2020-10-23 RX ORDER — BENAZEPRIL HYDROCHLORIDE AND HYDROCHLOROTHIAZIDE 20; 12.5 MG/1; MG/1
1 TABLET ORAL DAILY
Qty: 90 TABLET | Refills: 3 | Status: SHIPPED | OUTPATIENT
Start: 2020-10-23 | End: 2021-02-24

## 2020-10-23 NOTE — PROGRESS NOTES
Subjective:       Patient ID: Rohan Abarca is a 75 y.o. male.    Medication List with Changes/Refills   New Medications    BENAZEPRIL-HYDROCHLORTHIAZIDE (LOTENSIN HCT) 20-12.5 MG PER TABLET    Take 1 tablet by mouth once daily.   Current Medications    AMLODIPINE (NORVASC) 5 MG TABLET    TAKE 1 TABLET(5 MG) BY MOUTH EVERY DAY    ASPIRIN (ECOTRIN) 81 MG EC TABLET    Take 1 tablet (81 mg total) by mouth once daily.    ATORVASTATIN (LIPITOR) 10 MG TABLET    TAKE 1 TABLET(10 MG) BY MOUTH EVERY DAY    METRONIDAZOLE 1% (METROGEL) 1 % GEL    AAA face qday    MINOCYCLINE (MINOCIN,DYNACIN) 100 MG CAPSULE    TAKE 1 CAPSULE(100 MG) BY MOUTH EVERY OTHER DAY    UNABLE TO FIND    CLEAN NAILS, SHAKE BOTTLE WELL, APPLY TWICE DAILY TO ALL AFFECTED NAILS USING APPLICATOR. (UP TO 2 MLS/DAY)    VIT A/VIT C/VIT E/ZINC/COPPER (PRESERVISION AREDS ORAL)    Take by mouth.    VORICONAZOLE (VFEND) 200 MG TAB       Discontinued Medications    ENALAPRIL (VASOTEC) 20 MG TABLET    Take 1 tablet (20 mg total) by mouth once daily.       Chief Complaint: Follow-up  He is here today to f/u on multiple issues from his appt on 9/11/2020.     He has hypertension and was noted to have increased BP at his last appt. He was taking amlodipine 5 mg qday but had stopped his enalapril 20 mg. This was restarted and he reports his home BP running in the 140-150s/80s.  No chest pain or shortness of breath. He does have intermittent lower leg swelling.     He has known CAD and repeat carotid u/s on 10/2020 showed less than 50 % stenosis without any significant narrowing.     He was having worsening endurance with lower leg swelling. Nuclear stress test on 9/2020 was negative for ischemia. Echo showed EF 60%, positive diastolic dysfunction, cLVH and mild AS (PAMELA 1.82, gradient 8), PAP of 29.      He has hyperlipidemia and is taking atorvastatin 10 mg qday. Lipids on 10/2020 showed 106/47/43/53. He continues on aspirin daily.     Review of Systems  "  Constitutional: Negative for appetite change, fatigue, fever and unexpected weight change.   HENT: Negative for congestion, ear pain, hearing loss, sore throat and trouble swallowing.    Eyes: Negative for pain and visual disturbance.   Respiratory: Negative for cough, chest tightness, shortness of breath and wheezing.    Cardiovascular: Positive for leg swelling. Negative for chest pain and palpitations.   Gastrointestinal: Negative for abdominal pain, blood in stool, constipation, diarrhea, nausea and vomiting.   Endocrine: Negative for polyuria.   Genitourinary: Negative for dysuria and hematuria.   Musculoskeletal: Negative for arthralgias, back pain and myalgias.   Skin: Negative for rash.   Neurological: Negative for dizziness, weakness, numbness and headaches.   Hematological: Does not bruise/bleed easily.   Psychiatric/Behavioral: Negative for dysphoric mood, sleep disturbance and suicidal ideas. The patient is not nervous/anxious.        Objective:      Vitals:    10/23/20 0958 10/23/20 1016   BP: (!) 160/80 (!) 148/82   Pulse: 75    Resp: 18    Temp: 98.3 °F (36.8 °C)    TempSrc: Temporal    Weight: 110.7 kg (244 lb 2.6 oz)    Height: 5' 9" (1.753 m)      Body mass index is 36.06 kg/m².  Physical Exam    General appearance: No acute distress, cooperative  Neck: FROM, soft, supple, no thyromegaly, no bruits  Lymph: no anterior or posterior cervical adenopathy  Heart::  Regular rate and rhythm, no murmur  Lung: Clear to ascultation bilaterally, no wheezing, no rales, no rhonchi, no distress  Abdomen: Soft, nontender, no distention, no hepatosplenomegaly, bowel sounds normal, no guarding, no rebound, no peritoneal signs  Skin: no rashes, no lesions  Extremities: no edema, no cyanosis  Peripheral pulses: 2+ pedal pulses bilaterally, good perfusion and color      Assessment:       1. Essential hypertension    2. Lower leg edema    3. Hyperlipidemia, unspecified hyperlipidemia type        Plan:       Essential " hypertension  Uncontrolled and will stop enalapril and change to benazepril-HCTZ 20-12.5 mg qday.  He will f/u in 2 weeks to recheck BP and if still elevated will increase this medication to 2 pills a day. Continue amlodipine at 5 mg qday.   -     benazepril-hydrochlorthiazide (LOTENSIN HCT) 20-12.5 mg per tablet; Take 1 tablet by mouth once daily.  Dispense: 90 tablet; Refill: 3    Lower leg edema  Will start low dose HCTZ to help with lower leg swelling.     Hyperlipidemia, unspecified hyperlipidemia type  Well controlled and continue current regimen.     Follow up in about 2 weeks (around 11/6/2020) for nurse visit for BP check and chronic medical issues in 4 months .

## 2020-10-23 NOTE — PATIENT INSTRUCTIONS
BP  Continue amlodipine 5 mg once a day  Stop enalapril     Start benazepril-HCTZ 20-12.5 mg once a day     Follow up with nurse in 2 weeks to recheck BP

## 2020-11-09 ENCOUNTER — TELEPHONE (OUTPATIENT)
Dept: FAMILY MEDICINE | Facility: CLINIC | Age: 75
End: 2020-11-09

## 2020-11-09 ENCOUNTER — CLINICAL SUPPORT (OUTPATIENT)
Dept: FAMILY MEDICINE | Facility: CLINIC | Age: 75
End: 2020-11-09
Payer: MEDICARE

## 2020-11-09 VITALS — DIASTOLIC BLOOD PRESSURE: 70 MMHG | SYSTOLIC BLOOD PRESSURE: 128 MMHG

## 2020-11-09 NOTE — PROGRESS NOTES
Rohan Abarca 75 y.o. male is here today for Blood Pressure check.   History of HTN yes.    Review of patient's allergies indicates:   Allergen Reactions    No known drug allergies      Creatinine   Date Value Ref Range Status   10/13/2020 0.9 0.5 - 1.4 mg/dL Final     Sodium   Date Value Ref Range Status   10/13/2020 142 136 - 145 mmol/L Final     Potassium   Date Value Ref Range Status   10/13/2020 3.7 3.5 - 5.1 mmol/L Final   ]  Patient verifies taking blood pressure medications on a regular basis at the same time of the day.     Current Outpatient Medications:     amLODIPine (NORVASC) 5 MG tablet, TAKE 1 TABLET(5 MG) BY MOUTH EVERY DAY, Disp: 90 tablet, Rfl: 3    aspirin (ECOTRIN) 81 MG EC tablet, Take 1 tablet (81 mg total) by mouth once daily., Disp: 100 tablet, Rfl: 3    atorvastatin (LIPITOR) 10 MG tablet, TAKE 1 TABLET(10 MG) BY MOUTH EVERY DAY, Disp: 90 tablet, Rfl: 3    benazepril-hydrochlorthiazide (LOTENSIN HCT) 20-12.5 mg per tablet, Take 1 tablet by mouth once daily., Disp: 90 tablet, Rfl: 3    metronidazole 1% (METROGEL) 1 % Gel, AAA face qday, Disp: 60 g, Rfl: 6    minocycline (MINOCIN,DYNACIN) 100 MG capsule, TAKE 1 CAPSULE(100 MG) BY MOUTH EVERY OTHER DAY, Disp: 15 capsule, Rfl: 1    UNABLE TO FIND, CLEAN NAILS, SHAKE BOTTLE WELL, APPLY TWICE DAILY TO ALL AFFECTED NAILS USING APPLICATOR. (UP TO 2 MLS/DAY), Disp: , Rfl:     VIT A/VIT C/VIT E/ZINC/COPPER (PRESERVISION AREDS ORAL), Take by mouth., Disp: , Rfl:     voriconazole (VFEND) 200 MG Tab, , Disp: , Rfl:   Does patient have record of home blood pressure readings no.   Last dose of blood pressure medication was taken at  Patient is asymptomatic.     128/70

## 2020-12-02 ENCOUNTER — OFFICE VISIT (OUTPATIENT)
Dept: FAMILY MEDICINE | Facility: CLINIC | Age: 75
End: 2020-12-02
Payer: MEDICARE

## 2020-12-02 VITALS
SYSTOLIC BLOOD PRESSURE: 140 MMHG | RESPIRATION RATE: 18 BRPM | DIASTOLIC BLOOD PRESSURE: 86 MMHG | WEIGHT: 246.25 LBS | HEART RATE: 71 BPM | BODY MASS INDEX: 36.37 KG/M2 | OXYGEN SATURATION: 97 %

## 2020-12-02 DIAGNOSIS — L72.3 SEBACEOUS CYST: Primary | ICD-10-CM

## 2020-12-02 DIAGNOSIS — I10 ESSENTIAL HYPERTENSION: ICD-10-CM

## 2020-12-02 PROCEDURE — 99214 PR OFFICE/OUTPT VISIT, EST, LEVL IV, 30-39 MIN: ICD-10-PCS | Mod: S$GLB,,, | Performed by: NURSE PRACTITIONER

## 2020-12-02 PROCEDURE — 99214 OFFICE O/P EST MOD 30 MIN: CPT | Mod: S$GLB,,, | Performed by: NURSE PRACTITIONER

## 2020-12-02 NOTE — PROGRESS NOTES
Subjective:       Patient ID: Rohan Abarca is a 75 y.o. male.    Chief Complaint: Mass (2 bumps on back. states one has yellow/red fluid coming out of it)    HPI states he has had two bumps on his upper back for a long time now. States Dr. Garcia's has popped one in the past. States he noticed some yellow drainage on his T-shirt and found that one was inflamed and leaking. States there are now two of them. He denies any pain to the area. He is concerned about infection.    His BP is in his usual range. He states home readings are good. Continue current medications. No other concerns today. See ROS.    The following portion of the patients history was reviewed and updated as appropriate: allergies, current medications, past medical and surgical history. Past social history and problem list reviewed. Family PMH and Past social history reviewed. Tobacco, Illicit drug use reviewed.      Review of patient's allergies indicates:   Allergen Reactions    No known drug allergies          Current Outpatient Medications:     amLODIPine (NORVASC) 5 MG tablet, TAKE 1 TABLET(5 MG) BY MOUTH EVERY DAY, Disp: 90 tablet, Rfl: 3    aspirin (ECOTRIN) 81 MG EC tablet, Take 1 tablet (81 mg total) by mouth once daily., Disp: 100 tablet, Rfl: 3    atorvastatin (LIPITOR) 10 MG tablet, TAKE 1 TABLET(10 MG) BY MOUTH EVERY DAY, Disp: 90 tablet, Rfl: 3    benazepril-hydrochlorthiazide (LOTENSIN HCT) 20-12.5 mg per tablet, Take 1 tablet by mouth once daily., Disp: 90 tablet, Rfl: 3    minocycline (MINOCIN,DYNACIN) 100 MG capsule, TAKE 1 CAPSULE(100 MG) BY MOUTH EVERY OTHER DAY, Disp: 15 capsule, Rfl: 1    UNABLE TO FIND, CLEAN NAILS, SHAKE BOTTLE WELL, APPLY TWICE DAILY TO ALL AFFECTED NAILS USING APPLICATOR. (UP TO 2 MLS/DAY), Disp: , Rfl:     VIT A/VIT C/VIT E/ZINC/COPPER (PRESERVISION AREDS ORAL), Take by mouth., Disp: , Rfl:     metronidazole 1% (METROGEL) 1 % Gel, AAA face qday, Disp: 60 g, Rfl: 6    voriconazole (VFEND) 200 MG  Tab, , Disp: , Rfl:     Past Medical History:   Diagnosis Date    Bilateral cataracts     hx    Colon polyp     benign    HBP (high blood pressure)     Hearing loss     History of detached retina repair     left    REGAN (obstructive sleep apnea)     uses CPAP    Prostate cancer     Rosacea        Past Surgical History:   Procedure Laterality Date    BUNIONECTOMY Right     CATARACT EXTRACTION BILATERAL W/ ANTERIOR VITRECTOMY Bilateral     COLONOSCOPY N/A 3/20/2017    Procedure: COLONOSCOPY;  Surgeon: Carl Marcelino MD;  Location: [facility];  Service: Endoscopy;  Laterality: N/A;    HERNIA REPAIR      x 2    JOINT REPLACEMENT Right     per Dr. Heriberto Colón    JOINT REPLACEMENT Left     per Dr. Heriberto Colón    KNEE SURGERY      left , right    MASTOID SURGERY Right     right    PROSTATECTOMY      RETINAL DETACHMENT SURGERY      left    ROTATOR CUFF REPAIR      bilateral    SHOULDER SURGERY Left     RCR per Dr. Heriberto Colón    SHOULDER SURGERY Right     RCR per Dr. Heriberto Colón    TONSILLECTOMY, ADENOIDECTOMY         Social History     Socioeconomic History    Marital status:      Spouse name: Not on file    Number of children: Not on file    Years of education: Not on file    Highest education level: Not on file   Occupational History    Not on file   Social Needs    Financial resource strain: Not on file    Food insecurity     Worry: Not on file     Inability: Not on file    Transportation needs     Medical: Not on file     Non-medical: Not on file   Tobacco Use    Smoking status: Former Smoker     Packs/day: 0.50     Years: 5.00     Pack years: 2.50     Types: Cigarettes     Quit date: 1968     Years since quittin.9    Smokeless tobacco: Never Used   Substance and Sexual Activity    Alcohol use: Yes     Alcohol/week: 1.7 standard drinks     Types: 2 Standard drinks or equivalent per week     Comment: social    Drug use: No    Sexual activity: Yes      Partners: Female   Lifestyle    Physical activity     Days per week: Not on file     Minutes per session: Not on file    Stress: Not on file   Relationships    Social connections     Talks on phone: Not on file     Gets together: Not on file     Attends Protestant service: Not on file     Active member of club or organization: Not on file     Attends meetings of clubs or organizations: Not on file     Relationship status: Not on file   Other Topics Concern    Not on file   Social History Narrative    Was working 4 days a week delivering seafood, unloading and loading truck by hand, retired Friday    No dietary restrictions     Review of Systems   Constitutional: Negative for appetite change, fatigue and fever.   HENT: Negative for postnasal drip and rhinorrhea.    Eyes: Negative for visual disturbance.   Respiratory: Negative for cough, chest tightness, shortness of breath and wheezing.    Cardiovascular: Negative for chest pain, palpitations and leg swelling.   Gastrointestinal: Negative for abdominal pain, blood in stool, diarrhea, nausea and vomiting.   Musculoskeletal: Negative for arthralgias, back pain and gait problem.   Skin: Negative for rash.        Two bumps on upper back, one is draining.   Neurological: Negative for dizziness, weakness and headaches.   Hematological: Negative for adenopathy. Does not bruise/bleed easily.   Psychiatric/Behavioral: Negative for dysphoric mood and sleep disturbance. The patient is not nervous/anxious.        Objective:      BP (!) 140/86   Pulse 71   Resp 18   Wt 111.7 kg (246 lb 4.1 oz)   SpO2 97%   BMI 36.37 kg/m²      Physical Exam  Vitals signs and nursing note reviewed.   Constitutional:       General: He is not in acute distress.     Appearance: He is well-developed. He is not diaphoretic.   HENT:      Head: Normocephalic and atraumatic.   Neck:      Musculoskeletal: Normal range of motion and neck supple.      Thyroid: No thyromegaly.      Vascular: No JVD.    Cardiovascular:      Rate and Rhythm: Normal rate and regular rhythm.      Pulses: Normal pulses.      Heart sounds: Normal heart sounds. No murmur. No gallop.    Pulmonary:      Effort: Pulmonary effort is normal. No respiratory distress.      Breath sounds: Normal breath sounds. No wheezing or rales.   Chest:      Chest wall: No tenderness.   Musculoskeletal: Normal range of motion.      Comments: Gait and coordination normal.  strong, equal. Upper and lower extremity strength normal.    Lymphadenopathy:      Cervical: No cervical adenopathy.   Skin:     General: Skin is warm and dry.      Capillary Refill: Capillary refill takes less than 2 seconds.      Findings: Lesion present. No rash.      Comments: He has two small sebaceous cysts to the upper back. One is already draining. Easy to express content and core is removed. The other is a smaller one that is easily opened up with pressure. Content removed. No indication of infection   Neurological:      Mental Status: He is alert and oriented to person, place, and time.   Psychiatric:         Behavior: Behavior normal.         Thought Content: Thought content normal.         Judgment: Judgment normal.         Assessment:       1. Sebaceous cyst    2. Essential hypertension        Plan:       Sebaceous cyst: keep clean and covered when draining. No need for antibiotics. If it looks like these are going to flare up again then will send to Dermatology or general surgery to have sack removed.    Essential hypertension: in his usual range.      Continue current medication  Take medications only as prescribed  Healthy diet, exercise  Adequate rest  Adequate hydration  Avoid allergens  Avoid excessive caffeine     follow up as needed.

## 2020-12-09 ENCOUNTER — IMMUNIZATION (OUTPATIENT)
Dept: PHARMACY | Facility: CLINIC | Age: 75
End: 2020-12-09
Payer: MEDICARE

## 2020-12-09 ENCOUNTER — NURSE TRIAGE (OUTPATIENT)
Dept: ADMINISTRATIVE | Facility: CLINIC | Age: 75
End: 2020-12-09

## 2020-12-09 NOTE — TELEPHONE ENCOUNTER
"Rohan has been passing dark red blood with urination, urine is tea-colored but blood on the white porcelain of the commode is "bright or dark red blood".  This began yesterday evening.  No other symptoms, per Rohan.  He did have radical prostatectomy several years ago, Dr Fregoso, but has not seen urology "in a real long time".  Per [facility] triage protocol, recommend ED now for evaluation.  He said his wife will bring him to [facility] ED as soon as she returns home in "just a bit".  Message to Rosa Powell DO, pcp.  Please contact caller directly with any additional care advice.      Reason for Disposition   Passing pure blood or large blood clots (i.e., size > a dime) (Exception: pretty or small strands)    Additional Information   Negative: Shock suspected (e.g., cold/pale/clammy skin, too weak to stand, low BP, rapid pulse)   Negative: Sounds like a life-threatening emergency to the triager   Negative: Urinary catheter, questions about   Negative: Recent back or abdominal injury   Negative: Recent genital injury   Negative: [1] Unable to urinate (or only a few drops) > 4 hours AND [2] bladder feels very full (e.g., palpable bladder or strong urge to urinate)    Answer Assessment - Initial Assessment Questions  1. COLOR of URINE: "Describe the color of the urine."  (e.g., tea-colored, pink, red, blood clots, bloody)      Dark red blood when I urinate. Tea-colored urine.    2. ONSET: "When did the bleeding start?"       Bleeding began yesterday .    3. EPISODES: "How many times has there been blood in the urine?" or "How many times today?"      Several times last night, and today.    4. PAIN with URINATION: "Is there any pain with passing your urine?" If so, ask: "How bad is the pain?"  (Scale 1-10; or mild, moderate, severe)     - MILD - complains slightly about urination hurting     - MODERATE - interferes with normal activities       - SEVERE - excruciating, unwilling or unable to urinate because of the " "pain       No pain with urination.    5. FEVER: "Do you have a fever?" If so, ask: "What is your temperature, how was it measured, and when did it start?"      No fever.    6. ASSOCIATED SYMPTOMS: "Are you passing urine more frequently than usual?"      No.    7. OTHER SYMPTOMS: "Do you have any other symptoms?" (e.g., back/flank pain, abdominal pain, vomiting)      No other symptoms.    8. PREGNANCY: "Is there any chance you are pregnant?" "When was your last menstrual period?"      N/a    Protocols used:  URINE - BLOOD IN--      "

## 2020-12-11 ENCOUNTER — PATIENT MESSAGE (OUTPATIENT)
Dept: OTHER | Facility: OTHER | Age: 75
End: 2020-12-11

## 2020-12-13 ENCOUNTER — PATIENT OUTREACH (OUTPATIENT)
Dept: ADMINISTRATIVE | Facility: OTHER | Age: 75
End: 2020-12-13

## 2020-12-13 NOTE — PROGRESS NOTES
Health Maintenance Due   Topic Date Due    TETANUS VACCINE  01/25/1963     Updates were requested from care everywhere.  Chart was reviewed for overdue Proactive [facility] Encounters ([facility]) topics (CRS, Breast Cancer Screening, Eye exam)  Health Maintenance has been updated.  [place] immunization registry triggered.  Immunizations were reconciled.

## 2020-12-18 ENCOUNTER — OFFICE VISIT (OUTPATIENT)
Dept: UROLOGY | Facility: CLINIC | Age: 75
End: 2020-12-18
Payer: MEDICARE

## 2020-12-18 VITALS
SYSTOLIC BLOOD PRESSURE: 128 MMHG | BODY MASS INDEX: 36.56 KG/M2 | RESPIRATION RATE: 18 BRPM | TEMPERATURE: 98 F | DIASTOLIC BLOOD PRESSURE: 76 MMHG | HEART RATE: 79 BPM | HEIGHT: 69 IN | WEIGHT: 246.81 LBS

## 2020-12-18 DIAGNOSIS — R31.0 GROSS HEMATURIA: Primary | ICD-10-CM

## 2020-12-18 DIAGNOSIS — C61 PROSTATE CANCER: ICD-10-CM

## 2020-12-18 LAB
BILIRUB SERPL-MCNC: NEGATIVE MG/DL
BLOOD URINE, POC: ABNORMAL
CLARITY, POC UA: CLEAR
COLOR, POC UA: YELLOW
GLUCOSE UR QL STRIP: NEGATIVE
KETONES UR QL STRIP: NEGATIVE
LEUKOCYTE ESTERASE URINE, POC: NEGATIVE
NITRITE, POC UA: NEGATIVE
PH, POC UA: 6
PROTEIN, POC: NEGATIVE
SPECIFIC GRAVITY, POC UA: 1.02
UROBILINOGEN, POC UA: 0.2

## 2020-12-18 PROCEDURE — 99204 OFFICE O/P NEW MOD 45 MIN: CPT | Mod: S$PBB,,, | Performed by: UROLOGY

## 2020-12-18 PROCEDURE — 88112 PR  CYTOPATH, CELL ENHANCE TECH: ICD-10-PCS | Mod: 26,,, | Performed by: PATHOLOGY

## 2020-12-18 PROCEDURE — 99999 PR PBB SHADOW E&M-EST. PATIENT-LVL V: ICD-10-PCS | Mod: PBBFAC,,, | Performed by: UROLOGY

## 2020-12-18 PROCEDURE — 88112 CYTOPATH CELL ENHANCE TECH: CPT | Mod: 26,,, | Performed by: PATHOLOGY

## 2020-12-18 PROCEDURE — 99204 PR OFFICE/OUTPT VISIT, NEW, LEVL IV, 45-59 MIN: ICD-10-PCS | Mod: S$PBB,,, | Performed by: UROLOGY

## 2020-12-18 PROCEDURE — 81002 URINALYSIS NONAUTO W/O SCOPE: CPT | Mod: PBBFAC,PN | Performed by: UROLOGY

## 2020-12-18 PROCEDURE — 99215 OFFICE O/P EST HI 40 MIN: CPT | Mod: PBBFAC,PN,25 | Performed by: UROLOGY

## 2020-12-18 PROCEDURE — 88112 CYTOPATH CELL ENHANCE TECH: CPT | Performed by: PATHOLOGY

## 2020-12-18 PROCEDURE — 99999 PR PBB SHADOW E&M-EST. PATIENT-LVL V: CPT | Mod: PBBFAC,,, | Performed by: UROLOGY

## 2020-12-18 PROCEDURE — 87086 URINE CULTURE/COLONY COUNT: CPT

## 2020-12-18 NOTE — PROGRESS NOTES
[facility] Urology New Patient/H&P:    Rohan Abarca is a 75 y.o. male who presents for gross hematuria.    Patient with a history of pT2c, Rebekah 4+3 prostate adenocarcinoma s/p RALP on 7/23/12 with Dr. Melo.    He presented to the emergency department on 12/9/20 complaining of painless gross hematuria for one day. UA only with occult blood. Patient left the ED AMA prior to any imaging or intervention.     He states that he has continued to have intermittent gross hematuria. He also reports bothersome urinary frequency, urgency and intermittency. States that these symptoms were present prior to his prostatectomy and have not improved since having his prostate removed.     Patient reports a stone episode years ago that he passed spontaneously.     Denies any urinary tract infection, flank pain or recent  trauma.       PSA  <0.01  10/13/20  <0.01  12/7/18  <0.01  12/6/17  5.95  3/20/12    IPSS  QoL  11 5 12/8/20    Past Medical History:   Diagnosis Date    Bilateral cataracts     hx    Colon polyp     benign    HBP (high blood pressure)     Hearing loss     History of detached retina repair     left    REGAN (obstructive sleep apnea)     uses CPAP    Prostate cancer     Rosacea        Past Surgical History:   Procedure Laterality Date    BUNIONECTOMY Right     CATARACT EXTRACTION BILATERAL W/ ANTERIOR VITRECTOMY Bilateral     COLONOSCOPY N/A 3/20/2017    Procedure: COLONOSCOPY;  Surgeon: Carl Marcelino MD;  Location: [facility];  Service: Endoscopy;  Laterality: N/A;    HERNIA REPAIR      x 2    JOINT REPLACEMENT Right 2012    per Dr. Heriberto Colón    JOINT REPLACEMENT Left 2004    per Dr. Heriberto Colón    KNEE SURGERY      left , right    MASTOID SURGERY Right     right    PROSTATECTOMY  2012    RETINAL DETACHMENT SURGERY      left    ROTATOR CUFF REPAIR      bilateral    SHOULDER SURGERY Left     RCR per Dr. Heriberto Colón    SHOULDER SURGERY Right     RCR per Dr. Heriberto Colón     TONSILLECTOMY, ADENOIDECTOMY         Family History   Problem Relation Age of Onset    Cancer Sister     Stroke Sister     Cancer Sister     Stroke Unknown     Heart attack Unknown        Social History     Socioeconomic History    Marital status:      Spouse name: Not on file    Number of children: Not on file    Years of education: Not on file    Highest education level: Not on file   Occupational History    Not on file   Social Needs    Financial resource strain: Not on file    Food insecurity     Worry: Not on file     Inability: Not on file    Transportation needs     Medical: Not on file     Non-medical: Not on file   Tobacco Use    Smoking status: Former Smoker     Packs/day: 0.50     Years: 5.00     Pack years: 2.50     Types: Cigarettes     Quit date: 1968     Years since quittin.0    Smokeless tobacco: Never Used   Substance and Sexual Activity    Alcohol use: Yes     Alcohol/week: 1.7 standard drinks     Types: 2 Standard drinks or equivalent per week     Comment: social    Drug use: No    Sexual activity: Yes     Partners: Female   Lifestyle    Physical activity     Days per week: Not on file     Minutes per session: Not on file    Stress: Not on file   Relationships    Social connections     Talks on phone: Not on file     Gets together: Not on file     Attends Yarsani service: Not on file     Active member of club or organization: Not on file     Attends meetings of clubs or organizations: Not on file     Relationship status: Not on file   Other Topics Concern    Not on file   Social History Narrative    Was working 4 days a week delivering seafood, unloading and loading truck by hand, retired Friday    No dietary restrictions       Review of patient's allergies indicates:   Allergen Reactions    No known drug allergies        Medications Reviewed: see MAR    ROS:    Constitutional: denies fevers, chills, night sweats, fatigue, malaise  Respiratory: negative  "for cough, shortness of breath, wheezing, dyspnea.  Cardiovascular: negative for chest pain, varicose veins, ankle swelling, palpitations, syncope.  GI: negative for abdominal pain, heartburn, indigestion, nausea, vomiting, constipation, diarrhea, blood in stool.   Urology: as noted above in HPI  Endocrinology: negative for cold intolerance, excessive thirst, not feeling tired/sluggish, no heat intolerance.   Hematology/Lymph: negative for easy bleeding, easy bruising, swollen glands.  Musculoskeletal: negative for back pain, joint pain, joint swelling, neck pain.  Allergy-Immunology: negative for seasonal allergies, negative for unusual infections.   Skin: negative for boils, breast lumps, hives, itching, rash.   Neurology: negative for, dizziness, headache, tingling/numbness, tremors.   Psych: satisfied with life; negative for, anxiety, depression, suicidal thoughts.     PHYSICAL EXAM:    Vitals:    12/18/20 1450   BP: 128/76   Pulse: 79   Resp: 18   Temp: 98 °F (36.7 °C)     Body mass index is 36.45 kg/m². Weight: 112 kg (246 lb 12.9 oz) Height: 5' 9" (175.3 cm)       General: Alert, cooperative, no distress, appears stated age  Head: Normocephalic, without obvious abnormality, atraumatic  Neck: no masses, no thyromegaly, no lymphadenopathy  Eyes: PERRL, conjunctiva/corneas clear  Lungs: Respirations unlabored, normal effort, no accessory muscle use  CV: Warm and well perfused extremities  Abdomen: Soft, non-tender, no CVA tenderness, no hepatosplenomegaly, no hernia  Extremities: Extremities normal, atraumatic, no cyanosis or edema  Skin: Normal color, texture, and turgor, no rashes or lesions  Psych: Appropriate, well oriented, normal affect, normal mood  Neuro: Non-focal      LABS:    Recent Results (from the past 336 hour(s))   CBC auto differential    Collection Time: 12/09/20  6:36 PM   Result Value Ref Range    WBC 8.15 3.90 - 12.70 K/uL    RBC 5.24 4.60 - 6.20 M/uL    Hemoglobin 16.6 14.0 - 18.0 g/dL    " Hematocrit 47.9 40.0 - 54.0 %    MCV 91 82 - 98 fL    MCH 31.7 (H) 27.0 - 31.0 pg    MCHC 34.7 32.0 - 36.0 g/dL    RDW 12.3 11.5 - 14.5 %    Platelets 219 150 - 350 K/uL    MPV 10.6 9.2 - 12.9 fL    Immature Granulocytes 0.1 0.0 - 0.5 %    Gran # (ANC) 5.6 1.8 - 7.7 K/uL    Immature Grans (Abs) 0.01 0.00 - 0.04 K/uL    Lymph # 1.7 1.0 - 4.8 K/uL    Mono # 0.8 0.3 - 1.0 K/uL    Eos # 0.1 0.0 - 0.5 K/uL    Baso # 0.03 0.00 - 0.20 K/uL    nRBC 0 0 /100 WBC    Gran % 68.3 38.0 - 73.0 %    Lymph % 20.5 18.0 - 48.0 %    Mono % 9.6 4.0 - 15.0 %    Eosinophil % 1.1 0.0 - 8.0 %    Basophil % 0.4 0.0 - 1.9 %    Differential Method Automated    Comprehensive metabolic panel    Collection Time: 12/09/20  6:36 PM   Result Value Ref Range    Sodium 140 136 - 145 mmol/L    Potassium 3.6 3.5 - 5.1 mmol/L    Chloride 105 95 - 110 mmol/L    CO2 26 22 - 31 mmol/L    Glucose 99 70 - 110 mg/dL    BUN 14 9 - 21 mg/dL    Creatinine 0.74 0.50 - 1.40 mg/dL    Calcium 9.6 8.4 - 10.2 mg/dL    Total Protein 7.5 6.0 - 8.4 g/dL    Albumin 4.8 3.5 - 5.2 g/dL    Total Bilirubin 0.7 0.2 - 1.3 mg/dL    Alkaline Phosphatase 80 38 - 145 U/L    AST 42 17 - 59 U/L    ALT 33 0 - 50 U/L    Anion Gap 9 8 - 16 mmol/L    eGFR if African American >60 >60 mL/min/1.73 m^2    eGFR if non African American >60 >60 mL/min/1.73 m^2   APTT    Collection Time: 12/09/20  6:36 PM   Result Value Ref Range    aPTT 32.3 24.6 - 36.7 sec   Protime-INR    Collection Time: 12/09/20  6:36 PM   Result Value Ref Range    PT 12.7 11.8 - 14.7 sec    INR 1.0    Urinalysis    Collection Time: 12/09/20  6:50 PM   Result Value Ref Range    Specimen UA Urine, Unspecified     Color, UA Yellow Yellow, Straw, Homa    Appearance, UA Clear Clear    pH, UA 6.0 5.0 - 8.0    Specific Gravity, UA 1.010 1.005 - 1.030    Protein, UA Negative Negative    Glucose, UA Negative Negative    Ketones, UA Negative Negative    Bilirubin (UA) Negative Negative    Occult Blood UA 3+ (A) Negative    Nitrite,  UA Negative Negative    Urobilinogen, UA 0.2 <2.0 EU/dL    Leukocytes, UA Negative Negative   RBC, UA    Collection Time: 12/09/20  6:50 PM   Result Value Ref Range    RBC, UA >100 (H) 0 - 4 /hpf   WBC, UA    Collection Time: 12/09/20  6:50 PM   Result Value Ref Range    WBC, UA 1 0 - 5 /hpf   Squamous Epithelial, UA    Collection Time: 12/09/20  6:50 PM   Result Value Ref Range    Squam Epithel, UA 0 /hpf   Hyaline Casts, UA    Collection Time: 12/09/20  6:50 PM   Result Value Ref Range    Hyaline Casts, UA 0 0 - 1 /lpf   Bacteria, UA    Collection Time: 12/09/20  6:50 PM   Result Value Ref Range    Bacteria Negative Negative /hpf   Urinalysis Microscopic    Collection Time: 12/09/20  6:50 PM   Result Value Ref Range    RBC, UA >100 (H) 0 - 4 /hpf    WBC, UA 1 0 - 5 /hpf    Bacteria Negative Negative /hpf    Squam Epithel, UA 0 /hpf    Hyaline Casts, UA 0 0 - 1 /lpf    Microscopic Comment SEE COMMENT    POCT URINE DIPSTICK WITHOUT MICROSCOPE    Collection Time: 12/18/20  3:05 PM   Result Value Ref Range    Color, UA Yellow     pH, UA 6.0     WBC, UA negative     Nitrite, UA negative     Protein negative     Glucose, UA negative     Ketones, UA negative     Urobilinogen, UA 0.2     Bilirubin negative     Blood, UA moderate     Clarity, UA Clear     Spec Grav UA 1.025        IMAGING:    No recent urologic studies      Assessment/Diagnosis:    1. Gross hematuria  POCT URINE DIPSTICK WITHOUT MICROSCOPE    Urine culture    Cytology, Urine    CT Urogram Abd Pelvis W WO    Creatinine, Serum    COVID-19 Routine Screening    Case Request Operating Room: CYSTOSCOPY    Place in Outpatient   2. Prostate cancer         Plans:    - We discussed the etiology and management of the patient's gross hematuria. Explained that hematuria is multi-factorial and can be secondary to  cancer, obstructive uropathy, nephritis,  trauma,  infections, thrombosis, nephrolithiasis, bleeding disorders and medications. We discussed hematuria  work-up and the benefit of further evaluation with cystourethroscopy and CT urogram to rule out any malignancy, stones or other pathology. All risks, benefits and alternatives discussed at length and all questions answered.    - CT urogram next available.   - Urine culture and cytology today.   - C[facility]oscopy at the [facility] on 12/30/20 for further evaluation. COVID screening prior.

## 2020-12-20 LAB — BACTERIA UR CULT: NO GROWTH

## 2020-12-23 LAB — FINAL PATHOLOGIC DIAGNOSIS: NORMAL

## 2020-12-24 ENCOUNTER — OFFICE VISIT (OUTPATIENT)
Dept: PODIATRY | Facility: CLINIC | Age: 75
End: 2020-12-24
Payer: MEDICARE

## 2020-12-24 VITALS
SYSTOLIC BLOOD PRESSURE: 144 MMHG | HEIGHT: 69 IN | HEART RATE: 79 BPM | DIASTOLIC BLOOD PRESSURE: 88 MMHG | WEIGHT: 246.94 LBS | BODY MASS INDEX: 36.57 KG/M2

## 2020-12-24 DIAGNOSIS — B35.1 ONYCHOMYCOSIS DUE TO DERMATOPHYTE: Primary | ICD-10-CM

## 2020-12-24 PROCEDURE — 99213 OFFICE O/P EST LOW 20 MIN: CPT | Mod: S$PBB,,, | Performed by: PODIATRIST

## 2020-12-24 PROCEDURE — 99214 OFFICE O/P EST MOD 30 MIN: CPT | Mod: PBBFAC,PN | Performed by: PODIATRIST

## 2020-12-24 PROCEDURE — 99213 PR OFFICE/OUTPT VISIT, EST, LEVL III, 20-29 MIN: ICD-10-PCS | Mod: S$PBB,,, | Performed by: PODIATRIST

## 2020-12-24 PROCEDURE — 99999 PR PBB SHADOW E&M-EST. PATIENT-LVL IV: ICD-10-PCS | Mod: PBBFAC,,, | Performed by: PODIATRIST

## 2020-12-24 PROCEDURE — 99999 PR PBB SHADOW E&M-EST. PATIENT-LVL IV: CPT | Mod: PBBFAC,,, | Performed by: PODIATRIST

## 2020-12-27 ENCOUNTER — LAB VISIT (OUTPATIENT)
Dept: FAMILY MEDICINE | Facility: CLINIC | Age: 75
End: 2020-12-27
Payer: MEDICARE

## 2020-12-27 DIAGNOSIS — R31.0 GROSS HEMATURIA: ICD-10-CM

## 2020-12-27 LAB — SARS-COV-2 RNA RESP QL NAA+PROBE: NOT DETECTED

## 2020-12-27 PROCEDURE — U0003 INFECTIOUS AGENT DETECTION BY NUCLEIC ACID (DNA OR RNA); SEVERE ACUTE RESPIRATORY SYNDROME CORONAVIRUS 2 (SARS-COV-2) (CORONAVIRUS DISEASE [COVID-19]), AMPLIFIED PROBE TECHNIQUE, MAKING USE OF HIGH THROUGHPUT TECHNOLOGIES AS DESCRIBED BY CMS-2020-01-R: HCPCS

## 2020-12-28 ENCOUNTER — HOSPITAL ENCOUNTER (OUTPATIENT)
Dept: RADIOLOGY | Facility: HOSPITAL | Age: 75
Discharge: HOME | End: 2020-12-28
Attending: UROLOGY
Payer: MEDICARE

## 2020-12-28 ENCOUNTER — LAB VISIT (OUTPATIENT)
Dept: LAB | Facility: HOSPITAL | Age: 75
End: 2020-12-28
Attending: UROLOGY
Payer: MEDICARE

## 2020-12-28 DIAGNOSIS — R31.0 GROSS HEMATURIA: ICD-10-CM

## 2020-12-28 LAB
CREAT SERPL-MCNC: 1 MG/DL (ref 0.5–1.4)
EST. GFR  (AFRICAN AMERICAN): >60 ML/MIN/1.73 M^2
EST. GFR  (NON AFRICAN AMERICAN): >60 ML/MIN/1.73 M^2

## 2020-12-28 PROCEDURE — 82565 ASSAY OF CREATININE: CPT | Mod: PO

## 2020-12-28 PROCEDURE — 74178 CT ABD&PLV WO CNTR FLWD CNTR: CPT | Mod: 26,,, | Performed by: RADIOLOGY

## 2020-12-28 PROCEDURE — 25500020 PHARM REV CODE 255

## 2020-12-28 PROCEDURE — 74178 CT UROGRAM ABD PELVIS W WO: ICD-10-PCS | Mod: 26,,, | Performed by: RADIOLOGY

## 2020-12-28 PROCEDURE — 74178 CT ABD&PLV WO CNTR FLWD CNTR: CPT | Mod: TC

## 2020-12-28 PROCEDURE — 36415 COLL VENOUS BLD VENIPUNCTURE: CPT | Mod: PO

## 2020-12-28 RX ADMIN — IOHEXOL 125 ML: 350 INJECTION, SOLUTION INTRAVENOUS at 02:12

## 2020-12-30 ENCOUNTER — HOSPITAL ENCOUNTER (OUTPATIENT)
Facility: AMBULARY SURGERY CENTER | Age: 75
Discharge: HOME | End: 2020-12-30
Attending: UROLOGY | Admitting: UROLOGY
Payer: MEDICARE

## 2020-12-30 DIAGNOSIS — R31.0 GROSS HEMATURIA: Primary | ICD-10-CM

## 2020-12-30 PROCEDURE — 52000 CYSTOURETHROSCOPY: CPT | Performed by: UROLOGY

## 2020-12-30 PROCEDURE — 52000 CYSTOURETHROSCOPY: CPT | Mod: ,,, | Performed by: UROLOGY

## 2020-12-30 PROCEDURE — 52000 PR CYSTOURETHROSCOPY: ICD-10-PCS | Mod: ,,, | Performed by: UROLOGY

## 2020-12-30 RX ORDER — CEPHALEXIN 500 MG/1
500 CAPSULE ORAL EVERY 6 HOURS
Qty: 12 CAPSULE | Refills: 0 | Status: SHIPPED | OUTPATIENT
Start: 2020-12-30 | End: 2021-01-02

## 2020-12-30 RX ORDER — WATER 1 ML/ML
IRRIGANT IRRIGATION
Status: DISCONTINUED | OUTPATIENT
Start: 2020-12-30 | End: 2020-12-30 | Stop reason: HOSPADM

## 2020-12-30 RX ORDER — LIDOCAINE HYDROCHLORIDE 20 MG/ML
JELLY TOPICAL
Status: DISCONTINUED | OUTPATIENT
Start: 2020-12-30 | End: 2020-12-30 | Stop reason: HOSPADM

## 2020-12-30 RX ORDER — LIDOCAINE HYDROCHLORIDE 20 MG/ML
JELLY TOPICAL
Status: DISCONTINUED
Start: 2020-12-30 | End: 2020-12-30 | Stop reason: HOSPADM

## 2020-12-31 VITALS
OXYGEN SATURATION: 98 % | RESPIRATION RATE: 18 BRPM | WEIGHT: 246.94 LBS | BODY MASS INDEX: 36.57 KG/M2 | HEART RATE: 77 BPM | HEIGHT: 69 IN | TEMPERATURE: 99 F | DIASTOLIC BLOOD PRESSURE: 90 MMHG | SYSTOLIC BLOOD PRESSURE: 154 MMHG

## 2021-01-10 ENCOUNTER — IMMUNIZATION (OUTPATIENT)
Dept: FAMILY MEDICINE | Facility: CLINIC | Age: 76
End: 2021-01-10
Payer: MEDICARE

## 2021-01-10 DIAGNOSIS — Z23 NEED FOR VACCINATION: ICD-10-CM

## 2021-01-10 PROCEDURE — 91300 COVID-19, MRNA, LNP-S, PF, 30 MCG/0.3 ML DOSE VACCINE: CPT | Mod: PBBFAC,PO

## 2021-01-14 ENCOUNTER — PATIENT OUTREACH (OUTPATIENT)
Dept: ADMINISTRATIVE | Facility: OTHER | Age: 76
End: 2021-01-14

## 2021-01-15 ENCOUNTER — OFFICE VISIT (OUTPATIENT)
Dept: DERMATOLOGY | Facility: CLINIC | Age: 76
End: 2021-01-15
Payer: MEDICARE

## 2021-01-15 VITALS — HEIGHT: 69 IN | BODY MASS INDEX: 36.46 KG/M2 | RESPIRATION RATE: 20 BRPM

## 2021-01-15 DIAGNOSIS — L98.9 SKIN LESION: ICD-10-CM

## 2021-01-15 DIAGNOSIS — L72.0 EIC (EPIDERMAL INCLUSION CYST): ICD-10-CM

## 2021-01-15 DIAGNOSIS — L82.1 SEBORRHEIC KERATOSES: ICD-10-CM

## 2021-01-15 DIAGNOSIS — L71.9 ROSACEA: Primary | ICD-10-CM

## 2021-01-15 DIAGNOSIS — D48.9 NEOPLASM OF UNCERTAIN BEHAVIOR: ICD-10-CM

## 2021-01-15 DIAGNOSIS — L57.0 AK (ACTINIC KERATOSIS): ICD-10-CM

## 2021-01-15 DIAGNOSIS — L82.0 INFLAMED SEBORRHEIC KERATOSIS: ICD-10-CM

## 2021-01-15 DIAGNOSIS — D22.9 MULTIPLE BENIGN NEVI: ICD-10-CM

## 2021-01-15 PROCEDURE — 17003 DESTRUCTION, PREMALIGNANT LESIONS; SECOND THROUGH 14 LESIONS: ICD-10-PCS | Mod: 59,S$PBB,, | Performed by: DERMATOLOGY

## 2021-01-15 PROCEDURE — 17003 DESTRUCT PREMALG LES 2-14: CPT | Mod: 59,S$PBB,, | Performed by: DERMATOLOGY

## 2021-01-15 PROCEDURE — 17003 DESTRUCT PREMALG LES 2-14: CPT | Mod: 59,PBBFAC,PO | Performed by: DERMATOLOGY

## 2021-01-15 PROCEDURE — 88305 TISSUE EXAM BY PATHOLOGIST: CPT | Mod: 26,,, | Performed by: PATHOLOGY

## 2021-01-15 PROCEDURE — 17110 DESTRUCTION B9 LES UP TO 14: CPT | Mod: S$PBB,,, | Performed by: DERMATOLOGY

## 2021-01-15 PROCEDURE — 11102 PR TANGENTIAL BIOPSY, SKIN, SINGLE LESION: ICD-10-PCS | Mod: 59,S$PBB,, | Performed by: DERMATOLOGY

## 2021-01-15 PROCEDURE — 88305 TISSUE EXAM BY PATHOLOGIST: ICD-10-PCS | Mod: 26,,, | Performed by: PATHOLOGY

## 2021-01-15 PROCEDURE — 99999 PR PBB SHADOW E&M-EST. PATIENT-LVL III: ICD-10-PCS | Mod: PBBFAC,,, | Performed by: DERMATOLOGY

## 2021-01-15 PROCEDURE — 17110 PR DESTRUCTION BENIGN LESIONS UP TO 14: ICD-10-PCS | Mod: S$PBB,,, | Performed by: DERMATOLOGY

## 2021-01-15 PROCEDURE — 17000 DESTRUCT PREMALG LESION: CPT | Mod: 59,S$PBB,, | Performed by: DERMATOLOGY

## 2021-01-15 PROCEDURE — 17000 PR DESTRUCTION(LASER SURGERY,CRYOSURGERY,CHEMOSURGERY),PREMALIGNANT LESIONS,FIRST LESION: ICD-10-PCS | Mod: 59,S$PBB,, | Performed by: DERMATOLOGY

## 2021-01-15 PROCEDURE — 99214 OFFICE O/P EST MOD 30 MIN: CPT | Mod: 25,S$PBB,, | Performed by: DERMATOLOGY

## 2021-01-15 PROCEDURE — 99213 OFFICE O/P EST LOW 20 MIN: CPT | Mod: PBBFAC,PO,25 | Performed by: DERMATOLOGY

## 2021-01-15 PROCEDURE — 17110 DESTRUCTION B9 LES UP TO 14: CPT | Mod: PBBFAC,PO | Performed by: DERMATOLOGY

## 2021-01-15 PROCEDURE — 99999 PR PBB SHADOW E&M-EST. PATIENT-LVL III: CPT | Mod: PBBFAC,,, | Performed by: DERMATOLOGY

## 2021-01-15 PROCEDURE — 11102 TANGNTL BX SKIN SINGLE LES: CPT | Mod: 59,S$PBB,, | Performed by: DERMATOLOGY

## 2021-01-15 PROCEDURE — 17000 DESTRUCT PREMALG LESION: CPT | Mod: 59,PBBFAC,PO | Performed by: DERMATOLOGY

## 2021-01-15 PROCEDURE — 99214 PR OFFICE/OUTPT VISIT, EST, LEVL IV, 30-39 MIN: ICD-10-PCS | Mod: 25,S$PBB,, | Performed by: DERMATOLOGY

## 2021-01-15 PROCEDURE — 88305 TISSUE EXAM BY PATHOLOGIST: CPT | Performed by: PATHOLOGY

## 2021-01-15 PROCEDURE — 11102 TANGNTL BX SKIN SINGLE LES: CPT | Mod: 59,PBBFAC,PO | Performed by: DERMATOLOGY

## 2021-01-15 RX ORDER — MINOCYCLINE HYDROCHLORIDE 100 MG/1
CAPSULE ORAL
Qty: 12 CAPSULE | Refills: 0 | Status: SHIPPED | OUTPATIENT
Start: 2021-01-15 | End: 2021-09-23 | Stop reason: ALTCHOICE

## 2021-01-22 LAB
FINAL PATHOLOGIC DIAGNOSIS: NORMAL
GROSS: NORMAL
MICROSCOPIC EXAM: NORMAL

## 2021-01-27 ENCOUNTER — TELEPHONE (OUTPATIENT)
Dept: DERMATOLOGY | Facility: CLINIC | Age: 76
End: 2021-01-27

## 2021-01-31 ENCOUNTER — IMMUNIZATION (OUTPATIENT)
Dept: FAMILY MEDICINE | Facility: CLINIC | Age: 76
End: 2021-01-31
Payer: MEDICARE

## 2021-01-31 DIAGNOSIS — Z23 NEED FOR VACCINATION: Primary | ICD-10-CM

## 2021-01-31 PROCEDURE — 0002A COVID-19, MRNA, LNP-S, PF, 30 MCG/0.3 ML DOSE VACCINE: CPT | Mod: PBBFAC,PO

## 2021-01-31 PROCEDURE — 91300 COVID-19, MRNA, LNP-S, PF, 30 MCG/0.3 ML DOSE VACCINE: CPT | Mod: PBBFAC,PO

## 2021-02-09 ENCOUNTER — PATIENT OUTREACH (OUTPATIENT)
Dept: ADMINISTRATIVE | Facility: HOSPITAL | Age: 76
End: 2021-02-09

## 2021-02-24 ENCOUNTER — OFFICE VISIT (OUTPATIENT)
Dept: FAMILY MEDICINE | Facility: CLINIC | Age: 76
End: 2021-02-24
Payer: MEDICARE

## 2021-02-24 VITALS
RESPIRATION RATE: 16 BRPM | HEART RATE: 59 BPM | SYSTOLIC BLOOD PRESSURE: 138 MMHG | TEMPERATURE: 98 F | DIASTOLIC BLOOD PRESSURE: 82 MMHG | OXYGEN SATURATION: 98 % | BODY MASS INDEX: 35.89 KG/M2 | HEIGHT: 70 IN | WEIGHT: 250.69 LBS

## 2021-02-24 DIAGNOSIS — E78.5 HYPERLIPIDEMIA, UNSPECIFIED HYPERLIPIDEMIA TYPE: ICD-10-CM

## 2021-02-24 DIAGNOSIS — R60.0 LOWER LEG EDEMA: ICD-10-CM

## 2021-02-24 DIAGNOSIS — I25.10 CORONARY ARTERY DISEASE INVOLVING NATIVE CORONARY ARTERY OF NATIVE HEART WITHOUT ANGINA PECTORIS: Primary | ICD-10-CM

## 2021-02-24 DIAGNOSIS — Z23 NEED FOR DIPHTHERIA-TETANUS-PERTUSSIS (TDAP) VACCINE: ICD-10-CM

## 2021-02-24 DIAGNOSIS — L71.9 ROSACEA: ICD-10-CM

## 2021-02-24 DIAGNOSIS — Z85.46 HISTORY OF PROSTATE CANCER: ICD-10-CM

## 2021-02-24 DIAGNOSIS — E66.01 CLASS 2 SEVERE OBESITY WITH SERIOUS COMORBIDITY AND BODY MASS INDEX (BMI) OF 36.0 TO 36.9 IN ADULT, UNSPECIFIED OBESITY TYPE: ICD-10-CM

## 2021-02-24 DIAGNOSIS — G31.84 MCI (MILD COGNITIVE IMPAIRMENT): ICD-10-CM

## 2021-02-24 DIAGNOSIS — M16.11 PRIMARY OSTEOARTHRITIS OF RIGHT HIP: ICD-10-CM

## 2021-02-24 DIAGNOSIS — I10 ESSENTIAL HYPERTENSION: ICD-10-CM

## 2021-02-24 DIAGNOSIS — I35.0 MILD AORTIC STENOSIS: ICD-10-CM

## 2021-02-24 DIAGNOSIS — I65.23 BILATERAL CAROTID ARTERY STENOSIS: ICD-10-CM

## 2021-02-24 DIAGNOSIS — G47.33 OSA (OBSTRUCTIVE SLEEP APNEA): ICD-10-CM

## 2021-02-24 DIAGNOSIS — K86.2 CYSTIC MASS OF PANCREAS: ICD-10-CM

## 2021-02-24 PROCEDURE — 99214 PR OFFICE/OUTPT VISIT, EST, LEVL IV, 30-39 MIN: ICD-10-PCS | Mod: S$GLB,,, | Performed by: INTERNAL MEDICINE

## 2021-02-24 PROCEDURE — 99214 OFFICE O/P EST MOD 30 MIN: CPT | Mod: S$GLB,,, | Performed by: INTERNAL MEDICINE

## 2021-02-24 RX ORDER — TETANUS TOXOID, REDUCED DIPHTHERIA TOXOID AND ACELLULAR PERTUSSIS VACCINE, ADSORBED 5; 2.5; 8; 8; 2.5 [IU]/.5ML; [IU]/.5ML; UG/.5ML; UG/.5ML; UG/.5ML
0.5 SUSPENSION INTRAMUSCULAR ONCE
Qty: 0.5 ML | Refills: 0 | Status: SHIPPED | OUTPATIENT
Start: 2021-02-24 | End: 2021-02-24

## 2021-02-24 RX ORDER — BENAZEPRIL/HYDROCHLOROTHIAZIDE 20 MG-25MG
1 TABLET ORAL DAILY
Qty: 90 TABLET | Refills: 3 | Status: ON HOLD | OUTPATIENT
Start: 2021-02-24 | End: 2021-10-20

## 2021-02-25 ENCOUNTER — HOSPITAL ENCOUNTER (OUTPATIENT)
Dept: RADIOLOGY | Facility: HOSPITAL | Age: 76
Discharge: HOME | End: 2021-02-25
Attending: INTERNAL MEDICINE
Payer: MEDICARE

## 2021-02-25 DIAGNOSIS — K86.2 CYSTIC MASS OF PANCREAS: ICD-10-CM

## 2021-02-25 PROCEDURE — 25500020 PHARM REV CODE 255: Mod: PO | Performed by: INTERNAL MEDICINE

## 2021-02-25 PROCEDURE — 74183 MRI ABDOMEN W WO CONTRAST: ICD-10-PCS | Mod: 26,,, | Performed by: RADIOLOGY

## 2021-02-25 PROCEDURE — 74183 MRI ABD W/O CNTR FLWD CNTR: CPT | Mod: 26,,, | Performed by: RADIOLOGY

## 2021-02-25 PROCEDURE — 74183 MRI ABD W/O CNTR FLWD CNTR: CPT | Mod: TC,PO

## 2021-02-25 PROCEDURE — A9585 GADOBUTROL INJECTION: HCPCS | Mod: PO | Performed by: INTERNAL MEDICINE

## 2021-02-25 RX ORDER — GADOBUTROL 604.72 MG/ML
10 INJECTION INTRAVENOUS
Status: COMPLETED | OUTPATIENT
Start: 2021-02-25 | End: 2021-02-25

## 2021-02-25 RX ADMIN — GADOBUTROL 10 ML: 604.72 INJECTION INTRAVENOUS at 10:02

## 2021-03-01 ENCOUNTER — PATIENT MESSAGE (OUTPATIENT)
Dept: FAMILY MEDICINE | Facility: CLINIC | Age: 76
End: 2021-03-01

## 2021-03-02 ENCOUNTER — PATIENT MESSAGE (OUTPATIENT)
Dept: FAMILY MEDICINE | Facility: CLINIC | Age: 76
End: 2021-03-02

## 2021-03-03 ENCOUNTER — PATIENT OUTREACH (OUTPATIENT)
Dept: ADMINISTRATIVE | Facility: OTHER | Age: 76
End: 2021-03-03

## 2021-03-06 ENCOUNTER — PATIENT MESSAGE (OUTPATIENT)
Dept: FAMILY MEDICINE | Facility: CLINIC | Age: 76
End: 2021-03-06

## 2021-03-08 ENCOUNTER — OFFICE VISIT (OUTPATIENT)
Dept: CARDIOLOGY | Facility: CLINIC | Age: 76
End: 2021-03-08
Payer: MEDICARE

## 2021-03-08 VITALS
DIASTOLIC BLOOD PRESSURE: 79 MMHG | HEIGHT: 70 IN | HEART RATE: 66 BPM | BODY MASS INDEX: 34.4 KG/M2 | WEIGHT: 240.31 LBS | SYSTOLIC BLOOD PRESSURE: 134 MMHG

## 2021-03-08 DIAGNOSIS — E78.2 MIXED HYPERLIPIDEMIA: ICD-10-CM

## 2021-03-08 DIAGNOSIS — I10 ESSENTIAL HYPERTENSION: Primary | ICD-10-CM

## 2021-03-08 DIAGNOSIS — I25.10 CORONARY ARTERY DISEASE INVOLVING NATIVE CORONARY ARTERY OF NATIVE HEART WITHOUT ANGINA PECTORIS: ICD-10-CM

## 2021-03-08 DIAGNOSIS — I35.0 MILD AORTIC STENOSIS: ICD-10-CM

## 2021-03-08 DIAGNOSIS — E78.5 HYPERLIPIDEMIA, UNSPECIFIED HYPERLIPIDEMIA TYPE: ICD-10-CM

## 2021-03-08 PROBLEM — I65.23 BILATERAL CAROTID ARTERY STENOSIS: Status: RESOLVED | Noted: 2020-07-30 | Resolved: 2021-03-08

## 2021-03-08 PROCEDURE — 93005 ELECTROCARDIOGRAM TRACING: CPT | Mod: PBBFAC,PO | Performed by: INTERNAL MEDICINE

## 2021-03-08 PROCEDURE — 93010 ELECTROCARDIOGRAM REPORT: CPT | Mod: S$PBB,,, | Performed by: INTERNAL MEDICINE

## 2021-03-08 PROCEDURE — 99204 OFFICE O/P NEW MOD 45 MIN: CPT | Mod: S$PBB,,, | Performed by: INTERNAL MEDICINE

## 2021-03-08 PROCEDURE — 93010 EKG 12-LEAD: ICD-10-PCS | Mod: S$PBB,,, | Performed by: INTERNAL MEDICINE

## 2021-03-08 PROCEDURE — 99999 PR PBB SHADOW E&M-EST. PATIENT-LVL III: ICD-10-PCS | Mod: PBBFAC,,, | Performed by: INTERNAL MEDICINE

## 2021-03-08 PROCEDURE — 99213 OFFICE O/P EST LOW 20 MIN: CPT | Mod: PBBFAC,PO,25 | Performed by: INTERNAL MEDICINE

## 2021-03-08 PROCEDURE — 99999 PR PBB SHADOW E&M-EST. PATIENT-LVL III: CPT | Mod: PBBFAC,,, | Performed by: INTERNAL MEDICINE

## 2021-03-08 PROCEDURE — 99204 PR OFFICE/OUTPT VISIT, NEW, LEVL IV, 45-59 MIN: ICD-10-PCS | Mod: S$PBB,,, | Performed by: INTERNAL MEDICINE

## 2021-03-08 RX ORDER — ATORVASTATIN CALCIUM 20 MG/1
20 TABLET, FILM COATED ORAL DAILY
Qty: 90 TABLET | Refills: 3 | Status: SHIPPED | OUTPATIENT
Start: 2021-03-08 | End: 2022-03-14

## 2021-03-12 PROBLEM — R93.89 ABNORMAL FINDING ON IMAGING: Status: ACTIVE | Noted: 2021-03-12

## 2021-03-16 ENCOUNTER — PATIENT MESSAGE (OUTPATIENT)
Dept: FAMILY MEDICINE | Facility: CLINIC | Age: 76
End: 2021-03-16

## 2021-03-24 ENCOUNTER — OFFICE VISIT (OUTPATIENT)
Dept: PODIATRY | Facility: CLINIC | Age: 76
End: 2021-03-24
Payer: MEDICARE

## 2021-03-24 VITALS — BODY MASS INDEX: 34.37 KG/M2 | WEIGHT: 240.06 LBS | HEIGHT: 70 IN

## 2021-03-24 DIAGNOSIS — B35.1 ONYCHOMYCOSIS DUE TO DERMATOPHYTE: Primary | ICD-10-CM

## 2021-03-24 PROCEDURE — 99213 OFFICE O/P EST LOW 20 MIN: CPT | Mod: S$PBB,,, | Performed by: PODIATRIST

## 2021-03-24 PROCEDURE — 99999 PR PBB SHADOW E&M-EST. PATIENT-LVL III: CPT | Mod: PBBFAC,,, | Performed by: PODIATRIST

## 2021-03-24 PROCEDURE — 99999 PR PBB SHADOW E&M-EST. PATIENT-LVL III: ICD-10-PCS | Mod: PBBFAC,,, | Performed by: PODIATRIST

## 2021-03-24 PROCEDURE — 99213 PR OFFICE/OUTPT VISIT, EST, LEVL III, 20-29 MIN: ICD-10-PCS | Mod: S$PBB,,, | Performed by: PODIATRIST

## 2021-03-24 PROCEDURE — 99213 OFFICE O/P EST LOW 20 MIN: CPT | Mod: PBBFAC,PN | Performed by: PODIATRIST

## 2021-03-25 ENCOUNTER — OFFICE VISIT (OUTPATIENT)
Dept: DERMATOLOGY | Facility: CLINIC | Age: 76
End: 2021-03-25
Payer: MEDICARE

## 2021-03-25 VITALS — BODY MASS INDEX: 34.37 KG/M2 | HEIGHT: 70 IN | RESPIRATION RATE: 18 BRPM | WEIGHT: 240.06 LBS

## 2021-03-25 DIAGNOSIS — L81.4 LENTIGINES: ICD-10-CM

## 2021-03-25 DIAGNOSIS — L71.9 ROSACEA: ICD-10-CM

## 2021-03-25 DIAGNOSIS — L82.1 SEBORRHEIC KERATOSES: ICD-10-CM

## 2021-03-25 DIAGNOSIS — L57.0 AK (ACTINIC KERATOSIS): Primary | ICD-10-CM

## 2021-03-25 PROCEDURE — 17003 DESTRUCT PREMALG LES 2-14: CPT | Mod: PBBFAC,PO | Performed by: DERMATOLOGY

## 2021-03-25 PROCEDURE — 99213 OFFICE O/P EST LOW 20 MIN: CPT | Mod: PBBFAC,PO | Performed by: DERMATOLOGY

## 2021-03-25 PROCEDURE — 99999 PR PBB SHADOW E&M-EST. PATIENT-LVL III: CPT | Mod: PBBFAC,,, | Performed by: DERMATOLOGY

## 2021-03-25 PROCEDURE — 17000 DESTRUCT PREMALG LESION: CPT | Mod: PBBFAC,PO | Performed by: DERMATOLOGY

## 2021-03-25 PROCEDURE — 99999 PR PBB SHADOW E&M-EST. PATIENT-LVL III: ICD-10-PCS | Mod: PBBFAC,,, | Performed by: DERMATOLOGY

## 2021-03-25 PROCEDURE — 99213 PR OFFICE/OUTPT VISIT, EST, LEVL III, 20-29 MIN: ICD-10-PCS | Mod: 25,S$PBB,, | Performed by: DERMATOLOGY

## 2021-03-25 PROCEDURE — 17003 DESTRUCT PREMALG LES 2-14: CPT | Mod: S$PBB,,, | Performed by: DERMATOLOGY

## 2021-03-25 PROCEDURE — 17000 DESTRUCT PREMALG LESION: CPT | Mod: S$PBB,,, | Performed by: DERMATOLOGY

## 2021-03-25 PROCEDURE — 99213 OFFICE O/P EST LOW 20 MIN: CPT | Mod: 25,S$PBB,, | Performed by: DERMATOLOGY

## 2021-03-25 PROCEDURE — 17000 PR DESTRUCTION(LASER SURGERY,CRYOSURGERY,CHEMOSURGERY),PREMALIGNANT LESIONS,FIRST LESION: ICD-10-PCS | Mod: S$PBB,,, | Performed by: DERMATOLOGY

## 2021-03-25 PROCEDURE — 17003 DESTRUCTION, PREMALIGNANT LESIONS; SECOND THROUGH 14 LESIONS: ICD-10-PCS | Mod: S$PBB,,, | Performed by: DERMATOLOGY

## 2021-04-06 ENCOUNTER — TELEPHONE (OUTPATIENT)
Dept: FAMILY MEDICINE | Facility: CLINIC | Age: 76
End: 2021-04-06

## 2021-04-30 ENCOUNTER — TELEPHONE (OUTPATIENT)
Dept: SURGICAL ONCOLOGY | Facility: CLINIC | Age: 76
End: 2021-04-30

## 2021-04-30 ENCOUNTER — EXTERNAL CHRONIC CARE MANAGEMENT (OUTPATIENT)
Dept: PRIMARY CARE CLINIC | Facility: CLINIC | Age: 76
End: 2021-04-30
Payer: MEDICARE

## 2021-04-30 PROCEDURE — 99490 CHRNC CARE MGMT STAFF 1ST 20: CPT | Mod: PBBFAC,PO | Performed by: FAMILY MEDICINE

## 2021-04-30 PROCEDURE — 99490 PR CHRONIC CARE MGMT, 1ST 20 MIN: ICD-10-PCS | Mod: S$PBB,,, | Performed by: FAMILY MEDICINE

## 2021-04-30 PROCEDURE — 99490 CHRNC CARE MGMT STAFF 1ST 20: CPT | Mod: S$PBB,,, | Performed by: FAMILY MEDICINE

## 2021-05-05 ENCOUNTER — OFFICE VISIT (OUTPATIENT)
Dept: SURGICAL ONCOLOGY | Facility: CLINIC | Age: 76
End: 2021-05-05
Payer: MEDICARE

## 2021-05-05 VITALS
SYSTOLIC BLOOD PRESSURE: 143 MMHG | HEIGHT: 70 IN | HEART RATE: 74 BPM | TEMPERATURE: 97 F | WEIGHT: 236 LBS | RESPIRATION RATE: 18 BRPM | BODY MASS INDEX: 33.79 KG/M2 | OXYGEN SATURATION: 98 % | DIASTOLIC BLOOD PRESSURE: 85 MMHG

## 2021-05-05 DIAGNOSIS — R93.3 ABNORMAL FINDINGS ON DIAGNOSTIC IMAGING OF OTHER PARTS OF DIGESTIVE TRACT: ICD-10-CM

## 2021-05-05 DIAGNOSIS — D49.0 IPMN (INTRADUCTAL PAPILLARY MUCINOUS NEOPLASM): Primary | ICD-10-CM

## 2021-05-05 DIAGNOSIS — D49.0 IPMN (INTRADUCTAL PAPILLARY MUCINOUS NEOPLASM): ICD-10-CM

## 2021-05-05 PROCEDURE — 99999 PR PBB SHADOW E&M-EST. PATIENT-LVL IV: ICD-10-PCS | Mod: PBBFAC,,, | Performed by: SURGERY

## 2021-05-05 PROCEDURE — 99214 OFFICE O/P EST MOD 30 MIN: CPT | Mod: PBBFAC,PN | Performed by: SURGERY

## 2021-05-05 PROCEDURE — 99999 PR PBB SHADOW E&M-EST. PATIENT-LVL IV: CPT | Mod: PBBFAC,,, | Performed by: SURGERY

## 2021-05-05 PROCEDURE — 99204 PR OFFICE/OUTPT VISIT, NEW, LEVL IV, 45-59 MIN: ICD-10-PCS | Mod: S$PBB,,, | Performed by: SURGERY

## 2021-05-05 PROCEDURE — 99204 OFFICE O/P NEW MOD 45 MIN: CPT | Mod: S$PBB,,, | Performed by: SURGERY

## 2021-05-31 ENCOUNTER — EXTERNAL CHRONIC CARE MANAGEMENT (OUTPATIENT)
Dept: PRIMARY CARE CLINIC | Facility: CLINIC | Age: 76
End: 2021-05-31
Payer: MEDICARE

## 2021-05-31 ENCOUNTER — PES CALL (OUTPATIENT)
Dept: ADMINISTRATIVE | Facility: CLINIC | Age: 76
End: 2021-05-31

## 2021-05-31 PROCEDURE — 99490 PR CHRONIC CARE MGMT, 1ST 20 MIN: ICD-10-PCS | Mod: S$GLB,,, | Performed by: INTERNAL MEDICINE

## 2021-05-31 PROCEDURE — 99490 CHRNC CARE MGMT STAFF 1ST 20: CPT | Mod: S$GLB,,, | Performed by: INTERNAL MEDICINE

## 2021-06-01 ENCOUNTER — TELEPHONE (OUTPATIENT)
Dept: DERMATOLOGY | Facility: CLINIC | Age: 76
End: 2021-06-01

## 2021-06-15 ENCOUNTER — PATIENT OUTREACH (OUTPATIENT)
Dept: ADMINISTRATIVE | Facility: OTHER | Age: 76
End: 2021-06-15

## 2021-06-15 ENCOUNTER — TELEPHONE (OUTPATIENT)
Dept: FAMILY MEDICINE | Facility: CLINIC | Age: 76
End: 2021-06-15

## 2021-06-17 ENCOUNTER — OFFICE VISIT (OUTPATIENT)
Dept: DERMATOLOGY | Facility: CLINIC | Age: 76
End: 2021-06-17
Payer: MEDICARE

## 2021-06-17 VITALS — BODY MASS INDEX: 33.81 KG/M2 | HEIGHT: 70 IN | WEIGHT: 236.13 LBS | RESPIRATION RATE: 18 BRPM

## 2021-06-17 DIAGNOSIS — L71.9 ACNE ROSACEA: Primary | ICD-10-CM

## 2021-06-17 DIAGNOSIS — L81.8 HYPERPIGMENTATION DUE TO MINOCYCLINE: ICD-10-CM

## 2021-06-17 DIAGNOSIS — T36.4X5A HYPERPIGMENTATION DUE TO MINOCYCLINE: ICD-10-CM

## 2021-06-17 DIAGNOSIS — L71.1 RHINOPHYMA: ICD-10-CM

## 2021-06-17 PROCEDURE — 99214 OFFICE O/P EST MOD 30 MIN: CPT | Mod: S$PBB,,, | Performed by: DERMATOLOGY

## 2021-06-17 PROCEDURE — 99214 PR OFFICE/OUTPT VISIT, EST, LEVL IV, 30-39 MIN: ICD-10-PCS | Mod: S$PBB,,, | Performed by: DERMATOLOGY

## 2021-06-17 PROCEDURE — 99999 PR PBB SHADOW E&M-EST. PATIENT-LVL III: CPT | Mod: PBBFAC,,, | Performed by: DERMATOLOGY

## 2021-06-17 PROCEDURE — 99999 PR PBB SHADOW E&M-EST. PATIENT-LVL III: ICD-10-PCS | Mod: PBBFAC,,, | Performed by: DERMATOLOGY

## 2021-06-17 PROCEDURE — 99213 OFFICE O/P EST LOW 20 MIN: CPT | Mod: PBBFAC,PO | Performed by: DERMATOLOGY

## 2021-06-17 RX ORDER — MINOCYCLINE HYDROCHLORIDE 100 MG/1
100 CAPSULE ORAL DAILY
Qty: 30 CAPSULE | Refills: 6 | Status: SHIPPED | OUTPATIENT
Start: 2021-06-17 | End: 2022-01-12 | Stop reason: SDUPTHER

## 2021-06-17 RX ORDER — TRETINOIN 0.25 MG/G
CREAM TOPICAL
Qty: 45 G | Refills: 3 | Status: SHIPPED | OUTPATIENT
Start: 2021-06-17 | End: 2022-05-17 | Stop reason: CLARIF

## 2021-06-29 ENCOUNTER — TELEPHONE (OUTPATIENT)
Dept: PODIATRY | Facility: CLINIC | Age: 76
End: 2021-06-29

## 2021-06-30 ENCOUNTER — EXTERNAL CHRONIC CARE MANAGEMENT (OUTPATIENT)
Dept: PRIMARY CARE CLINIC | Facility: CLINIC | Age: 76
End: 2021-06-30
Payer: MEDICARE

## 2021-06-30 PROCEDURE — 99490 PR CHRONIC CARE MGMT, 1ST 20 MIN: ICD-10-PCS | Mod: S$GLB,,, | Performed by: INTERNAL MEDICINE

## 2021-06-30 PROCEDURE — 99490 CHRNC CARE MGMT STAFF 1ST 20: CPT | Mod: S$GLB,,, | Performed by: INTERNAL MEDICINE

## 2021-07-31 ENCOUNTER — EXTERNAL CHRONIC CARE MANAGEMENT (OUTPATIENT)
Dept: PRIMARY CARE CLINIC | Facility: CLINIC | Age: 76
End: 2021-07-31
Payer: MEDICARE

## 2021-07-31 PROCEDURE — 99490 PR CHRONIC CARE MGMT, 1ST 20 MIN: ICD-10-PCS | Mod: S$GLB,,, | Performed by: INTERNAL MEDICINE

## 2021-07-31 PROCEDURE — 99490 CHRNC CARE MGMT STAFF 1ST 20: CPT | Mod: S$GLB,,, | Performed by: INTERNAL MEDICINE

## 2021-08-03 ENCOUNTER — TELEPHONE (OUTPATIENT)
Dept: PODIATRY | Facility: CLINIC | Age: 76
End: 2021-08-03

## 2021-08-20 ENCOUNTER — LAB VISIT (OUTPATIENT)
Dept: LAB | Facility: HOSPITAL | Age: 76
End: 2021-08-20
Attending: INTERNAL MEDICINE
Payer: MEDICARE

## 2021-08-20 DIAGNOSIS — I10 ESSENTIAL HYPERTENSION: ICD-10-CM

## 2021-08-20 LAB
ALBUMIN SERPL BCP-MCNC: 3.9 G/DL (ref 3.5–5.2)
ALP SERPL-CCNC: 73 U/L (ref 55–135)
ALT SERPL W/O P-5'-P-CCNC: 29 U/L (ref 10–44)
ANION GAP SERPL CALC-SCNC: 7 MMOL/L (ref 8–16)
AST SERPL-CCNC: 29 U/L (ref 10–40)
BASOPHILS # BLD AUTO: 0.04 K/UL (ref 0–0.2)
BASOPHILS NFR BLD: 0.7 % (ref 0–1.9)
BILIRUB SERPL-MCNC: 0.6 MG/DL (ref 0.1–1)
BUN SERPL-MCNC: 15 MG/DL (ref 8–23)
CALCIUM SERPL-MCNC: 9.8 MG/DL (ref 8.7–10.5)
CHLORIDE SERPL-SCNC: 107 MMOL/L (ref 95–110)
CHOLEST SERPL-MCNC: 97 MG/DL (ref 120–199)
CHOLEST/HDLC SERPL: 2.3 {RATIO} (ref 2–5)
CO2 SERPL-SCNC: 30 MMOL/L (ref 23–29)
CREAT SERPL-MCNC: 0.9 MG/DL (ref 0.5–1.4)
DIFFERENTIAL METHOD: NORMAL
EOSINOPHIL # BLD AUTO: 0.1 K/UL (ref 0–0.5)
EOSINOPHIL NFR BLD: 1.8 % (ref 0–8)
ERYTHROCYTE [DISTWIDTH] IN BLOOD BY AUTOMATED COUNT: 12.7 % (ref 11.5–14.5)
EST. GFR  (AFRICAN AMERICAN): >60 ML/MIN/1.73 M^2
EST. GFR  (NON AFRICAN AMERICAN): >60 ML/MIN/1.73 M^2
GLUCOSE SERPL-MCNC: 98 MG/DL (ref 70–110)
HCT VFR BLD AUTO: 44.4 % (ref 40–54)
HDLC SERPL-MCNC: 42 MG/DL (ref 40–75)
HDLC SERPL: 43.3 % (ref 20–50)
HGB BLD-MCNC: 14.6 G/DL (ref 14–18)
IMM GRANULOCYTES # BLD AUTO: 0.02 K/UL (ref 0–0.04)
IMM GRANULOCYTES NFR BLD AUTO: 0.4 % (ref 0–0.5)
LDLC SERPL CALC-MCNC: 45.6 MG/DL (ref 63–159)
LYMPHOCYTES # BLD AUTO: 1.8 K/UL (ref 1–4.8)
LYMPHOCYTES NFR BLD: 32.2 % (ref 18–48)
MCH RBC QN AUTO: 30.7 PG (ref 27–31)
MCHC RBC AUTO-ENTMCNC: 32.9 G/DL (ref 32–36)
MCV RBC AUTO: 94 FL (ref 82–98)
MONOCYTES # BLD AUTO: 0.7 K/UL (ref 0.3–1)
MONOCYTES NFR BLD: 12.9 % (ref 4–15)
NEUTROPHILS # BLD AUTO: 2.9 K/UL (ref 1.8–7.7)
NEUTROPHILS NFR BLD: 52 % (ref 38–73)
NONHDLC SERPL-MCNC: 55 MG/DL
NRBC BLD-RTO: 0 /100 WBC
PLATELET # BLD AUTO: 175 K/UL (ref 150–450)
PMV BLD AUTO: 11.8 FL (ref 9.2–12.9)
POTASSIUM SERPL-SCNC: 4.7 MMOL/L (ref 3.5–5.1)
PROT SERPL-MCNC: 6.3 G/DL (ref 6–8.4)
RBC # BLD AUTO: 4.75 M/UL (ref 4.6–6.2)
SODIUM SERPL-SCNC: 144 MMOL/L (ref 136–145)
TRIGL SERPL-MCNC: 47 MG/DL (ref 30–150)
TSH SERPL DL<=0.005 MIU/L-ACNC: 1.53 UIU/ML (ref 0.4–4)
WBC # BLD AUTO: 5.52 K/UL (ref 3.9–12.7)

## 2021-08-20 PROCEDURE — 80061 LIPID PANEL: CPT | Performed by: INTERNAL MEDICINE

## 2021-08-20 PROCEDURE — 84443 ASSAY THYROID STIM HORMONE: CPT | Performed by: INTERNAL MEDICINE

## 2021-08-20 PROCEDURE — 80053 COMPREHEN METABOLIC PANEL: CPT | Performed by: INTERNAL MEDICINE

## 2021-08-20 PROCEDURE — 36415 COLL VENOUS BLD VENIPUNCTURE: CPT | Mod: PO | Performed by: INTERNAL MEDICINE

## 2021-08-20 PROCEDURE — 85025 COMPLETE CBC W/AUTO DIFF WBC: CPT | Performed by: INTERNAL MEDICINE

## 2021-08-21 ENCOUNTER — PATIENT MESSAGE (OUTPATIENT)
Dept: FAMILY MEDICINE | Facility: CLINIC | Age: 76
End: 2021-08-21

## 2021-09-23 ENCOUNTER — OFFICE VISIT (OUTPATIENT)
Dept: FAMILY MEDICINE | Facility: CLINIC | Age: 76
End: 2021-09-23
Payer: MEDICARE

## 2021-09-23 VITALS
HEART RATE: 65 BPM | DIASTOLIC BLOOD PRESSURE: 78 MMHG | HEIGHT: 70 IN | WEIGHT: 226.06 LBS | RESPIRATION RATE: 14 BRPM | BODY MASS INDEX: 32.36 KG/M2 | SYSTOLIC BLOOD PRESSURE: 128 MMHG | TEMPERATURE: 98 F | OXYGEN SATURATION: 99 %

## 2021-09-23 DIAGNOSIS — I65.23 BILATERAL CAROTID ARTERY STENOSIS: ICD-10-CM

## 2021-09-23 DIAGNOSIS — D49.0 IPMN (INTRADUCTAL PAPILLARY MUCINOUS NEOPLASM): ICD-10-CM

## 2021-09-23 DIAGNOSIS — I35.0 MILD AORTIC STENOSIS: ICD-10-CM

## 2021-09-23 DIAGNOSIS — Z23 NEED FOR DIPHTHERIA-TETANUS-PERTUSSIS (TDAP) VACCINE: ICD-10-CM

## 2021-09-23 DIAGNOSIS — L71.9 ROSACEA: ICD-10-CM

## 2021-09-23 DIAGNOSIS — M16.11 PRIMARY OSTEOARTHRITIS OF RIGHT HIP: ICD-10-CM

## 2021-09-23 DIAGNOSIS — E66.01 CLASS 2 SEVERE OBESITY WITH SERIOUS COMORBIDITY AND BODY MASS INDEX (BMI) OF 36.0 TO 36.9 IN ADULT, UNSPECIFIED OBESITY TYPE: ICD-10-CM

## 2021-09-23 DIAGNOSIS — Z85.46 HISTORY OF PROSTATE CANCER: ICD-10-CM

## 2021-09-23 DIAGNOSIS — G47.33 OSA (OBSTRUCTIVE SLEEP APNEA): ICD-10-CM

## 2021-09-23 DIAGNOSIS — E78.5 HYPERLIPIDEMIA, UNSPECIFIED HYPERLIPIDEMIA TYPE: ICD-10-CM

## 2021-09-23 DIAGNOSIS — I25.10 CORONARY ARTERY DISEASE INVOLVING NATIVE CORONARY ARTERY OF NATIVE HEART WITHOUT ANGINA PECTORIS: Primary | ICD-10-CM

## 2021-09-23 DIAGNOSIS — I10 ESSENTIAL HYPERTENSION: ICD-10-CM

## 2021-09-23 DIAGNOSIS — G31.84 MCI (MILD COGNITIVE IMPAIRMENT): ICD-10-CM

## 2021-09-23 PROCEDURE — 99214 PR OFFICE/OUTPT VISIT, EST, LEVL IV, 30-39 MIN: ICD-10-PCS | Mod: S$GLB,,, | Performed by: INTERNAL MEDICINE

## 2021-09-23 PROCEDURE — 99214 OFFICE O/P EST MOD 30 MIN: CPT | Mod: S$GLB,,, | Performed by: INTERNAL MEDICINE

## 2021-09-23 RX ORDER — TETANUS TOXOID, REDUCED DIPHTHERIA TOXOID AND ACELLULAR PERTUSSIS VACCINE, ADSORBED 5; 2.5; 8; 8; 2.5 [IU]/.5ML; [IU]/.5ML; UG/.5ML; UG/.5ML; UG/.5ML
0.5 SUSPENSION INTRAMUSCULAR ONCE
Qty: 0.5 ML | Refills: 0 | Status: SHIPPED | OUTPATIENT
Start: 2021-09-23 | End: 2021-09-23

## 2021-10-18 ENCOUNTER — HOSPITAL ENCOUNTER (OUTPATIENT)
Dept: RADIOLOGY | Facility: HOSPITAL | Age: 76
Discharge: HOME | End: 2021-10-18
Attending: SURGERY
Payer: MEDICARE

## 2021-10-18 DIAGNOSIS — R93.3 ABNORMAL FINDINGS ON DIAGNOSTIC IMAGING OF OTHER PARTS OF DIGESTIVE TRACT: ICD-10-CM

## 2021-10-18 DIAGNOSIS — D49.0 IPMN (INTRADUCTAL PAPILLARY MUCINOUS NEOPLASM): ICD-10-CM

## 2021-10-18 PROCEDURE — A9585 GADOBUTROL INJECTION: HCPCS | Mod: PO | Performed by: SURGERY

## 2021-10-18 PROCEDURE — 76376 3D RENDER W/INTRP POSTPROCES: CPT | Mod: 26,,, | Performed by: RADIOLOGY

## 2021-10-18 PROCEDURE — 76376 MRI ABDOMEN WITH AND WO_INC MRCP: ICD-10-PCS | Mod: 26,,, | Performed by: RADIOLOGY

## 2021-10-18 PROCEDURE — 76376 3D RENDER W/INTRP POSTPROCES: CPT | Mod: TC,PO

## 2021-10-18 PROCEDURE — 74183 MRI ABD W/O CNTR FLWD CNTR: CPT | Mod: 26,,, | Performed by: RADIOLOGY

## 2021-10-18 PROCEDURE — 74183 MRI ABDOMEN WITH AND WO_INC MRCP: ICD-10-PCS | Mod: 26,,, | Performed by: RADIOLOGY

## 2021-10-18 PROCEDURE — 25500020 PHARM REV CODE 255: Mod: PO | Performed by: SURGERY

## 2021-10-18 RX ORDER — GADOBUTROL 604.72 MG/ML
10 INJECTION INTRAVENOUS
Status: COMPLETED | OUTPATIENT
Start: 2021-10-18 | End: 2021-10-18

## 2021-10-18 RX ADMIN — GADOBUTROL 10 ML: 604.72 INJECTION INTRAVENOUS at 02:10

## 2021-10-20 PROBLEM — K86.2 CYST OF PANCREAS: Status: ACTIVE | Noted: 2021-10-20

## 2021-10-25 ENCOUNTER — IMMUNIZATION (OUTPATIENT)
Dept: FAMILY MEDICINE | Facility: CLINIC | Age: 76
End: 2021-10-25
Payer: MEDICARE

## 2021-10-25 DIAGNOSIS — Z23 NEED FOR VACCINATION: Primary | ICD-10-CM

## 2021-10-25 PROCEDURE — 0003A COVID-19, MRNA, LNP-S, PF, 30 MCG/0.3 ML DOSE VACCINE: CPT | Mod: PBBFAC,PO

## 2021-10-25 PROCEDURE — 91300 COVID-19, MRNA, LNP-S, PF, 30 MCG/0.3 ML DOSE VACCINE: CPT | Mod: PBBFAC,PO

## 2021-11-03 ENCOUNTER — OFFICE VISIT (OUTPATIENT)
Dept: HEMATOLOGY/ONCOLOGY | Facility: CLINIC | Age: 76
End: 2021-11-03
Payer: MEDICARE

## 2021-11-03 VITALS
HEART RATE: 65 BPM | WEIGHT: 225.31 LBS | SYSTOLIC BLOOD PRESSURE: 153 MMHG | DIASTOLIC BLOOD PRESSURE: 88 MMHG | BODY MASS INDEX: 33.37 KG/M2 | TEMPERATURE: 97 F | HEIGHT: 69 IN

## 2021-11-03 DIAGNOSIS — K86.2 CYST OF PANCREAS: ICD-10-CM

## 2021-11-03 DIAGNOSIS — R93.3 ABNORMAL FINDINGS ON DIAGNOSTIC IMAGING OF OTHER PARTS OF DIGESTIVE TRACT: ICD-10-CM

## 2021-11-03 DIAGNOSIS — D49.0 IPMN (INTRADUCTAL PAPILLARY MUCINOUS NEOPLASM): ICD-10-CM

## 2021-11-03 PROCEDURE — 99213 PR OFFICE/OUTPT VISIT, EST, LEVL III, 20-29 MIN: ICD-10-PCS | Mod: S$PBB,,, | Performed by: SURGERY

## 2021-11-03 PROCEDURE — 99999 PR PBB SHADOW E&M-EST. PATIENT-LVL III: ICD-10-PCS | Mod: PBBFAC,,, | Performed by: SURGERY

## 2021-11-03 PROCEDURE — 99213 OFFICE O/P EST LOW 20 MIN: CPT | Mod: PBBFAC,PN | Performed by: SURGERY

## 2021-11-03 PROCEDURE — 99213 OFFICE O/P EST LOW 20 MIN: CPT | Mod: S$PBB,,, | Performed by: SURGERY

## 2021-11-03 PROCEDURE — 99999 PR PBB SHADOW E&M-EST. PATIENT-LVL III: CPT | Mod: PBBFAC,,, | Performed by: SURGERY

## 2021-12-28 ENCOUNTER — PATIENT OUTREACH (OUTPATIENT)
Dept: ADMINISTRATIVE | Facility: OTHER | Age: 76
End: 2021-12-28
Payer: MEDICARE

## 2022-03-28 ENCOUNTER — OFFICE VISIT (OUTPATIENT)
Dept: FAMILY MEDICINE | Facility: CLINIC | Age: 77
End: 2022-03-28
Payer: MEDICARE

## 2022-03-28 VITALS
SYSTOLIC BLOOD PRESSURE: 120 MMHG | TEMPERATURE: 99 F | HEIGHT: 69 IN | WEIGHT: 228.63 LBS | HEART RATE: 65 BPM | BODY MASS INDEX: 33.86 KG/M2 | OXYGEN SATURATION: 99 % | DIASTOLIC BLOOD PRESSURE: 60 MMHG | RESPIRATION RATE: 18 BRPM

## 2022-03-28 DIAGNOSIS — E66.01 CLASS 2 SEVERE OBESITY WITH SERIOUS COMORBIDITY AND BODY MASS INDEX (BMI) OF 36.0 TO 36.9 IN ADULT, UNSPECIFIED OBESITY TYPE: ICD-10-CM

## 2022-03-28 DIAGNOSIS — D49.0 IPMN (INTRADUCTAL PAPILLARY MUCINOUS NEOPLASM): ICD-10-CM

## 2022-03-28 DIAGNOSIS — Z85.46 HISTORY OF PROSTATE CANCER: ICD-10-CM

## 2022-03-28 DIAGNOSIS — I10 ESSENTIAL HYPERTENSION: ICD-10-CM

## 2022-03-28 DIAGNOSIS — Z12.5 SCREENING FOR PROSTATE CANCER: ICD-10-CM

## 2022-03-28 DIAGNOSIS — L71.9 ROSACEA: ICD-10-CM

## 2022-03-28 DIAGNOSIS — G31.84 MCI (MILD COGNITIVE IMPAIRMENT): ICD-10-CM

## 2022-03-28 DIAGNOSIS — E78.5 HYPERLIPIDEMIA, UNSPECIFIED HYPERLIPIDEMIA TYPE: ICD-10-CM

## 2022-03-28 DIAGNOSIS — Z12.11 SCREEN FOR COLON CANCER: ICD-10-CM

## 2022-03-28 DIAGNOSIS — I25.10 CORONARY ARTERY DISEASE INVOLVING NATIVE CORONARY ARTERY OF NATIVE HEART WITHOUT ANGINA PECTORIS: Primary | ICD-10-CM

## 2022-03-28 DIAGNOSIS — I65.23 BILATERAL CAROTID ARTERY STENOSIS: ICD-10-CM

## 2022-03-28 DIAGNOSIS — Z86.010 PERSONAL HISTORY OF COLONIC POLYPS: ICD-10-CM

## 2022-03-28 DIAGNOSIS — G47.33 OSA (OBSTRUCTIVE SLEEP APNEA): ICD-10-CM

## 2022-03-28 DIAGNOSIS — I35.0 MILD AORTIC STENOSIS: ICD-10-CM

## 2022-03-28 PROCEDURE — 99214 OFFICE O/P EST MOD 30 MIN: CPT | Mod: S$GLB,,, | Performed by: INTERNAL MEDICINE

## 2022-03-28 PROCEDURE — 99214 PR OFFICE/OUTPT VISIT, EST, LEVL IV, 30-39 MIN: ICD-10-PCS | Mod: S$GLB,,, | Performed by: INTERNAL MEDICINE

## 2022-03-28 NOTE — PROGRESS NOTES
Subjective:       Patient ID: Rohan Abarca is a 77 y.o. male.    Medication List with Changes/Refills   Current Medications    AMLODIPINE (NORVASC) 5 MG TABLET    TAKE 1 TABLET(5 MG) BY MOUTH EVERY DAY    ATORVASTATIN (LIPITOR) 20 MG TABLET    TAKE 1 TABLET(20 MG) BY MOUTH EVERY DAY    MINOCYCLINE (MINOCIN,DYNACIN) 100 MG CAPSULE    TAKE 1 CAPSULE(100 MG) BY MOUTH EVERY DAY    TRETINOIN (RETIN-A) 0.025 % CREAM    Apply small amount to face nightly    UNABLE TO FIND    CLEAN NAILS, SHAKE BOTTLE WELL, APPLY TWICE DAILY TO ALL AFFECTED NAILS USING APPLICATOR. (UP TO 2 MLS/DAY)    VIT A/VIT C/VIT E/ZINC/COPPER (PRESERVISION AREDS ORAL)    Take 1 capsule by mouth 2 (two) times a day.    Discontinued Medications    ASPIRIN (ECOTRIN) 81 MG EC TABLET    Take 1 tablet (81 mg total) by mouth once daily.       Chief Complaint: Follow-up (6 month follow up)  He is here today to f/u on chronic medical issues.     He has hypertension and is taking amlodipine 5 mg qday once a day.  He does not check his BP at home. He denies chest pain or shortness of breath. He had a nuclear stress test on 10/2020 that was negative for ischemia and echo that showed EF 60%, positive diastolic dysfunction, cLVH, and mild AS (PAMELA 1.82, gradient 8).  He has no known CAD but recent CT urogram showed calcification in coronary arteries. .He continues to have lower leg swelling.  He was seen by cardiology on 8/2021 and his dose of atorvastatin was increased.      He had a carotid u/s in 2014 that showed 50% stenosis at carotid bifurcations but no significant stenosis.  Repeat u.s on 10/2020 showed no significant stenosis.      He has hyperlipidemia and is taking atorvastatin 20 mg qday. HIs last lipid were on 8/2021 were 97/47/42/45.  He is taking aspirin daily.      He has REGAN and and uses CPAP nightly. He is tolerating well.      He has history of prostate cancer s/p radical prostatectomy in 2012.  He did not go on hormonal treatment. He no longer  follows with urology.  His last PSA was undetectable on 10/2020.       He had an episode of hematuria and was seen by urology. Urine cytology was negative. CT urogram showed bilateral non-obstructing stones and right renal cyst.  Cystoscopy was normal on 12/2020.  He has not had further bleeding.      Incidental finding on CT urogram was cystic lesion with complexity in the pancreas.MRI on 2/2021 showed Multilocular cystic mass centered in the uncinate process of the pancreas, similar in size as compared to the prior CT on 12/28/2020, with imaging features most suggestive of a side branch intraductal papillary mucinous neoplasm.  He had EUS on 3/2021 that showed gastritis and biopsied cystic lesion in uncinate process of pancreas that was 40 mm. Biopsy was negative. MRI on 5/2021 showed  Increase size of the lesion and then had subsequent EUS on 10/2021 showing cystic uncinate process lesion of pancreas without any duct abnormalities. He was seen by surgical oncology and advised repeat  MRI in 6 months. He is scheduled to have this done in April.      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 He had multiple excisions but bx still showed residual cancer.  He was treated with fluorouracil and did very well. He continues to follow with dermatology every 4 months.      He has rosacea and was started on minocycline 100 mg q3 days. He does have some discoloration around his neck and on his face but does not want to stop treatment. He was last seen by dermatology on 6/2021.       He had complained of memory issues. He was seen by neurology in 6/2017 who ordered MRI (chronic microvascular ischemic changes) and neuropsych testing which was normal.  He was advised to start statin and aspirin.       He complain of continue right hip pain diagnosed by xray as OA. He was seen by orthopedics on 6/2020 and has a steroid injection without much relief. He is not able to walk or do cardio exercises at  "the gym due to his right hip pain. His pain is a constant dull low ache.  Worse with walking and flexing. Better with rest.       He lives with his wife and feels safe at home. He is exercising at the gym 4 days a week. He tries to eat healthy.  He denies any balance issues or falls.        Colonoscopy----3/2017 repeat in 8 years  Tdap---more than 10 years  Pneumovax---12/2018  Prevnar----5/2018  Influenza vaccine----12/2018---refused  Shingrex vaccine----8/2020, 12/2020  Covid vaccine---UTD x 3 doses   AAA screen---10/2016 neg      Review of Systems   Constitutional: Negative for appetite change, fatigue, fever and unexpected weight change.   HENT: Negative for congestion, ear pain, hearing loss, sore throat and trouble swallowing.    Eyes: Negative for pain and visual disturbance.   Respiratory: Negative for cough, chest tightness, shortness of breath and wheezing.    Cardiovascular: Negative for chest pain, palpitations and leg swelling.   Gastrointestinal: Negative for abdominal pain, blood in stool, constipation, diarrhea, nausea and vomiting.   Endocrine: Negative for polyuria.   Genitourinary: Negative for dysuria and hematuria.   Musculoskeletal: Positive for arthralgias. Negative for back pain and myalgias.   Skin: Negative for rash.   Neurological: Negative for dizziness, weakness, numbness and headaches.   Hematological: Does not bruise/bleed easily.   Psychiatric/Behavioral: Negative for dysphoric mood, sleep disturbance and suicidal ideas. The patient is not nervous/anxious.        Objective:      Vitals:    03/28/22 1307   BP: 120/60   Pulse: 65   Resp: 18   Temp: 98.7 °F (37.1 °C)   SpO2: 99%   Weight: 103.7 kg (228 lb 9.9 oz)   Height: 5' 9" (1.753 m)     Body mass index is 33.76 kg/m².  Physical Exam    General appearance: No acute distress, cooperative  Eyes: PERRL, EOMI, conjunctiva clear  Ears: normal external ear and pinna, tm clear without drainage, canals clear  Nose: Normal mucosa without " drainage  Throat: no exudates or erythema, tonsils not enlarged  Mouth: no sores or lesions, moist mucous membranes  Neck: FROM, soft, supple, no thyromegaly, no bruits  Lymph: no anterior or posterior cervical adenopathy  Heart::  Regular rate and rhythm, no murmur  Lung: Clear to ascultation bilaterally, no wheezing, no rales, no rhonchi, no distress  Abdomen: Soft, nontender, no distention, no hepatosplenomegaly, bowel sounds normal, no guarding, no rebound, no peritoneal signs  Skin: no rashes, no lesions  Extremities: 1+ pitting edema bilateral lower legs,  no cyanosis  Neuro: CN 2-12 intact, 5/5 muscle strength upper and lower extremity bilaterally, 2+ DTRs UE and LE bilaterally, normal gait  Peripheral pulses: 2+ pedal pulses bilaterally, good perfusion and color  Musculoskeletal: FROM, good strenth, no tenderness  Joint: normal appearance, no swelling, no warmth, no deformity in all joints    Assessment:       1. Coronary artery disease involving native coronary artery of native heart without angina pectoris    2. Mild aortic stenosis    3. Essential hypertension    4. Hyperlipidemia, unspecified hyperlipidemia type    5. Bilateral carotid artery stenosis    6. IPMN (intraductal papillary mucinous neoplasm)    7. Rosacea    8. MCI (mild cognitive impairment)    9. History of prostate cancer    10. REGAN (obstructive sleep apnea)    11. Class 2 severe obesity with serious comorbidity and body mass index (BMI) of 36.0 to 36.9 in adult, unspecified obesity type    12. Screening for prostate cancer    13. Screen for colon cancer    14. Personal history of colonic polyps         Plan:       Coronary artery disease involving native coronary artery of native heart without angina pectoris  Stable and no active symptoms.    Mild aortic stenosis  Asymptomatic and continue to monitor.    Essential hypertension  Well controlled and continue current regimen.   -     CBC Auto Differential; Future; Expected date:  03/28/2022  -     Comprehensive Metabolic Panel; Future; Expected date: 03/28/2022  -     Lipid Panel; Future; Expected date: 03/28/2022  -     TSH; Future; Expected date: 03/28/2022    Hyperlipidemia, unspecified hyperlipidemia type  Good control on atorvastatin. Continue aspirin daily.    Bilateral carotid artery stenosis  Continue statin and aspirin.    IPMN (intraductal papillary mucinous neoplasm)  Due to get MRI in April and then f/u with surgical oncology.    Rosacea  Improved on minocycline.    MCI (mild cognitive impairment)  Mild and continue to monitor.    History of prostate cancer  No active disease.     REGAN (obstructive sleep apnea)  Stable and patient is compliant with use. Patient benefits from regular use of CPAP.     Class 2 severe obesity with serious comorbidity and body mass index (BMI) of 36.0 to 36.9 in adult, unspecified obesity type  Long discussion on the benefits of healthy eating and regular exercise to help lose weight and help control cad, hypertension and hyperlipidemia.     Screening for prostate cancer  -     PSA, Screening; Future; Expected date: 03/28/2022    Screen for colon cancer  -     Case Request Endoscopy: COLONOSCOPY    Personal history of colonic polyps   -     Case Request Endoscopy: COLONOSCOPY    Follow up in about 6 months (around 9/28/2022) for chronic medical issues.

## 2022-03-29 ENCOUNTER — TELEPHONE (OUTPATIENT)
Dept: GASTROENTEROLOGY | Facility: CLINIC | Age: 77
End: 2022-03-29
Payer: MEDICARE

## 2022-04-12 ENCOUNTER — TELEPHONE (OUTPATIENT)
Dept: SURGERY | Facility: CLINIC | Age: 77
End: 2022-04-12
Payer: MEDICARE

## 2022-04-12 NOTE — TELEPHONE ENCOUNTER
Called pt to reschedule appt. with Dr. THRASHER on 5/18 due to provider being out that day. Left message with call back number and 2 options (virtual visit or in-person visit at main campus with NP) for pt.

## 2022-05-05 ENCOUNTER — TELEPHONE (OUTPATIENT)
Dept: GASTROENTEROLOGY | Facility: CLINIC | Age: 77
End: 2022-05-05
Payer: MEDICARE

## 2022-05-05 NOTE — TELEPHONE ENCOUNTER
Spoke with pt. Verified pt still has prep instructions for upcoming procedure with Dr. Marcelino. Pt stated they did not-Emailed prep instructions to pt . Pt informed surgery center will call day or two prior with arrival time. Pt verbalized understanding to all.

## 2022-05-06 ENCOUNTER — TELEPHONE (OUTPATIENT)
Dept: GASTROENTEROLOGY | Facility: CLINIC | Age: 77
End: 2022-05-06
Payer: MEDICARE

## 2022-05-06 NOTE — TELEPHONE ENCOUNTER
----- Message from Diane England sent at 5/6/2022  7:36 AM CDT -----  Type: Needs Medical Advice  Who Called:  PT  Symptoms (please be specific):  Pt is asking to reschedule his procedure    Best Call Back Number: [phone]  Additional Information: Please call pt and reschedule

## 2022-05-06 NOTE — TELEPHONE ENCOUNTER
----- Message from Yael Stinson sent at 5/6/2022 12:11 PM CDT -----  Type:  Patient Returning Call         Who Called:  TERESA REGALADO [[id number]]         Who Left Message for Patient: Maribel Claudio LPN         Does the patient know what this is regarding?: yes         Would the patient rather a call back or a response via [facility]?  Call back         Best Call Back Number:[phone]         Additional Information:

## 2022-05-06 NOTE — TELEPHONE ENCOUNTER
Spoke with pt. Rescheduled procedure per pt request. Pt verbalized understanding to new date & location.

## 2022-05-16 ENCOUNTER — PATIENT MESSAGE (OUTPATIENT)
Dept: FAMILY MEDICINE | Facility: CLINIC | Age: 77
End: 2022-05-16
Payer: MEDICARE

## 2022-05-16 RX ORDER — OLMESARTAN MEDOXOMIL 20 MG/1
20 TABLET ORAL DAILY
Qty: 90 TABLET | Refills: 3 | Status: SHIPPED | OUTPATIENT
Start: 2022-05-16 | End: 2022-08-30 | Stop reason: SDUPTHER

## 2022-05-16 NOTE — TELEPHONE ENCOUNTER
Will start olmesartan 20 mg daily for uncontrolled readings.     Please call to make appt for nurse visit for BP check     Thanks

## 2022-05-17 PROBLEM — Z71.89 ACP (ADVANCE CARE PLANNING): Status: ACTIVE | Noted: 2022-05-17

## 2022-05-18 ENCOUNTER — PATIENT MESSAGE (OUTPATIENT)
Dept: FAMILY MEDICINE | Facility: CLINIC | Age: 77
End: 2022-05-18
Payer: MEDICARE

## 2022-05-18 ENCOUNTER — TELEPHONE (OUTPATIENT)
Dept: FAMILY MEDICINE | Facility: CLINIC | Age: 77
End: 2022-05-18
Payer: MEDICARE

## 2022-05-18 ENCOUNTER — TELEPHONE (OUTPATIENT)
Dept: CARDIOLOGY | Facility: CLINIC | Age: 77
End: 2022-05-18

## 2022-05-18 DIAGNOSIS — I47.10 SVT (SUPRAVENTRICULAR TACHYCARDIA): Primary | ICD-10-CM

## 2022-05-18 NOTE — TELEPHONE ENCOUNTER
----- Message from Janet Ferguson MA sent at 5/18/2022  4:20 PM CDT -----  Type: Needs Medical Advice  Who Called:  Shantel BRAGA  Best Call Back Number: [phone]  Additional Information: patient needs hosp follow up within two weeks.  Please call patient to schedule

## 2022-05-18 NOTE — TELEPHONE ENCOUNTER
REC. CALL FROM ERIN HALLMAN / DR. FREDERICK HERNANDEZ FOR PT TO SEE DR. NICHOLSON FOR SVT, REFERRAL IN SYSTEM  X

## 2022-05-30 ENCOUNTER — OFFICE VISIT (OUTPATIENT)
Dept: FAMILY MEDICINE | Facility: CLINIC | Age: 77
End: 2022-05-30
Payer: MEDICARE

## 2022-05-30 VITALS
TEMPERATURE: 99 F | HEIGHT: 69 IN | RESPIRATION RATE: 20 BRPM | BODY MASS INDEX: 34.55 KG/M2 | OXYGEN SATURATION: 99 % | WEIGHT: 233.25 LBS | HEART RATE: 71 BPM | SYSTOLIC BLOOD PRESSURE: 152 MMHG | DIASTOLIC BLOOD PRESSURE: 89 MMHG

## 2022-05-30 DIAGNOSIS — I10 ESSENTIAL HYPERTENSION: ICD-10-CM

## 2022-05-30 DIAGNOSIS — I47.10 SVT (SUPRAVENTRICULAR TACHYCARDIA): Primary | ICD-10-CM

## 2022-05-30 DIAGNOSIS — N20.0 KIDNEY STONE: ICD-10-CM

## 2022-05-30 PROCEDURE — 99214 PR OFFICE/OUTPT VISIT, EST, LEVL IV, 30-39 MIN: ICD-10-PCS | Mod: S$GLB,,, | Performed by: INTERNAL MEDICINE

## 2022-05-30 PROCEDURE — 99214 OFFICE O/P EST MOD 30 MIN: CPT | Mod: S$GLB,,, | Performed by: INTERNAL MEDICINE

## 2022-05-30 RX ORDER — METOPROLOL SUCCINATE 25 MG/1
25 TABLET, EXTENDED RELEASE ORAL DAILY
Qty: 30 TABLET | Refills: 11 | Status: SHIPPED | OUTPATIENT
Start: 2022-05-30 | End: 2023-03-20

## 2022-05-30 NOTE — PROGRESS NOTES
Subjective:       Patient ID: Rohan Abarca is a 77 y.o. male.    Medication List with Changes/Refills   New Medications    METOPROLOL SUCCINATE (TOPROL-XL) 25 MG 24 HR TABLET    Take 1 tablet (25 mg total) by mouth once daily.   Current Medications    AMLODIPINE (NORVASC) 5 MG TABLET    TAKE 1 TABLET(5 MG) BY MOUTH EVERY DAY    ASPIRIN (ECOTRIN) 81 MG EC TABLET    Take 81 mg by mouth every evening.    ATORVASTATIN (LIPITOR) 20 MG TABLET    TAKE 1 TABLET(20 MG) BY MOUTH EVERY DAY    BIOTIN ORAL    Take 1 capsule by mouth once daily.    BISMUTH SUBSALICYLATE (PEPTO BISMOL) 262 MG/15 ML SUSPENSION    Take 15 mLs by mouth every 6 (six) hours as needed for Indigestion.    HYDROCODONE-ACETAMINOPHEN (NORCO) 5-325 MG PER TABLET    Take 1 tablet by mouth every 8 (eight) hours as needed for Pain.    MINOCYCLINE (MINOCIN,DYNACIN) 100 MG CAPSULE    TAKE 1 CAPSULE(100 MG) BY MOUTH EVERY DAY    OLMESARTAN (BENICAR) 20 MG TABLET    Take 1 tablet (20 mg total) by mouth once daily.    ONDANSETRON (ZOFRAN-ODT) 4 MG TBDL    Take 1 tablet (4 mg total) by mouth every 8 (eight) hours as needed (nausea).    TAMSULOSIN (FLOMAX) 0.4 MG CAP    Take 1 capsule (0.4 mg total) by mouth once daily. for 7 days    VIT A/VIT C/VIT E/ZINC/COPPER (PRESERVISION AREDS ORAL)    Take 2 capsules by mouth every evening.       Chief Complaint: Hospital Follow Up  He is here today to f/u on hospital stay and 2 ER visits.     He was admitted to [facility] on 5/17/2022 for palpitations with dizziness and found to be in SVT.  He was given adenosine x 2, calcium IV and magnesium IV and amiodarone without response. He was seen by cardiology and has successful cardioversion.  Stress test was negative for ischemia. Echo showed cLVH, EF 60%, positive diastolic dysfunction, severe LAE, mild JOHN, mild AS (PAMELA 1.94, gradient 11), mild TR and PAP of 23.  He was d/c home without any changes to his regimen and scheduled to see EP Dr. Patiño on June 6th.  He has had recurrent  "events that last minutes to 2 hrs.  Yesterday morning he had an event associated with jaw pain and chest pressure. He does not feel well when he is in this rhythm.  In ER yesterday his cardiac enzymes were negative and d/c home.     He was seen in ER on 5/28/2022 for acute abdominal pain with blood in urine. CT showed obstructing 2 mm stone in the left proximal ureter, bilateral non-obstructing stones in both kidneys, and enlarging pancreatic mass. Today he denies any further pain. No further blood in urine. No fever or pain with urination. Labs show creatinine of 1.2 with GFR of 55.  He is scheduled to see surgical oncology with MRI on 6/1/2022 to f/u on the pancreatic mass.     Review of Systems   Constitutional: Negative for appetite change, fatigue, fever and unexpected weight change.   HENT: Negative for congestion, ear pain, hearing loss, sore throat and trouble swallowing.    Eyes: Negative for pain and visual disturbance.   Respiratory: Negative for cough, chest tightness, shortness of breath and wheezing.    Cardiovascular: Positive for chest pain and palpitations. Negative for leg swelling.   Gastrointestinal: Negative for abdominal pain, blood in stool, constipation, diarrhea, nausea and vomiting.   Endocrine: Negative for polyuria.   Genitourinary: Negative for dysuria and hematuria.   Musculoskeletal: Negative for arthralgias, back pain and myalgias.   Skin: Negative for rash.   Neurological: Positive for dizziness, light-headedness and headaches. Negative for weakness and numbness.   Hematological: Does not bruise/bleed easily.   Psychiatric/Behavioral: Negative for dysphoric mood, sleep disturbance and suicidal ideas. The patient is not nervous/anxious.        Objective:      Vitals:    05/30/22 1106   BP: (!) 152/89   Pulse: 71   Resp: 20   Temp: 98.8 °F (37.1 °C)   SpO2: 99%   Weight: 105.8 kg (233 lb 4 oz)   Height: 5' 9" (1.753 m)     Body mass index is 34.44 kg/m².  Physical Exam    General " appearance: No acute distress, cooperative  Neck: FROM, soft, supple, no thyromegaly, no bruits  Lymph: no anterior or posterior cervical adenopathy  Heart::  Regular rate and rhythm, no murmur  Lung: Clear to ascultation bilaterally, no wheezing, no rales, no rhonchi, no distress  Abdomen: Soft, nontender, no distention, no hepatosplenomegaly, bowel sounds normal, no guarding, no rebound, no peritoneal signs  Skin: no rashes, no lesions  Extremities: trace lower leg edema, no cyanosis  Peripheral pulses: 2+ pedal pulses bilaterally, good perfusion and color  Musculoskeletal: FROM, good strenth, no tenderness    Assessment:       1. SVT (supraventricular tachycardia)    2. Essential hypertension    3. Kidney stone        Plan:       SVT (supraventricular tachycardia)  He continues with intermittent episodes. Due to see EP in one week.      Essential hypertension  Uncontrolled and will add metoprolol to his regimen. Nurse to visit to recheck BP in one week.   -     metoprolol succinate (TOPROL-XL) 25 MG 24 hr tablet; Take 1 tablet (25 mg total) by mouth once daily.  Dispense: 30 tablet; Refill: 11    Kidney stone  He passed stone and denies any pain today.     Follow up in about 1 week (around 6/6/2022) for nurse visit for BP check.

## 2022-06-01 ENCOUNTER — HOSPITAL ENCOUNTER (OUTPATIENT)
Dept: RADIOLOGY | Facility: HOSPITAL | Age: 77
Discharge: HOME | End: 2022-06-01
Attending: SURGERY
Payer: MEDICARE

## 2022-06-01 ENCOUNTER — TELEPHONE (OUTPATIENT)
Dept: HEMATOLOGY/ONCOLOGY | Facility: CLINIC | Age: 77
End: 2022-06-01
Payer: MEDICARE

## 2022-06-01 ENCOUNTER — OFFICE VISIT (OUTPATIENT)
Dept: HEMATOLOGY/ONCOLOGY | Facility: CLINIC | Age: 77
End: 2022-06-01
Payer: MEDICARE

## 2022-06-01 VITALS
WEIGHT: 230.63 LBS | OXYGEN SATURATION: 97 % | RESPIRATION RATE: 16 BRPM | BODY MASS INDEX: 34.16 KG/M2 | DIASTOLIC BLOOD PRESSURE: 75 MMHG | HEIGHT: 69 IN | HEART RATE: 60 BPM | TEMPERATURE: 98 F | SYSTOLIC BLOOD PRESSURE: 121 MMHG

## 2022-06-01 DIAGNOSIS — R93.3 ABNORMAL FINDINGS ON DIAGNOSTIC IMAGING OF OTHER PARTS OF DIGESTIVE TRACT: ICD-10-CM

## 2022-06-01 DIAGNOSIS — D49.0 IPMN (INTRADUCTAL PAPILLARY MUCINOUS NEOPLASM): Primary | ICD-10-CM

## 2022-06-01 DIAGNOSIS — K86.2 CYST OF PANCREAS: ICD-10-CM

## 2022-06-01 DIAGNOSIS — D49.0 IPMN (INTRADUCTAL PAPILLARY MUCINOUS NEOPLASM): ICD-10-CM

## 2022-06-01 PROCEDURE — 74183 MRI ABD W/O CNTR FLWD CNTR: CPT | Mod: TC,PO

## 2022-06-01 PROCEDURE — 76376 MRI ABDOMEN WITH AND WO_INC MRCP: ICD-10-PCS | Mod: 26,,, | Performed by: RADIOLOGY

## 2022-06-01 PROCEDURE — 76376 3D RENDER W/INTRP POSTPROCES: CPT | Mod: 26,,, | Performed by: RADIOLOGY

## 2022-06-01 PROCEDURE — 25500020 PHARM REV CODE 255: Mod: PO | Performed by: SURGERY

## 2022-06-01 PROCEDURE — 74183 MRI ABD W/O CNTR FLWD CNTR: CPT | Mod: 26,,, | Performed by: RADIOLOGY

## 2022-06-01 PROCEDURE — 74183 MRI ABDOMEN WITH AND WO_INC MRCP: ICD-10-PCS | Mod: 26,,, | Performed by: RADIOLOGY

## 2022-06-01 PROCEDURE — 99999 PR PBB SHADOW E&M-EST. PATIENT-LVL V: CPT | Mod: PBBFAC,,, | Performed by: SURGERY

## 2022-06-01 PROCEDURE — 99214 PR OFFICE/OUTPT VISIT, EST, LEVL IV, 30-39 MIN: ICD-10-PCS | Mod: S$PBB,,, | Performed by: SURGERY

## 2022-06-01 PROCEDURE — 99999 PR PBB SHADOW E&M-EST. PATIENT-LVL V: ICD-10-PCS | Mod: PBBFAC,,, | Performed by: SURGERY

## 2022-06-01 PROCEDURE — A9585 GADOBUTROL INJECTION: HCPCS | Mod: PO | Performed by: SURGERY

## 2022-06-01 PROCEDURE — 99214 OFFICE O/P EST MOD 30 MIN: CPT | Mod: S$PBB,,, | Performed by: SURGERY

## 2022-06-01 PROCEDURE — 99215 OFFICE O/P EST HI 40 MIN: CPT | Mod: PBBFAC,25,PN | Performed by: SURGERY

## 2022-06-01 RX ORDER — GADOBUTROL 604.72 MG/ML
10 INJECTION INTRAVENOUS
Status: COMPLETED | OUTPATIENT
Start: 2022-06-01 | End: 2022-06-01

## 2022-06-01 RX ADMIN — GADOBUTROL 10 ML: 604.72 INJECTION INTRAVENOUS at 11:06

## 2022-06-01 NOTE — PROGRESS NOTES
F/u for side branch IPMN first seen by me 5/21. This was initially discovered incidentally and is asymptomatic. He has a family history (sister) of PDAC.   Since his last visit with me, he developed atrial tachycardia three weeks ago with VR of 200 requiring cardioversion in the ER. Also last week he had an episode of left ureteral colic from a kidney stone which has since passed. No other interval problems    Past Medical History:   Diagnosis Date    Bilateral cataracts     hx    Colon polyp     benign    Gross hematuria     HBP (high blood pressure)     Hearing loss     History of detached retina repair     left    Macular degeneration     REGAN (obstructive sleep apnea)     uses CPAP    Prostate cancer     Rosacea     Squamous cell carcinoma of skin      Past Surgical History:   Procedure Laterality Date    BUNIONECTOMY Right     CATARACT EXTRACTION BILATERAL W/ ANTERIOR VITRECTOMY Bilateral     COLONOSCOPY N/A 3/20/2017    Procedure: COLONOSCOPY;  Surgeon: Carl Marcelino MD;  Location: [facility];  Service: Endoscopy;  Laterality: N/A;    CYSTOSCOPY N/A 12/30/2020    Procedure: CYSTOSCOPY;  Surgeon: Kevin De Leon Jr., MD;  Location: Replaced by [facility];  Service: Urology;  Laterality: N/A;    ENDOSCOPIC ULTRASOUND OF UPPER GASTROINTESTINAL TRACT Left 3/12/2021    Procedure: ULTRASOUND, UPPER GI TRACT, ENDOSCOPIC;  Surgeon: Carlton Oliveros MD;  Location: [facility];  Service: Endoscopy;  Laterality: Left;  Linear    ENDOSCOPIC ULTRASOUND OF UPPER GASTROINTESTINAL TRACT Left 10/20/2021    Procedure: ULTRASOUND, UPPER GI TRACT, ENDOSCOPIC;  Surgeon: Carlton Oliveros MD;  Location: [facility];  Service: Endoscopy;  Laterality: Left;  Linear scope    ESOPHAGOGASTRODUODENOSCOPY N/A 3/12/2021    Procedure: EGD (ESOPHAGOGASTRODUODENOSCOPY);  Surgeon: Carlton Oliveros MD;  Location: [facility];  Service: Endoscopy;  Laterality: N/A;    ESOPHAGOGASTRODUODENOSCOPY N/A 10/20/2021    Procedure: EGD  (ESOPHAGOGASTRODUODENOSCOPY);  Surgeon: Carlton Oliveros MD;  Location: [facility];  Service: Endoscopy;  Laterality: N/A;    HERNIA REPAIR      x 2    JOINT REPLACEMENT Right 2012    per Dr. Heriberto Colón    JOINT REPLACEMENT Left 2004    per Dr. Heriberto Colón    KNEE SURGERY      left , right    MASTOID SURGERY Right     right    PROSTATECTOMY  2012    RETINAL DETACHMENT SURGERY      left    ROTATOR CUFF REPAIR      bilateral    SHOULDER SURGERY Left     RCR per Dr. Heriberto Colón    SHOULDER SURGERY Right     RCR per Dr. Heriberto Colón    TONSILLECTOMY, ADENOIDECTOMY     '  Medication List with Changes/Refills   Current Medications    AMLODIPINE (NORVASC) 5 MG TABLET    TAKE 1 TABLET(5 MG) BY MOUTH EVERY DAY    ASPIRIN (ECOTRIN) 81 MG EC TABLET    Take 81 mg by mouth every evening.    ATORVASTATIN (LIPITOR) 20 MG TABLET    TAKE 1 TABLET(20 MG) BY MOUTH EVERY DAY    BIOTIN ORAL    Take 1 capsule by mouth once daily.    BISMUTH SUBSALICYLATE (PEPTO BISMOL) 262 MG/15 ML SUSPENSION    Take 15 mLs by mouth every 6 (six) hours as needed for Indigestion.    HYDROCODONE-ACETAMINOPHEN (NORCO) 5-325 MG PER TABLET    Take 1 tablet by mouth every 8 (eight) hours as needed for Pain.    METOPROLOL SUCCINATE (TOPROL-XL) 25 MG 24 HR TABLET    Take 1 tablet (25 mg total) by mouth once daily.    MINOCYCLINE (MINOCIN,DYNACIN) 100 MG CAPSULE    TAKE 1 CAPSULE(100 MG) BY MOUTH EVERY DAY    OLMESARTAN (BENICAR) 20 MG TABLET    Take 1 tablet (20 mg total) by mouth once daily.    ONDANSETRON (ZOFRAN-ODT) 4 MG TBDL    Take 1 tablet (4 mg total) by mouth every 8 (eight) hours as needed (nausea).    TAMSULOSIN (FLOMAX) 0.4 MG CAP    Take 1 capsule (0.4 mg total) by mouth once daily. for 7 days    VIT A/VIT C/VIT E/ZINC/COPPER (PRESERVISION AREDS ORAL)    Take 2 capsules by mouth every evening.     Review of patient's allergies indicates:   Allergen Reactions    Ciprofloxacin Rash     Rash with redness even at a very slow rate.      Vitals:    06/01/22 1316   BP: 121/75   Pulse: 60   Resp: 16   Temp: 98.3 °F (36.8 °C)     WD WN man in NAD  Heart: RR&R  Abdomen: obese, soft    CT scan done 5/27 personally reviewed: there has been a slight increase in the size of the cystic lesion of the HOP. This does not (at least not yet) meet criteria for rapid growth.     Imp:  Side branch IPMN with one worrisome feature (size) as well as downstream mild PD dilation  Recent onset of atrial tachycardia, currently being evaluated by Dr Patiño    Rec:  Continue to watch and wait, with repeat EUS by Dr Oliveros in six months and RTC  Discussed with Mr Abarca and his wife.

## 2022-06-02 DIAGNOSIS — I49.8 OTHER SPECIFIED CARDIAC ARRHYTHMIAS: Primary | ICD-10-CM

## 2022-06-03 PROBLEM — I45.10 RBBB: Status: ACTIVE | Noted: 2022-06-03

## 2022-06-03 NOTE — PROGRESS NOTES
Subjective:    Patient ID:  Rohan Abarca is a 77 y.o. male who presents for evaluation of SVT (Ref by Dr. Stevenson )      HPI 78 yo male with SVT, Htn, sleep apnea (CPAP), RBBB.  Primary cardiologist is Dr. Stevenson  Presented with sustained palpitations 5/17/22, noted to be in SVT with RBBB aberrancy. Notes indicate DCCVx3, followed by recurrence triggered by PAC's. Briefly put on amiodarone. Discharged on metoprolol.    Returned 5/29/22 with palpitations, headaches and chest pain. ECG revealed nsr. He reports symptoms improved prior to presentation.    Echo 5/17/22 normal biventricular structure and function severe LAE  Spect stress 5/17/22 negative    Baseline ECG reveals RBBB, no pre-excitation.  Today HR is 47 bpm.    Review of Systems   Constitutional: Negative. Negative for fever and malaise/fatigue.   HENT: Negative for congestion and sore throat.    Cardiovascular: Positive for palpitations. Negative for chest pain, dyspnea on exertion, irregular heartbeat, leg swelling, near-syncope, orthopnea, paroxysmal nocturnal dyspnea and syncope.   Respiratory: Negative for cough and shortness of breath.    Gastrointestinal: Negative for abdominal pain, constipation and diarrhea.   Neurological: Negative for dizziness, light-headedness and weakness.   Psychiatric/Behavioral: Negative for depression. The patient is not nervous/anxious.    All other systems reviewed and are negative.       Objective:    Physical Exam  Constitutional:       Appearance: He is well-developed.   Eyes:      General: No scleral icterus.     Conjunctiva/sclera: Conjunctivae normal.   Neck:      Vascular: No JVD.      Trachea: No tracheal deviation.   Cardiovascular:      Rate and Rhythm: Regular rhythm. Bradycardia present.      Chest Wall: PMI is not displaced.      Heart sounds: Normal heart sounds.   Pulmonary:      Effort: Pulmonary effort is normal. No respiratory distress.      Breath sounds: Normal breath sounds.   Abdominal:       Palpations: Abdomen is soft.      Tenderness: There is no abdominal tenderness.   Musculoskeletal:         General: No tenderness.   Skin:     General: Skin is warm and dry.      Findings: No rash.   Neurological:      Mental Status: He is alert and oriented to person, place, and time.   Psychiatric:         Behavior: Behavior normal.           Assessment:       1. SVT (supraventricular tachycardia)    2. Essential hypertension    3. Mild aortic stenosis    4. REGAN (obstructive sleep apnea)    5. RBBB    6. Bradycardia         Plan:           SVT. There is a small  Possibility that this may have been AF.  He is feeling better overall, but is bradycardic.  Recommend RFA for SVT.  Risks and benefits discussed, he would like to proceed.  If this is a left sided AT >> defer RFA.  Hold Toprol 3 days prior.

## 2022-06-06 ENCOUNTER — OFFICE VISIT (OUTPATIENT)
Dept: CARDIOLOGY | Facility: CLINIC | Age: 77
End: 2022-06-06
Payer: MEDICARE

## 2022-06-06 VITALS
WEIGHT: 237 LBS | DIASTOLIC BLOOD PRESSURE: 71 MMHG | BODY MASS INDEX: 35.1 KG/M2 | SYSTOLIC BLOOD PRESSURE: 122 MMHG | HEIGHT: 69 IN | HEART RATE: 47 BPM

## 2022-06-06 DIAGNOSIS — I35.0 MILD AORTIC STENOSIS: ICD-10-CM

## 2022-06-06 DIAGNOSIS — I47.10 SVT (SUPRAVENTRICULAR TACHYCARDIA): Primary | ICD-10-CM

## 2022-06-06 DIAGNOSIS — G47.33 OSA (OBSTRUCTIVE SLEEP APNEA): ICD-10-CM

## 2022-06-06 DIAGNOSIS — I10 ESSENTIAL HYPERTENSION: ICD-10-CM

## 2022-06-06 DIAGNOSIS — R00.1 BRADYCARDIA: ICD-10-CM

## 2022-06-06 DIAGNOSIS — I45.10 RBBB: ICD-10-CM

## 2022-06-06 PROCEDURE — 99999 PR PBB SHADOW E&M-EST. PATIENT-LVL III: CPT | Mod: PBBFAC,,, | Performed by: INTERNAL MEDICINE

## 2022-06-06 PROCEDURE — 99205 PR OFFICE/OUTPT VISIT, NEW, LEVL V, 60-74 MIN: ICD-10-PCS | Mod: S$PBB,,, | Performed by: INTERNAL MEDICINE

## 2022-06-06 PROCEDURE — 99205 OFFICE O/P NEW HI 60 MIN: CPT | Mod: S$PBB,,, | Performed by: INTERNAL MEDICINE

## 2022-06-06 PROCEDURE — 99999 PR PBB SHADOW E&M-EST. PATIENT-LVL III: ICD-10-PCS | Mod: PBBFAC,,, | Performed by: INTERNAL MEDICINE

## 2022-06-06 PROCEDURE — 99213 OFFICE O/P EST LOW 20 MIN: CPT | Mod: PBBFAC,PO | Performed by: INTERNAL MEDICINE

## 2022-06-09 ENCOUNTER — TELEPHONE (OUTPATIENT)
Dept: ELECTROPHYSIOLOGY | Facility: CLINIC | Age: 77
End: 2022-06-09
Payer: MEDICARE

## 2022-06-09 ENCOUNTER — PATIENT MESSAGE (OUTPATIENT)
Dept: ELECTROPHYSIOLOGY | Facility: CLINIC | Age: 77
End: 2022-06-09
Payer: MEDICARE

## 2022-06-09 DIAGNOSIS — I49.9 CARDIAC ARRHYTHMIA, UNSPECIFIED CARDIAC ARRHYTHMIA TYPE: ICD-10-CM

## 2022-06-09 DIAGNOSIS — I47.10 SVT (SUPRAVENTRICULAR TACHYCARDIA): ICD-10-CM

## 2022-06-09 DIAGNOSIS — I49.8 OTHER SPECIFIED CARDIAC ARRHYTHMIAS: Primary | ICD-10-CM

## 2022-06-09 NOTE — TELEPHONE ENCOUNTER
Spoke with patient and scheduled his SVT/RFA for 7/7/2022. Procedure details reviewed and instructions will be sent via patient portal and mail as requested. Explained that we need to hold metoprolol for 3 days prior tot he procedure and his last dose will be 7/3/2022.

## 2022-06-09 NOTE — TELEPHONE ENCOUNTER
----- Message from Sarah Beth Morfin MA sent at 6/9/2022  9:54 AM CDT -----  The patient would like to talk to the nurse about his procedure please call [phone]. Thank you     in ASU:

## 2022-06-20 ENCOUNTER — IMMUNIZATION (OUTPATIENT)
Dept: FAMILY MEDICINE | Facility: CLINIC | Age: 77
End: 2022-06-20
Payer: MEDICARE

## 2022-06-20 ENCOUNTER — OFFICE VISIT (OUTPATIENT)
Dept: CARDIOLOGY | Facility: CLINIC | Age: 77
End: 2022-06-20
Payer: MEDICARE

## 2022-06-20 VITALS
BODY MASS INDEX: 34.78 KG/M2 | WEIGHT: 234.81 LBS | SYSTOLIC BLOOD PRESSURE: 135 MMHG | DIASTOLIC BLOOD PRESSURE: 73 MMHG | HEART RATE: 42 BPM | HEIGHT: 69 IN

## 2022-06-20 DIAGNOSIS — I35.0 MILD AORTIC STENOSIS: Primary | ICD-10-CM

## 2022-06-20 DIAGNOSIS — E78.2 MIXED HYPERLIPIDEMIA: ICD-10-CM

## 2022-06-20 DIAGNOSIS — E66.01 CLASS 2 SEVERE OBESITY WITH SERIOUS COMORBIDITY AND BODY MASS INDEX (BMI) OF 36.0 TO 36.9 IN ADULT, UNSPECIFIED OBESITY TYPE: ICD-10-CM

## 2022-06-20 DIAGNOSIS — G47.33 OSA (OBSTRUCTIVE SLEEP APNEA): ICD-10-CM

## 2022-06-20 DIAGNOSIS — I10 ESSENTIAL HYPERTENSION: ICD-10-CM

## 2022-06-20 DIAGNOSIS — Z23 NEED FOR VACCINATION: Primary | ICD-10-CM

## 2022-06-20 PROCEDURE — 99999 PR PBB SHADOW E&M-EST. PATIENT-LVL III: CPT | Mod: PBBFAC,,, | Performed by: PHYSICIAN ASSISTANT

## 2022-06-20 PROCEDURE — 99213 OFFICE O/P EST LOW 20 MIN: CPT | Mod: PBBFAC,PO | Performed by: PHYSICIAN ASSISTANT

## 2022-06-20 PROCEDURE — 99999 PR PBB SHADOW E&M-EST. PATIENT-LVL III: ICD-10-PCS | Mod: PBBFAC,,, | Performed by: PHYSICIAN ASSISTANT

## 2022-06-20 PROCEDURE — 99214 PR OFFICE/OUTPT VISIT, EST, LEVL IV, 30-39 MIN: ICD-10-PCS | Mod: S$PBB,,, | Performed by: PHYSICIAN ASSISTANT

## 2022-06-20 PROCEDURE — 91305 COVID-19, MRNA, LNP-S, PF, 30 MCG/0.3 ML DOSE VACCINE (PFIZER): CPT | Mod: PBBFAC,PO

## 2022-06-20 PROCEDURE — 99214 OFFICE O/P EST MOD 30 MIN: CPT | Mod: S$PBB,,, | Performed by: PHYSICIAN ASSISTANT

## 2022-06-20 RX ORDER — FUROSEMIDE 20 MG/1
TABLET ORAL
Qty: 30 TABLET | Refills: 5 | Status: SHIPPED | OUTPATIENT
Start: 2022-06-20 | End: 2022-12-20

## 2022-06-20 NOTE — PROGRESS NOTES
Subjective:    Patient ID:  Rohan Abarca is a 77 y.o. male who presents for follow-up of SVT.       HPI  Mr. Abarca is a very pleasant gentleman who follows with Dr. Stevenson. He was recently seen in the ED at [facility] for palpitations. EKG showed SVT. He was given adenosine x 2, calcium, magnesium, and amiodarone. He underwent unsuccessful cardioversion as well. He eventually cardioverted to NSR on his own. He underwent nuclear stress testing that showed no evidence of ischemia. Echo showed normal LVSF, grade II diastolic dysfunction, and mild AS. He was discharged and instructed to f/u with Dr. Patiño.    He was seen in clinic on 6/6 and is scheduled for an SVT RFA on 7/7.    He c/o bilateral LE edema. The swelling is present in the morning when he wakes up. It's been worsening over the last several months. He was previously on a diuretic, but it was held for unknown reasons. Last creatinine was 1.24 and creatinine was 4.7. He admits to high-sodium diet.     Review of Systems   Constitutional: Negative for chills, diaphoresis, fever, weight gain and weight loss.   HENT: Negative for sore throat.    Eyes: Negative for blurred vision, vision loss in left eye, vision loss in right eye and visual disturbance.   Cardiovascular: Positive for leg swelling. Negative for chest pain, claudication, dyspnea on exertion, near-syncope, orthopnea, palpitations, paroxysmal nocturnal dyspnea and syncope.   Respiratory: Negative for cough, hemoptysis, shortness of breath, sputum production and wheezing.    Endocrine: Negative for cold intolerance and heat intolerance.   Hematologic/Lymphatic: Negative for adenopathy. Does not bruise/bleed easily.   Skin: Negative for rash.   Musculoskeletal: Negative for falls, muscle weakness and myalgias.   Gastrointestinal: Negative for abdominal pain, change in bowel habit, constipation, diarrhea, melena and nausea.   Genitourinary: Negative for bladder incontinence.   Neurological: Negative  "for dizziness, focal weakness, headaches, light-headedness, numbness and weakness.   Psychiatric/Behavioral: Negative for altered mental status.         Vitals:    06/20/22 1319   BP: 135/73   BP Location: Right arm   Patient Position: Sitting   BP Method: Medium (Automatic)   Pulse: (!) 42   Weight: 106.5 kg (234 lb 12.6 oz)   Height: 5' 9" (1.753 m)   Body mass index is 34.67 kg/m².    Objective:    Physical Exam  Constitutional:       Appearance: He is well-developed.   HENT:      Head: Normocephalic and atraumatic.   Eyes:      Extraocular Movements: Extraocular movements intact.      Pupils: Pupils are equal, round, and reactive to light.   Neck:      Thyroid: No thyromegaly.      Vascular: No JVD.      Trachea: No tracheal deviation.   Cardiovascular:      Rate and Rhythm: Normal rate and regular rhythm.      Chest Wall: PMI is not displaced.      Pulses: Normal pulses and intact distal pulses.      Heart sounds: S1 normal and S2 normal. No murmur heard.    No friction rub. No gallop.   Pulmonary:      Effort: Pulmonary effort is normal. No respiratory distress.      Breath sounds: Normal breath sounds. No wheezing or rales.   Chest:      Chest wall: No tenderness.   Abdominal:      General: Bowel sounds are normal. There is no distension.      Palpations: Abdomen is soft. There is no mass.      Tenderness: There is no abdominal tenderness.   Musculoskeletal:         General: No tenderness. Normal range of motion.      Cervical back: Neck supple.   Skin:     General: Skin is warm and dry.      Findings: No rash.   Neurological:      General: No focal deficit present.      Mental Status: He is alert and oriented to person, place, and time.   Psychiatric:         Mood and Affect: Mood normal.         Behavior: Behavior normal.           Assessment:       Problem List Items Addressed This Visit        Cardiology Problems    Essential hypertension    Mild aortic stenosis - Primary    Mixed hyperlipidemia       " Other    Class 2 severe obesity with serious comorbidity and body mass index (BMI) of 36.0 to 36.9 in adult    REGAN (obstructive sleep apnea)           Plan:       Rx called in for lasix 20mg daily. He was instructed to take it prn weight gain > 2 lbs overnight or > 5 lbs in 1 week. If no improvement in 3 days, he was instructed to call.   Discussed limiting sodium, compression stockings, and elevating legs while seated.   Continue current cardiac medications.   Risk factor modification including diet and exercise.   F/U with Dr. Patiño as scheduled.   F/U with Dr. Stevenson in 4-6 months.

## 2022-06-30 ENCOUNTER — LAB VISIT (OUTPATIENT)
Dept: LAB | Facility: HOSPITAL | Age: 77
End: 2022-06-30
Attending: INTERNAL MEDICINE
Payer: MEDICARE

## 2022-06-30 DIAGNOSIS — I49.8 OTHER SPECIFIED CARDIAC ARRHYTHMIAS: ICD-10-CM

## 2022-06-30 DIAGNOSIS — I47.10 SVT (SUPRAVENTRICULAR TACHYCARDIA): ICD-10-CM

## 2022-06-30 DIAGNOSIS — I49.9 CARDIAC ARRHYTHMIA, UNSPECIFIED CARDIAC ARRHYTHMIA TYPE: ICD-10-CM

## 2022-06-30 LAB
ANION GAP SERPL CALC-SCNC: 8 MMOL/L (ref 8–16)
APTT BLDCRRT: 29.2 SEC (ref 21–32)
BASOPHILS # BLD AUTO: 0.03 K/UL (ref 0–0.2)
BASOPHILS NFR BLD: 0.6 % (ref 0–1.9)
BUN SERPL-MCNC: 14 MG/DL (ref 8–23)
CALCIUM SERPL-MCNC: 9.9 MG/DL (ref 8.7–10.5)
CHLORIDE SERPL-SCNC: 107 MMOL/L (ref 95–110)
CO2 SERPL-SCNC: 30 MMOL/L (ref 23–29)
CREAT SERPL-MCNC: 0.9 MG/DL (ref 0.5–1.4)
DIFFERENTIAL METHOD: ABNORMAL
EOSINOPHIL # BLD AUTO: 0.1 K/UL (ref 0–0.5)
EOSINOPHIL NFR BLD: 1.5 % (ref 0–8)
ERYTHROCYTE [DISTWIDTH] IN BLOOD BY AUTOMATED COUNT: 12.6 % (ref 11.5–14.5)
EST. GFR  (AFRICAN AMERICAN): >60 ML/MIN/1.73 M^2
EST. GFR  (NON AFRICAN AMERICAN): >60 ML/MIN/1.73 M^2
GLUCOSE SERPL-MCNC: 102 MG/DL (ref 70–110)
HCT VFR BLD AUTO: 44 % (ref 40–54)
HGB BLD-MCNC: 14.3 G/DL (ref 14–18)
IMM GRANULOCYTES # BLD AUTO: 0.02 K/UL (ref 0–0.04)
IMM GRANULOCYTES NFR BLD AUTO: 0.4 % (ref 0–0.5)
INR PPP: 1.1 (ref 0.8–1.2)
LYMPHOCYTES # BLD AUTO: 1.4 K/UL (ref 1–4.8)
LYMPHOCYTES NFR BLD: 27.2 % (ref 18–48)
MCH RBC QN AUTO: 31.8 PG (ref 27–31)
MCHC RBC AUTO-ENTMCNC: 32.5 G/DL (ref 32–36)
MCV RBC AUTO: 98 FL (ref 82–98)
MONOCYTES # BLD AUTO: 0.7 K/UL (ref 0.3–1)
MONOCYTES NFR BLD: 13.5 % (ref 4–15)
NEUTROPHILS # BLD AUTO: 2.9 K/UL (ref 1.8–7.7)
NEUTROPHILS NFR BLD: 56.8 % (ref 38–73)
NRBC BLD-RTO: 0 /100 WBC
PLATELET # BLD AUTO: 151 K/UL (ref 150–450)
PMV BLD AUTO: 12.5 FL (ref 9.2–12.9)
POTASSIUM SERPL-SCNC: 4.5 MMOL/L (ref 3.5–5.1)
PROTHROMBIN TIME: 12 SEC (ref 9–12.5)
RBC # BLD AUTO: 4.5 M/UL (ref 4.6–6.2)
SODIUM SERPL-SCNC: 145 MMOL/L (ref 136–145)
WBC # BLD AUTO: 5.18 K/UL (ref 3.9–12.7)

## 2022-06-30 PROCEDURE — 80048 BASIC METABOLIC PNL TOTAL CA: CPT | Performed by: INTERNAL MEDICINE

## 2022-06-30 PROCEDURE — 85610 PROTHROMBIN TIME: CPT | Mod: PO | Performed by: INTERNAL MEDICINE

## 2022-06-30 PROCEDURE — 85730 THROMBOPLASTIN TIME PARTIAL: CPT | Mod: PO | Performed by: INTERNAL MEDICINE

## 2022-06-30 PROCEDURE — 85025 COMPLETE CBC W/AUTO DIFF WBC: CPT | Performed by: INTERNAL MEDICINE

## 2022-06-30 PROCEDURE — 36415 COLL VENOUS BLD VENIPUNCTURE: CPT | Mod: PO | Performed by: INTERNAL MEDICINE

## 2022-07-05 ENCOUNTER — PATIENT MESSAGE (OUTPATIENT)
Dept: MEDSURG UNIT | Facility: HOSPITAL | Age: 77
End: 2022-07-05
Payer: MEDICARE

## 2022-07-06 ENCOUNTER — TELEPHONE (OUTPATIENT)
Dept: ELECTROPHYSIOLOGY | Facility: CLINIC | Age: 77
End: 2022-07-06
Payer: MEDICARE

## 2022-07-06 ENCOUNTER — PATIENT MESSAGE (OUTPATIENT)
Dept: ENDOSCOPY | Facility: HOSPITAL | Age: 77
End: 2022-07-06
Payer: MEDICARE

## 2022-07-06 NOTE — TELEPHONE ENCOUNTER
Spoke to patient    CONFIRMED procedure arrival time of 11am tomorrow for SVT RFA with Dr Patiño    Reiterated instructions including:  -Directions to check in desk  -NPO after midnight night prior to procedure  -High importance of HOLDING Metoprolol for 3 days prior to procedure. Confirmed that last dose was 7/3/2022.   -Pre-procedure LABS reviewed, no alerts noted.   -COVID test not needed; Covid vax on file  -Confirmed absence or presence of implanted device/stimulator No devices present  -Confirmed no fever, cough, or shortness of breath in the past 30 days  -Confirmed no redness, rash, irritation, or yeast infection to groin area.   -Reviewed current visitor policy    Patient verbalizes understanding of above and appreciates call.

## 2022-07-07 ENCOUNTER — PATIENT MESSAGE (OUTPATIENT)
Dept: ENDOSCOPY | Facility: HOSPITAL | Age: 77
End: 2022-07-07
Payer: MEDICARE

## 2022-07-07 ENCOUNTER — ANESTHESIA EVENT (OUTPATIENT)
Dept: MEDSURG UNIT | Facility: HOSPITAL | Age: 77
End: 2022-07-07
Payer: MEDICARE

## 2022-07-07 ENCOUNTER — HOSPITAL ENCOUNTER (OUTPATIENT)
Facility: HOSPITAL | Age: 77
Discharge: HOME | End: 2022-07-07
Attending: INTERNAL MEDICINE | Admitting: INTERNAL MEDICINE
Payer: MEDICARE

## 2022-07-07 ENCOUNTER — ANESTHESIA (OUTPATIENT)
Dept: MEDSURG UNIT | Facility: HOSPITAL | Age: 77
End: 2022-07-07
Payer: MEDICARE

## 2022-07-07 VITALS
HEIGHT: 69 IN | OXYGEN SATURATION: 94 % | BODY MASS INDEX: 34.07 KG/M2 | HEART RATE: 68 BPM | SYSTOLIC BLOOD PRESSURE: 141 MMHG | TEMPERATURE: 98 F | WEIGHT: 230 LBS | RESPIRATION RATE: 20 BRPM | DIASTOLIC BLOOD PRESSURE: 82 MMHG

## 2022-07-07 DIAGNOSIS — I47.10 SVT (SUPRAVENTRICULAR TACHYCARDIA): ICD-10-CM

## 2022-07-07 DIAGNOSIS — I49.8 OTHER SPECIFIED CARDIAC ARRHYTHMIAS: ICD-10-CM

## 2022-07-07 DIAGNOSIS — I49.9 ARRHYTHMIA: ICD-10-CM

## 2022-07-07 DIAGNOSIS — I45.10 RBBB: ICD-10-CM

## 2022-07-07 PROCEDURE — 93613 INTRACARDIAC EPHYS 3D MAPG: CPT | Mod: ,,, | Performed by: INTERNAL MEDICINE

## 2022-07-07 PROCEDURE — 93621 COMP EP EVL L PAC&REC C SINS: CPT | Mod: 26,,, | Performed by: INTERNAL MEDICINE

## 2022-07-07 PROCEDURE — 93623 PR STIM/PACING HEART POST IV DRUG INFU: ICD-10-PCS | Mod: 26,,, | Performed by: INTERNAL MEDICINE

## 2022-07-07 PROCEDURE — D9220A PRA ANESTHESIA: ICD-10-PCS | Mod: CRNA,,, | Performed by: NURSE ANESTHETIST, CERTIFIED REGISTERED

## 2022-07-07 PROCEDURE — D9220A PRA ANESTHESIA: Mod: ANES,,, | Performed by: ANESTHESIOLOGY

## 2022-07-07 PROCEDURE — 93613 PR INTRACARD ELECTROPHYS 3-DIMENS MAPPING: ICD-10-PCS | Mod: ,,, | Performed by: INTERNAL MEDICINE

## 2022-07-07 PROCEDURE — 93005 ELECTROCARDIOGRAM TRACING: CPT

## 2022-07-07 PROCEDURE — 63600175 PHARM REV CODE 636 W HCPCS: Performed by: INTERNAL MEDICINE

## 2022-07-07 PROCEDURE — 25000003 PHARM REV CODE 250: Performed by: NURSE ANESTHETIST, CERTIFIED REGISTERED

## 2022-07-07 PROCEDURE — 93623 PRGRMD STIMJ&PACG IV RX NFS: CPT | Performed by: INTERNAL MEDICINE

## 2022-07-07 PROCEDURE — C1733 CATH, EP, OTHR THAN COOL-TIP: HCPCS | Performed by: INTERNAL MEDICINE

## 2022-07-07 PROCEDURE — 93620: ICD-10-PCS | Mod: 26,,, | Performed by: INTERNAL MEDICINE

## 2022-07-07 PROCEDURE — D9220A PRA ANESTHESIA: Mod: CRNA,,, | Performed by: NURSE ANESTHETIST, CERTIFIED REGISTERED

## 2022-07-07 PROCEDURE — 93010 EKG 12-LEAD: ICD-10-PCS | Mod: ,,, | Performed by: INTERNAL MEDICINE

## 2022-07-07 PROCEDURE — 63600175 PHARM REV CODE 636 W HCPCS: Performed by: NURSE ANESTHETIST, CERTIFIED REGISTERED

## 2022-07-07 PROCEDURE — 25000003 PHARM REV CODE 250: Performed by: INTERNAL MEDICINE

## 2022-07-07 PROCEDURE — 93621: ICD-10-PCS | Mod: 26,,, | Performed by: INTERNAL MEDICINE

## 2022-07-07 PROCEDURE — 93010 ELECTROCARDIOGRAM REPORT: CPT | Mod: ,,, | Performed by: INTERNAL MEDICINE

## 2022-07-07 PROCEDURE — 93621 COMP EP EVL L PAC&REC C SINS: CPT | Performed by: INTERNAL MEDICINE

## 2022-07-07 PROCEDURE — 93623 PRGRMD STIMJ&PACG IV RX NFS: CPT | Mod: 26,,, | Performed by: INTERNAL MEDICINE

## 2022-07-07 PROCEDURE — 93005 ELECTROCARDIOGRAM TRACING: CPT | Mod: 59

## 2022-07-07 PROCEDURE — 93613 INTRACARDIAC EPHYS 3D MAPG: CPT | Performed by: INTERNAL MEDICINE

## 2022-07-07 PROCEDURE — 93620 COMP EP EVL R AT VEN PAC&REC: CPT | Mod: 26,,, | Performed by: INTERNAL MEDICINE

## 2022-07-07 PROCEDURE — D9220A PRA ANESTHESIA: ICD-10-PCS | Mod: ANES,,, | Performed by: ANESTHESIOLOGY

## 2022-07-07 PROCEDURE — 37000009 HC ANESTHESIA EA ADD 15 MINS: Performed by: INTERNAL MEDICINE

## 2022-07-07 PROCEDURE — 93620 COMP EP EVL R AT VEN PAC&REC: CPT | Performed by: INTERNAL MEDICINE

## 2022-07-07 PROCEDURE — C1730 CATH, EP, 19 OR FEW ELECT: HCPCS | Performed by: INTERNAL MEDICINE

## 2022-07-07 PROCEDURE — C1894 INTRO/SHEATH, NON-LASER: HCPCS | Performed by: INTERNAL MEDICINE

## 2022-07-07 PROCEDURE — 27201423 OPTIME MED/SURG SUP & DEVICES STERILE SUPPLY: Performed by: INTERNAL MEDICINE

## 2022-07-07 PROCEDURE — 37000008 HC ANESTHESIA 1ST 15 MINUTES: Performed by: INTERNAL MEDICINE

## 2022-07-07 RX ORDER — PHENYLEPHRINE HYDROCHLORIDE 10 MG/ML
INJECTION INTRAVENOUS CONTINUOUS PRN
Status: DISCONTINUED | OUTPATIENT
Start: 2022-07-07 | End: 2022-07-07

## 2022-07-07 RX ORDER — HEPARIN SOD,PORCINE/0.9 % NACL 1000/500ML
INTRAVENOUS SOLUTION INTRAVENOUS
Status: DISCONTINUED | OUTPATIENT
Start: 2022-07-07 | End: 2022-07-07 | Stop reason: HOSPADM

## 2022-07-07 RX ORDER — LIDOCAINE HYDROCHLORIDE 10 MG/ML
INJECTION, SOLUTION INTRAVENOUS
Status: DISCONTINUED | OUTPATIENT
Start: 2022-07-07 | End: 2022-07-07

## 2022-07-07 RX ORDER — SODIUM CHLORIDE 0.9 % (FLUSH) 0.9 %
10 SYRINGE (ML) INJECTION
Status: DISCONTINUED | OUTPATIENT
Start: 2022-07-07 | End: 2022-07-07 | Stop reason: HOSPADM

## 2022-07-07 RX ORDER — LIDOCAINE HYDROCHLORIDE 20 MG/ML
INJECTION, SOLUTION INFILTRATION; PERINEURAL
Status: DISCONTINUED | OUTPATIENT
Start: 2022-07-07 | End: 2022-07-07 | Stop reason: HOSPADM

## 2022-07-07 RX ORDER — FENTANYL CITRATE 50 UG/ML
25 INJECTION, SOLUTION INTRAMUSCULAR; INTRAVENOUS EVERY 5 MIN PRN
Status: DISCONTINUED | OUTPATIENT
Start: 2022-07-07 | End: 2022-07-07 | Stop reason: HOSPADM

## 2022-07-07 RX ORDER — PROPOFOL 10 MG/ML
VIAL (ML) INTRAVENOUS CONTINUOUS PRN
Status: DISCONTINUED | OUTPATIENT
Start: 2022-07-07 | End: 2022-07-07

## 2022-07-07 RX ORDER — PROCHLORPERAZINE EDISYLATE 5 MG/ML
5 INJECTION INTRAMUSCULAR; INTRAVENOUS EVERY 30 MIN PRN
Status: DISCONTINUED | OUTPATIENT
Start: 2022-07-07 | End: 2022-07-07 | Stop reason: HOSPADM

## 2022-07-07 RX ORDER — PROPOFOL 10 MG/ML
VIAL (ML) INTRAVENOUS
Status: DISCONTINUED | OUTPATIENT
Start: 2022-07-07 | End: 2022-07-07

## 2022-07-07 RX ORDER — ACETAMINOPHEN 325 MG/1
650 TABLET ORAL EVERY 4 HOURS PRN
Status: DISCONTINUED | OUTPATIENT
Start: 2022-07-07 | End: 2022-07-07 | Stop reason: HOSPADM

## 2022-07-07 RX ADMIN — SODIUM CHLORIDE: 0.9 INJECTION, SOLUTION INTRAVENOUS at 01:07

## 2022-07-07 RX ADMIN — Medication 30 MG: at 01:07

## 2022-07-07 RX ADMIN — PROPOFOL 150 MCG/KG/MIN: 10 INJECTION, EMULSION INTRAVENOUS at 01:07

## 2022-07-07 RX ADMIN — PHENYLEPHRINE HYDROCHLORIDE 0.25 MCG/KG/MIN: 10 INJECTION, SOLUTION INTRAMUSCULAR; INTRAVENOUS; SUBCUTANEOUS at 01:07

## 2022-07-07 RX ADMIN — ISOPROTERENOL HYDROCHLORIDE 2 MCG/MIN: 0.2 INJECTION, SOLUTION INTRAMUSCULAR; INTRAVENOUS at 02:07

## 2022-07-07 RX ADMIN — LIDOCAINE HYDROCHLORIDE 30 MG: 10 INJECTION, SOLUTION INTRAVENOUS at 01:07

## 2022-07-07 RX ADMIN — ACETAMINOPHEN 650 MG: 325 TABLET ORAL at 03:07

## 2022-07-07 NOTE — H&P
[facility] - Short Stay Cardiac Unit  Cardiac Electrophysiology  History and Physical     Admission Date: 7/7/2022  Code Status: Prior   Attending Provider: Tom Patiño MD   Principal Problem:<principal problem not specified>    Subjective:     Chief Complaint:  Elective EPS +/- RF for mx of SVT     HPI:  Rohan Abarca is a 77 y.o. male who presents for elective EPS +/- RFA for management of SVT. Pt doing well today without complaints. His last dose of metoprolol was on Sunday. He denies use of any blood thinning agents.         Following history obtained from most recent EP clinic encounter on 6/6/22 by Dr. Patiño.    HPI 76 yo male with SVT, Htn, sleep apnea (CPAP), RBBB.  Primary cardiologist is Dr. Stevenson  Presented with sustained palpitations 5/17/22, noted to be in SVT with RBBB aberrancy. Notes indicate DCCVx3, followed by recurrence triggered by PAC's. Briefly put on amiodarone. Discharged on metoprolol.     Returned 5/29/22 with palpitations, headaches and chest pain. ECG revealed nsr. He reports symptoms improved prior to presentation.     Echo 5/17/22 normal biventricular structure and function severe LAE  Spect stress 5/17/22 negative         Past Medical History:   Diagnosis Date    Bilateral cataracts     hx    Colon polyp     benign    Gross hematuria     HBP (high blood pressure)     Hearing loss     History of detached retina repair     left    Macular degeneration     REGAN (obstructive sleep apnea)     uses CPAP    Prostate cancer     Rosacea     Squamous cell carcinoma of skin        Past Surgical History:   Procedure Laterality Date    BUNIONECTOMY Right     CATARACT EXTRACTION BILATERAL W/ ANTERIOR VITRECTOMY Bilateral     COLONOSCOPY N/A 3/20/2017    Procedure: COLONOSCOPY;  Surgeon: Carl Marcelino MD;  Location: [facility] ENDO;  Service: Endoscopy;  Laterality: N/A;    CYSTOSCOPY N/A 12/30/2020    Procedure: CYSTOSCOPY;  Surgeon: Kevin De Leon Jr., MD;  Location: [facility] OR;   Service: Urology;  Laterality: N/A;    ENDOSCOPIC ULTRASOUND OF UPPER GASTROINTESTINAL TRACT Left 3/12/2021    Procedure: ULTRASOUND, UPPER GI TRACT, ENDOSCOPIC;  Surgeon: Carlton Oliveros MD;  Location: [facility] ENDO;  Service: Endoscopy;  Laterality: Left;  Linear    ENDOSCOPIC ULTRASOUND OF UPPER GASTROINTESTINAL TRACT Left 10/20/2021    Procedure: ULTRASOUND, UPPER GI TRACT, ENDOSCOPIC;  Surgeon: Carlton Oliveros MD;  Location: [facility] ENDO;  Service: Endoscopy;  Laterality: Left;  Linear scope    ESOPHAGOGASTRODUODENOSCOPY N/A 3/12/2021    Procedure: EGD (ESOPHAGOGASTRODUODENOSCOPY);  Surgeon: Carlton Oliveros MD;  Location: [facility] ENDO;  Service: Endoscopy;  Laterality: N/A;    ESOPHAGOGASTRODUODENOSCOPY N/A 10/20/2021    Procedure: EGD (ESOPHAGOGASTRODUODENOSCOPY);  Surgeon: Carlton Oliveros MD;  Location: [facility] ENDO;  Service: Endoscopy;  Laterality: N/A;    HERNIA REPAIR      x 2    JOINT REPLACEMENT Right 2012    per Dr. Heriberto Colón    JOINT REPLACEMENT Left 2004    per Dr. Heriberto Colón    KNEE SURGERY      left , right    MASTOID SURGERY Right     right    PROSTATECTOMY  2012    RETINAL DETACHMENT SURGERY      left    ROTATOR CUFF REPAIR      bilateral    SHOULDER SURGERY Left     RCR per Dr. Heriberto Colón    SHOULDER SURGERY Right     RCR per Dr. Heriberto Colón    TONSILLECTOMY, ADENOIDECTOMY         Review of patient's allergies indicates:  No Known Allergies    No current facility-administered medications on file prior to encounter.     Current Outpatient Medications on File Prior to Encounter   Medication Sig    amLODIPine (NORVASC) 5 MG tablet TAKE 1 TABLET(5 MG) BY MOUTH EVERY DAY (Patient taking differently: Take 5 mg by mouth once daily.)    atorvastatin (LIPITOR) 20 MG tablet TAKE 1 TABLET(20 MG) BY MOUTH EVERY DAY (Patient taking differently: Take 20 mg by mouth every evening.)    minocycline (MINOCIN,DYNACIN) 100 MG capsule TAKE 1 CAPSULE(100 MG) BY MOUTH EVERY DAY (Patient  taking differently: Take 100 mg by mouth once daily.)    olmesartan (BENICAR) 20 MG tablet Take 1 tablet (20 mg total) by mouth once daily.    VIT A/VIT C/VIT E/ZINC/COPPER (PRESERVISION AREDS ORAL) Take 2 capsules by mouth every evening.    aspirin (ECOTRIN) 81 MG EC tablet Take 81 mg by mouth every evening.    BIOTIN ORAL Take 1 capsule by mouth once daily.    bismuth subsalicylate (PEPTO BISMOL) 262 mg/15 mL suspension Take 15 mLs by mouth every 6 (six) hours as needed for Indigestion.    metoprolol succinate (TOPROL-XL) 25 MG 24 hr tablet Take 1 tablet (25 mg total) by mouth once daily.    tamsulosin (FLOMAX) 0.4 mg Cap Take 1 capsule (0.4 mg total) by mouth once daily. for 7 days    [DISCONTINUED] clobetasol (TEMOVATE) 0.05 % cream Apply topically 2 (two) times daily.    [DISCONTINUED] fluticasone propionate (FLONASE) 50 mcg/actuation nasal spray SHAKE LIQUID AND USE 1 SPRAY(50 MCG) IN EACH NOSTRIL TWICE DAILY     Family History       Problem Relation (Age of Onset)    Cancer Sister, Sister    Heart attack     Stroke Sister,           Tobacco Use    Smoking status: Former Smoker     Packs/day: 0.50     Years: 5.00     Pack years: 2.50     Types: Cigarettes     Quit date: 1968     Years since quittin.5    Smokeless tobacco: Never Used   Substance and Sexual Activity    Alcohol use: Not Currently     Alcohol/week: 1.7 standard drinks     Types: 2 Standard drinks or equivalent per week     Comment: social    Drug use: No    Sexual activity: Yes     Partners: Female     Review of Systems   Constitutional: Negative for chills, decreased appetite and fever.   HENT:  Negative for congestion and sore throat.    Eyes:  Negative for blurred vision and discharge.   Cardiovascular:  Negative for chest pain, claudication, cyanosis, dyspnea on exertion and leg swelling.   Respiratory:  Negative for cough, hemoptysis and shortness of breath.    Endocrine: Negative for cold intolerance and heat  intolerance.   Skin:  Negative for color change.   Musculoskeletal:  Negative for muscle weakness and myalgias.   Gastrointestinal:  Negative for bloating and abdominal pain.   Neurological:  Negative for dizziness, focal weakness and weakness.   Psychiatric/Behavioral:  Negative for altered mental status and depression.    Objective:     Vital Signs (Most Recent):  Temp: 97.9 °F (36.6 °C) (07/07/22 1100)  Pulse: (!) 56 (07/07/22 1100)  Resp: 17 (07/07/22 1100)  BP: (!) 167/87 (07/07/22 1127)  SpO2: 98 % (07/07/22 1100)   Vital Signs (24h Range):  Temp:  [97.9 °F (36.6 °C)] 97.9 °F (36.6 °C)  Pulse:  [56] 56  Resp:  [17] 17  SpO2:  [98 %] 98 %  BP: (162-167)/(82-87) 167/87       Weight: 104.3 kg (230 lb)  Body mass index is 33.97 kg/m².    SpO2: 98 %     Physical Exam  Constitutional:       Appearance: Normal appearance.   HENT:      Head: Normocephalic.      Nose: Nose normal.      Mouth/Throat:      Mouth: Mucous membranes are dry.   Eyes:      Extraocular Movements: Extraocular movements intact.      Pupils: Pupils are equal, round, and reactive to light.   Cardiovascular:      Rate and Rhythm: Normal rate and regular rhythm.      Pulses: Normal pulses.      Heart sounds: Normal heart sounds.   Pulmonary:      Effort: Pulmonary effort is normal.      Breath sounds: Normal breath sounds.   Abdominal:      General: Bowel sounds are normal.      Palpations: Abdomen is soft.   Musculoskeletal:         General: No swelling or tenderness. Normal range of motion.      Cervical back: Normal range of motion and neck supple.   Skin:     General: Skin is warm and dry.   Neurological:      General: No focal deficit present.      Mental Status: He is alert and oriented to person, place, and time. Mental status is at baseline.   Psychiatric:         Mood and Affect: Mood normal.         Assessment and Plan:     SVT (supraventricular tachycardia)  78 yo M with documented SVT with a small possibility that it may have been  AF.  Will proceed with EPS +/- RFA for SVT.  - The risks, benefits and alternatives of the procedure were explained to the patient, patient's family and/or surrogate decision maker. Risks include (but not limited to) bleeding, hematoma, infection, pain, vascular damage, damage to structures surrounding vasculature, myocardial damage [perforation, valvular damage], cardiac tamponade, myocardial infarction, stroke, and death. All questions were answered. Patient is understanding of these risks, and would like to proceed. Consents signed.  Risks and benefits discussed, he would like to proceed.  If this is a left sided AT >> defer RFA.          Veronica Chan MD  Cardiac Electrophysiology  [facility] - Short Stay Cardiac Unit

## 2022-07-07 NOTE — PROGRESS NOTES
Pt's spouse, Rosa Abarca, notified pt in recovery, updated on pt status & POC. Will continue to monitor.

## 2022-07-07 NOTE — PLAN OF CARE
VSS. Patient c/o pain while laying flat, notified bedrest until 1915, tylenol administered per MAR, repositioned as appropriate. No N&V.  Transferred to the next Phase of Care.

## 2022-07-07 NOTE — HPI
Rohan Abarca is a 77 y.o. male who presents for elective EPS +/- RFA for management of SVT. Pt doing well today without complaints. His last dose of metoprolol was on Sunday. He denies use of any blood thinning agents.         Following history obtained from most recent EP clinic encounter on 6/6/22 by Dr. Patiño.    HPI 78 yo male with SVT, Htn, sleep apnea (CPAP), RBBB.  Primary cardiologist is Dr. Stevenson  Presented with sustained palpitations 5/17/22, noted to be in SVT with RBBB aberrancy. Notes indicate DCCVx3, followed by recurrence triggered by PAC's. Briefly put on amiodarone. Discharged on metoprolol.     Returned 5/29/22 with palpitations, headaches and chest pain. ECG revealed nsr. He reports symptoms improved prior to presentation.     Echo 5/17/22 normal biventricular structure and function severe LAE  Spect stress 5/17/22 negative

## 2022-07-07 NOTE — ASSESSMENT & PLAN NOTE
78 yo M with documented SVT with a small possibility that it may have been AF.  Will proceed with EPS +/- RFA for SVT.  - The risks, benefits and alternatives of the procedure were explained to the patient, patient's family and/or surrogate decision maker. Risks include (but not limited to) bleeding, hematoma, infection, pain, vascular damage, damage to structures surrounding vasculature, myocardial damage [perforation, valvular damage], cardiac tamponade, myocardial infarction, stroke, and death. All questions were answered. Patient is understanding of these risks, and would like to proceed. Consents signed.  Risks and benefits discussed, he would like to proceed.  If this is a left sided AT >> defer RFA.

## 2022-07-07 NOTE — PLAN OF CARE
Pt ambulated on unit this morning with spouse at side.  Verbalized an understanding of procedure.  Oriented to room and call bell provided.  Pt wife received text message for updates.  Denies pain or SOB.  Will continue to monitor.

## 2022-07-07 NOTE — TRANSFER OF CARE
"Anesthesia Transfer of Care Note    Patient: Rohan Abarca    Procedure(s) Performed: Procedure(s) (LRB):  CARDIAC ELECTROPHYSIOLOGY STUDY, DIAGNOSTIC (N/A)  Cardioversion or Defibrillation    Patient location: PACU    Anesthesia Type: general    Transport from OR: Transported from OR on 6-10 L/min O2 by face mask with adequate spontaneous ventilation    Post pain: adequate analgesia    Post assessment: no apparent anesthetic complications    Post vital signs: stable    Level of consciousness: awake    Nausea/Vomiting: no nausea/vomiting    Complications: none    Transfer of care protocol was followed      Last vitals:   Visit Vitals  BP (!) 167/87 (BP Location: Right arm, Patient Position: Lying)   Pulse 68   Temp 36.6 °C (97.9 °F)   Resp 18   Ht 5' 9" (1.753 m)   Wt 104.3 kg (230 lb)   SpO2 99%   BMI 33.97 kg/m²     "

## 2022-07-07 NOTE — SUBJECTIVE & OBJECTIVE
Past Medical History:   Diagnosis Date    Bilateral cataracts     hx    Colon polyp     benign    Gross hematuria     HBP (high blood pressure)     Hearing loss     History of detached retina repair     left    Macular degeneration     REGAN (obstructive sleep apnea)     uses CPAP    Prostate cancer     Rosacea     Squamous cell carcinoma of skin        Past Surgical History:   Procedure Laterality Date    BUNIONECTOMY Right     CATARACT EXTRACTION BILATERAL W/ ANTERIOR VITRECTOMY Bilateral     COLONOSCOPY N/A 3/20/2017    Procedure: COLONOSCOPY;  Surgeon: Carl Marcelino MD;  Location: [facility] ENDO;  Service: Endoscopy;  Laterality: N/A;    CYSTOSCOPY N/A 12/30/2020    Procedure: CYSTOSCOPY;  Surgeon: Kevin De Leon Jr., MD;  Location: [facility] OR;  Service: Urology;  Laterality: N/A;    ENDOSCOPIC ULTRASOUND OF UPPER GASTROINTESTINAL TRACT Left 3/12/2021    Procedure: ULTRASOUND, UPPER GI TRACT, ENDOSCOPIC;  Surgeon: Carlton Oliveros MD;  Location: [facility];  Service: Endoscopy;  Laterality: Left;  Linear    ENDOSCOPIC ULTRASOUND OF UPPER GASTROINTESTINAL TRACT Left 10/20/2021    Procedure: ULTRASOUND, UPPER GI TRACT, ENDOSCOPIC;  Surgeon: Carlton Oliveros MD;  Location: [facility];  Service: Endoscopy;  Laterality: Left;  Linear scope    ESOPHAGOGASTRODUODENOSCOPY N/A 3/12/2021    Procedure: EGD (ESOPHAGOGASTRODUODENOSCOPY);  Surgeon: Carlton Oliveros MD;  Location: [facility];  Service: Endoscopy;  Laterality: N/A;    ESOPHAGOGASTRODUODENOSCOPY N/A 10/20/2021    Procedure: EGD (ESOPHAGOGASTRODUODENOSCOPY);  Surgeon: Carlton Oliveros MD;  Location: [facility];  Service: Endoscopy;  Laterality: N/A;    HERNIA REPAIR      x 2    JOINT REPLACEMENT Right 2012    per Dr. Heriberto Colón    JOINT REPLACEMENT Left 2004    per Dr. Heriberto Colón    KNEE SURGERY      left , right    MASTOID SURGERY Right     right    PROSTATECTOMY  2012    RETINAL DETACHMENT SURGERY      left    ROTATOR CUFF REPAIR      bilateral     SHOULDER SURGERY Left     RCR per Dr. Heriberto Colón    SHOULDER SURGERY Right     RCR per Dr. Heriberto Colón    TONSILLECTOMY, ADENOIDECTOMY         Review of patient's allergies indicates:  No Known Allergies    No current facility-administered medications on file prior to encounter.     Current Outpatient Medications on File Prior to Encounter   Medication Sig    amLODIPine (NORVASC) 5 MG tablet TAKE 1 TABLET(5 MG) BY MOUTH EVERY DAY (Patient taking differently: Take 5 mg by mouth once daily.)    atorvastatin (LIPITOR) 20 MG tablet TAKE 1 TABLET(20 MG) BY MOUTH EVERY DAY (Patient taking differently: Take 20 mg by mouth every evening.)    minocycline (MINOCIN,DYNACIN) 100 MG capsule TAKE 1 CAPSULE(100 MG) BY MOUTH EVERY DAY (Patient taking differently: Take 100 mg by mouth once daily.)    olmesartan (BENICAR) 20 MG tablet Take 1 tablet (20 mg total) by mouth once daily.    VIT A/VIT C/VIT E/ZINC/COPPER (PRESERVISION AREDS ORAL) Take 2 capsules by mouth every evening.    aspirin (ECOTRIN) 81 MG EC tablet Take 81 mg by mouth every evening.    BIOTIN ORAL Take 1 capsule by mouth once daily.    bismuth subsalicylate (PEPTO BISMOL) 262 mg/15 mL suspension Take 15 mLs by mouth every 6 (six) hours as needed for Indigestion.    metoprolol succinate (TOPROL-XL) 25 MG 24 hr tablet Take 1 tablet (25 mg total) by mouth once daily.    tamsulosin (FLOMAX) 0.4 mg Cap Take 1 capsule (0.4 mg total) by mouth once daily. for 7 days    [DISCONTINUED] clobetasol (TEMOVATE) 0.05 % cream Apply topically 2 (two) times daily.    [DISCONTINUED] fluticasone propionate (FLONASE) 50 mcg/actuation nasal spray SHAKE LIQUID AND USE 1 SPRAY(50 MCG) IN EACH NOSTRIL TWICE DAILY     Family History       Problem Relation (Age of Onset)    Cancer Sister, Sister    Heart attack     Stroke Sister,           Tobacco Use    Smoking status: Former Smoker     Packs/day: 0.50     Years: 5.00     Pack years: 2.50     Types: Cigarettes     Quit date: 12/19/1968      Years since quittin.5    Smokeless tobacco: Never Used   Substance and Sexual Activity    Alcohol use: Not Currently     Alcohol/week: 1.7 standard drinks     Types: 2 Standard drinks or equivalent per week     Comment: social    Drug use: No    Sexual activity: Yes     Partners: Female     Review of Systems   Constitutional: Negative for chills, decreased appetite and fever.   HENT:  Negative for congestion and sore throat.    Eyes:  Negative for blurred vision and discharge.   Cardiovascular:  Negative for chest pain, claudication, cyanosis, dyspnea on exertion and leg swelling.   Respiratory:  Negative for cough, hemoptysis and shortness of breath.    Endocrine: Negative for cold intolerance and heat intolerance.   Skin:  Negative for color change.   Musculoskeletal:  Negative for muscle weakness and myalgias.   Gastrointestinal:  Negative for bloating and abdominal pain.   Neurological:  Negative for dizziness, focal weakness and weakness.   Psychiatric/Behavioral:  Negative for altered mental status and depression.    Objective:     Vital Signs (Most Recent):  Temp: 97.9 °F (36.6 °C) (22 1100)  Pulse: (!) 56 (22 1100)  Resp: 17 (22 1100)  BP: (!) 167/87 (22 1127)  SpO2: 98 % (22 1100)   Vital Signs (24h Range):  Temp:  [97.9 °F (36.6 °C)] 97.9 °F (36.6 °C)  Pulse:  [56] 56  Resp:  [17] 17  SpO2:  [98 %] 98 %  BP: (162-167)/(82-87) 167/87       Weight: 104.3 kg (230 lb)  Body mass index is 33.97 kg/m².    SpO2: 98 %     Physical Exam  Constitutional:       Appearance: Normal appearance.   HENT:      Head: Normocephalic.      Nose: Nose normal.      Mouth/Throat:      Mouth: Mucous membranes are dry.   Eyes:      Extraocular Movements: Extraocular movements intact.      Pupils: Pupils are equal, round, and reactive to light.   Cardiovascular:      Rate and Rhythm: Normal rate and regular rhythm.      Pulses: Normal pulses.      Heart sounds: Normal heart sounds.   Pulmonary:       Effort: Pulmonary effort is normal.      Breath sounds: Normal breath sounds.   Abdominal:      General: Bowel sounds are normal.      Palpations: Abdomen is soft.   Musculoskeletal:         General: No swelling or tenderness. Normal range of motion.      Cervical back: Normal range of motion and neck supple.   Skin:     General: Skin is warm and dry.   Neurological:      General: No focal deficit present.      Mental Status: He is alert and oriented to person, place, and time. Mental status is at baseline.   Psychiatric:         Mood and Affect: Mood normal.

## 2022-07-07 NOTE — DISCHARGE SUMMARY
[facility] - Cardiology  Cardiac Electrophysiology  Discharge Summary      Patient Name: Rohan Abarca  MRN: [id number]  Admission Date: 7/7/2022  Hospital Length of Stay: 0 days  Discharge Date and Time:  07/07/2022 4:20 PM  Attending Physician: Tom Patiño MD    Discharging Provider: Veronica Chan MD  Primary Care Physician: Rosa Powell DO    HPI:   Rohan Abarca is a 77 y.o. male who presents for elective EPS +/- RFA for management of SVT. Pt doing well today without complaints. His last dose of metoprolol was on Sunday. He denies use of any blood thinning agents.         Following history obtained from most recent EP clinic encounter on 6/6/22 by Dr. Patiño.    HPI 78 yo male with SVT, Htn, sleep apnea (CPAP), RBBB.  Primary cardiologist is Dr. Stevenson  Presented with sustained palpitations 5/17/22, noted to be in SVT with RBBB aberrancy. Notes indicate DCCVx3, followed by recurrence triggered by PAC's. Briefly put on amiodarone. Discharged on metoprolol.     Returned 5/29/22 with palpitations, headaches and chest pain. ECG revealed nsr. He reports symptoms improved prior to presentation.     Echo 5/17/22 normal biventricular structure and function severe LAE  Spect stress 5/17/22 negative         Procedure(s) (LRB):  CARDIAC ELECTROPHYSIOLOGY STUDY, DIAGNOSTIC (N/A)  Cardioversion or Defibrillation     Indwelling Lines/Drains at time of discharge:  Lines/Drains/Airways     None                 Hospital Course:  Pt underwent EPS, but we were unfortunately unable to induce SVT. Pt tolerated procedure well. He was seen post operatively and reported no complaints or concerns. He was instructed to continue home medications. Discussed with Dr. Patiño and will schedule pt with Bardy monitor. I spoke with Ellis Murry to arrange Bardy in 1 month. Pt to follow up with Dr. Patiño in 2 months.      Goals of Care Treatment Preferences:  Code Status: Full Code          Pending Diagnostic Studies:     None           Final Active Diagnoses:    Diagnosis Date Noted POA    SVT (supraventricular tachycardia) [I47.1]  Yes      Problems Resolved During this Admission:     No new Assessment & Plan notes have been filed under this hospital service since the last note was generated.  Service: Arrhythmia      Discharged Condition: good    Disposition: home    Follow Up:   Follow-up Information     Tom Patiño MD Follow up in 2 month(s).    Specialties: Electrophysiology, Cardiology  Contact information:  [address]  [facility] [address]  [phone]                       Patient Instructions:   No discharge procedures on file.  Medications:  Reconciled Home Medications:      Medication List      ASK your doctor about these medications    amLODIPine 5 MG tablet  Commonly known as: NORVASC  TAKE 1 TABLET(5 MG) BY MOUTH EVERY DAY     aspirin 81 MG EC tablet  Commonly known as: ECOTRIN  Take 81 mg by mouth every evening.     atorvastatin 20 MG tablet  Commonly known as: LIPITOR  TAKE 1 TABLET(20 MG) BY MOUTH EVERY DAY     BIOTIN ORAL  Take 1 capsule by mouth once daily.     bismuth subsalicylate 262 mg/15 mL suspension  Commonly known as: PEPTO BISMOL  Take 15 mLs by mouth every 6 (six) hours as needed for Indigestion.     furosemide 20 MG tablet  Commonly known as: LASIX  Take 1 pill daily as needed for weight gain, swelling, or shortness of breath     metoprolol succinate 25 MG 24 hr tablet  Commonly known as: TOPROL-XL  Take 1 tablet (25 mg total) by mouth once daily.     minocycline 100 MG capsule  Commonly known as: MINOCIN,DYNACIN  TAKE 1 CAPSULE(100 MG) BY MOUTH EVERY DAY     olmesartan 20 MG tablet  Commonly known as: BENICAR  Take 1 tablet (20 mg total) by mouth once daily.     PRESERVISION AREDS ORAL  Take 2 capsules by mouth every evening.     tamsulosin 0.4 mg Cap  Commonly known as: FLOMAX  Take 1 capsule (0.4 mg total) by mouth once daily. for 7 days            Time spent on the discharge of patient: 20  minutes    Veronica Chan MD  Cardiac Electrophysiology  [facility] - Cardiology

## 2022-07-07 NOTE — ANESTHESIA PREPROCEDURE EVALUATION
07/07/2022  Rohan Abarca is a 77 y.o., male.  [facility]  Anesthesia Pre-Operative Evaluation       Patient Name: Rohan Abarca  YOB: 1945  MRN: [id number]  CSN: [id number]      Code Status: Prior   Date of Procedure: 7/7/2022  Anesthesia: Monitor Anesthesia Care Procedure: Procedure(s) (LRB):  Ablation, SVT, Accessory Pathway (N/A)  Pre-Operative Diagnosis: Other specified cardiac arrhythmias [I49.8]  SVT (supraventricular tachycardia) [I47.1]  Proceduralist: Surgeon(s) and Role:     * Tom Patiño MD - Primary        SUBJECTIVE:   Rohan Abarca is a 77 y.o. male who  has a past medical history of Bilateral cataracts, Colon polyp, Gross hematuria, HBP (high blood pressure), Hearing loss, History of detached retina repair, Macular degeneration, REGAN (obstructive sleep apnea), Prostate cancer, Rosacea, and Squamous cell carcinoma of skin.     he has a current medication list which includes the following long-term medication(s): amlodipine, atorvastatin, furosemide, olmesartan, metoprolol succinate, tamsulosin, [DISCONTINUED] clobetasol, and [DISCONTINUED] fluticasone propionate.   ALLERGIES:   Review of patient's allergies indicates:  No Known Allergies    History:     Active Hospital Problems    Diagnosis  POA    SVT (supraventricular tachycardia) [I47.1]  Yes      Resolved Hospital Problems   No resolved problems to display.     Past Medical History:   Diagnosis Date    Bilateral cataracts     hx    Colon polyp     benign    Gross hematuria     HBP (high blood pressure)     Hearing loss     History of detached retina repair     left    Macular degeneration     REGAN (obstructive sleep apnea)     uses CPAP    Prostate cancer     Rosacea     Squamous cell carcinoma of skin        Surgical History:    has a past surgical history that includes Rotator cuff  repair; Bunionectomy (Right); Knee surgery; Cataract extraction bilateral w/ anterior vitrectomy (Bilateral); Retinal detachment surgery; Mastoid surgery (Right); Hernia repair; TONSILLECTOMY, ADENOIDECTOMY; Prostatectomy (2012); Joint replacement (Right, 2012); Joint replacement (Left, 2004); Shoulder surgery (Left); Shoulder surgery (Right); Colonoscopy (N/A, 3/20/2017); Cystoscopy (N/A, 12/30/2020); Esophagogastroduodenoscopy (N/A, 3/12/2021); Endoscopic ultrasound of upper gastrointestinal tract (Left, 3/12/2021); Esophagogastroduodenoscopy (N/A, 10/20/2021); and Endoscopic ultrasound of upper gastrointestinal tract (Left, 10/20/2021).   Social History:    reports being sexually active and has had partner(s) who are female.  reports that he quit smoking about 53 years ago. His smoking use included cigarettes. He has a 2.50 pack-year smoking history. He has never used smokeless tobacco. He reports previous alcohol use of about 1.7 standard drinks of alcohol per week. He reports that he does not use drugs.     OBJECTIVE:     Vital Signs (Most Recent):  Temp: 36.6 °C (97.9 °F) (07/07/22 1100)  Pulse: (!) 56 (07/07/22 1100)  Resp: 17 (07/07/22 1100)  BP: (!) 167/87 (07/07/22 1127)  SpO2: 98 % (07/07/22 1100) Vital Signs Range (Last 24H):  Temp:  [36.6 °C (97.9 °F)]   Pulse:  [56]   Resp:  [17]   BP: (162-167)/(82-87)   SpO2:  [98 %]        Body mass index is 33.97 kg/m².   Wt Readings from Last 4 Encounters:   07/07/22 104.3 kg (230 lb)   06/20/22 106.5 kg (234 lb 12.6 oz)   06/06/22 107.5 kg (236 lb 15.9 oz)   06/01/22 104.6 kg (230 lb 9.6 oz)     Significant Labs:  Lab Results   Component Value Date    WBC 5.18 06/30/2022    HGB 14.3 06/30/2022    HCT 44.0 06/30/2022     06/30/2022     06/30/2022    K 4.5 06/30/2022     06/30/2022    CREATININE 0.9 06/30/2022    BUN 14 06/30/2022    CO2 30 (H) 06/30/2022     06/30/2022    CALCIUM 9.9 06/30/2022    MG 2.0 05/18/2022    PHOS 3.9 07/23/2012     ALKPHOS 61 05/29/2022    ALT 17 05/29/2022    AST 25 05/29/2022    ALBUMIN 3.8 05/29/2022    INR 1.1 06/30/2022    APTT 29.2 06/30/2022    HGBA1C 5.3 12/03/2019    TROPONINI <0.012 05/29/2022    TROPONINI <0.012 05/29/2022    BNP 17 10/13/2020    NTPROBNP 643 05/29/2022     No LMP for male patient.  No results found for this or any previous visit (from the past 72 hour(s)).    EKG:   Results for orders placed or performed during the hospital encounter of 05/17/22   EKG 12-lead    Collection Time: 05/29/22  6:52 PM    Narrative    Test Reason : R07.89,    Vent. Rate : 077 BPM     Atrial Rate : 077 BPM     P-R Int : 196 ms          QRS Dur : 162 ms      QT Int : 430 ms       P-R-T Axes : 049 068 033 degrees     QTc Int : 486 ms    Sinus rhythm with frequent Premature ventricular complexes  Right bundle branch block  Abnormal ECG  When compared with ECG of 17-MAY-2022 15:57,  Premature ventricular complexes are now Present  Confirmed by Benito SHARMA, Sean LAO ([id number]) on 6/1/2022 10:03:40 AM    Referred By: [email or web address]   SELF           Confirmed By:Sean Oliver MD       TTE:  Results for orders placed or performed during the hospital encounter of 05/17/22   Echo   Result Value Ref Range    Narrative    · The left ventricle is normal in size with concentric hypertrophy and   normal systolic function.  · The estimated ejection fraction is 60-65%.  · Grade II left ventricular diastolic dysfunction.  · Normal right ventricular size with normal right ventricular systolic   function.  · Severe left atrial enlargement.  · Mild right atrial enlargement.  · There is mild aortic valve stenosis.  · Aortic valve area is 1.94 cm2; peak velocity is 2.30 m/s; mean gradient   is 11 mmHg.  · Mild tricuspid regurgitation.  · Normal central venous pressure (3 mmHg).  · The estimated PA systolic pressure is 23 mmHg.        EF   Date Value Ref Range Status   05/18/2022 65 % Final     Nuc Rest EF   Date Value Ref Range Status   05/18/2022 64   Final   10/13/2020 62  Final      No results found for this or any previous visit.  ELIZABETH:  No results found for this or any previous visit.  Stress Test:  Results for orders placed during the hospital encounter of 05/17/22    Nuclear Stress - Cardiology Interpreted    Interpretation Summary    Normal myocardial perfusion scan. There is no evidence of myocardial ischemia or infarction.    There is a  mild intensity fixed perfusion abnormality in the inferior and inferoseptal wall of the left ventricle secondary to diaphragm attenuation.    There is moderate apical thinning which is a normal variant.    The gated perfusion images showed an ejection fraction of 64% at rest.    There is normal wall motion at rest.    The EKG portion of this study is negative for ischemia.    When compared to the previous study from 9/11/2020, there are no significant changes.         ASSESSMENT/PLAN:         Pre-op Assessment    I have reviewed the Patient Summary Reports.     I have reviewed the Nursing Notes. I have reviewed the NPO Status.   I have reviewed the Medications.     Review of Systems      Physical Exam  General: Well nourished, Cooperative and Alert    Airway:  Mallampati: III / II  Mouth Opening: Normal  TM Distance: Normal  Tongue: Normal  Neck ROM: Normal ROM    Chest/Lungs:  Normal Respiratory Rate    Heart:  Rate: Normal  Rhythm: Regular Rhythm        Anesthesia Plan  Type of Anesthesia, risks & benefits discussed:    Anesthesia Type: Gen Natural Airway, MAC  Intra-op Monitoring Plan: Standard ASA Monitors  Post Op Pain Control Plan: IV/PO Opioids PRN  Induction:  IV  Informed Consent: Informed consent signed with the Patient and all parties understand the risks and agree with anesthesia plan.  All questions answered.   ASA Score: 3  Day of Surgery Review of History & Physical: H&P Update referred to the surgeon/provider.    Ready For Surgery From Anesthesia Perspective.     .

## 2022-07-07 NOTE — NURSING TRANSFER
Nursing Transfer Note      7/7/2022     Transfer To: [facility] 3    Transfer via stretcher    Transfer with cardiac monitoring    Transported by RN    Medicines sent: N/A    Any special needs or follow-up needed: N/A    Chart send with patient: Yes    Notified: spouse    Patient reassessed at: 7/7/2022 @ [id number]

## 2022-07-07 NOTE — BRIEF OP NOTE
: Dr. Tom Patiño  Date of procedure: 07/07/2022   Post-operative Diagnosis: SVT       Procedure Performed: EPS    Description of Procedure: The patient was brought to the EP lab in the fasting state. Prepped and draped in sterile fashion. Safety timeout was performed. Sedation administered by anesthesia staff. Ultrasound guided venous access of the bilateral femoral veins was performed. HRA, HIS, and RV catheters placed via left femoral vein access. CS and Ablation catheters via right femoral vein access. Diagnostic EP study performed. We were able to induce 1.5 echo beats but nothing more despite use of isuprel. Patient found to have HV interval of 75 ms. Given underlying his disease and no clear evidence of sustained tachycardia, no ablation was performed.        EBL: <10 mL    Specimens Removed: None  Complications: no immediate    Adil MD Dustin  Cardiovascular Disease Fellow PGY VI  Pager [phone]

## 2022-07-08 NOTE — PROGRESS NOTES
Reviewed discharge material with patient and spouse. Verbalized understanding and given copy of paperwork. Waiting for transportation.

## 2022-07-08 NOTE — PROGRESS NOTES
Up with assistance. Able to ambulate and void without complication. Sites to nabor groin soft and dressings c/d/i.  Removed piv 1 c tip intact and pressure dressing applied. Wife assisting patient with dressing at this time.

## 2022-07-13 ENCOUNTER — TELEPHONE (OUTPATIENT)
Dept: ELECTROPHYSIOLOGY | Facility: CLINIC | Age: 77
End: 2022-07-13
Payer: MEDICARE

## 2022-07-13 NOTE — TELEPHONE ENCOUNTER
Spoke to patient regarding post op care. Patient denies any complaints. Wound site dry and intact without redness, swelling, hematoma or drainage. Patient denies any fever or pain.  Denies SOB, or feelings of heart racing.     Patient  has resumed medications, denies taking Aspirin daily. Patient verbalizes understanding of all post op instructions.

## 2022-08-08 ENCOUNTER — CLINICAL SUPPORT (OUTPATIENT)
Dept: CARDIOLOGY | Facility: HOSPITAL | Age: 77
End: 2022-08-08
Attending: INTERNAL MEDICINE
Payer: MEDICARE

## 2022-08-08 PROCEDURE — 93244 CV CARDIAC MONITOR - 3-15 DAY ADULT (CUPID ONLY): ICD-10-PCS | Mod: ,,, | Performed by: INTERNAL MEDICINE

## 2022-08-08 PROCEDURE — 93244 EXT ECG>48HR<7D REV&INTERPJ: CPT | Mod: ,,, | Performed by: INTERNAL MEDICINE

## 2022-08-12 ENCOUNTER — PATIENT OUTREACH (OUTPATIENT)
Dept: ADMINISTRATIVE | Facility: HOSPITAL | Age: 77
End: 2022-08-12
Payer: MEDICARE

## 2022-08-12 NOTE — PROGRESS NOTES
2022 Care Everywhere updates requested and reviewed.  Immunizations reconciled. Media reports reviewed.  Duplicate HM overrides and  orders removed.  Overdue HM topic chart audit and/or requested.  Overdue lab testing linked to upcoming lab appointments if applies.    Health Maintenance Due   Topic Date Due    TETANUS VACCINE  Never done    Colonoscopy  2022

## 2022-08-22 ENCOUNTER — LAB VISIT (OUTPATIENT)
Dept: LAB | Facility: HOSPITAL | Age: 77
End: 2022-08-22
Attending: INTERNAL MEDICINE
Payer: MEDICARE

## 2022-08-22 DIAGNOSIS — I10 ESSENTIAL HYPERTENSION: ICD-10-CM

## 2022-08-22 DIAGNOSIS — Z12.5 SCREENING FOR PROSTATE CANCER: ICD-10-CM

## 2022-08-22 LAB
ALBUMIN SERPL BCP-MCNC: 3.8 G/DL (ref 3.5–5.2)
ALP SERPL-CCNC: 76 U/L (ref 55–135)
ALT SERPL W/O P-5'-P-CCNC: 23 U/L (ref 10–44)
ANION GAP SERPL CALC-SCNC: 5 MMOL/L (ref 8–16)
AST SERPL-CCNC: 22 U/L (ref 10–40)
BASOPHILS # BLD AUTO: 0.04 K/UL (ref 0–0.2)
BASOPHILS NFR BLD: 0.7 % (ref 0–1.9)
BILIRUB SERPL-MCNC: 0.7 MG/DL (ref 0.1–1)
BUN SERPL-MCNC: 12 MG/DL (ref 8–23)
CALCIUM SERPL-MCNC: 9.6 MG/DL (ref 8.7–10.5)
CHLORIDE SERPL-SCNC: 106 MMOL/L (ref 95–110)
CHOLEST SERPL-MCNC: 102 MG/DL (ref 120–199)
CHOLEST/HDLC SERPL: 2.4 {RATIO} (ref 2–5)
CO2 SERPL-SCNC: 31 MMOL/L (ref 23–29)
COMPLEXED PSA SERPL-MCNC: <0.01 NG/ML (ref 0–4)
CREAT SERPL-MCNC: 0.8 MG/DL (ref 0.5–1.4)
DIFFERENTIAL METHOD: ABNORMAL
EOSINOPHIL # BLD AUTO: 0.1 K/UL (ref 0–0.5)
EOSINOPHIL NFR BLD: 1.9 % (ref 0–8)
ERYTHROCYTE [DISTWIDTH] IN BLOOD BY AUTOMATED COUNT: 12.3 % (ref 11.5–14.5)
EST. GFR  (NO RACE VARIABLE): >60 ML/MIN/1.73 M^2
GLUCOSE SERPL-MCNC: 100 MG/DL (ref 70–110)
HCT VFR BLD AUTO: 39.6 % (ref 40–54)
HDLC SERPL-MCNC: 42 MG/DL (ref 40–75)
HDLC SERPL: 41.2 % (ref 20–50)
HGB BLD-MCNC: 13.3 G/DL (ref 14–18)
IMM GRANULOCYTES # BLD AUTO: 0.02 K/UL (ref 0–0.04)
IMM GRANULOCYTES NFR BLD AUTO: 0.4 % (ref 0–0.5)
LDLC SERPL CALC-MCNC: 51 MG/DL (ref 63–159)
LYMPHOCYTES # BLD AUTO: 1.9 K/UL (ref 1–4.8)
LYMPHOCYTES NFR BLD: 34.9 % (ref 18–48)
MCH RBC QN AUTO: 31.4 PG (ref 27–31)
MCHC RBC AUTO-ENTMCNC: 33.6 G/DL (ref 32–36)
MCV RBC AUTO: 94 FL (ref 82–98)
MONOCYTES # BLD AUTO: 0.6 K/UL (ref 0.3–1)
MONOCYTES NFR BLD: 11.5 % (ref 4–15)
NEUTROPHILS # BLD AUTO: 2.7 K/UL (ref 1.8–7.7)
NEUTROPHILS NFR BLD: 50.6 % (ref 38–73)
NONHDLC SERPL-MCNC: 60 MG/DL
NRBC BLD-RTO: 0 /100 WBC
PLATELET # BLD AUTO: 199 K/UL (ref 150–450)
PMV BLD AUTO: 12.4 FL (ref 9.2–12.9)
POTASSIUM SERPL-SCNC: 4.2 MMOL/L (ref 3.5–5.1)
PROT SERPL-MCNC: 6.2 G/DL (ref 6–8.4)
RBC # BLD AUTO: 4.23 M/UL (ref 4.6–6.2)
SODIUM SERPL-SCNC: 142 MMOL/L (ref 136–145)
TRIGL SERPL-MCNC: 45 MG/DL (ref 30–150)
TSH SERPL DL<=0.005 MIU/L-ACNC: 2.8 UIU/ML (ref 0.4–4)
WBC # BLD AUTO: 5.38 K/UL (ref 3.9–12.7)

## 2022-08-22 PROCEDURE — 84443 ASSAY THYROID STIM HORMONE: CPT | Performed by: INTERNAL MEDICINE

## 2022-08-22 PROCEDURE — 36415 COLL VENOUS BLD VENIPUNCTURE: CPT | Mod: PO | Performed by: INTERNAL MEDICINE

## 2022-08-22 PROCEDURE — 80061 LIPID PANEL: CPT | Performed by: INTERNAL MEDICINE

## 2022-08-22 PROCEDURE — 85025 COMPLETE CBC W/AUTO DIFF WBC: CPT | Performed by: INTERNAL MEDICINE

## 2022-08-22 PROCEDURE — 84153 ASSAY OF PSA TOTAL: CPT | Performed by: INTERNAL MEDICINE

## 2022-08-22 PROCEDURE — 80053 COMPREHEN METABOLIC PANEL: CPT | Performed by: INTERNAL MEDICINE

## 2022-08-23 DIAGNOSIS — L71.9 ACNE ROSACEA: ICD-10-CM

## 2022-08-23 DIAGNOSIS — L71.1 RHINOPHYMA: ICD-10-CM

## 2022-08-23 RX ORDER — MINOCYCLINE HYDROCHLORIDE 100 MG/1
CAPSULE ORAL
Qty: 30 CAPSULE | Refills: 6 | Status: SHIPPED | OUTPATIENT
Start: 2022-08-23 | End: 2023-03-24 | Stop reason: SDUPTHER

## 2022-08-23 NOTE — TELEPHONE ENCOUNTER
----- Message from Garima Rodríguez sent at 8/23/2022  9:49 AM CDT -----  Contact: Pt  Type:  RX Refill Request    Who Called:  Pt    Refill or New Rx:  Refill    RX Name and Strength:  minocycline (MINOCIN,DYNACIN) 100 MG capsule    Preferred Pharmacy with phone number:    [id number] - [facility] [id number] [place] [address] AT [place] [address]  [id number] [place] [id number]  Phone: [phone] Fax: [phone]    Best Call Back Number:  [phone]    Additional Information:  Please call back.  Thanks.

## 2022-08-29 ENCOUNTER — PATIENT MESSAGE (OUTPATIENT)
Dept: FAMILY MEDICINE | Facility: CLINIC | Age: 77
End: 2022-08-29

## 2022-08-29 ENCOUNTER — OFFICE VISIT (OUTPATIENT)
Dept: FAMILY MEDICINE | Facility: CLINIC | Age: 77
End: 2022-08-29
Payer: MEDICARE

## 2022-08-29 VITALS
SYSTOLIC BLOOD PRESSURE: 136 MMHG | TEMPERATURE: 98 F | HEART RATE: 56 BPM | HEIGHT: 69 IN | OXYGEN SATURATION: 98 % | WEIGHT: 227.31 LBS | BODY MASS INDEX: 33.67 KG/M2 | RESPIRATION RATE: 18 BRPM | DIASTOLIC BLOOD PRESSURE: 68 MMHG

## 2022-08-29 DIAGNOSIS — D49.0 IPMN (INTRADUCTAL PAPILLARY MUCINOUS NEOPLASM): ICD-10-CM

## 2022-08-29 DIAGNOSIS — E66.01 CLASS 2 SEVERE OBESITY WITH SERIOUS COMORBIDITY AND BODY MASS INDEX (BMI) OF 36.0 TO 36.9 IN ADULT, UNSPECIFIED OBESITY TYPE: ICD-10-CM

## 2022-08-29 DIAGNOSIS — I47.10 SVT (SUPRAVENTRICULAR TACHYCARDIA): Primary | ICD-10-CM

## 2022-08-29 DIAGNOSIS — E78.5 HYPERLIPIDEMIA, UNSPECIFIED HYPERLIPIDEMIA TYPE: ICD-10-CM

## 2022-08-29 DIAGNOSIS — I65.23 BILATERAL CAROTID ARTERY STENOSIS: ICD-10-CM

## 2022-08-29 DIAGNOSIS — L71.9 ROSACEA: ICD-10-CM

## 2022-08-29 DIAGNOSIS — G31.84 MCI (MILD COGNITIVE IMPAIRMENT): ICD-10-CM

## 2022-08-29 DIAGNOSIS — Z23 NEED FOR DIPHTHERIA-TETANUS-PERTUSSIS (TDAP) VACCINE: ICD-10-CM

## 2022-08-29 DIAGNOSIS — I50.32 CHRONIC DIASTOLIC HEART FAILURE: ICD-10-CM

## 2022-08-29 DIAGNOSIS — G47.33 OSA (OBSTRUCTIVE SLEEP APNEA): ICD-10-CM

## 2022-08-29 DIAGNOSIS — I10 ESSENTIAL HYPERTENSION: ICD-10-CM

## 2022-08-29 DIAGNOSIS — N13.2 HYDRONEPHROSIS WITH URINARY OBSTRUCTION DUE TO RENAL CALCULUS: ICD-10-CM

## 2022-08-29 DIAGNOSIS — I35.0 MILD AORTIC STENOSIS: ICD-10-CM

## 2022-08-29 DIAGNOSIS — Z85.46 HISTORY OF PROSTATE CANCER: ICD-10-CM

## 2022-08-29 PROBLEM — I25.10 CORONARY ARTERY DISEASE INVOLVING NATIVE CORONARY ARTERY OF NATIVE HEART WITHOUT ANGINA PECTORIS: Status: ACTIVE | Noted: 2022-08-29

## 2022-08-29 LAB
OHS CV HOLTER SINUS AVERAGE HR: 54
OHS CV HOLTER SINUS MAX HR: 92
OHS CV HOLTER SINUS MIN HR: 38

## 2022-08-29 PROCEDURE — 99214 PR OFFICE/OUTPT VISIT, EST, LEVL IV, 30-39 MIN: ICD-10-PCS | Mod: S$GLB,,, | Performed by: INTERNAL MEDICINE

## 2022-08-29 PROCEDURE — 99214 OFFICE O/P EST MOD 30 MIN: CPT | Mod: S$GLB,,, | Performed by: INTERNAL MEDICINE

## 2022-08-29 RX ORDER — TETANUS TOXOID, REDUCED DIPHTHERIA TOXOID AND ACELLULAR PERTUSSIS VACCINE, ADSORBED 5; 2.5; 8; 8; 2.5 [IU]/.5ML; [IU]/.5ML; UG/.5ML; UG/.5ML; UG/.5ML
0.5 SUSPENSION INTRAMUSCULAR ONCE
Qty: 0.5 ML | Refills: 0 | Status: SHIPPED | OUTPATIENT
Start: 2022-08-29 | End: 2022-08-29

## 2022-08-29 NOTE — PROGRESS NOTES
Subjective:       Patient ID: Rohan Abarca is a 77 y.o. male.    Medication List with Changes/Refills   New Medications    DIPHTH,PERTUS,ACELL,,TETANUS (BOOSTRIX TDAP) 2.5-8-5 LF-MCG-LF/0.5ML SUSP    Inject 0.5 mLs into the muscle once. for 1 dose   Current Medications    AMLODIPINE (NORVASC) 5 MG TABLET    TAKE 1 TABLET(5 MG) BY MOUTH EVERY DAY    ASPIRIN (ECOTRIN) 81 MG EC TABLET    Take 81 mg by mouth every evening.    ATORVASTATIN (LIPITOR) 20 MG TABLET    TAKE 1 TABLET(20 MG) BY MOUTH EVERY DAY    BIOTIN ORAL    Take 1 capsule by mouth once daily.    BISMUTH SUBSALICYLATE (PEPTO BISMOL) 262 MG/15 ML SUSPENSION    Take 15 mLs by mouth every 6 (six) hours as needed for Indigestion.    FUROSEMIDE (LASIX) 20 MG TABLET    Take 1 pill daily as needed for weight gain, swelling, or shortness of breath    METOPROLOL SUCCINATE (TOPROL-XL) 25 MG 24 HR TABLET    Take 1 tablet (25 mg total) by mouth once daily.    MINOCYCLINE (MINOCIN,DYNACIN) 100 MG CAPSULE    TAKE 1 CAPSULE(100 MG) BY MOUTH EVERY DAY    OLMESARTAN (BENICAR) 20 MG TABLET    Take 1 tablet (20 mg total) by mouth once daily.    VIT A/VIT C/VIT E/ZINC/COPPER (PRESERVISION AREDS ORAL)    Take 2 capsules by mouth every evening.   Discontinued Medications    FAMOTIDINE (PEPCID) 20 MG TABLET    Take 1 tablet (20 mg total) by mouth 2 (two) times daily. for 7 days    TAMSULOSIN (FLOMAX) 0.4 MG CAP    Take 1 capsule (0.4 mg total) by mouth once daily. for 14 days       Chief Complaint: Follow-up  He is here today to f/u on chronic medical issues.     He has recurrent SVT with last episode on 5/2022 where he was given adenosine x 2, calcium IV and magnesium IV and amiodarone without response and eventually had successful cardioversion.  Stress test was negative for ischemia. Echo showed cLVH, EF 60%, positive diastolic dysfunction, severe LAE, mild JOHN, mild AS (PAMELA 1.94, gradient 11), mild TR and PAP of 23.  He was seen by EP who felt he would benefit from RFA  for SVT but during procedure on 7/2022 was unable to induce SVT.  He was noted to have a low heart rate and ordered a 3 day holter.      He has hypertension and is taking amlodipine 5 mg daily, metoprolol xl 25 mg daily and olmesartan 20 mg daily (he is not sure if he is taking olmesartan).  He does not check his BP at home. He denies chest pain or shortness of breath. He has no known CAD but recent CT urogram showed calcification in coronary arteries. He continues to have lower leg swelling and uses lasix 20 mg daily.      He had a carotid u/s in 2014 that showed 50% stenosis at carotid bifurcations but no significant stenosis.  Repeat u.s on 10/2020 showed no significant stenosis.      He has hyperlipidemia and is taking atorvastatin 20 mg qday. HIs lipids on 8/2022 were 102/45/42/51. He is taking aspirin daily.        Incidental finding on CT urogram was cystic lesion with complexity in the pancreas.MRI on 2/2021 showed Multilocular cystic mass centered in the uncinate process of the pancreas, similar in size as compared to the prior CT on 12/28/2020, with imaging features most suggestive of a side branch intraductal papillary mucinous neoplasm.  He had EUS on 3/2021 that showed gastritis and biopsied cystic lesion in uncinate process of pancreas that was 40 mm. Biopsy was negative. MRI on 5/2021 showed  Increase size of the lesion and then had subsequent EUS on 10/2021 showing cystic uncinate process lesion of pancreas without any duct abnormalities. He was seen by surgical oncology and advised repeat  MRI in 6 months. MRI on 8/2022 showed multi-septate cystic lesion with dilatation downstream consistent with a stable IPMN.  He is scheduled to repeat EUS in November 2022.      He has known renal stones on CT urogram on 12/2020 and a CT on 5/2022. CT stone protocol on 7/2022 showed There is right hydronephrosis, hydroureter with inflammatory stranding demonstrated about the proximal ureter, renal pelvis.  This  correlates with a persistent calculus at the renal pelvis, caliceal junction albeit a proximal ureteral calculus measuring 3 x 4 x 3 mm.  Some superimposed early inflammation could also present in this fashion. MRI on 8/2022 showed Mild right hydro nephrosis.  There is low signal intensity stone in the proximal right ureter, most likely secondary to ureterolithiasis. He has not seen urology. He denies blood in urine or pain with urination. No low back pain.      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 He had multiple excisions but bx still showed residual cancer.  He was treated with fluorouracil and did very well. He continues to follow with dermatology every 4 months.      He has rosacea and was started on minocycline 100 mg q3 days. He does have some discoloration around his neck and on his face but does not want to stop treatment. He was last seen by dermatology on 6/2021.       He had complained of memory issues. He was seen by neurology in 6/2017 who ordered MRI (chronic microvascular ischemic changes) and neuropsych testing which was normal.  He was advised to start statin and aspirin.      He has REGAN and and uses CPAP nightly. He is tolerating well.      He has history of prostate cancer s/p radical prostatectomy in 2012.  He did not go on hormonal treatment. He no longer follows with urology.  His last PSA was undetectable on 8/2022.        He lives with his wife and feels safe at home. He is exercising at the gym 4 days a week. He tries to eat healthy.  He denies any balance issues or falls.        Colonoscopy----3/2017 repeat in 8 years---scheduled   Tdap---more than 10 years  Pneumovax---12/2018  Prevnar----5/2018  Influenza vaccine----12/2018---refused  Shingrex vaccine----8/2020, 12/2020  Covid vaccine---4 doses   AAA screen---10/2016 neg      Review of Systems   Constitutional:  Negative for appetite change, fatigue, fever and unexpected weight change.   HENT:  Negative  "for congestion, ear pain, hearing loss, sore throat and trouble swallowing.    Eyes:  Negative for pain and visual disturbance.   Respiratory:  Negative for cough, chest tightness, shortness of breath and wheezing.    Cardiovascular:  Positive for leg swelling. Negative for chest pain and palpitations.   Gastrointestinal:  Negative for abdominal pain, blood in stool, constipation, diarrhea, nausea and vomiting.   Endocrine: Negative for polyuria.   Genitourinary:  Negative for dysuria and hematuria.   Musculoskeletal:  Negative for arthralgias, back pain and myalgias.   Skin:  Negative for rash.   Neurological:  Negative for dizziness, weakness, numbness and headaches.   Hematological:  Does not bruise/bleed easily.   Psychiatric/Behavioral:  Negative for dysphoric mood, sleep disturbance and suicidal ideas. The patient is not nervous/anxious.      Objective:      Vitals:    08/29/22 0753   BP: 136/68   Pulse: (!) 56   Resp: 18   Temp: 98.2 °F (36.8 °C)   TempSrc: Temporal   SpO2: 98%   Weight: 103.1 kg (227 lb 4.7 oz)   Height: 5' 9" (1.753 m)     Body mass index is 33.57 kg/m².  Physical Exam    General appearance: No acute distress, cooperative  Eyes: PERRL, EOMI, conjunctiva clear  Ears: normal external ear and pinna, tm clear without drainage, canals clear  Nose: Normal mucosa without drainage  Throat: no exudates or erythema, tonsils not enlarged  Mouth: no sores or lesions, moist mucous membranes  Neck: FROM, soft, supple, no thyromegaly, no bruits  Lymph: no anterior or posterior cervical adenopathy  Heart::  Regular rate and rhythm, no murmur  Lung: Clear to ascultation bilaterally, no wheezing, no rales, no rhonchi, no distress  Abdomen: Soft, nontender, no distention, no hepatosplenomegaly, bowel sounds normal, no guarding, no rebound, no peritoneal signs  Skin: no rashes, no lesions  Extremities: trace edema, no cyanosis  Neuro: CN 2-12 intact, 5/5 muscle strength upper and lower extremity bilaterally, " 2+ DTRs UE and LE bilaterally, normal gait  Peripheral pulses: 1+ pedal pulses bilaterally  Musculoskeletal: FROM, good strenth, no tenderness  Joint: normal appearance, no swelling, no warmth, no deformity in all joints    Assessment:       1. SVT (supraventricular tachycardia)    2. Chronic diastolic heart failure    3. Essential hypertension    4. Mild aortic stenosis    5. Hyperlipidemia, unspecified hyperlipidemia type    6. Bilateral carotid artery stenosis    7. IPMN (intraductal papillary mucinous neoplasm)    8. Hydronephrosis with urinary obstruction due to renal calculus    9. Rosacea    10. MCI (mild cognitive impairment)    11. History of prostate cancer    12. REGAN (obstructive sleep apnea)    13. Class 2 severe obesity with serious comorbidity and body mass index (BMI) of 36.0 to 36.9 in adult, unspecified obesity type    14. Need for diphtheria-tetanus-pertussis (Tdap) vaccine        Plan:       SVT (supraventricular tachycardia)  No further episodes. He continues on metoprolol and aspirin. He is followed by EP and is scheduled for a holter to evaluate low HR.     Chronic diastolic heart failure  Well compensated on lasix daily.    Essential hypertension  Well controlled and continue current regimen.   -     CBC Auto Differential; Future; Expected date: 08/29/2022  -     Basic Metabolic Panel; Future; Expected date: 08/29/2022    Mild aortic stenosis  Stable on recent echo. No symptoms.    Hyperlipidemia, unspecified hyperlipidemia type  Good control on atorvastatin. Continue aspirin daily.    Bilateral carotid artery stenosis  Continue statin and aspirin.     IPMN (intraductal papillary mucinous neoplasm)  MRI was reassuring but concern about dilatation downstream from IPMN. Scheduled for EUS in November.     Hydronephrosis with urinary obstruction due to renal calculus  New since 5/2022 with CT showing obstruction in 7/2022 and MRI confirming on 8/2022. Normal renal function and asymptomatic.  Referral to urology for evaluation.   -     Ambulatory referral/consult to Urology; Future; Expected date: 09/05/2022    Rosacea  Doing well on doxycycline.    MCI (mild cognitive impairment)  STable and continue to monitor.    History of prostate cancer  NO active disease.    REGAN (obstructive sleep apnea)  Stable and patient is compliant with use. Patient benefits from regular use of CPAP.     Class 2 severe obesity with serious comorbidity and body mass index (BMI) of 36.0 to 36.9 in adult, unspecified obesity type  Long discussion on the benefits of healthy eating and regular exercise to help lose weight and help control hypertension and hyperlipidemia.     Need for diphtheria-tetanus-pertussis (Tdap) vaccine  -     diphth,pertus,acell,,tetanus (BOOSTRIX TDAP) 2.5-8-5 Lf-mcg-Lf/0.5mL Susp; Inject 0.5 mLs into the muscle once. for 1 dose  Dispense: 0.5 mL; Refill: 0    Follow up in about 6 months (around 2/28/2023) for chronic medical issues.

## 2022-08-30 ENCOUNTER — PATIENT MESSAGE (OUTPATIENT)
Dept: FAMILY MEDICINE | Facility: CLINIC | Age: 77
End: 2022-08-30
Payer: MEDICARE

## 2022-08-30 RX ORDER — OLMESARTAN MEDOXOMIL 20 MG/1
20 TABLET ORAL DAILY
Qty: 90 TABLET | Refills: 3 | Status: SHIPPED | OUTPATIENT
Start: 2022-08-30 | End: 2023-03-06

## 2022-09-02 ENCOUNTER — TELEPHONE (OUTPATIENT)
Dept: DERMATOLOGY | Facility: CLINIC | Age: 77
End: 2022-09-02
Payer: MEDICARE

## 2022-09-02 NOTE — TELEPHONE ENCOUNTER
----- Message from Juancho Elizabeth sent at 9/2/2022 11:24 AM CDT -----  Type:  Sooner Appointment Request    Caller is requesting a sooner appointment.  Caller declined first available appointment listed below.  Caller will not accept being placed on the waitlist and is requesting a message be sent to doctor.    Name of Caller:  patient  When is the first available appointment?  --  Symptoms:    Best Call Back Number:  [phone]   Additional Information:

## 2022-09-02 NOTE — TELEPHONE ENCOUNTER
Tried to reach pt. No answer, I left a message on answering machine.    Contacting to inform pt of openings in dept.

## 2022-09-09 ENCOUNTER — TELEPHONE (OUTPATIENT)
Dept: DERMATOLOGY | Facility: CLINIC | Age: 77
End: 2022-09-09
Payer: MEDICARE

## 2022-09-09 ENCOUNTER — OFFICE VISIT (OUTPATIENT)
Dept: UROLOGY | Facility: CLINIC | Age: 77
End: 2022-09-09
Payer: MEDICARE

## 2022-09-09 VITALS — HEIGHT: 69 IN | BODY MASS INDEX: 33.18 KG/M2 | WEIGHT: 224 LBS

## 2022-09-09 DIAGNOSIS — N20.0 KIDNEY STONE: ICD-10-CM

## 2022-09-09 DIAGNOSIS — Z85.46 HISTORY OF PROSTATE CANCER: ICD-10-CM

## 2022-09-09 DIAGNOSIS — N13.2 HYDRONEPHROSIS WITH URINARY OBSTRUCTION DUE TO RENAL CALCULUS: ICD-10-CM

## 2022-09-09 DIAGNOSIS — N20.1 URETERAL STONE: Primary | ICD-10-CM

## 2022-09-09 PROCEDURE — 99213 OFFICE O/P EST LOW 20 MIN: CPT | Mod: S$PBB,,,

## 2022-09-09 PROCEDURE — 99999 PR PBB SHADOW E&M-EST. PATIENT-LVL III: CPT | Mod: PBBFAC,,,

## 2022-09-09 PROCEDURE — 99999 PR PBB SHADOW E&M-EST. PATIENT-LVL III: ICD-10-PCS | Mod: PBBFAC,,,

## 2022-09-09 PROCEDURE — 99213 PR OFFICE/OUTPT VISIT, EST, LEVL III, 20-29 MIN: ICD-10-PCS | Mod: S$PBB,,,

## 2022-09-09 PROCEDURE — 99213 OFFICE O/P EST LOW 20 MIN: CPT | Mod: PBBFAC,PO

## 2022-09-09 NOTE — TELEPHONE ENCOUNTER
----- Message from Bassam Lopez sent at 9/9/2022  2:03 PM CDT -----  Regarding: request for a sooner appt  Type:  Sooner Appointment Request    Patient is requesting a sooner appointment.  Patient declined first available appointment listed as well as another facility and provider .  Patient will not accept being placed on the waitlist and is requesting a message be sent to doctor.    Name of Caller: Rohan     When is the first available appointment? 4/23/2023    Symptoms: bumps on back for a year    Would the patient rather a call back or a response via [employer]? Call back     Best Call Back Number: [phone]    Additional Information: the patient has a few big red bumps on his back for a year. He stated that they do not hurt, but would like to have them looked at. Please return call at earliest convenience.

## 2022-09-09 NOTE — PROGRESS NOTES
[facility] Note  Staff: KATHRYN Richards    PCP: DO Andre    Chief Complaint: Kidney Stones, Hydronephrosis    Subjective:        HPI: Rohan Abarca is a 77 y.o. male new patient to me presents today for evaluation of kidney stones. He is accompanied by his wife whom is helpful with his medical history and interview. He recently had a CT which showed There is interval right hydronephrosis, hydroureter with adjacent stranding.  This corresponds with a calculus demonstrated at the renal pelvis measuring approximately 1.1 by 1 cm.  There is also a calculus measuring 3 x 4 x 3 mm at the proximal right ureter.  I reviewed the imaging with the patient. He does have a history of pT2c, [place] 4+3 prostate adenocarcinoma s/p RALP on 7/23/12 with Dr. Melo. His most recent PSA from 8/22/22 is <0.01. He denies dysuria, hematuria, urgency, frequency, fever, flank pain, and feeling of incomplete bladder emptying. He is currently not taking any bladder or prostate medications.     Questions asked the pt during ov today:  Urgency: No, urge incontinence? No  NTF: 2x night  Dysuria: No  Gross Hematuria:No  Straining:No, Hesistancy:No, Intermittency:No}, Weak stream:No    Last PSA Screening:   Lab Results   Component Value Date    PSA <0.01 08/22/2022    PSA <0.01 10/13/2020    PSA <0.01 12/07/2018       History of Kidney Stones?:  Yes    Constipation issues?:  No    REVIEW OF SYSTEMS:  Review of Systems   Constitutional: Negative.  Negative for chills and fever.   HENT: Negative.     Eyes: Negative.    Respiratory: Negative.     Cardiovascular: Negative.    Gastrointestinal: Negative.  Negative for abdominal pain, nausea and vomiting.   Genitourinary: Negative.  Negative for dysuria, flank pain, frequency, hematuria and urgency.   Musculoskeletal: Negative.  Negative for back pain.   Skin: Negative.    Neurological: Negative.    Endo/Heme/Allergies: Negative.    Psychiatric/Behavioral: Negative.        PMHx:  Past Medical History:   Diagnosis Date    Bilateral cataracts     hx    Colon polyp     benign    Gross hematuria     HBP (high blood pressure)     Hearing loss     History of detached retina repair     left    Macular degeneration     REGAN (obstructive sleep apnea)     uses CPAP    Prostate cancer     Rosacea     Squamous cell carcinoma of skin        PSHx:  Past Surgical History:   Procedure Laterality Date    BUNIONECTOMY Right     CATARACT EXTRACTION BILATERAL W/ ANTERIOR VITRECTOMY Bilateral     COLONOSCOPY N/A 3/20/2017    Procedure: COLONOSCOPY;  Surgeon: Carl Marcelino MD;  Location: [facility] ENDO;  Service: Endoscopy;  Laterality: N/A;    CYSTOSCOPY N/A 12/30/2020    Procedure: CYSTOSCOPY;  Surgeon: Kevin De Leon Jr., MD;  Location: [facility] OR;  Service: Urology;  Laterality: N/A;    DIAGNOSTIC CARDIAC ELECTROPHYSIOLOGY STUDY N/A 7/7/2022    Procedure: CARDIAC ELECTROPHYSIOLOGY STUDY, DIAGNOSTIC;  Surgeon: Tom Patiño MD;  Location: [facility] EP LAB;  Service: Cardiology;  Laterality: N/A;  SVT, RFA, LENNY, MAC, SK, 3 Prep    ENDOSCOPIC ULTRASOUND OF UPPER GASTROINTESTINAL TRACT Left 3/12/2021    Procedure: ULTRASOUND, UPPER GI TRACT, ENDOSCOPIC;  Surgeon: Carlton Oliveros MD;  Location: [facility];  Service: Endoscopy;  Laterality: Left;  Linear    ENDOSCOPIC ULTRASOUND OF UPPER GASTROINTESTINAL TRACT Left 10/20/2021    Procedure: ULTRASOUND, UPPER GI TRACT, ENDOSCOPIC;  Surgeon: Carlton Oliveros MD;  Location: [facility];  Service: Endoscopy;  Laterality: Left;  Linear scope    ESOPHAGOGASTRODUODENOSCOPY N/A 3/12/2021    Procedure: EGD (ESOPHAGOGASTRODUODENOSCOPY);  Surgeon: Carlton Oliveros MD;  Location: [facility];  Service: Endoscopy;  Laterality: N/A;    ESOPHAGOGASTRODUODENOSCOPY N/A 10/20/2021    Procedure: EGD (ESOPHAGOGASTRODUODENOSCOPY);  Surgeon: Carlton Oliveros MD;  Location: [facility];  Service: Endoscopy;  Laterality: N/A;    HERNIA REPAIR      x 2    JOINT REPLACEMENT  Right 2012    per Dr. Heriberto Colón    JOINT REPLACEMENT Left 2004    per Dr. Heriberto Colón    KNEE SURGERY      left , right    MASTOID SURGERY Right     right    PROSTATECTOMY  2012    RETINAL DETACHMENT SURGERY      left    ROTATOR CUFF REPAIR      bilateral    SHOULDER SURGERY Left     RCR per Dr. Heriberto Colón    SHOULDER SURGERY Right     RCR per Dr. Heriberto Colón    TONSILLECTOMY, ADENOIDECTOMY      TREATMENT OF CARDIAC ARRHYTHMIA  7/7/2022    Procedure: Cardioversion or Defibrillation;  Surgeon: Tom Patiño MD;  Location: [facility] LAB;  Service: Cardiology;;       Fam Hx:   malignancies: No    kidney stones: No     Soc Hx:  , lives in [place]    Allergies:  Patient has no known allergies.    Medications: reviewed     Objective:   There were no vitals filed for this visit.    Physical Exam  Constitutional:       Appearance: Normal appearance.   HENT:      Head: Normocephalic.   Eyes:      Conjunctiva/sclera: Conjunctivae normal.   Pulmonary:      Effort: Pulmonary effort is normal.   Abdominal:      General: There is no distension.      Palpations: Abdomen is soft.      Tenderness: There is no abdominal tenderness. There is no right CVA tenderness or left CVA tenderness.   Musculoskeletal:         General: Normal range of motion.      Cervical back: Normal range of motion.   Skin:     General: Skin is warm.   Neurological:      Mental Status: He is alert and oriented to person, place, and time.   Psychiatric:         Mood and Affect: Mood normal.         Behavior: Behavior normal.       LABS REVIEW:  UA today:  Color:Clear, Yellow  Spec. Grav.  1.020  PH  6.0  Negative for nitrates, protein, glucose, ketones, bili  Trace blood  Positive urobili  Trace leuks    Assessment:       1. Ureteral stone    2. Hydronephrosis with urinary obstruction due to renal calculus    3. Kidney stone    4. History of prostate cancer            Plan:     Will discuss case with urologists for evaluation of surgical  intervention    F/u As Needed per Treatment Plan    [facility]: Active    Sheela Goldstein, ALMAP-C

## 2022-09-12 ENCOUNTER — HOSPITAL ENCOUNTER (OUTPATIENT)
Dept: RADIOLOGY | Facility: HOSPITAL | Age: 77
Discharge: HOME | End: 2022-09-12
Attending: UROLOGY
Payer: MEDICARE

## 2022-09-12 ENCOUNTER — TELEPHONE (OUTPATIENT)
Dept: UROLOGY | Facility: CLINIC | Age: 77
End: 2022-09-12
Payer: MEDICARE

## 2022-09-12 DIAGNOSIS — N20.0 KIDNEY STONE: Primary | ICD-10-CM

## 2022-09-12 DIAGNOSIS — N20.0 KIDNEY STONE: ICD-10-CM

## 2022-09-12 PROCEDURE — 74018 RADEX ABDOMEN 1 VIEW: CPT | Mod: 26,,, | Performed by: RADIOLOGY

## 2022-09-12 PROCEDURE — 74018 RADEX ABDOMEN 1 VIEW: CPT | Mod: TC,FY,PO

## 2022-09-12 PROCEDURE — 74018 XR ABDOMEN AP 1 VIEW: ICD-10-PCS | Mod: 26,,, | Performed by: RADIOLOGY

## 2022-09-12 NOTE — TELEPHONE ENCOUNTER
Savananh scheduled for 130 pm today , spoke with patient to confirm, patient expressed understanding.

## 2022-09-28 ENCOUNTER — TELEPHONE (OUTPATIENT)
Dept: GASTROENTEROLOGY | Facility: CLINIC | Age: 77
End: 2022-09-28
Payer: MEDICARE

## 2022-09-28 NOTE — TELEPHONE ENCOUNTER
Called patient in regards to prep instructions. Rx sent to pharmacy. Prep sent to pt portal. Pt verbalized understanding.

## 2022-10-01 ENCOUNTER — PATIENT MESSAGE (OUTPATIENT)
Dept: UROLOGY | Facility: CLINIC | Age: 77
End: 2022-10-01
Payer: MEDICARE

## 2022-10-03 NOTE — TELEPHONE ENCOUNTER
In basket message was sent to urology pool.  Patient would like a call back about the results of his Xray on 9/12/22.  Patient stated he haven't heard back about his kidney stones. Patient would like to know what his next step should be. Please contact patient or advise.

## 2022-10-04 ENCOUNTER — ANESTHESIA EVENT (OUTPATIENT)
Dept: ENDOSCOPY | Facility: HOSPITAL | Age: 77
End: 2022-10-04
Payer: MEDICARE

## 2022-10-04 ENCOUNTER — ANESTHESIA (OUTPATIENT)
Dept: ENDOSCOPY | Facility: HOSPITAL | Age: 77
End: 2022-10-04
Payer: MEDICARE

## 2022-10-04 ENCOUNTER — HOSPITAL ENCOUNTER (OUTPATIENT)
Facility: HOSPITAL | Age: 77
Discharge: HOME | End: 2022-10-04
Attending: INTERNAL MEDICINE | Admitting: INTERNAL MEDICINE
Payer: MEDICARE

## 2022-10-04 DIAGNOSIS — Z86.010 HX OF COLONIC POLYPS: ICD-10-CM

## 2022-10-04 PROCEDURE — 37000008 HC ANESTHESIA 1ST 15 MINUTES: Mod: PO | Performed by: INTERNAL MEDICINE

## 2022-10-04 PROCEDURE — D9220A PRA ANESTHESIA: Mod: ANES,,, | Performed by: ANESTHESIOLOGY

## 2022-10-04 PROCEDURE — 37000009 HC ANESTHESIA EA ADD 15 MINS: Mod: PO | Performed by: INTERNAL MEDICINE

## 2022-10-04 PROCEDURE — 25000003 PHARM REV CODE 250: Mod: PO | Performed by: NURSE ANESTHETIST, CERTIFIED REGISTERED

## 2022-10-04 PROCEDURE — D9220A PRA ANESTHESIA: ICD-10-PCS | Mod: CRNA,,, | Performed by: NURSE ANESTHETIST, CERTIFIED REGISTERED

## 2022-10-04 PROCEDURE — G0105 COLORECTAL SCRN; HI RISK IND: ICD-10-PCS | Mod: ,,, | Performed by: INTERNAL MEDICINE

## 2022-10-04 PROCEDURE — G0105 COLORECTAL SCRN; HI RISK IND: HCPCS | Mod: ,,, | Performed by: INTERNAL MEDICINE

## 2022-10-04 PROCEDURE — D9220A PRA ANESTHESIA: ICD-10-PCS | Mod: ANES,,, | Performed by: ANESTHESIOLOGY

## 2022-10-04 PROCEDURE — D9220A PRA ANESTHESIA: Mod: CRNA,,, | Performed by: NURSE ANESTHETIST, CERTIFIED REGISTERED

## 2022-10-04 PROCEDURE — 63600175 PHARM REV CODE 636 W HCPCS: Mod: PO | Performed by: INTERNAL MEDICINE

## 2022-10-04 PROCEDURE — 63600175 PHARM REV CODE 636 W HCPCS: Mod: PO | Performed by: NURSE ANESTHETIST, CERTIFIED REGISTERED

## 2022-10-04 PROCEDURE — G0105 COLORECTAL SCRN; HI RISK IND: HCPCS | Mod: PO | Performed by: INTERNAL MEDICINE

## 2022-10-04 RX ORDER — SODIUM CHLORIDE, SODIUM LACTATE, POTASSIUM CHLORIDE, CALCIUM CHLORIDE 600; 310; 30; 20 MG/100ML; MG/100ML; MG/100ML; MG/100ML
INJECTION, SOLUTION INTRAVENOUS CONTINUOUS
Status: DISCONTINUED | OUTPATIENT
Start: 2022-10-04 | End: 2022-10-04 | Stop reason: HOSPADM

## 2022-10-04 RX ORDER — PROPOFOL 10 MG/ML
VIAL (ML) INTRAVENOUS
Status: DISCONTINUED | OUTPATIENT
Start: 2022-10-04 | End: 2022-10-04

## 2022-10-04 RX ORDER — SODIUM CHLORIDE 0.9 % (FLUSH) 0.9 %
10 SYRINGE (ML) INJECTION
Status: DISCONTINUED | OUTPATIENT
Start: 2022-10-04 | End: 2022-10-04 | Stop reason: HOSPADM

## 2022-10-04 RX ORDER — LIDOCAINE HCL/PF 100 MG/5ML
SYRINGE (ML) INTRAVENOUS
Status: DISCONTINUED | OUTPATIENT
Start: 2022-10-04 | End: 2022-10-04

## 2022-10-04 RX ADMIN — PROPOFOL 50 MG: 10 INJECTION, EMULSION INTRAVENOUS at 09:10

## 2022-10-04 RX ADMIN — LIDOCAINE HYDROCHLORIDE 100 MG: 20 INJECTION, SOLUTION INTRAVENOUS at 09:10

## 2022-10-04 RX ADMIN — SODIUM CHLORIDE, SODIUM LACTATE, POTASSIUM CHLORIDE, AND CALCIUM CHLORIDE: .6; .31; .03; .02 INJECTION, SOLUTION INTRAVENOUS at 09:10

## 2022-10-04 RX ADMIN — PROPOFOL 125 MG: 10 INJECTION, EMULSION INTRAVENOUS at 09:10

## 2022-10-04 NOTE — PROVATION PATIENT INSTRUCTIONS
Discharge Summary/Instructions after an Endoscopic Procedure  Patient Name: Rohan Abarca  Patient MRN: [id number]  Patient YOB: 1945 Tuesday, October 4, 2022  Carl Marcelino MD  Dear patient,  As a result of recent federal legislation (The Federal Cures Act), you may   receive lab or pathology results from your procedure in your [facility]   account before your physician is able to contact you. Your physician or   their representative will relay the results to you with their   recommendations at their soonest availability.  Thank you,  RESTRICTIONS:  During your procedure today, you received medications for sedation.  These   medications may affect your judgment, balance and coordination.  Therefore,   for 24 hours, you have the following restrictions:   - DO NOT drive a car, operate machinery, make legal/financial decisions,   sign important papers or drink alcohol.    ACTIVITY:  Today: no heavy lifting, straining or running due to procedural   sedation/anesthesia.  The following day: return to full activity including work.  DIET:  Eat and drink normally unless instructed otherwise.     TREATMENT FOR COMMON SIDE EFFECTS:  - Mild abdominal pain, nausea, belching, bloating or excessive gas:  rest,   eat lightly and use a heating pad.  - Sore Throat: treat with throat lozenges and/or gargle with warm salt   water.  - Because air was used during the procedure, expelling large amounts of air   from your rectum or belching is normal.  - If a bowel prep was taken, you may not have a bowel movement for 1-3 days.    This is normal.  SYMPTOMS TO WATCH FOR AND REPORT TO YOUR PHYSICIAN:  1. Abdominal pain or bloating, other than gas cramps.  2. Chest pain.  3. Back pain.  4. Signs of infection such as: chills or fever occurring within 24 hours   after the procedure.  5. Rectal bleeding, which would show as bright red, maroon, or black stools.   (A tablespoon of blood from the rectum is not serious, especially  if   hemorrhoids are present.)  6. Vomiting.  7. Weakness or dizziness.  GO DIRECTLY TO THE NEAREST EMERGENCY ROOM IF YOU HAVE ANY OF THE FOLLOWING:      Difficulty breathing              Chills and/or fever over 101 F   Persistent vomiting and/or vomiting blood   Severe abdominal pain   Severe chest pain   Black, tarry stools   Bleeding- more than one tablespoon   Any other symptom or condition that you feel may need urgent attention  Your doctor recommends these additional instructions:  If any biopsies were taken, your doctors clinic will contact you in 1 to 2   weeks with any results.  Your physician has indicated that a repeat colonoscopy is not recommended   for surveillance.   You are being discharged to home.  For questions, problems or results please call your physician - Carl Marcelino MD at Work:  [phone].  EMERGENCY PHONE NUMBER: [phone], LAB RESULTS: [phone]  IF A COMPLICATION OR EMERGENCY SITUATION ARISES AND YOU ARE UNABLE TO REACH   YOUR PHYSICIAN - GO DIRECTLY TO THE EMERGENCY ROOM.  ___________________________________________  Nurse Signature  ___________________________________________  Patient/Designated Responsible Party Signature  Carl Marcelino MD  10/4/2022 10:02:48 AM  This report has been verified and signed electronically.  Dear patient,  As a result of recent federal legislation (The Federal Cures Act), you may   receive lab or pathology results from your procedure in your [facility]   account before your physician is able to contact you. Your physician or   their representative will relay the results to you with their   recommendations at their soonest availability.  Thank you.  PROVATION

## 2022-10-04 NOTE — DISCHARGE SUMMARY
[facility] - Endoscopy  Discharge Note  Short Stay  Discharge Note  Short Stay      SUMMARY     Admit Date: 10/4/2022    Attending Physician: Carl Marcelino MD     Discharge Physician: Carl Marcelino MD    Discharge Date: 10/4/2022 10:03 AM    Final Diagnosis: Screening [Z13.9]    Disposition: HOME OR SELF CARE    Patient Instructions:   Current Discharge Medication List        CONTINUE these medications which have NOT CHANGED    Details   amLODIPine (NORVASC) 5 MG tablet TAKE 1 TABLET(5 MG) BY MOUTH EVERY DAY  Qty: 90 tablet, Refills: 3    Associated Diagnoses: Essential hypertension      atorvastatin (LIPITOR) 20 MG tablet TAKE 1 TABLET(20 MG) BY MOUTH EVERY DAY  Qty: 90 tablet, Refills: 3    Associated Diagnoses: Hyperlipidemia, unspecified hyperlipidemia type      BIOTIN ORAL Take 1 capsule by mouth once daily.      bismuth subsalicylate (PEPTO BISMOL) 262 mg/15 mL suspension Take 15 mLs by mouth every 6 (six) hours as needed for Indigestion.      furosemide (LASIX) 20 MG tablet Take 1 pill daily as needed for weight gain, swelling, or shortness of breath  Qty: 30 tablet, Refills: 5      metoprolol succinate (TOPROL-XL) 25 MG 24 hr tablet Take 1 tablet (25 mg total) by mouth once daily.  Qty: 30 tablet, Refills: 11    Comments: .  Associated Diagnoses: Essential hypertension      minocycline (MINOCIN,DYNACIN) 100 MG capsule TAKE 1 CAPSULE(100 MG) BY MOUTH EVERY DAY  Qty: 30 capsule, Refills: 6    Associated Diagnoses: Acne rosacea; Rhinophyma      olmesartan (BENICAR) 20 MG tablet Take 1 tablet (20 mg total) by mouth once daily.  Qty: 90 tablet, Refills: 3    Comments: .      VIT A/VIT C/VIT E/ZINC/COPPER (PRESERVISION AREDS ORAL) Take 2 capsules by mouth every evening.      aspirin (ECOTRIN) 81 MG EC tablet Take 81 mg by mouth every evening.             Discharge Procedure Orders (must include Diet, Follow-up, Activity)    Follow Up:  Follow up with PCP as previously scheduled  Resume routine  diet.  Activity as tolerated.    No driving day of procedure.

## 2022-10-04 NOTE — ANESTHESIA PREPROCEDURE EVALUATION
10/04/2022  Rohan Abarca is a 77 y.o., male.      Pre-op Assessment    I have reviewed the Patient Summary Reports.     I have reviewed the Nursing Notes. I have reviewed the NPO Status.   I have reviewed the Medications.     Review of Systems  Anesthesia Hx:  No problems with previous Anesthesia    Social:  Former Smoker    Hematology/Oncology:         -- Cancer in past history (SCCA, Prostate CA):   Cardiovascular:   Hypertension, well controlled CAD asymptomatic Dysrhythmias (SVT, Bradycardia)  CHF RBBB   Pulmonary:   Sleep Apnea    Renal/:   Chronic Renal Disease    Musculoskeletal:   Arthritis  DDD  Lumbar spondylosis and stenosis   Neurological:  Neurology Normal    Endocrine:  Endocrine Normal        Physical Exam  General: Well nourished, Cooperative, Alert and Oriented    Airway:  Mallampati: II   Mouth Opening: Normal  TM Distance: Normal  Neck ROM: Normal ROM        Anesthesia Plan  Type of Anesthesia, risks & benefits discussed:    Anesthesia Type: Gen ETT, Gen Supraglottic Airway, Gen Natural Airway, MAC  Intra-op Monitoring Plan: Standard ASA Monitors  Post Op Pain Control Plan: multimodal analgesia  Induction:  IV  Airway Plan: Direct, Video and Fiberoptic, Post-Induction  Informed Consent: Informed consent signed with the Patient and all parties understand the risks and agree with anesthesia plan.  All questions answered.   ASA Score: 3    Ready For Surgery From Anesthesia Perspective.     .

## 2022-10-04 NOTE — TRANSFER OF CARE
"Anesthesia Transfer of Care Note    Patient: Rohan Abarca    Procedure(s) Performed: Procedure(s) (LRB):  COLONOSCOPY (N/A)    Patient location: PACU    Anesthesia Type: general    Transport from OR: Transported from OR on room air with adequate spontaneous ventilation    Post pain: adequate analgesia    Post assessment: no apparent anesthetic complications and tolerated procedure well    Post vital signs: stable    Level of consciousness: sedated and awake    Nausea/Vomiting: no nausea/vomiting    Complications: none    Transfer of care protocol was followed      Last vitals:   Visit Vitals  BP (!) 108/55   Pulse (!) 58   Temp 36.6 °C (97.9 °F)   Resp 15   Ht 5' 9" (1.753 m)   Wt 102.1 kg (225 lb)   SpO2 98%   BMI 33.23 kg/m²     "

## 2022-10-04 NOTE — H&P
History & Physical - Short Stay  Gastroenterology      SUBJECTIVE:     Procedure: Colonoscopy    Chief Complaint/Indication for Procedure: Previous Polyps    PTA Medications   Medication Sig    amLODIPine (NORVASC) 5 MG tablet TAKE 1 TABLET(5 MG) BY MOUTH EVERY DAY (Patient taking differently: Take 5 mg by mouth once daily.)    atorvastatin (LIPITOR) 20 MG tablet TAKE 1 TABLET(20 MG) BY MOUTH EVERY DAY (Patient taking differently: Take 20 mg by mouth every evening.)    BIOTIN ORAL Take 1 capsule by mouth once daily.    bismuth subsalicylate (PEPTO BISMOL) 262 mg/15 mL suspension Take 15 mLs by mouth every 6 (six) hours as needed for Indigestion.    furosemide (LASIX) 20 MG tablet Take 1 pill daily as needed for weight gain, swelling, or shortness of breath    metoprolol succinate (TOPROL-XL) 25 MG 24 hr tablet Take 1 tablet (25 mg total) by mouth once daily.    minocycline (MINOCIN,DYNACIN) 100 MG capsule TAKE 1 CAPSULE(100 MG) BY MOUTH EVERY DAY    olmesartan (BENICAR) 20 MG tablet Take 1 tablet (20 mg total) by mouth once daily.    VIT A/VIT C/VIT E/ZINC/COPPER (PRESERVISION AREDS ORAL) Take 2 capsules by mouth every evening.    aspirin (ECOTRIN) 81 MG EC tablet Take 81 mg by mouth every evening.       Review of patient's allergies indicates:  No Known Allergies     Past Medical History:   Diagnosis Date    Bilateral cataracts     hx    Colon polyp     benign    Gross hematuria     HBP (high blood pressure)     Hearing loss     History of detached retina repair     left    Macular degeneration     REGAN (obstructive sleep apnea)     uses CPAP    Prostate cancer     Rosacea     Squamous cell carcinoma of skin      Past Surgical History:   Procedure Laterality Date    BUNIONECTOMY Right     CATARACT EXTRACTION BILATERAL W/ ANTERIOR VITRECTOMY Bilateral     COLONOSCOPY N/A 3/20/2017    Procedure: COLONOSCOPY;  Surgeon: Carl Marcelino MD;  Location: [facility];  Service: Endoscopy;  Laterality: N/A;    CYSTOSCOPY  N/A 12/30/2020    Procedure: CYSTOSCOPY;  Surgeon: Kevin De Leon Jr., MD;  Location: [facility] OR;  Service: Urology;  Laterality: N/A;    DIAGNOSTIC CARDIAC ELECTROPHYSIOLOGY STUDY N/A 7/7/2022    Procedure: CARDIAC ELECTROPHYSIOLOGY STUDY, DIAGNOSTIC;  Surgeon: Tom Patiño MD;  Location: [facility] EP LAB;  Service: Cardiology;  Laterality: N/A;  SVT, RFA, LENNY, MAC, SK, 3 Prep    ENDOSCOPIC ULTRASOUND OF UPPER GASTROINTESTINAL TRACT Left 3/12/2021    Procedure: ULTRASOUND, UPPER GI TRACT, ENDOSCOPIC;  Surgeon: Carlton Oliveros MD;  Location: [facility];  Service: Endoscopy;  Laterality: Left;  Linear    ENDOSCOPIC ULTRASOUND OF UPPER GASTROINTESTINAL TRACT Left 10/20/2021    Procedure: ULTRASOUND, UPPER GI TRACT, ENDOSCOPIC;  Surgeon: Carlton Oliveros MD;  Location: [facility];  Service: Endoscopy;  Laterality: Left;  Linear scope    ESOPHAGOGASTRODUODENOSCOPY N/A 3/12/2021    Procedure: EGD (ESOPHAGOGASTRODUODENOSCOPY);  Surgeon: Carlton Oliveros MD;  Location: [facility];  Service: Endoscopy;  Laterality: N/A;    ESOPHAGOGASTRODUODENOSCOPY N/A 10/20/2021    Procedure: EGD (ESOPHAGOGASTRODUODENOSCOPY);  Surgeon: Carlton Oliveros MD;  Location: [facility];  Service: Endoscopy;  Laterality: N/A;    HERNIA REPAIR      x 2    JOINT REPLACEMENT Right 2012    per Dr. Heriberto Colón    JOINT REPLACEMENT Left 2004    per Dr. Heriberto Colón    KNEE SURGERY      left , right    MASTOID SURGERY Right     right    PROSTATECTOMY  2012    RETINAL DETACHMENT SURGERY      left    ROTATOR CUFF REPAIR      bilateral    SHOULDER SURGERY Left     RCR per Dr. Heriberto Colón    SHOULDER SURGERY Right     RCR per Dr. Heriberto Colón    TONSILLECTOMY, ADENOIDECTOMY      TREATMENT OF CARDIAC ARRHYTHMIA  7/7/2022    Procedure: Cardioversion or Defibrillation;  Surgeon: Tom Patiño MD;  Location: [facility] EP LAB;  Service: Cardiology;;     Family History   Problem Relation Age of Onset    Cancer Sister     Stroke Sister     Cancer Sister     Stroke  Unknown     Heart attack Unknown      Social History     Tobacco Use    Smoking status: Former     Packs/day: 0.50     Years: 5.00     Pack years: 2.50     Types: Cigarettes     Quit date: 1968     Years since quittin.8    Smokeless tobacco: Never   Substance Use Topics    Alcohol use: Not Currently     Alcohol/week: 1.7 standard drinks     Types: 2 Standard drinks or equivalent per week     Comment: social    Drug use: No         OBJECTIVE:     Vital Signs (Most Recent)  Temp: 97.9 °F (36.6 °C) (10/04/22 0844)  Pulse: (!) 58 (10/04/22 0844)  Resp: 15 (10/04/22 0844)  BP: (!) 146/86 (10/04/22 0844)  SpO2: 98 % (10/04/22 0844)    Physical Exam:                                                       GENERAL:  Comfortable, in no acute distress.                                 HEENT EXAM:  Nonicteric.  No adenopathy.  Oropharynx is clear.               NECK:  Supple.                                                               LUNGS:  Clear.                                                               CARDIAC:  Regular rate and rhythm.  S1, S2.  No murmur.                      ABDOMEN:  Soft, positive bowel sounds, nontender.  No hepatosplenomegaly or masses.  No rebound or guarding.                                             EXTREMITIES:  No edema.     MENTAL STATUS:  Normal, alert and oriented.      ASSESSMENT/PLAN:     Assessment: Previous Polyps    Plan: Colonoscopy    Anesthesia Plan: General    ASA Grade: ASA 3 - Patient with moderate systemic disease with functional limitations    MALLAMPATI SCORE:  I (soft palate, uvula, fauces, and tonsillar pillars visible)

## 2022-10-05 VITALS
WEIGHT: 225 LBS | BODY MASS INDEX: 33.33 KG/M2 | SYSTOLIC BLOOD PRESSURE: 112 MMHG | RESPIRATION RATE: 16 BRPM | OXYGEN SATURATION: 96 % | HEART RATE: 48 BPM | HEIGHT: 69 IN | TEMPERATURE: 98 F | DIASTOLIC BLOOD PRESSURE: 74 MMHG

## 2022-10-05 NOTE — ANESTHESIA POSTPROCEDURE EVALUATION
Anesthesia Post Evaluation    Patient: Rohan Abarca    Procedure(s) Performed: Procedure(s) (LRB):  COLONOSCOPY (N/A)    Final Anesthesia Type: general      Patient location during evaluation: PACU  Patient participation: Yes- Able to Participate  Level of consciousness: awake and alert and oriented  Post-procedure vital signs: reviewed and stable  Pain management: adequate  Airway patency: patent    PONV status at discharge: No PONV  Anesthetic complications: no      Cardiovascular status: blood pressure returned to baseline and stable  Respiratory status: unassisted and spontaneous ventilation  Hydration status: euvolemic  Follow-up not needed.          Vitals Value Taken Time   /74 10/04/22 1024   Temp   10/05/22 0727   Pulse 48 10/04/22 1015   Resp 16 10/04/22 1015   SpO2 96 % 10/04/22 1015         Event Time   Out of Recovery 10:38:07         Pain/Rabia Score: Rabia Score: 10 (10/4/2022 10:24 AM)

## 2022-10-07 ENCOUNTER — PATIENT MESSAGE (OUTPATIENT)
Dept: UROLOGY | Facility: CLINIC | Age: 77
End: 2022-10-07
Payer: MEDICARE

## 2022-10-11 ENCOUNTER — TELEPHONE (OUTPATIENT)
Dept: UROLOGY | Facility: CLINIC | Age: 77
End: 2022-10-11
Payer: MEDICARE

## 2022-10-11 NOTE — TELEPHONE ENCOUNTER
Called the pt and told him that there KUB did not show any stones and he said that he had one that may need surgery.  Please review the CT from May 27th 2022, it mentions a 11 mm stone on the right side.    I told the pt that I would send this message to Dr Rangel for review and call him back.

## 2022-10-11 NOTE — TELEPHONE ENCOUNTER
----- Message from Jesika Menon MA sent at 10/3/2022  1:01 PM CDT -----  Regarding: Xray Results  Patient would like a call back about the results of his Xray on 9/12/22.  Patient stated he haven't heard back about his kidney stones. Patient would like to know what his next step should be. Please contact patient or advise.    Thank you

## 2022-10-12 NOTE — TELEPHONE ENCOUNTER
I reviewed CT scan and KUB.  He does have a large stone in the right kidney.  Okay to schedule right ureteroscopy with laser lithotripsy at [facility].

## 2022-10-14 ENCOUNTER — PATIENT MESSAGE (OUTPATIENT)
Dept: UROLOGY | Facility: CLINIC | Age: 77
End: 2022-10-14
Payer: MEDICARE

## 2022-10-20 ENCOUNTER — CLINICAL SUPPORT (OUTPATIENT)
Dept: AUDIOLOGY | Facility: CLINIC | Age: 77
End: 2022-10-20
Payer: MEDICARE

## 2022-10-20 ENCOUNTER — OFFICE VISIT (OUTPATIENT)
Dept: OTOLARYNGOLOGY | Facility: CLINIC | Age: 77
End: 2022-10-20
Payer: MEDICARE

## 2022-10-20 VITALS — HEIGHT: 69 IN | BODY MASS INDEX: 33.34 KG/M2 | WEIGHT: 225.06 LBS

## 2022-10-20 DIAGNOSIS — Q16.1 EXTERNAL AUDITORY CANAL ATRESIA: ICD-10-CM

## 2022-10-20 DIAGNOSIS — H90.A31 MIXED CONDUCTIVE AND SENSORINEURAL HEARING LOSS OF RIGHT EAR WITH RESTRICTED HEARING OF LEFT EAR: Primary | ICD-10-CM

## 2022-10-20 DIAGNOSIS — H90.3 ASYMMETRICAL SENSORINEURAL HEARING LOSS: Primary | ICD-10-CM

## 2022-10-20 PROCEDURE — 99999 PR PBB SHADOW E&M-EST. PATIENT-LVL III: ICD-10-PCS | Mod: PBBFAC,,, | Performed by: STUDENT IN AN ORGANIZED HEALTH CARE EDUCATION/TRAINING PROGRAM

## 2022-10-20 PROCEDURE — 92567 TYMPANOMETRY: CPT | Mod: PBBFAC,PO | Performed by: AUDIOLOGIST

## 2022-10-20 PROCEDURE — 99213 OFFICE O/P EST LOW 20 MIN: CPT | Mod: PBBFAC,PO | Performed by: STUDENT IN AN ORGANIZED HEALTH CARE EDUCATION/TRAINING PROGRAM

## 2022-10-20 PROCEDURE — 92557 COMPREHENSIVE HEARING TEST: CPT | Mod: PBBFAC,PO | Performed by: AUDIOLOGIST

## 2022-10-20 PROCEDURE — 99999 PR PBB SHADOW E&M-EST. PATIENT-LVL III: CPT | Mod: PBBFAC,,, | Performed by: STUDENT IN AN ORGANIZED HEALTH CARE EDUCATION/TRAINING PROGRAM

## 2022-10-20 PROCEDURE — 99213 PR OFFICE/OUTPT VISIT, EST, LEVL III, 20-29 MIN: ICD-10-PCS | Mod: S$PBB,,, | Performed by: STUDENT IN AN ORGANIZED HEALTH CARE EDUCATION/TRAINING PROGRAM

## 2022-10-20 PROCEDURE — 99213 OFFICE O/P EST LOW 20 MIN: CPT | Mod: S$PBB,,, | Performed by: STUDENT IN AN ORGANIZED HEALTH CARE EDUCATION/TRAINING PROGRAM

## 2022-10-20 NOTE — PROGRESS NOTES
Otolaryngology Clinic Note    Subjective:       Patient ID: Rohan Abarca is a 77 y.o. male.    Chief Complaint: Hearing Loss      History of Present Illness: Rohan Abarca is a 77 y.o. male presenting with hearing loss BL. Reports 52 year ago, had 2 right mastoid surgeries, second one did not reconstruct ossicles. Not sure what it was, sounds like cholesteatoma. Has had essentially no hearing on right since then. Left hearing aid for many years, getting worse. Right no change. Speech getting harder.       Past Surgical History:   Procedure Laterality Date    BUNIONECTOMY Right     CATARACT EXTRACTION BILATERAL W/ ANTERIOR VITRECTOMY Bilateral     COLONOSCOPY N/A 3/20/2017    Procedure: COLONOSCOPY;  Surgeon: Carl Marcelino MD;  Location: [facility];  Service: Endoscopy;  Laterality: N/A;    COLONOSCOPY N/A 10/4/2022    Procedure: COLONOSCOPY;  Surgeon: Carl Marcelino MD;  Location: [facility];  Service: Endoscopy;  Laterality: N/A;    CYSTOSCOPY N/A 12/30/2020    Procedure: CYSTOSCOPY;  Surgeon: Kevin De Leon Jr., MD;  Location: [facility];  Service: Urology;  Laterality: N/A;    DIAGNOSTIC CARDIAC ELECTROPHYSIOLOGY STUDY N/A 7/7/2022    Procedure: CARDIAC ELECTROPHYSIOLOGY STUDY, DIAGNOSTIC;  Surgeon: Tom Patiño MD;  Location: [facility];  Service: Cardiology;  Laterality: N/A;  SVT, RFA, LENNY, MAC, SK, 3 Prep    ENDOSCOPIC ULTRASOUND OF UPPER GASTROINTESTINAL TRACT Left 3/12/2021    Procedure: ULTRASOUND, UPPER GI TRACT, ENDOSCOPIC;  Surgeon: Carlton Oliveros MD;  Location: [facility];  Service: Endoscopy;  Laterality: Left;  Linear    ENDOSCOPIC ULTRASOUND OF UPPER GASTROINTESTINAL TRACT Left 10/20/2021    Procedure: ULTRASOUND, UPPER GI TRACT, ENDOSCOPIC;  Surgeon: Carlton Oliveros MD;  Location: [facility];  Service: Endoscopy;  Laterality: Left;  Linear scope    ESOPHAGOGASTRODUODENOSCOPY N/A 3/12/2021    Procedure: EGD (ESOPHAGOGASTRODUODENOSCOPY);  Surgeon: Carlton Oliveros MD;   Location: [facility];  Service: Endoscopy;  Laterality: N/A;    ESOPHAGOGASTRODUODENOSCOPY N/A 10/20/2021    Procedure: EGD (ESOPHAGOGASTRODUODENOSCOPY);  Surgeon: Carlton Oliveros MD;  Location: [facility];  Service: Endoscopy;  Laterality: N/A;    HERNIA REPAIR      x 2    JOINT REPLACEMENT Right 2012    per Dr. Heriberto Colón    JOINT REPLACEMENT Left 2004    per Dr. Heriberto Colón    KNEE SURGERY      left , right    MASTOID SURGERY Right     right    PROSTATECTOMY  2012    RETINAL DETACHMENT SURGERY      left    ROTATOR CUFF REPAIR      bilateral    SHOULDER SURGERY Left     RCR per Dr. Heriberto Colón    SHOULDER SURGERY Right     RCR per Dr. Heriberto Colón    TONSILLECTOMY, ADENOIDECTOMY      TREATMENT OF CARDIAC ARRHYTHMIA  7/7/2022    Procedure: Cardioversion or Defibrillation;  Surgeon: Tom Patiño MD;  Location: [facility];  Service: Cardiology;;     Past Medical History:   Diagnosis Date    Bilateral cataracts     hx    Colon polyp     benign    Gross hematuria     HBP (high blood pressure)     Hearing loss     History of detached retina repair     left    Macular degeneration     REGAN (obstructive sleep apnea)     uses CPAP    Prostate cancer     Rosacea     Squamous cell carcinoma of skin      Social Determinants of Health     Tobacco Use: Medium Risk    Smoking Tobacco Use: Former    Smokeless Tobacco Use: Never    Passive Exposure: Not on file   Alcohol Use: Not on file   Financial Resource Strain: Not on file   Food Insecurity: Not on file   Transportation Needs: Not on file   Physical Activity: Not on file   Stress: Not on file   Social Connections: Not on file   Housing Stability: Not on file   Depression PHQ-4: Low Risk     Last PHQ Score: 0     Review of patient's allergies indicates:  No Known Allergies  Current Outpatient Medications   Medication Instructions    amLODIPine (NORVASC) 5 MG tablet TAKE 1 TABLET(5 MG) BY MOUTH EVERY DAY    aspirin (ECOTRIN) 81 mg, Oral, Nightly    atorvastatin (LIPITOR) 20  MG tablet TAKE 1 TABLET(20 MG) BY MOUTH EVERY DAY    BIOTIN ORAL 1 capsule, Oral, Daily    bismuth subsalicylate (PEPTO BISMOL) 262 mg/15 mL suspension 15 mLs, Oral, Every 6 hours PRN    furosemide (LASIX) 20 MG tablet Take 1 pill daily as needed for weight gain, swelling, or shortness of breath    metoprolol succinate (TOPROL-XL) 25 mg, Oral, Daily    minocycline (MINOCIN,DYNACIN) 100 MG capsule TAKE 1 CAPSULE(100 MG) BY MOUTH EVERY DAY    olmesartan (BENICAR) 20 mg, Oral, Daily    VIT A/VIT C/VIT E/ZINC/COPPER (PRESERVISION AREDS ORAL) 2 capsules, Oral, Nightly       ENT ROS negative except as stated above.             Objective:      There were no vitals filed for this visit.    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 The nasal mucosa is moist. The septum is midline. There is no evidence of septal hematoma. The turbinates are without abnormality.   Ears: The ears are with normal-appearing pinna. Examination of the canals showing EAC atresia within bony canal on right. Canal appears intact. Left ear normal, TMI and mobile. Hearing decreased.   Rinne- appears to have bone conduction on the right? No air, Myles midline with 512. Left rinne negative.   Mouth: No obvious abnormalities to the lips. The teeth are unremarkable. The gingivae are without any obvious evidence of infection or lesion. The oral mucosa is moist and pink. There are no obvious masses to the hard or soft palate.   Oropharynx: The uvula is midline.  The tongue is midline. The posterior pharynx is without erythema or exudate. The tonsils are normal appearing.  Salivary glands: The salivary glands are symmetric and not enlarged, no masses  Neck: No lymphadenopathy, trachea midline, thryoid not enlarged.  Psych: Normal mood and affect.   Neuro: Grossly intact  Speech: fluent    Test Review: I personally reviewed his audiogram showing profound SNHL AD, mild to  profoudn SNHL AS, no discrim on right, 76% on left.      Assessment and Plan:       1. Mixed conductive and sensorineural hearing loss of right ear with restricted hearing of left ear    2. External auditory canal atresia          Continue hearing aid on left, can consider bicros.   He is currently just interested in hearing or possible bicros. He is medically cleared for hearing aids.   Offered CT, offered neurotology referral, he declines at this time. Discussed that if the nerve is not dead, he could benefit from surgery possibly.   [facility] Will call me if he needs to return here.     RTC: annual audios, PRN    Plan of care was discussed in detail with the patient, who agreed with the plan as above. All questions were answered in detail.     Charlee Farrell MD  Otolaryngology

## 2022-10-21 NOTE — PROGRESS NOTES
Subjective:    Patient ID:  Rohan Abarca is a 77 y.o. male who presents for follow-up of SVT (DCCV 07/07/22)      HPI 78 yo male with SVT, Htn, sleep apnea (CPAP), RBBB.  Background:  Primary cardiologist is Dr. Stevenson  Presented with sustained palpitations 5/17/22, noted to be in SVT with RBBB aberrancy. Notes indicate DCCVx3, followed by recurrence triggered by PAC's. Briefly put on amiodarone. Discharged on metoprolol.     Returned 5/29/22 with palpitations, headaches and chest pain. ECG revealed nsr. He reports symptoms improved prior to presentation.     Echo 5/17/22 normal biventricular structure and function severe LAE  Spect stress 5/17/22 negative     Baseline ECG revealed sinus bradycardia at 47 bpm RBBB, no pre-excitation    Update:    EPS 7/7/22 HV interval 75 msec. Induced sustained irregular rapid atrial tachycardia requiring DCCV.   Bardy patch revealed 9 episodes of nonsustained atrial tachycardia.  Has felt well. Denies dizziness, palpitations, shortness of breath.      Review of Systems   Constitutional: Negative. Negative for fever and malaise/fatigue.   HENT:  Negative for congestion and sore throat.    Cardiovascular:  Negative for chest pain, dyspnea on exertion, irregular heartbeat, leg swelling, near-syncope, orthopnea, palpitations, paroxysmal nocturnal dyspnea and syncope.   Respiratory:  Negative for cough and shortness of breath.    Gastrointestinal:  Negative for abdominal pain, constipation and diarrhea.   Neurological:  Negative for dizziness, light-headedness and weakness.   Psychiatric/Behavioral:  Negative for depression. The patient is not nervous/anxious.    All other systems reviewed and are negative.     Objective:    Physical Exam  Constitutional:       Appearance: He is well-developed.   Eyes:      General: No scleral icterus.     Conjunctiva/sclera: Conjunctivae normal.   Neck:      Vascular: No JVD.      Trachea: No tracheal deviation.   Cardiovascular:      Rate and  Rhythm: Regular rhythm. Bradycardia present.      Chest Wall: PMI is not displaced.      Heart sounds: Normal heart sounds.   Pulmonary:      Effort: Pulmonary effort is normal. No respiratory distress.      Breath sounds: Normal breath sounds.   Abdominal:      Palpations: Abdomen is soft.      Tenderness: There is no abdominal tenderness.   Musculoskeletal:         General: No tenderness.   Skin:     General: Skin is warm and dry.      Findings: No rash.   Neurological:      Mental Status: He is alert and oriented to person, place, and time.   Psychiatric:         Behavior: Behavior normal.         Assessment:       1. SVT (supraventricular tachycardia)    2. RBBB    3. Bradycardia    4. Chronic diastolic heart failure    5. Essential hypertension    6. Personal history of prostate cancer    7. Class 2 severe obesity with serious comorbidity and body mass index (BMI) of 36.0 to 36.9 in adult, unspecified obesity type    8. REGAN (obstructive sleep apnea)         Plan:           Tachy-bassem syndrome.  Recommend dual chamber PPM with conduction system pacing, followed by increasing beta blocker.  Risks and benefits discussed. They will reflect/consider, and contact us if they would like to proceed.

## 2022-10-24 ENCOUNTER — OFFICE VISIT (OUTPATIENT)
Dept: CARDIOLOGY | Facility: CLINIC | Age: 77
End: 2022-10-24
Payer: MEDICARE

## 2022-10-24 ENCOUNTER — TELEPHONE (OUTPATIENT)
Dept: UROLOGY | Facility: CLINIC | Age: 77
End: 2022-10-24
Payer: MEDICARE

## 2022-10-24 VITALS
HEIGHT: 69 IN | WEIGHT: 229.25 LBS | BODY MASS INDEX: 33.96 KG/M2 | DIASTOLIC BLOOD PRESSURE: 76 MMHG | HEART RATE: 42 BPM | SYSTOLIC BLOOD PRESSURE: 142 MMHG

## 2022-10-24 DIAGNOSIS — Z85.46 PERSONAL HISTORY OF PROSTATE CANCER: ICD-10-CM

## 2022-10-24 DIAGNOSIS — E66.01 CLASS 2 SEVERE OBESITY WITH SERIOUS COMORBIDITY AND BODY MASS INDEX (BMI) OF 36.0 TO 36.9 IN ADULT, UNSPECIFIED OBESITY TYPE: ICD-10-CM

## 2022-10-24 DIAGNOSIS — I10 ESSENTIAL HYPERTENSION: ICD-10-CM

## 2022-10-24 DIAGNOSIS — I50.32 CHRONIC DIASTOLIC HEART FAILURE: ICD-10-CM

## 2022-10-24 DIAGNOSIS — G47.33 OSA (OBSTRUCTIVE SLEEP APNEA): ICD-10-CM

## 2022-10-24 DIAGNOSIS — R00.1 BRADYCARDIA: ICD-10-CM

## 2022-10-24 DIAGNOSIS — I45.10 RBBB: ICD-10-CM

## 2022-10-24 DIAGNOSIS — I47.10 SVT (SUPRAVENTRICULAR TACHYCARDIA): Primary | ICD-10-CM

## 2022-10-24 DIAGNOSIS — N20.0 KIDNEY STONE: Primary | ICD-10-CM

## 2022-10-24 PROCEDURE — 99212 OFFICE O/P EST SF 10 MIN: CPT | Mod: PBBFAC,PO | Performed by: INTERNAL MEDICINE

## 2022-10-24 PROCEDURE — 99999 PR PBB SHADOW E&M-EST. PATIENT-LVL II: ICD-10-PCS | Mod: PBBFAC,,, | Performed by: INTERNAL MEDICINE

## 2022-10-24 PROCEDURE — 99999 PR PBB SHADOW E&M-EST. PATIENT-LVL II: CPT | Mod: PBBFAC,,, | Performed by: INTERNAL MEDICINE

## 2022-10-24 PROCEDURE — 99215 OFFICE O/P EST HI 40 MIN: CPT | Mod: S$PBB,,, | Performed by: INTERNAL MEDICINE

## 2022-10-24 PROCEDURE — 99215 PR OFFICE/OUTPT VISIT, EST, LEVL V, 40-54 MIN: ICD-10-PCS | Mod: S$PBB,,, | Performed by: INTERNAL MEDICINE

## 2022-11-04 ENCOUNTER — TELEPHONE (OUTPATIENT)
Dept: UROLOGY | Facility: CLINIC | Age: 77
End: 2022-11-04
Payer: MEDICARE

## 2022-11-04 NOTE — TELEPHONE ENCOUNTER
Patient states he has not taken Aspirin in months , nurse calling to ensure patient is not currently taking in preparation for upcoming procedure.

## 2022-11-08 PROBLEM — N20.0 KIDNEY STONE: Status: ACTIVE | Noted: 2022-11-08

## 2022-11-12 ENCOUNTER — TELEPHONE (OUTPATIENT)
Dept: UROLOGY | Facility: CLINIC | Age: 77
End: 2022-11-12
Payer: MEDICARE

## 2022-11-17 ENCOUNTER — OUTPATIENT CASE MANAGEMENT (OUTPATIENT)
Dept: ADMINISTRATIVE | Facility: OTHER | Age: 77
End: 2022-11-17
Payer: MEDICARE

## 2022-11-17 NOTE — PROGRESS NOTES
11/17/22 Attempt assessment with patient/caregiver. Pt declined OPCM at this time. Has all needs met. RN OPCM case closure.

## 2022-11-28 ENCOUNTER — PATIENT OUTREACH (OUTPATIENT)
Dept: ADMINISTRATIVE | Facility: HOSPITAL | Age: 77
End: 2022-11-28
Payer: MEDICARE

## 2022-11-28 ENCOUNTER — PATIENT MESSAGE (OUTPATIENT)
Dept: ADMINISTRATIVE | Facility: HOSPITAL | Age: 77
End: 2022-11-28
Payer: MEDICARE

## 2022-11-28 ENCOUNTER — OFFICE VISIT (OUTPATIENT)
Dept: UROLOGY | Facility: CLINIC | Age: 77
End: 2022-11-28
Payer: MEDICARE

## 2022-11-28 VITALS — BODY MASS INDEX: 34.19 KG/M2 | WEIGHT: 231.5 LBS

## 2022-11-28 DIAGNOSIS — N20.0 KIDNEY STONE: Primary | ICD-10-CM

## 2022-11-28 PROCEDURE — 99999 PR PBB SHADOW E&M-EST. PATIENT-LVL III: CPT | Mod: PBBFAC,,, | Performed by: UROLOGY

## 2022-11-28 PROCEDURE — 99213 OFFICE O/P EST LOW 20 MIN: CPT | Mod: PBBFAC,PO | Performed by: UROLOGY

## 2022-11-28 PROCEDURE — 99999 PR PBB SHADOW E&M-EST. PATIENT-LVL III: ICD-10-PCS | Mod: PBBFAC,,, | Performed by: UROLOGY

## 2022-11-28 PROCEDURE — 99213 PR OFFICE/OUTPT VISIT, EST, LEVL III, 20-29 MIN: ICD-10-PCS | Mod: S$PBB,,, | Performed by: UROLOGY

## 2022-11-28 PROCEDURE — 99213 OFFICE O/P EST LOW 20 MIN: CPT | Mod: S$PBB,,, | Performed by: UROLOGY

## 2022-11-28 NOTE — PROGRESS NOTES
Subjective:       Patient ID: Rohan Abarca is a 77 y.o. male.    Chief Complaint: Follow-up    HPI    77-year-old with a history of kidney stones.  He underwent ureteroscopy and fragmentation of the right kidney stone several weeks ago.  He removed the stent at home.  He is pain-free today.  He did notice passing a large amount of dustlike stone fragments.    Review of Systems   Constitutional:  Negative for fever.   Genitourinary:  Negative for dysuria and hematuria.     Objective:      Physical Exam  Vitals reviewed.   Constitutional:       Appearance: He is well-developed.   Pulmonary:      Effort: Pulmonary effort is normal.   Skin:     Findings: No rash.   Neurological:      Mental Status: He is alert and oriented to person, place, and time.       Assessment:       1. Kidney stone        Plan:       Kidney stone      Recommendations:   Increase hydration 2 liters fluid per day.

## 2022-11-28 NOTE — PROGRESS NOTES
Uncontrolled BP REPORT  BP Readings from Last 3 Encounters:   11/08/22 137/77   10/31/22 (!) 157/78   10/24/22 (!) 142/76       Non-compliant report chart audits for HYPERTENSION MANAGEMENT     Outreach to patient in reference to hypertension management       NEED REMOTE HOME BP READING DOCUMENTED   OR  BP FOLLOW UP WITH NURSE VISIT OR CARE TEAM MEMBER                Uncontrolled BP REPORT  BP Readings from Last 3 Encounters:   11/08/22 137/77   10/31/22 (!) 157/78   10/24/22 (!) 142/76

## 2022-12-06 ENCOUNTER — OFFICE VISIT (OUTPATIENT)
Dept: DERMATOLOGY | Facility: CLINIC | Age: 77
End: 2022-12-06
Payer: MEDICARE

## 2022-12-06 DIAGNOSIS — Z85.828 HISTORY OF NONMELANOMA SKIN CANCER: ICD-10-CM

## 2022-12-06 DIAGNOSIS — L82.1 SEBORRHEIC KERATOSES: ICD-10-CM

## 2022-12-06 DIAGNOSIS — L81.8 HYPERPIGMENTATION DUE TO MINOCYCLINE: ICD-10-CM

## 2022-12-06 DIAGNOSIS — D22.9 BENIGN NEVUS: ICD-10-CM

## 2022-12-06 DIAGNOSIS — L82.0 SEBORRHEIC KERATOSES, INFLAMED: ICD-10-CM

## 2022-12-06 DIAGNOSIS — D48.5 NEOPLASM OF UNCERTAIN BEHAVIOR OF SKIN: Primary | ICD-10-CM

## 2022-12-06 DIAGNOSIS — L57.0 ACTINIC KERATOSIS: ICD-10-CM

## 2022-12-06 DIAGNOSIS — L72.9 CYST OF SKIN: ICD-10-CM

## 2022-12-06 DIAGNOSIS — L57.8 ACTINIC SKIN DAMAGE: ICD-10-CM

## 2022-12-06 DIAGNOSIS — T36.4X5A HYPERPIGMENTATION DUE TO MINOCYCLINE: ICD-10-CM

## 2022-12-06 PROCEDURE — 11103 TANGNTL BX SKIN EA SEP/ADDL: CPT | Mod: PBBFAC,PO | Performed by: STUDENT IN AN ORGANIZED HEALTH CARE EDUCATION/TRAINING PROGRAM

## 2022-12-06 PROCEDURE — 88305 TISSUE EXAM BY PATHOLOGIST: ICD-10-PCS | Mod: 26,,, | Performed by: PATHOLOGY

## 2022-12-06 PROCEDURE — 88305 TISSUE EXAM BY PATHOLOGIST: CPT | Mod: 26,,, | Performed by: PATHOLOGY

## 2022-12-06 PROCEDURE — 17000 DESTRUCT PREMALG LESION: CPT | Mod: 59,S$PBB,, | Performed by: STUDENT IN AN ORGANIZED HEALTH CARE EDUCATION/TRAINING PROGRAM

## 2022-12-06 PROCEDURE — 99213 PR OFFICE/OUTPT VISIT, EST, LEVL III, 20-29 MIN: ICD-10-PCS | Mod: 25,S$PBB,, | Performed by: STUDENT IN AN ORGANIZED HEALTH CARE EDUCATION/TRAINING PROGRAM

## 2022-12-06 PROCEDURE — 11103 PR TANGENTIAL BIOPSY, SKIN, EA ADDTL LESION: ICD-10-PCS | Mod: S$PBB,,, | Performed by: STUDENT IN AN ORGANIZED HEALTH CARE EDUCATION/TRAINING PROGRAM

## 2022-12-06 PROCEDURE — 11102 PR TANGENTIAL BIOPSY, SKIN, SINGLE LESION: ICD-10-PCS | Mod: S$PBB,,, | Performed by: STUDENT IN AN ORGANIZED HEALTH CARE EDUCATION/TRAINING PROGRAM

## 2022-12-06 PROCEDURE — 88341 IMHCHEM/IMCYTCHM EA ADD ANTB: CPT | Mod: 26,,, | Performed by: PATHOLOGY

## 2022-12-06 PROCEDURE — 88341 PR IHC OR ICC EACH ADD'L SINGLE ANTIBODY  STAINPR: ICD-10-PCS | Mod: 26,,, | Performed by: PATHOLOGY

## 2022-12-06 PROCEDURE — 88342 IMHCHEM/IMCYTCHM 1ST ANTB: CPT | Mod: 26,,, | Performed by: PATHOLOGY

## 2022-12-06 PROCEDURE — 88305 TISSUE EXAM BY PATHOLOGIST: CPT | Mod: 59 | Performed by: PATHOLOGY

## 2022-12-06 PROCEDURE — 11103 TANGNTL BX SKIN EA SEP/ADDL: CPT | Mod: S$PBB,,, | Performed by: STUDENT IN AN ORGANIZED HEALTH CARE EDUCATION/TRAINING PROGRAM

## 2022-12-06 PROCEDURE — 17003 DESTRUCT PREMALG LES 2-14: CPT | Mod: 59,PBBFAC,PO | Performed by: STUDENT IN AN ORGANIZED HEALTH CARE EDUCATION/TRAINING PROGRAM

## 2022-12-06 PROCEDURE — 11102 TANGNTL BX SKIN SINGLE LES: CPT | Mod: S$PBB,,, | Performed by: STUDENT IN AN ORGANIZED HEALTH CARE EDUCATION/TRAINING PROGRAM

## 2022-12-06 PROCEDURE — 17003 DESTRUCTION, PREMALIGNANT LESIONS; SECOND THROUGH 14 LESIONS: ICD-10-PCS | Mod: 59,S$PBB,, | Performed by: STUDENT IN AN ORGANIZED HEALTH CARE EDUCATION/TRAINING PROGRAM

## 2022-12-06 PROCEDURE — 99999 PR PBB SHADOW E&M-EST. PATIENT-LVL III: CPT | Mod: PBBFAC,,, | Performed by: STUDENT IN AN ORGANIZED HEALTH CARE EDUCATION/TRAINING PROGRAM

## 2022-12-06 PROCEDURE — 99999 PR PBB SHADOW E&M-EST. PATIENT-LVL III: ICD-10-PCS | Mod: PBBFAC,,, | Performed by: STUDENT IN AN ORGANIZED HEALTH CARE EDUCATION/TRAINING PROGRAM

## 2022-12-06 PROCEDURE — 88342 CHG IMMUNOCYTOCHEMISTRY: ICD-10-PCS | Mod: 26,,, | Performed by: PATHOLOGY

## 2022-12-06 PROCEDURE — 11102 TANGNTL BX SKIN SINGLE LES: CPT | Mod: PBBFAC,PO | Performed by: STUDENT IN AN ORGANIZED HEALTH CARE EDUCATION/TRAINING PROGRAM

## 2022-12-06 PROCEDURE — 17003 DESTRUCT PREMALG LES 2-14: CPT | Mod: 59,S$PBB,, | Performed by: STUDENT IN AN ORGANIZED HEALTH CARE EDUCATION/TRAINING PROGRAM

## 2022-12-06 PROCEDURE — 99213 OFFICE O/P EST LOW 20 MIN: CPT | Mod: PBBFAC,PO | Performed by: STUDENT IN AN ORGANIZED HEALTH CARE EDUCATION/TRAINING PROGRAM

## 2022-12-06 PROCEDURE — 17000 DESTRUCT PREMALG LESION: CPT | Mod: 59,PBBFAC,PO | Performed by: STUDENT IN AN ORGANIZED HEALTH CARE EDUCATION/TRAINING PROGRAM

## 2022-12-06 PROCEDURE — 99213 OFFICE O/P EST LOW 20 MIN: CPT | Mod: 25,S$PBB,, | Performed by: STUDENT IN AN ORGANIZED HEALTH CARE EDUCATION/TRAINING PROGRAM

## 2022-12-06 PROCEDURE — 88342 IMHCHEM/IMCYTCHM 1ST ANTB: CPT | Mod: 59 | Performed by: PATHOLOGY

## 2022-12-06 PROCEDURE — 17000 PR DESTRUCTION(LASER SURGERY,CRYOSURGERY,CHEMOSURGERY),PREMALIGNANT LESIONS,FIRST LESION: ICD-10-PCS | Mod: 59,S$PBB,, | Performed by: STUDENT IN AN ORGANIZED HEALTH CARE EDUCATION/TRAINING PROGRAM

## 2022-12-06 PROCEDURE — 88341 IMHCHEM/IMCYTCHM EA ADD ANTB: CPT | Performed by: PATHOLOGY

## 2022-12-06 NOTE — PROGRESS NOTES
Subjective:       Patient ID:  Rohan Abarca is a 77 y.o. male who presents for   Chief Complaint   Patient presents with    Mass     back     Lov 6/17/21 [facility]     Patient here today for skin check UBSE  Complains of lumps on back x 2-3 years. He states they are not painful. One drained fluid approx 1 year ago.     Derm Hx:  SCC 8/20/18 right dorsal hand - efudex  SCC 10/25/18 right hand - efudex  no Fhx of melanoma.      Review of Systems   Constitutional:  Negative for fever, chills and fatigue.   Respiratory:  Negative for cough and shortness of breath.    Gastrointestinal:  Negative for nausea, vomiting and diarrhea.   Skin:  Positive for activity-related sunscreen use and wears hat. Negative for daily sunscreen use.   Hematologic/Lymphatic: Bruises/bleeds easily.      Objective:    Physical Exam   Constitutional: He appears well-developed and well-nourished. No distress.   Neurological: He is alert and oriented to person, place, and time. He is not disoriented.   Psychiatric: He has a normal mood and affect.   Skin:   Areas Examined (abnormalities noted in diagram):   Scalp / Hair Palpated and Inspected  Head / Face Inspection Performed  Neck Inspection Performed  Chest / Axilla Inspection Performed  Abdomen Inspection Performed  Back Inspection Performed  RUE Inspected  LUE Inspection Performed  Nails and Digits Inspection Performed                     Diagram Legend     Erythematous scaling macule/papule c/w actinic keratosis       Vascular papule c/w angioma      Pigmented verrucoid papule/plaque c/w seborrheic keratosis      Yellow umbilicated papule c/w sebaceous hyperplasia      Irregularly shaped tan macule c/w lentigo     1-2 mm smooth white papules consistent with Milia      Movable subcutaneous cyst with punctum c/w epidermal inclusion cyst      Subcutaneous movable cyst c/w pilar cyst      Firm pink to brown papule c/w dermatofibroma      Pedunculated fleshy papule(s) c/w skin tag(s)       Evenly pigmented macule c/w junctional nevus     Mildly variegated pigmented, slightly irregular-bordered macule c/w mildly atypical nevus      Flesh colored to evenly pigmented papule c/w intradermal nevus       Pink pearly papule/plaque c/w basal cell carcinoma      Erythematous hyperkeratotic cursted plaque c/w SCC      Surgical scar with no sign of skin cancer recurrence      Open and closed comedones      Inflammatory papules and pustules      Verrucoid papule consistent consistent with wart     Erythematous eczematous patches and plaques     Dystrophic onycholytic nail with subungual debris c/w onychomycosis     Umbilicated papule    Erythematous-base heme-crusted tan verrucoid plaque consistent with inflamed seborrheic keratosis     Erythematous Silvery Scaling Plaque c/w Psoriasis     See annotation            Assessment / Plan:      Pathology Orders:       Normal Orders This Visit    Specimen to Pathology, Dermatology     Comments:    Number of Specimens:->2  ------------------------->-------------------------  Spec 1 Procedure:->Biopsy  Spec 1 Clinical Impression:->ISK vs. SCC vs BCC  Spec 1 Source:->right neck  ------------------------->-------------------------  Spec 2 Procedure:->Biopsy  Spec 2 Clinical Impression:->dark brown - blue papule dx; sk  r/o melanocytic neoplasm  Spec 2 Source:->right forearm    Questions:    Procedure Type: Dermatology and skin neoplasms    Number of Specimens: 2    ------------------------: -------------------------    Spec 1 Procedure: Biopsy    Spec 1 Clinical Impression: ISK vs. SCC vs BCC    Spec 1 Source: right neck    ------------------------: -------------------------    Spec 2 Procedure: Biopsy    Spec 2 Clinical Impression: dark brown - blue papule dx; sk r/o melanocytic neoplasm    Spec 2 Source: right forearm    Release to patient:           Neoplasm of uncertain behavior of skin x2  -     Specimen to Pathology, Dermatology  Shave biopsy procedure note:    Shave  biopsy performed after verbal consent including risk of infection, scar, recurrence, need for additional treatment of site. Area prepped with alcohol, anesthetized with approximately 1.0cc of 1% lidocaine with epinephrine. Lesional tissue shaved with razor blade. Hemostasis achieved with application of aluminum chloride followed by hyfrecation. No complications. Dressing applied. Wound care explained.    Hyperpigmentation due to minocycline  - face, nails, neck  - discussed that this is induced by minocycline, he does not want to stop minocycline     Actinic keratosis  Cryosurgery Procedure Note    Verbal consent from the patient is obtained and the patient is aware of the precancerous quality and need for treatment of these lesions. Liquid nitrogen cryosurgery is applied to the 5 actinic keratoses, as detailed in the physical exam, to produce a freeze injury. The patient is aware that blisters may form and is instructed on wound care with gentle cleansing and use of vaseline ointment to keep moist until healed. The patient is supplied a handout on cryosurgery and is instructed to call if lesions do not completely resolve.    History of nonmelanoma skin cancer  Actinic skin damage  Area(s) of previous NMSC evaluated with no signs of recurrence.  Upper body skin examination performed today including at least 9 points as noted in physical examination. Suspicious lesions noted.  Patient instructed in importance in daily broad spectrum sun protection of at least spf 30. Mineral sunscreen ingredients preferred (Zinc +/- Titanium) and can be found OTC.   Recommend Elta MD for daily use on face and neck.  Patient encouraged to wear hat for all outdoor exposure.   Also discussed sun avoidance and use of protective clothing.    Seborrheic keratoses  Seborrheic keratoses, inflamed  These are benign inherited growths without a malignant potential. Reassurance given to patient. No treatment is necessary.     Benign  nevus  Careful dermoscopy evaluation of nevi performed.  Monitor for new mole or moles that are becoming bigger, darker, irritated, or developing irregular borders.     Cyst of skin  Reassurance given to patient. No treatment is necessary.   Discussed treatment options - excision vs observation. Cysts may recur with excision. Pt will defer treatment at this time.          1 year   No follow-ups on file.

## 2022-12-06 NOTE — PATIENT INSTRUCTIONS
CRYOSURGERY      Your doctor has used a method called cryosurgery to treat your skin condition. Cryosurgery refers to the use of very cold substances to treat a variety of skin conditions such as warts, pre-skin cancers, molluscum contagiosum, sun spots, and several benign growths. The substance we use in cryosurgery is liquid nitrogen and is so cold (-195 degrees Celsius) that is burns when administered.     Following treatment in the office, the skin may immediately burn and become red. You may find the area around the lesion is affected as well. It is sometimes necessary to treat not only the lesion, but a small area of the surrounding normal skin to achieve a good response.     A blister, and even a blood filled blister, may form after treatment.   This is a normal response. If the blister is painful, it is acceptable to sterilize a needle and with rubbing alcohol and gently pop the blister. It is important that you gently wash the area with soap and warm water as the blister fluid may contain wart virus if a wart was treated. Do no remove the roof of the blister.     The area treated can take anywhere from 1-3 weeks to heal. Healing time depends on the kind skin lesion treated, the location, and how aggressively the lesion was treated. It is recommended that the areas treated are covered with Vaseline or bacitracin ointment and a band-aid. If a band-aid is not practical, just ointment applied several times per day will do. Keeping these areas moist will speed the healing time.    Treatment with liquid nitrogen can leave a scar. In dark skin, it may be a light or dark scar, in light skin it may be a white or pink scar. These will generally fade with time, but may never go away completely.     If you have any concerns after your treatment, please feel free to call the office.       [address] [place] [address]/ [phone] FAX/ [email or web address]  Shave Biopsy Wound Care    Your  doctor has performed a shave biopsy today.  A band aid and vaseline ointment has been placed over the site.  This should remain in place for 24 hours.  It is recommended that you keep the area dry for the first 24 hours.  After 24 hours, you may remove the band aid and wash the area with warm soap and water and apply Vaseline jelly.  Many patients prefer to use Neosporin or Bacitracin ointment.  This is acceptable; however, know that you can develop an allergy to this medication even if you have used it safely for years.  It is important to keep the area moist.  Letting it dry out and get air slows healing time, and will worsen the scar.  Band aid is optional after first 24 hours.      If you notice increasing redness, tenderness, pain, or yellow drainage at the biopsy site, please notify your doctor.  These are signs of an infection.    If your biopsy site is bleeding, apply firm pressure for 15 minutes straight.  Repeat for another 15 minutes, if it is still bleeding.   If the surgical site continues to bleed, then please contact your doctor.      [address] [place] [address]/ [phone] FAX/ [email or web address]

## 2022-12-12 ENCOUNTER — TELEPHONE (OUTPATIENT)
Dept: DERMATOLOGY | Facility: CLINIC | Age: 77
End: 2022-12-12
Payer: MEDICARE

## 2022-12-12 DIAGNOSIS — L21.9 SEBORRHEIC DERMATITIS: Primary | ICD-10-CM

## 2022-12-12 RX ORDER — KETOCONAZOLE 20 MG/ML
SHAMPOO, SUSPENSION TOPICAL
Qty: 120 ML | Refills: 3 | Status: SHIPPED | OUTPATIENT
Start: 2022-12-12 | End: 2023-03-28

## 2022-12-12 NOTE — TELEPHONE ENCOUNTER
----- Message from Milly Shearer MD sent at 12/12/2022 12:18 PM CST -----  Regarding: RE: shampoo  Contact: self  I sent it over please call and let him know  ----- Message -----  From: Arsenio Elizabeth LPN  Sent: 12/9/2022  12:39 PM CST  To: Milly Shearer MD  Subject: shampoo                                          Patient called asking for you to send over a shampoo that was discussed during the appointment. I do not see the name anywhere. Please advise?  ----- Message -----  From: Laith Maurice  Sent: 12/9/2022  12:29 PM CST  To: Manfred Samson Staff    Type: Needs Medical Advice  Who Called: Patient   Pharmacy name and phone #:   [id number] - [place] [address] AT [place] [address] & [address] [address]  Phone: [phone] Fax: [phone]  Best Call Back Number: [phone]  Additional Information: Pt states she needs to see when Dr. Shearer will send in his new prescription from the last visit he had with her. Plz call pt and confirm its sent. Thanks

## 2022-12-12 NOTE — TELEPHONE ENCOUNTER
Pt contacted with no answer,  Left message that Dr. Shearer had sent an Rx for his shampoo to this pharmacy.

## 2022-12-14 LAB
FINAL PATHOLOGIC DIAGNOSIS: NORMAL
GROSS: NORMAL
Lab: NORMAL
MICROSCOPIC EXAM: NORMAL

## 2022-12-20 ENCOUNTER — OFFICE VISIT (OUTPATIENT)
Dept: CARDIOLOGY | Facility: CLINIC | Age: 77
End: 2022-12-20
Payer: MEDICARE

## 2022-12-20 VITALS
DIASTOLIC BLOOD PRESSURE: 79 MMHG | BODY MASS INDEX: 35.03 KG/M2 | WEIGHT: 237.19 LBS | RESPIRATION RATE: 18 BRPM | HEART RATE: 51 BPM | SYSTOLIC BLOOD PRESSURE: 146 MMHG

## 2022-12-20 DIAGNOSIS — I45.10 RBBB: ICD-10-CM

## 2022-12-20 DIAGNOSIS — I10 ESSENTIAL HYPERTENSION: ICD-10-CM

## 2022-12-20 DIAGNOSIS — I50.32 CHRONIC DIASTOLIC HEART FAILURE: ICD-10-CM

## 2022-12-20 DIAGNOSIS — E78.5 HYPERLIPIDEMIA, UNSPECIFIED HYPERLIPIDEMIA TYPE: ICD-10-CM

## 2022-12-20 DIAGNOSIS — G47.33 OSA (OBSTRUCTIVE SLEEP APNEA): ICD-10-CM

## 2022-12-20 DIAGNOSIS — I35.0 MILD AORTIC STENOSIS: ICD-10-CM

## 2022-12-20 DIAGNOSIS — I25.10 CORONARY ARTERY DISEASE INVOLVING NATIVE CORONARY ARTERY OF NATIVE HEART WITHOUT ANGINA PECTORIS: Primary | ICD-10-CM

## 2022-12-20 PROCEDURE — 99214 PR OFFICE/OUTPT VISIT, EST, LEVL IV, 30-39 MIN: ICD-10-PCS | Mod: S$PBB,,, | Performed by: PHYSICIAN ASSISTANT

## 2022-12-20 PROCEDURE — 99213 OFFICE O/P EST LOW 20 MIN: CPT | Mod: PBBFAC,PO | Performed by: PHYSICIAN ASSISTANT

## 2022-12-20 PROCEDURE — 99999 PR PBB SHADOW E&M-EST. PATIENT-LVL III: CPT | Mod: PBBFAC,,, | Performed by: PHYSICIAN ASSISTANT

## 2022-12-20 PROCEDURE — 99999 PR PBB SHADOW E&M-EST. PATIENT-LVL III: ICD-10-PCS | Mod: PBBFAC,,, | Performed by: PHYSICIAN ASSISTANT

## 2022-12-20 PROCEDURE — 99214 OFFICE O/P EST MOD 30 MIN: CPT | Mod: S$PBB,,, | Performed by: PHYSICIAN ASSISTANT

## 2022-12-20 NOTE — PROGRESS NOTES
Subjective:    Patient ID:  Rohan Abarca is a 77 y.o. male who presents for follow-up of SVT.       HPI  Mr. Abarca is a very pleasant gentleman who follows with Dr. Stevenson. He presents today for routine follow up. He is without cardiovascular complaints. No CP, BACON, or change in his exercise tolerance. He has been exercise routinely (walking stairs at the [employer]). We reviewed his last lipid panel (May 2022) and lipids are at goal.     He saw Dr. Patiño in October and recommendation was for dual chamber PPM with conduction system pacing, followed by increasing beta blocker. He is still considering whether to proceed with PPM. He has had no further palpitations.    Echo in May 2022 showed:  The left ventricle is normal in size with concentric hypertrophy and normal systolic function.  The estimated ejection fraction is 60-65%.  Grade II left ventricular diastolic dysfunction.  Normal right ventricular size with normal right ventricular systolic function.  Severe left atrial enlargement.  Mild right atrial enlargement.  There is mild aortic valve stenosis.  Aortic valve area is 1.94 cm2; peak velocity is 2.30 m/s; mean gradient is 11 mmHg.  Mild tricuspid regurgitation.  Normal central venous pressure (3 mmHg).  The estimated PA systolic pressure is 23 mmHg.      Review of Systems   Constitutional: Negative for chills, diaphoresis, fever, weight gain and weight loss.   HENT:  Negative for sore throat.    Eyes:  Negative for blurred vision, vision loss in left eye, vision loss in right eye and visual disturbance.   Cardiovascular:  Positive for leg swelling. Negative for chest pain, claudication, dyspnea on exertion, near-syncope, orthopnea, palpitations, paroxysmal nocturnal dyspnea and syncope.   Respiratory:  Negative for cough, hemoptysis, shortness of breath, sputum production and wheezing.    Endocrine: Negative for cold intolerance and heat intolerance.   Hematologic/Lymphatic: Negative for  adenopathy. Does not bruise/bleed easily.   Skin:  Negative for rash.   Musculoskeletal:  Negative for falls, muscle weakness and myalgias.   Gastrointestinal:  Negative for abdominal pain, change in bowel habit, constipation, diarrhea, melena and nausea.   Genitourinary:  Negative for bladder incontinence.   Neurological:  Negative for dizziness, focal weakness, headaches, light-headedness, numbness and weakness.   Psychiatric/Behavioral:  Negative for altered mental status.        Vitals:    12/20/22 1200   BP: (!) 146/79   BP Location: Left arm   Patient Position: Sitting   BP Method: Large (Automatic)   Pulse: (!) 51   Resp: 18   Weight: 107.6 kg (237 lb 3.4 oz)   Body mass index is 35.03 kg/m².    Objective:    Physical Exam  Constitutional:       Appearance: He is well-developed.   HENT:      Head: Normocephalic and atraumatic.   Eyes:      Extraocular Movements: Extraocular movements intact.      Pupils: Pupils are equal, round, and reactive to light.   Neck:      Thyroid: No thyromegaly.      Vascular: No JVD.      Trachea: No tracheal deviation.   Cardiovascular:      Rate and Rhythm: Normal rate and regular rhythm.      Chest Wall: PMI is not displaced.      Pulses: Normal pulses and intact distal pulses.      Heart sounds: S1 normal and S2 normal. No murmur heard.    No friction rub. No gallop.   Pulmonary:      Effort: Pulmonary effort is normal. No respiratory distress.      Breath sounds: Normal breath sounds. No wheezing or rales.   Chest:      Chest wall: No tenderness.   Abdominal:      General: Bowel sounds are normal. There is no distension.      Palpations: Abdomen is soft. There is no mass.      Tenderness: There is no abdominal tenderness.   Musculoskeletal:         General: No tenderness. Normal range of motion.      Cervical back: Neck supple.   Skin:     General: Skin is warm and dry.      Findings: No rash.   Neurological:      General: No focal deficit present.      Mental Status: He is  alert and oriented to person, place, and time.   Psychiatric:         Mood and Affect: Mood normal.         Behavior: Behavior normal.         Assessment:       Problem List Items Addressed This Visit          Cardiology Problems    Chronic diastolic heart failure    Coronary artery disease involving native coronary artery of native heart without angina pectoris - Primary    Essential hypertension    Hyperlipidemia    Mild aortic stenosis    RBBB       Other    REGAN (obstructive sleep apnea)      Plan:       Discussed limiting sodium, compression stockings, and elevating legs while seated for LE edema. He did not notice improvement with the daily lasix, so instructed him to take it prn.    Continue current cardiac medications.   Risk factor modification including diet and exercise.   F/U with Dr. Stevenson 6 months.   Echo every 2-3 years to follow AS.

## 2023-01-31 ENCOUNTER — PROCEDURE VISIT (OUTPATIENT)
Dept: DERMATOLOGY | Facility: CLINIC | Age: 78
End: 2023-01-31
Payer: MEDICARE

## 2023-01-31 DIAGNOSIS — D48.5 NEOPLASM OF UNCERTAIN BEHAVIOR OF SKIN: Primary | ICD-10-CM

## 2023-01-31 PROCEDURE — 11402 EXC TR-EXT B9+MARG 1.1-2 CM: CPT | Mod: PBBFAC,PO | Performed by: STUDENT IN AN ORGANIZED HEALTH CARE EDUCATION/TRAINING PROGRAM

## 2023-01-31 PROCEDURE — 12032 INTMD RPR S/A/T/EXT 2.6-7.5: CPT | Mod: PBBFAC,PO | Performed by: STUDENT IN AN ORGANIZED HEALTH CARE EDUCATION/TRAINING PROGRAM

## 2023-01-31 PROCEDURE — 88305 TISSUE EXAM BY PATHOLOGIST: ICD-10-PCS | Mod: 26,,, | Performed by: PATHOLOGY

## 2023-01-31 PROCEDURE — 12032 INTMD RPR S/A/T/EXT 2.6-7.5: CPT | Mod: S$PBB,,, | Performed by: STUDENT IN AN ORGANIZED HEALTH CARE EDUCATION/TRAINING PROGRAM

## 2023-01-31 PROCEDURE — 88305 TISSUE EXAM BY PATHOLOGIST: CPT | Mod: 26,,, | Performed by: PATHOLOGY

## 2023-01-31 PROCEDURE — 12032 PR LAYR CLOS WND TRUNK,ARM,LEG 2.6-7.5 CM: ICD-10-PCS | Mod: S$PBB,,, | Performed by: STUDENT IN AN ORGANIZED HEALTH CARE EDUCATION/TRAINING PROGRAM

## 2023-01-31 PROCEDURE — 99499 NO LOS: ICD-10-PCS | Mod: S$PBB,,, | Performed by: STUDENT IN AN ORGANIZED HEALTH CARE EDUCATION/TRAINING PROGRAM

## 2023-01-31 PROCEDURE — 11402 EXC TR-EXT B9+MARG 1.1-2 CM: CPT | Mod: S$PBB,51,, | Performed by: STUDENT IN AN ORGANIZED HEALTH CARE EDUCATION/TRAINING PROGRAM

## 2023-01-31 PROCEDURE — 88305 TISSUE EXAM BY PATHOLOGIST: CPT | Performed by: PATHOLOGY

## 2023-01-31 PROCEDURE — 11402 PR EXC SKIN BENIG 1.1-2 CM TRUNK,ARM,LEG: ICD-10-PCS | Mod: S$PBB,51,, | Performed by: STUDENT IN AN ORGANIZED HEALTH CARE EDUCATION/TRAINING PROGRAM

## 2023-01-31 PROCEDURE — 99499 UNLISTED E&M SERVICE: CPT | Mod: S$PBB,,, | Performed by: STUDENT IN AN ORGANIZED HEALTH CARE EDUCATION/TRAINING PROGRAM

## 2023-01-31 NOTE — PATIENT INSTRUCTIONS

## 2023-01-31 NOTE — PROGRESS NOTES
Subjective:       Patient ID:  Rohan Abarca is a 78 y.o. male who presents for   Chief Complaint   Patient presents with    Dysplastic Nevus     E&S     Patient here today for E&S of dysplastic nevus on right forearm.   Denies pacemaker.  Denies blood thinners.    . Skin, right forearm, shave biopsy:   -ATYPICAL JUNCTIONAL MELANOCYTIC PROLIFERATION, EXTENDING TO A PERIPHERAL   BIOPSY EDGE, see comment   COMMENT:  Clinical images were reviewed in epic and differential diagnosis   noted.  The histological findings (see microscopic description) show a   traumatized hair follicle with associated fibrosis and inflammation.   However, there is an adjacent atypical junctional melanocytic proliferation   characterized by some increased crowded and near confluent single melanocytes   along the dermal epidermal junction.  Although the lesion could represent   part of an irritated lentigo simplex or irritated junctional lentiginous   melanocytic nevus, a malignant melanoma in-situ (lentigo maligna type) can   not be excluded this time.  This atypical junctional melanocytic   proliferation is only partially present towards one of the edges of the   biopsy and extends to a peripheral biopsy edge.  Re-shave biopsy to include   the entire pigmented lesion or conservative re-excision of this area are   recommended.       Review of Systems   Constitutional:  Negative for fever, chills and fatigue.   Respiratory:  Negative for cough and shortness of breath.    Gastrointestinal:  Negative for nausea, vomiting and diarrhea.   Skin:  Positive for activity-related sunscreen use and wears hat. Negative for daily sunscreen use.   Hematologic/Lymphatic: Bruises/bleeds easily.      Objective:    Physical Exam   Constitutional: He appears well-developed and well-nourished.   Neurological: He is alert and oriented to person, place, and time.   Psychiatric: He has a normal mood and affect.        Diagram Legend     Erythematous scaling  macule/papule c/w actinic keratosis       Vascular papule c/w angioma      Pigmented verrucoid papule/plaque c/w seborrheic keratosis      Yellow umbilicated papule c/w sebaceous hyperplasia      Irregularly shaped tan macule c/w lentigo     1-2 mm smooth white papules consistent with Milia      Movable subcutaneous cyst with punctum c/w epidermal inclusion cyst      Subcutaneous movable cyst c/w pilar cyst      Firm pink to brown papule c/w dermatofibroma      Pedunculated fleshy papule(s) c/w skin tag(s)      Evenly pigmented macule c/w junctional nevus     Mildly variegated pigmented, slightly irregular-bordered macule c/w mildly atypical nevus      Flesh colored to evenly pigmented papule c/w intradermal nevus       Pink pearly papule/plaque c/w basal cell carcinoma      Erythematous hyperkeratotic cursted plaque c/w SCC      Surgical scar with no sign of skin cancer recurrence      Open and closed comedones      Inflammatory papules and pustules      Verrucoid papule consistent consistent with wart     Erythematous eczematous patches and plaques     Dystrophic onycholytic nail with subungual debris c/w onychomycosis     Umbilicated papule    Erythematous-base heme-crusted tan verrucoid plaque consistent with inflamed seborrheic keratosis     Erythematous Silvery Scaling Plaque c/w Psoriasis     See annotation        Assessment / Plan:      Pathology Orders:       Normal Orders This Visit    Specimen to Pathology, Dermatology     Questions:    Procedure Type: Dermatology and skin neoplasms    Number of Specimens: 1    ------------------------: -------------------------    Spec 1 Procedure: Excision >2cm    Spec 1 Clinical Impression: biopsy proven atypical melanocytic proliferation please check margins    Spec 1 Source: right forearm    Release to patient:           Neoplasm of uncertain behavior of skin  -     Specimen to Pathology, Dermatology  PROCEDURE: Elliptical excision with intermediate layered repair in  order to decrease dead space, decrease tension, and close large gap.    ANESTHETIC: 9 cc 1% Xylocaine with Epinephrine 1:100,000, buffered    SURGEON: Milly Shearer MD  ASSISTANTS: Roma Stevens MA    PREOPERATIVE DIAGNOSIS:   atypical melanocytic proliferation    POSTOPERATIVE DIAGNOSIS:  Same as preoperative diagnosis    PATHOLOGIC DIAGNOSIS: Pending    LOCATION: right forearm    INITIAL LESION SIZE: 0.5 cm    EXCISED DIAMETER: 1.5 cm    PREPARATION: The diagnosis, procedure, alternatives, benefits and risks, including but not limited to: infection, bleeding/bruising, drug reactions, pain, scar or cosmetic defect, local sensation disturbances, wound dehiscence (separation of wound edges after sutures removed) and/or recurrence of present condition were explained to the patient. The patient elected to proceed.  Patient's identity was verified using 2 patient identifiers and the side and site was verified.  Time out period with surgeon, assistant and patient in surgical suite was taken.    PROCEDURE: The location noted above was prepped and draped in the usual sterile fashion. The area was anesthetized with intradermal buffered xylocaine. Lesional tissue was carefully marked with at least 5 mm margins of clinically normal skin in all directions. A fusiform elliptical excision was done with #15 blade carried down completely through the dermis into the subcutaneous tissues to the level of the subcutaneous fat, and dissection was carried out in that plane.  Electrocoagulation was used to obtain hemostasis. Blood loss was minimal. The wound was then approximated in a layered fashion with subcutaneous and intradermal sutures of 4.0 Monocryl, approximately 6 in number, and the wound was then superficially closed with running sutures of 4.0 Prolene.    The patient tolerated the procedure well.    The area was cleaned and dressed appropriately and the patient was given wound care instructions, as well as an appointment for  follow-up evaluation.    LENGTH OF REPAIR: 5.1 cm             2 weeks  No follow-ups on file.

## 2023-02-03 LAB
FINAL PATHOLOGIC DIAGNOSIS: NORMAL
GROSS: NORMAL
Lab: NORMAL
MICROSCOPIC EXAM: NORMAL

## 2023-02-14 ENCOUNTER — CLINICAL SUPPORT (OUTPATIENT)
Dept: DERMATOLOGY | Facility: CLINIC | Age: 78
End: 2023-02-14
Payer: MEDICARE

## 2023-02-14 DIAGNOSIS — Z48.02 VISIT FOR SUTURE REMOVAL: Primary | ICD-10-CM

## 2023-02-14 PROCEDURE — 99024 POSTOP FOLLOW-UP VISIT: CPT | Mod: POP,,, | Performed by: STUDENT IN AN ORGANIZED HEALTH CARE EDUCATION/TRAINING PROGRAM

## 2023-02-14 PROCEDURE — 99024 PR POST-OP FOLLOW-UP VISIT: ICD-10-PCS | Mod: POP,,, | Performed by: STUDENT IN AN ORGANIZED HEALTH CARE EDUCATION/TRAINING PROGRAM

## 2023-03-01 ENCOUNTER — LAB VISIT (OUTPATIENT)
Dept: LAB | Facility: HOSPITAL | Age: 78
End: 2023-03-01
Attending: INTERNAL MEDICINE
Payer: MEDICARE

## 2023-03-01 DIAGNOSIS — I10 ESSENTIAL HYPERTENSION: ICD-10-CM

## 2023-03-01 LAB
ANION GAP SERPL CALC-SCNC: 5 MMOL/L (ref 8–16)
BASOPHILS # BLD AUTO: 0.03 K/UL (ref 0–0.2)
BASOPHILS NFR BLD: 0.6 % (ref 0–1.9)
BUN SERPL-MCNC: 17 MG/DL (ref 8–23)
CALCIUM SERPL-MCNC: 9.6 MG/DL (ref 8.7–10.5)
CHLORIDE SERPL-SCNC: 107 MMOL/L (ref 95–110)
CO2 SERPL-SCNC: 33 MMOL/L (ref 23–29)
CREAT SERPL-MCNC: 1 MG/DL (ref 0.5–1.4)
DIFFERENTIAL METHOD: ABNORMAL
EOSINOPHIL # BLD AUTO: 0.1 K/UL (ref 0–0.5)
EOSINOPHIL NFR BLD: 1.8 % (ref 0–8)
ERYTHROCYTE [DISTWIDTH] IN BLOOD BY AUTOMATED COUNT: 12.7 % (ref 11.5–14.5)
EST. GFR  (NO RACE VARIABLE): >60 ML/MIN/1.73 M^2
GLUCOSE SERPL-MCNC: 100 MG/DL (ref 70–110)
HCT VFR BLD AUTO: 41.6 % (ref 40–54)
HGB BLD-MCNC: 13.6 G/DL (ref 14–18)
IMM GRANULOCYTES # BLD AUTO: 0.01 K/UL (ref 0–0.04)
IMM GRANULOCYTES NFR BLD AUTO: 0.2 % (ref 0–0.5)
LYMPHOCYTES # BLD AUTO: 1.6 K/UL (ref 1–4.8)
LYMPHOCYTES NFR BLD: 32.3 % (ref 18–48)
MCH RBC QN AUTO: 31.2 PG (ref 27–31)
MCHC RBC AUTO-ENTMCNC: 32.7 G/DL (ref 32–36)
MCV RBC AUTO: 95 FL (ref 82–98)
MONOCYTES # BLD AUTO: 0.6 K/UL (ref 0.3–1)
MONOCYTES NFR BLD: 11.7 % (ref 4–15)
NEUTROPHILS # BLD AUTO: 2.6 K/UL (ref 1.8–7.7)
NEUTROPHILS NFR BLD: 53.4 % (ref 38–73)
NRBC BLD-RTO: 0 /100 WBC
PLATELET # BLD AUTO: 184 K/UL (ref 150–450)
PMV BLD AUTO: 11.8 FL (ref 9.2–12.9)
POTASSIUM SERPL-SCNC: 4.2 MMOL/L (ref 3.5–5.1)
RBC # BLD AUTO: 4.36 M/UL (ref 4.6–6.2)
SODIUM SERPL-SCNC: 145 MMOL/L (ref 136–145)
WBC # BLD AUTO: 4.89 K/UL (ref 3.9–12.7)

## 2023-03-01 PROCEDURE — 36415 COLL VENOUS BLD VENIPUNCTURE: CPT | Mod: PO | Performed by: INTERNAL MEDICINE

## 2023-03-01 PROCEDURE — 85025 COMPLETE CBC W/AUTO DIFF WBC: CPT | Performed by: INTERNAL MEDICINE

## 2023-03-01 PROCEDURE — 80048 BASIC METABOLIC PNL TOTAL CA: CPT | Performed by: INTERNAL MEDICINE

## 2023-03-06 ENCOUNTER — OFFICE VISIT (OUTPATIENT)
Dept: FAMILY MEDICINE | Facility: CLINIC | Age: 78
End: 2023-03-06
Payer: MEDICARE

## 2023-03-06 VITALS
OXYGEN SATURATION: 96 % | HEIGHT: 69 IN | WEIGHT: 234.44 LBS | SYSTOLIC BLOOD PRESSURE: 128 MMHG | HEART RATE: 59 BPM | TEMPERATURE: 98 F | BODY MASS INDEX: 34.72 KG/M2 | DIASTOLIC BLOOD PRESSURE: 78 MMHG

## 2023-03-06 DIAGNOSIS — L71.9 ROSACEA: ICD-10-CM

## 2023-03-06 DIAGNOSIS — H35.3223 EXUDATIVE AGE-RELATED MACULAR DEGENERATION, LEFT EYE, WITH INACTIVE SCAR: ICD-10-CM

## 2023-03-06 DIAGNOSIS — R20.0 NUMBNESS AND TINGLING OF RIGHT UPPER EXTREMITY: ICD-10-CM

## 2023-03-06 DIAGNOSIS — Z85.46 HISTORY OF PROSTATE CANCER: ICD-10-CM

## 2023-03-06 DIAGNOSIS — G47.33 OSA (OBSTRUCTIVE SLEEP APNEA): ICD-10-CM

## 2023-03-06 DIAGNOSIS — R20.2 NUMBNESS AND TINGLING OF RIGHT UPPER EXTREMITY: ICD-10-CM

## 2023-03-06 DIAGNOSIS — I47.19 ATRIAL TACHYCARDIA: ICD-10-CM

## 2023-03-06 DIAGNOSIS — I65.23 BILATERAL CAROTID ARTERY STENOSIS: ICD-10-CM

## 2023-03-06 DIAGNOSIS — R73.09 ELEVATED GLUCOSE: ICD-10-CM

## 2023-03-06 DIAGNOSIS — K86.89 DILATED PANCREATIC DUCT: ICD-10-CM

## 2023-03-06 DIAGNOSIS — N13.2 HYDRONEPHROSIS WITH URINARY OBSTRUCTION DUE TO RENAL CALCULUS: ICD-10-CM

## 2023-03-06 DIAGNOSIS — I10 ESSENTIAL HYPERTENSION: ICD-10-CM

## 2023-03-06 DIAGNOSIS — I70.0 AORTIC ATHEROSCLEROSIS: ICD-10-CM

## 2023-03-06 DIAGNOSIS — G31.84 MCI (MILD COGNITIVE IMPAIRMENT): ICD-10-CM

## 2023-03-06 DIAGNOSIS — E78.5 HYPERLIPIDEMIA, UNSPECIFIED HYPERLIPIDEMIA TYPE: ICD-10-CM

## 2023-03-06 DIAGNOSIS — E66.01 CLASS 2 SEVERE OBESITY WITH SERIOUS COMORBIDITY AND BODY MASS INDEX (BMI) OF 36.0 TO 36.9 IN ADULT, UNSPECIFIED OBESITY TYPE: ICD-10-CM

## 2023-03-06 DIAGNOSIS — I25.10 CORONARY ARTERY DISEASE INVOLVING NATIVE CORONARY ARTERY OF NATIVE HEART WITHOUT ANGINA PECTORIS: Primary | ICD-10-CM

## 2023-03-06 DIAGNOSIS — I35.0 MILD AORTIC STENOSIS: ICD-10-CM

## 2023-03-06 DIAGNOSIS — Z12.5 SCREENING FOR PROSTATE CANCER: ICD-10-CM

## 2023-03-06 DIAGNOSIS — I50.32 CHRONIC DIASTOLIC HEART FAILURE: ICD-10-CM

## 2023-03-06 DIAGNOSIS — I49.5 TACHY-BRADY SYNDROME: ICD-10-CM

## 2023-03-06 DIAGNOSIS — D49.0 IPMN (INTRADUCTAL PAPILLARY MUCINOUS NEOPLASM): ICD-10-CM

## 2023-03-06 PROCEDURE — 99215 PR OFFICE/OUTPT VISIT, EST, LEVL V, 40-54 MIN: ICD-10-PCS | Mod: S$GLB,,, | Performed by: INTERNAL MEDICINE

## 2023-03-06 PROCEDURE — 99215 OFFICE O/P EST HI 40 MIN: CPT | Mod: S$GLB,,, | Performed by: INTERNAL MEDICINE

## 2023-03-06 RX ORDER — OLMESARTAN MEDOXOMIL 40 MG/1
40 TABLET ORAL DAILY
Qty: 90 TABLET | Refills: 3 | Status: SHIPPED | OUTPATIENT
Start: 2023-03-06 | End: 2023-12-09 | Stop reason: SDUPTHER

## 2023-03-06 RX ORDER — ASPIRIN 81 MG/1
81 TABLET ORAL DAILY
Qty: 90 TABLET | Refills: 3 | Status: SHIPPED | OUTPATIENT
Start: 2023-03-06 | End: 2024-03-05

## 2023-03-06 RX ORDER — FUROSEMIDE 20 MG/1
40 TABLET ORAL DAILY
Qty: 180 TABLET | Refills: 3 | Status: SHIPPED | OUTPATIENT
Start: 2023-03-06 | End: 2023-03-09 | Stop reason: SDUPTHER

## 2023-03-06 NOTE — PROGRESS NOTES
Subjective:       Patient ID: Rohan Abarca is a 78 y.o. male.    Medication List with Changes/Refills   New Medications    ASPIRIN (ECOTRIN) 81 MG EC TABLET    Take 1 tablet (81 mg total) by mouth once daily.    OLMESARTAN (BENICAR) 40 MG TABLET    Take 1 tablet (40 mg total) by mouth once daily.   Current Medications    ATORVASTATIN (LIPITOR) 20 MG TABLET    TAKE 1 TABLET(20 MG) BY MOUTH EVERY DAY    KETOCONAZOLE (NIZORAL) 2 % SHAMPOO    Apply topically 3 (three) times a week.    METOPROLOL SUCCINATE (TOPROL-XL) 25 MG 24 HR TABLET    Take 1 tablet (25 mg total) by mouth once daily.    MINOCYCLINE (MINOCIN,DYNACIN) 100 MG CAPSULE    TAKE 1 CAPSULE(100 MG) BY MOUTH EVERY DAY    VIT A/VIT C/VIT E/ZINC/COPPER (PRESERVISION AREDS ORAL)    Take 1 capsule by mouth 2 (two) times a day.   Changed and/or Refilled Medications    Modified Medication Previous Medication    FUROSEMIDE (LASIX) 20 MG TABLET furosemide (LASIX) 20 MG tablet       Take 2 tablets (40 mg total) by mouth once daily. Take 1 pill daily as needed for weight gain, swelling, or shortness of breath    Take 1 tablet (20 mg total) by mouth once daily. Take 1 pill daily as needed for weight gain, swelling, or shortness of breath   Discontinued Medications    AMLODIPINE (NORVASC) 5 MG TABLET    Take 1 tablet (5 mg total) by mouth once daily.    HYDROCODONE-ACETAMINOPHEN (NORCO) 5-325 MG PER TABLET    Take 1 tablet by mouth every 4 (four) hours as needed for Pain.    OLMESARTAN (BENICAR) 20 MG TABLET    Take 1 tablet (20 mg total) by mouth once daily.       Chief Complaint: Follow-up  He is here today to f/u on chronic medical issues.      He has recurrent SVT with last episode on 5/2022 where he was given adenosine x 2, calcium IV and magnesium IV and amiodarone without response and eventually had successful cardioversion.  Stress test was negative for ischemia. Echo showed cLVH, EF 60%, positive diastolic dysfunction, severe LAE, mild JOHN, mild AS (PAMELA 1.94,  "gradient 11), mild TR and PAP of 23.  He was seen by EP who felt he would benefit from RFA for SVT but during procedure on 7/2022 was unable to induce SVT.  He was noted to have a low heart rate and ordered a 3 day holter. The holter showed occasional non-sustained atrial tachycardia with HR ranging from 38 to 92. He was diagnosed with tachy-bassem syndrome and advised to get a dual chamber pacer and then increase his beta blocker.  He does not want this done because he "feels fine".  He was seen by cardiology on 12/2022 and advised to f/u in 6 months and needs an echo every 2-3 years to monitor AS.     He has hypertension and is taking amlodipine 5 mg daily, metoprolol xl 25 mg daily and olmesartan 20 mg daily .  He does not check his BP at home. He denies chest pain or shortness of breath. He has no known CAD but recent CT urogram showed calcification in coronary arteries. He continues to have lower leg swelling and does not respond to lasix 20 mg twice a day.      He had a carotid u/s in 2014 that showed 50% stenosis at carotid bifurcations but no significant stenosis.  Repeat u.s on 10/2020 showed no significant stenosis.      He has hyperlipidemia and is taking atorvastatin 20 mg qday. HIs lipids on 8/2022 were 102/45/42/51. He is taking aspirin daily.        Incidental finding on CT urogram was cystic lesion with complexity in the pancreas.MRI on 2/2021 showed Multilocular cystic mass centered in the uncinate process of the pancreas, similar in size as compared to the prior CT on 12/28/2020, with imaging features most suggestive of a side branch intraductal papillary mucinous neoplasm.  He had EUS on 3/2021 that showed gastritis and biopsied cystic lesion in uncinate process of pancreas that was 40 mm. Biopsy was negative. MRI on 5/2021 showed  Increase size of the lesion and then had subsequent EUS on 10/2021 showing cystic uncinate process lesion of pancreas without any duct abnormalities. He was seen by " surgical oncology and advised repeat  MRI in 6 months. MRI on 8/2022 showed multi-septate cystic lesion with dilatation downstream consistent with a stable IPMN. He had a EUS on 11/2022 that showed increasing uncinated cystic mass (from 4.3 to 4.5 cm) and increasing dilatation of pancreatic duct (from 5 mm to 6.2 mm).  He was advised to f/u with Dr. Foster due to increasing size to determine if resection vs continued monitor (EUS in 6 months) was advised. He never f/u with surgical oncology.      He has known renal stones on CT urogram on 12/2020 and a CT on 5/2022. CT stone protocol on 7/2022 showed There is right hydronephrosis, hydroureter with inflammatory stranding demonstrated about the proximal ureter, renal pelvis.  This correlates with a persistent calculus at the renal pelvis, caliceal junction albeit a proximal ureteral calculus measuring 3 x 4 x 3 mm.  Some superimposed early inflammation could also present in this fashion. MRI on 8/2022 showed Mild right hydro nephrosis.  There is low signal intensity stone in the proximal right ureter, most likely secondary to ureterolithiasis. He was seen by urology and underwent ureteroscopy with fragmentation of right kidney stone on 11/2022. He has not had any further f/u with urology or repeat imaging.      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 He had multiple excisions but bx still showed residual cancer.  He was treated with fluorouracil and did very well. He continues to follow with dermatology every 4 months.      He has rosacea and was started on minocycline 100 mg q3 days. He does have some discoloration around his neck and on his face but does not want to stop treatment.        He had complained of memory issues. He was seen by neurology in 6/2017 who ordered MRI (chronic microvascular ischemic changes) and neuropsych testing which was normal.  He was advised to start statin and aspirin.       He has REGAN and and uses CPAP  nightly. He is tolerating well.      He has history of prostate cancer s/p radical prostatectomy in 2012.  He did not go on hormonal treatment. He no longer follows with urology.  His last PSA was undetectable on 8/2022.       He complains of intermittent right forearm and hand numbness with tingling. Worse when he wakes in the am and if he leans his arm against the table. He will let his arm hang and symptoms improve.  No weakness. No neck pain.  He has symptoms when he uses his mouse on the computer.      He lives with his wife and feels safe at home. He is exercising at the gym 4 days a week. He tries to eat healthy.  He denies any balance issues or falls.        Colonoscopy----10/2022 repeat in 10 years    Tdap---more than 10 years  Pneumovax---12/2018  Prevnar----5/2018  Influenza vaccine----12/2018---refused  Shingrex vaccine----8/2020, 12/2020  Covid vaccine---4 doses   AAA screen---10/2016 neg      Review of Systems   Constitutional:  Negative for appetite change, fatigue, fever and unexpected weight change.   HENT:  Negative for congestion, ear pain, hearing loss, sore throat and trouble swallowing.    Eyes:  Negative for pain and visual disturbance.   Respiratory:  Negative for cough, chest tightness, shortness of breath and wheezing.    Cardiovascular:  Negative for chest pain, palpitations and leg swelling.   Gastrointestinal:  Negative for abdominal pain, blood in stool, constipation, diarrhea, nausea and vomiting.   Endocrine: Negative for polyuria.   Genitourinary:  Negative for dysuria and hematuria.   Musculoskeletal:  Positive for arthralgias. Negative for back pain and myalgias.   Skin:  Negative for rash.   Neurological:  Negative for dizziness, weakness, numbness and headaches.   Hematological:  Does not bruise/bleed easily.   Psychiatric/Behavioral:  Negative for dysphoric mood, sleep disturbance and suicidal ideas. The patient is not nervous/anxious.      Objective:      Vitals:    03/06/23  "1012   BP: 128/78   Pulse: (!) 59   Temp: 98.4 °F (36.9 °C)   SpO2: 96%   Weight: 106.4 kg (234 lb 7.4 oz)   Height: 5' 9" (1.753 m)     Body mass index is 34.62 kg/m².  Physical Exam    General appearance: No acute distress, cooperative  Eyes: PERRL, EOMI, conjunctiva clear  Ears: normal external ear and pinna, tm clear without drainage, canals clear  Nose: Normal mucosa without drainage  Throat: no exudates or erythema, tonsils not enlarged  Mouth: no sores or lesions, moist mucous membranes  Neck: FROM, soft, supple, no thyromegaly, no bruits  Lymph: no anterior or posterior cervical adenopathy  Heart::  Regular rate and rhythm, 2/6 systolic murmur  Lung: Clear to ascultation bilaterally, no wheezing, no rales, no rhonchi, no distress  Abdomen: Soft, nontender, no distention, no hepatosplenomegaly, bowel sounds normal, no guarding, no rebound, no peritoneal signs  Skin: no rashes, no lesions  Extremities: 2+ pitting edema, no cyanosis  Neuro: CN 2-12 intact, 5/5 muscle strength upper and lower extremity bilaterally, 2+ DTRs UE and LE bilaterally, normal gait  Peripheral pulses: 1+ pedal pulses bilaterally  Musculoskeletal: FROM, good strenth, no tenderness  Joint: normal appearance, no swelling, no warmth, no deformity in all joints    Assessment:       1. Coronary artery disease involving native coronary artery of native heart without angina pectoris    2. Chronic diastolic heart failure    3. Atrial tachycardia    4. Tachy-bassem syndrome    5. Mild aortic stenosis    6. Essential hypertension    7. Hyperlipidemia, unspecified hyperlipidemia type    8. Aortic atherosclerosis    9. Bilateral carotid artery stenosis    10. IPMN (intraductal papillary mucinous neoplasm)    11. Dilated pancreatic duct    12. Hydronephrosis with urinary obstruction due to renal calculus    13. Rosacea    14. MCI (mild cognitive impairment)    15. History of prostate cancer    16. Numbness and tingling of right upper extremity    17. " Exudative age-related macular degeneration, left eye, with inactive scar    18. REGAN (obstructive sleep apnea)    19. Class 2 severe obesity with serious comorbidity and body mass index (BMI) of 36.0 to 36.9 in adult, unspecified obesity type    20. Screening for prostate cancer    21. Elevated glucose        Plan:       Coronary artery disease involving native coronary artery of native heart without angina pectoris  STable and no active symptoms. Continue statin and aspirin.     Chronic diastolic heart failure  Uncompensated with lower leg swelling. Will adjust his medications---stopping amlodipine to see if helps and increasing his lasix to 40 mg once a day (from 20 mg bid). Recheck electrolytes and renal function in 2 weeks.   -     furosemide (LASIX) 20 MG tablet; Take 2 tablets (40 mg total) by mouth once daily. Take 1 pill daily as needed for weight gain, swelling, or shortness of breath  Dispense: 180 tablet; Refill: 3    Atrial tachycardia  He continues on beta blocker but limited with dose due to low HR.      Tachy-bassem syndrome  Uncontrolled and EP recommended dual chamber pacer and then titrate up his beta blocker. He does not want to have this done.     Mild aortic stenosis  Stable and due to recheck echo in 2024.     Essential hypertension  Good control but amlodipine may be contributing to his lower leg swelling. Will stop amlodipine. Will increase olmesartan to 40 mg daily. He will check BP daily and bring readings at his nurse visit in 2 weeks.   -     Comprehensive Metabolic Panel; Future; Expected date: 03/06/2023  -     Lipid Panel; Future; Expected date: 03/06/2023  -     TSH; Future; Expected date: 03/06/2023  -     CBC Auto Differential; Future; Expected date: 03/06/2023  -     aspirin (ECOTRIN) 81 MG EC tablet; Take 1 tablet (81 mg total) by mouth once daily.  Dispense: 90 tablet; Refill: 3  -     Basic Metabolic Panel; Future; Expected date: 03/06/2023  -     olmesartan (BENICAR) 40 MG  tablet; Take 1 tablet (40 mg total) by mouth once daily.  Dispense: 90 tablet; Refill: 3    Hyperlipidemia, unspecified hyperlipidemia type  Good control on atorvastatin. Advised to take aspirin daily.   -     aspirin (ECOTRIN) 81 MG EC tablet; Take 1 tablet (81 mg total) by mouth once daily.  Dispense: 90 tablet; Refill: 3    Aortic atherosclerosis  Continue statin and aspirin.    Bilateral carotid artery stenosis  Continue statin and aspirin.    IPMN (intraductal papillary mucinous neoplasm) with dilated pancreatic duct  INcreasing size of IPMN and pancreatic duct. Needs to f/u with surgical oncology Dr. Foster to determine if needs resection vs repeat EUS for monitoring which would be due in 5/2023 with Dr. Oliveros.   -     Ambulatory referral/consult to Surgical Oncology; Future; Expected date: 03/13/2023    Hydronephrosis with urinary obstruction due to renal calculus  S/p fragmentation and he is doing well. Question if he needs repeat imaging. He has no active symptoms. Advised to discuss with urology.     Rosacea  Well controlled and continue current regimen.     MCI (mild cognitive impairment)  STable and continue to monitor.      History of prostate cancer  NO active disease    Numbness and tingling of right upper extremity  Strong suspicion due to CTS. Will get EMG/NCS and then refer to hand surgeon or OT if needed. Advised to start wearing right wrist splints at night.   -     EMG W/ ULTRASOUND AND NERVE CONDUCTION TEST 2 Extremities; Future    Exudative age-related macular degeneration, left eye, with inactive scar  STable and continue to follow with ophtalmology.    REGAN (obstructive sleep apnea)  Stable and patient is compliant with use. Patient benefits from regular use of CPAP.     Class 2 severe obesity with serious comorbidity and body mass index (BMI) of 36.0 to 36.9 in adult, unspecified obesity type  Long discussion on the benefits of healthy eating and regular exercise to help lose weight and  help control cad, hypertension and hyperlipidemia.     Screening for prostate cancer  -     PSA, Screening; Future; Expected date: 03/06/2023    Elevated glucose  -     Hemoglobin A1C; Future; Expected date: 03/06/2023    Follow up in about 2 weeks (around 3/20/2023) for nurse visit for BP check and 4 months for chronic medical issues .     55 minutes of total time spent on the encounter, which includes face to face time and non-face to face time preparing to see the patient (eg, review of tests), Obtaining and/or reviewing separately obtained history, documenting clinical information in the electronic or other health record, independently interpreting results (not separately reported) and communicating results to the patient/family/caregiver, or Care coordination (not separately reported).

## 2023-03-06 NOTE — PATIENT INSTRUCTIONS
For BP and lower leg swelling  Stop amlodipine   Change your olmesartan from 20 mg to 40 mg once a day  Continue  metoprolol xl 25 mg once a day  Increase furosemide to 2 pills together (total of 40 mg) once a day in the am     Check BP and pulse daily for 2 weeks  Check weights daily in the am     Drop off readings in 2 weeks with nurse for BP check    Start aspirin once a day ---81 mg daily      Buy wrist splint and wear every night on the right hand---at pharmacy

## 2023-03-07 ENCOUNTER — TELEPHONE (OUTPATIENT)
Dept: SURGERY | Facility: CLINIC | Age: 78
End: 2023-03-07
Payer: MEDICARE

## 2023-03-09 DIAGNOSIS — I50.32 CHRONIC DIASTOLIC HEART FAILURE: ICD-10-CM

## 2023-03-09 NOTE — TELEPHONE ENCOUNTER
No new care gaps identified.  [facility] Embedded Care Gaps. Reference number: [id number]. 3/09/2023   4:09:46 PM CST

## 2023-03-09 NOTE — TELEPHONE ENCOUNTER
"[facility] sig clarification for pt furosemide 20mg tablets stating " please clarify sig. Is pt taking 2 ts daily plus 1 as needed or max 2 per day." Please clarify pended medication and re-send.  "

## 2023-03-10 RX ORDER — FUROSEMIDE 20 MG/1
40 TABLET ORAL DAILY
Qty: 180 TABLET | Refills: 3 | Status: SHIPPED | OUTPATIENT
Start: 2023-03-10 | End: 2023-03-14 | Stop reason: SDUPTHER

## 2023-03-10 NOTE — TELEPHONE ENCOUNTER
"vd fax from [facility] requesting sig clarification for pt furosemide 20mg tablets stating " please specify which set of directions is correct: 2 tablets po once daily OR 1 tablet po once daily?"    The sig on the rx is "  Route: Take 2 tablets (40 mg total) by mouth once daily. Take 1 pill daily as needed for weight gain, swelling, or shortness of breath - Oral". There is another set of directions on the rx stating " Admin instructions: Take 1 pill daily as needed for weight gain, swelling, or shortness of   Breath".  Please clarify pended medication dosage instructions and re-send.  "

## 2023-03-11 RX ORDER — FUROSEMIDE 20 MG/1
40 TABLET ORAL DAILY
Qty: 180 TABLET | Refills: 3 | OUTPATIENT
Start: 2023-03-11

## 2023-03-13 ENCOUNTER — PATIENT MESSAGE (OUTPATIENT)
Dept: FAMILY MEDICINE | Facility: CLINIC | Age: 78
End: 2023-03-13
Payer: MEDICARE

## 2023-03-14 DIAGNOSIS — I50.32 CHRONIC DIASTOLIC HEART FAILURE: ICD-10-CM

## 2023-03-14 DIAGNOSIS — I10 ESSENTIAL HYPERTENSION: ICD-10-CM

## 2023-03-14 RX ORDER — FUROSEMIDE 20 MG/1
40 TABLET ORAL DAILY
Qty: 180 TABLET | Refills: 3 | Status: SHIPPED | OUTPATIENT
Start: 2023-03-14 | End: 2023-04-12

## 2023-03-14 NOTE — TELEPHONE ENCOUNTER
----- Message from Myah Navarro sent at 3/13/2023  3:20 PM CDT -----  Contact: [phone]  Type: Needs Medical Advice  Who Called:  Pt     Best Call Back Number: [phone]    Additional Information: Pt states pharmacy has been trying contact your office for clarification on his furosemide (LASIX) 20 MG tablet rx. Pls call pharmacy back . Pt has been out of meds for 3 days and is very upset.         [id number] [place] [address] [address] [place] [address] [address] [place] [address]  [id number] [place] [id number]  Phone: [phone] Fax: [phone]

## 2023-03-14 NOTE — TELEPHONE ENCOUNTER
No new care gaps identified.  [facility] Embedded Care Gaps. Reference number: [id number]. 3/14/2023   10:10:56 AM JOSE ANTONIOT

## 2023-03-16 ENCOUNTER — OCCUPATIONAL HEALTH (OUTPATIENT)
Dept: URGENT CARE | Facility: CLINIC | Age: 78
End: 2023-03-16

## 2023-03-16 VITALS
SYSTOLIC BLOOD PRESSURE: 145 MMHG | WEIGHT: 231 LBS | TEMPERATURE: 99 F | DIASTOLIC BLOOD PRESSURE: 83 MMHG | BODY MASS INDEX: 35.01 KG/M2 | RESPIRATION RATE: 16 BRPM | OXYGEN SATURATION: 98 % | HEART RATE: 66 BPM | HEIGHT: 68 IN

## 2023-03-16 DIAGNOSIS — Z02.1 PRE-EMPLOYMENT EXAMINATION: Primary | ICD-10-CM

## 2023-03-16 LAB
CTP QC/QA: YES
POC 10 PANEL DRUG SCREEN: NEGATIVE

## 2023-03-16 PROCEDURE — 99499 UNLISTED E&M SERVICE: CPT | Mod: S$GLB,,, | Performed by: FAMILY MEDICINE

## 2023-03-16 PROCEDURE — 80305 DRUG TEST PRSMV DIR OPT OBS: CPT | Mod: S$GLB,,, | Performed by: FAMILY MEDICINE

## 2023-03-16 PROCEDURE — 80305 POCT RAPID DRUG SCREEN 10 PANEL: ICD-10-PCS | Mod: S$GLB,,, | Performed by: FAMILY MEDICINE

## 2023-03-16 PROCEDURE — 99499 PHYSICAL, BASIC COMPLEXITY: ICD-10-PCS | Mod: S$GLB,,, | Performed by: FAMILY MEDICINE

## 2023-03-19 ENCOUNTER — PATIENT MESSAGE (OUTPATIENT)
Dept: FAMILY MEDICINE | Facility: CLINIC | Age: 78
End: 2023-03-19
Payer: MEDICARE

## 2023-03-20 ENCOUNTER — LAB VISIT (OUTPATIENT)
Dept: LAB | Facility: HOSPITAL | Age: 78
End: 2023-03-20
Attending: INTERNAL MEDICINE
Payer: MEDICARE

## 2023-03-20 ENCOUNTER — TELEPHONE (OUTPATIENT)
Dept: FAMILY MEDICINE | Facility: CLINIC | Age: 78
End: 2023-03-20

## 2023-03-20 DIAGNOSIS — L71.9 ACNE ROSACEA: ICD-10-CM

## 2023-03-20 DIAGNOSIS — L71.1 RHINOPHYMA: ICD-10-CM

## 2023-03-20 DIAGNOSIS — I10 ESSENTIAL HYPERTENSION: ICD-10-CM

## 2023-03-20 LAB
ANION GAP SERPL CALC-SCNC: 9 MMOL/L (ref 8–16)
BUN SERPL-MCNC: 20 MG/DL (ref 8–23)
CALCIUM SERPL-MCNC: 10 MG/DL (ref 8.7–10.5)
CHLORIDE SERPL-SCNC: 105 MMOL/L (ref 95–110)
CO2 SERPL-SCNC: 30 MMOL/L (ref 23–29)
CREAT SERPL-MCNC: 0.9 MG/DL (ref 0.5–1.4)
EST. GFR  (NO RACE VARIABLE): >60 ML/MIN/1.73 M^2
GLUCOSE SERPL-MCNC: 91 MG/DL (ref 70–110)
POTASSIUM SERPL-SCNC: 4 MMOL/L (ref 3.5–5.1)
SODIUM SERPL-SCNC: 144 MMOL/L (ref 136–145)

## 2023-03-20 PROCEDURE — 80048 BASIC METABOLIC PNL TOTAL CA: CPT | Performed by: INTERNAL MEDICINE

## 2023-03-20 PROCEDURE — 36415 COLL VENOUS BLD VENIPUNCTURE: CPT | Mod: PO | Performed by: INTERNAL MEDICINE

## 2023-03-20 RX ORDER — MINOCYCLINE HYDROCHLORIDE 100 MG/1
CAPSULE ORAL
Qty: 30 CAPSULE | Refills: 2 | OUTPATIENT
Start: 2023-03-20

## 2023-03-21 ENCOUNTER — TELEPHONE (OUTPATIENT)
Dept: NEUROLOGY | Facility: CLINIC | Age: 78
End: 2023-03-21
Payer: MEDICARE

## 2023-03-21 ENCOUNTER — CLINICAL SUPPORT (OUTPATIENT)
Dept: FAMILY MEDICINE | Facility: CLINIC | Age: 78
End: 2023-03-21
Payer: MEDICARE

## 2023-03-21 ENCOUNTER — TELEPHONE (OUTPATIENT)
Dept: FAMILY MEDICINE | Facility: CLINIC | Age: 78
End: 2023-03-21

## 2023-03-21 VITALS
SYSTOLIC BLOOD PRESSURE: 142 MMHG | WEIGHT: 231.38 LBS | BODY MASS INDEX: 35.7 KG/M2 | DIASTOLIC BLOOD PRESSURE: 68 MMHG | OXYGEN SATURATION: 97 % | HEART RATE: 48 BPM

## 2023-03-21 DIAGNOSIS — I10 ESSENTIAL HYPERTENSION: ICD-10-CM

## 2023-03-21 RX ORDER — METOPROLOL SUCCINATE 25 MG/1
25 TABLET, EXTENDED RELEASE ORAL 2 TIMES DAILY
Qty: 90 TABLET | Refills: 3
Start: 2023-03-21 | End: 2024-01-11

## 2023-03-21 NOTE — TELEPHONE ENCOUNTER
Please have him increase metoprolol xl 25 to to twice a day    Recheck BP and HR in 1 week    Thanks

## 2023-03-21 NOTE — TELEPHONE ENCOUNTER
Pt had leg swelling so amlodipine was stopped and the lasix was increased from 20 to 40 mg and the olmesartan was increased from 20 to 40 mg.        Pt in today for blood pressure and weight check.  Denies any complaints but swelling in bilateral legs has not gone down.  States swelling does not go down over night.  Initial bp was 142/68 with manual pulse of 48 and weight was 231.  Repeat after 15 additional minutes was still 142/80. Pt has home log but has not had his machine calibrated.  Pt takes bp prior to his medications in the am around 1000.       3/7 234 lb 155/86 p 50  3/8 233 lb 145/81 p 46  3/9 233 lb 164/88 p 47  3/10 231 lb 166/89 p 48  3/11 230 lb 154/87 p 47  3/12 234 lb 164/90 p 46  3/13 232 lb 166/86 p 48  3/14 233 lb 174/94 p 42  3/15 232 lb 155/88 p 56  3/16 231 lb 162/88 p 55  3/17 230 lb 182/95 p 43  3/18 233 lb 197/98 p 44  3/19 230 lb 177/94 p 46  3/20 230 lb 190/98 p 44

## 2023-03-21 NOTE — PROGRESS NOTES
Blood pressure reading after 15 minutes was 142/68, Pulse 48. Repeat after 15 additional minutes was same 142/68.  Dr. Powell notified.    Rohan Abarca 78 y.o. male is here today for Blood Pressure check.   History of HTN yes.    Review of patient's allergies indicates:  No Known Allergies  Creatinine   Date Value Ref Range Status   03/20/2023 0.9 0.5 - 1.4 mg/dL Final     Sodium   Date Value Ref Range Status   03/20/2023 144 136 - 145 mmol/L Final     Potassium   Date Value Ref Range Status   03/20/2023 4.0 3.5 - 5.1 mmol/L Final   ]  Patient verifies taking blood pressure medications on a regular basis at the same time of the day.     Current Outpatient Medications:     aspirin (ECOTRIN) 81 MG EC tablet, Take 1 tablet (81 mg total) by mouth once daily., Disp: 90 tablet, Rfl: 3    atorvastatin (LIPITOR) 20 MG tablet, TAKE 1 TABLET(20 MG) BY MOUTH EVERY DAY (Patient taking differently: Take 20 mg by mouth once daily.), Disp: 90 tablet, Rfl: 3    furosemide (LASIX) 20 MG tablet, Take 2 tablets (40 mg total) by mouth once daily., Disp: 180 tablet, Rfl: 3    ketoconazole (NIZORAL) 2 % shampoo, Apply topically 3 (three) times a week., Disp: 120 mL, Rfl: 3    metoprolol succinate (TOPROL-XL) 25 MG 24 hr tablet, TAKE 1 TABLET(25 MG) BY MOUTH EVERY DAY, Disp: 90 tablet, Rfl: 3    minocycline (MINOCIN,DYNACIN) 100 MG capsule, TAKE 1 CAPSULE(100 MG) BY MOUTH EVERY DAY, Disp: 30 capsule, Rfl: 6    olmesartan (BENICAR) 40 MG tablet, Take 1 tablet (40 mg total) by mouth once daily., Disp: 90 tablet, Rfl: 3    VIT A/VIT C/VIT E/ZINC/COPPER (PRESERVISION AREDS ORAL), Take 1 capsule by mouth 2 (two) times a day., Disp: , Rfl:   Does patient have record of home blood pressure readings yes. Readings have been averaging   3/7 234 lb 155/86 p 50  3/8 233 lb 145/81 p 46  3/9 233 lb 164/88 p 47  3/10 231 lb 166/89 p 48  3/11 230 lb 154/87 p 47  3/12 234 lb 164/90 p 46  3/13 232 lb 166/86 p 48  3/14 233 lb 174/94 p 42  3/15 232 lb  155/88 p 56  3/16 231 lb 162/88 p 55  3/17 230 lb 182/95 p 43  3/18 233 lb 197/98 p 44  3/19 230 lb 177/94 p 46  3/20 230 lb 190/98 p 44.   Last dose of blood pressure medication was taken at 1000.  Patient is asymptomatic.   Complains of n/a.

## 2023-03-21 NOTE — TELEPHONE ENCOUNTER
Spoke with patient regarding scheduling EMG study.  At this time he gloria like cancel the order for the study, since he feel better after wearing the hand brace. He will follow up with his MD.

## 2023-03-22 DIAGNOSIS — L71.1 RHINOPHYMA: ICD-10-CM

## 2023-03-22 DIAGNOSIS — L71.9 ACNE ROSACEA: ICD-10-CM

## 2023-03-22 RX ORDER — MINOCYCLINE HYDROCHLORIDE 100 MG/1
CAPSULE ORAL
Qty: 30 CAPSULE | Refills: 6 | Status: CANCELLED | OUTPATIENT
Start: 2023-03-22

## 2023-03-22 NOTE — TELEPHONE ENCOUNTER
Called and reviewed with patient.  Verbalized understanding regarding new medication instructions.  Scheduled nurse visit, patient is starting a new job Monday and may have to reschedule if it conflicts with his job.  Will call if reschedule is needed

## 2023-03-23 ENCOUNTER — PATIENT MESSAGE (OUTPATIENT)
Dept: FAMILY MEDICINE | Facility: CLINIC | Age: 78
End: 2023-03-23
Payer: MEDICARE

## 2023-03-24 DIAGNOSIS — L71.1 RHINOPHYMA: ICD-10-CM

## 2023-03-24 DIAGNOSIS — L71.9 ACNE ROSACEA: ICD-10-CM

## 2023-03-24 NOTE — TELEPHONE ENCOUNTER
Spoke to pt and he has been out of his Minocycline  for 2 days and is asking for a refill. I sent refill to Dr. Shaerer but it was denied. Pt requesting refill from you. I did scheduled an apt for him to see you in August which was the soonest available. Please advise if we can refill.

## 2023-03-24 NOTE — TELEPHONE ENCOUNTER
----- Message from Laith Maurice sent at 3/24/2023 12:41 PM CDT -----  Contact: self  Type: RX Refill Request        Who Called: Patient   Refill or New Rx: refill  RX Name and Strength: minocycline (MINOCIN,DYNACIN) 100 MG capsule  How is the patient currently taking it? (ex. 1XDay): as directed   Is this a 30 day or 90 day RX: n/a  Preferred Pharmacy with phone number:   [id number] [id number] [place] [address] [address] [address]  Phone: [phone] Fax: [phone]  Local or Mail Order: [facility]    Ordering Provider:Dr. Patricia Reddy Call Back Number: [phone]  Additional Information: Plz send a new script over for the pt. Thanks

## 2023-03-26 DIAGNOSIS — L71.9 ROSACEA: Primary | ICD-10-CM

## 2023-03-26 RX ORDER — DOXYCYCLINE HYCLATE 100 MG
TABLET ORAL
Qty: 30 TABLET | Refills: 2 | Status: SHIPPED | OUTPATIENT
Start: 2023-03-26 | End: 2023-06-13 | Stop reason: SDUPTHER

## 2023-03-27 RX ORDER — MINOCYCLINE HYDROCHLORIDE 100 MG/1
CAPSULE ORAL
Qty: 30 CAPSULE | Refills: 4 | Status: SHIPPED | OUTPATIENT
Start: 2023-03-27 | End: 2023-06-13

## 2023-03-28 ENCOUNTER — TELEPHONE (OUTPATIENT)
Dept: FAMILY MEDICINE | Facility: CLINIC | Age: 78
End: 2023-03-28
Payer: MEDICARE

## 2023-03-28 ENCOUNTER — PATIENT MESSAGE (OUTPATIENT)
Dept: FAMILY MEDICINE | Facility: CLINIC | Age: 78
End: 2023-03-28
Payer: MEDICARE

## 2023-03-28 NOTE — TELEPHONE ENCOUNTER
Called to reschedule pt's nurse visit.  He is unable to schedule as he is starting his new job tomorrow.  Pt checked his bp this am prior to his am meds, 180/90 p 48.  Pt advised that he will call back to schedule nurse visit once he has his work schedule.

## 2023-03-28 NOTE — TELEPHONE ENCOUNTER
----- Message from Rebecca Milligan sent at 3/27/2023  4:47 PM CDT -----  Contact: pt at [phone]  Type:  Sooner Appointment Request    Caller is requesting a sooner appointment.  Caller declined first available appointment listed below.  Caller will not accept being placed on the waitlist and is requesting a message be sent to doctor.    Name of Caller:  pt  When is the first available appointment?  June    Best Call Back Number:  [phone]    Additional Information:  Patient needs to reschedule his appt on 3/29/23. Please call back to advise if there is an appt. tomorrow 3/28 or anything closer than June. Thank you

## 2023-03-29 ENCOUNTER — PATIENT MESSAGE (OUTPATIENT)
Dept: FAMILY MEDICINE | Facility: CLINIC | Age: 78
End: 2023-03-29

## 2023-03-30 ENCOUNTER — PATIENT MESSAGE (OUTPATIENT)
Dept: FAMILY MEDICINE | Facility: CLINIC | Age: 78
End: 2023-03-30
Payer: MEDICARE

## 2023-03-31 ENCOUNTER — TELEPHONE (OUTPATIENT)
Dept: FAMILY MEDICINE | Facility: CLINIC | Age: 78
End: 2023-03-31
Payer: MEDICARE

## 2023-03-31 NOTE — TELEPHONE ENCOUNTER
----- Message from Diane Glenn sent at 3/30/2023 12:01 PM CDT -----  Contact: patient  Type:  Needs Medical Advice    Who Called:  patient     Would the patient rather a call back or a response via [facility]? Call     Best Call Back Number: [phone] (home)      Additional Information: Patient would like to speak with the nurse in regards to him testing positive for COVID yesterday.     Please call to advise

## 2023-03-31 NOTE — TELEPHONE ENCOUNTER
Patient advises that he has an occasional cough and runny nose but otherwise feels ok.  Patient was already started on medication.  Was advised to let us know if his symptoms persists.

## 2023-04-03 ENCOUNTER — PATIENT MESSAGE (OUTPATIENT)
Dept: FAMILY MEDICINE | Facility: CLINIC | Age: 78
End: 2023-04-03
Payer: MEDICARE

## 2023-04-04 NOTE — PROGRESS NOTES
"Rohan Abarca is a 77yo M with history of IPMN on surveillance since .  He has no history of clinical or biochemical pancreatitis. He is not diabetic.  Fam hx: Nephew with pancreas cancer in his 40s. Sister  of "abdominal cancer" (? Gyn)      EUS 10/2021  Findings:        ENDOSCOPIC FINDING: :        Limited views of the esophagus, stomach, and duodenum were normal.        ENDOSONOGRAPHIC FINDING: :        An anechoic lesion suggestive of a cyst was identified in the        uncinate process of the pancreas. It did appear to communicate with        the main pancreatic duct. The lesion measured 40 mm by 35 mm in        maximal cross-sectional diameter. There were a few compartments        thinly septated. The outer wall of the lesion was thin. There was no        associated mass.    CEA Body Fluid Source  Pancreatic VC    Carcioembryonic Antigen, Fluid ng/mL 298.6      Body Fluid Source, Refrigerated  Pancreatic VC    Amylase, Fluid U/L 2584      PANCREAS, FURTHER DESIGNATED "UNCINATE CYST," FINE NEEDLE ASPIRATION:        --NEGATIVE FOR MALIGNANT CELLS.     EUS 2022:  Findings:        ENDOSCOPIC FINDING: :        Limited views of the esophagus, stomach, and duodenum were normal.        ENDOSONOGRAPHIC FINDING: :        An anechoic lesion suggestive of a cyst was identified in the        uncinate process of the pancreas. It communicates with the        pancreatic duct. The lesion measured 45 mm by 31 mm in maximal        cross-sectional diameter. There were a few compartments thinly        septated. The outer wall of the lesion was thin. There was no        associated mass. There was no internal debris within the        fluid-filled cavity.       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 His PD is about 6mm segmental in the head, but otherwise the cyst is fairly stable in size over 2 years (very slight growth). He has some cardiopulmonary comorbidity but aside from considerable " orthopedic operations is fairly fit and active.  We discussed IPMN and its potential premalignant nature and reviewed the risk factors for occult malignancy or high risk for progression. I would consider this a pretty stable cyst and we discussed continued observation and the need for intervention if there is significant progression or concern for malignancy. He says Dr. Oliveros has 1 year MRCP scheduled which I think is appropriate, will continue to follow along for any changes.    I spent 45 minutes with Mr. Abarca, with >50% of time spent face to face and additional time preparing to see the patient (eg, review of tests), obtaining and/or reviewing separately obtained history, documenting clinical information in the electronic or other health record, independently interpreting results (not separately reported) and communicating results to the patient/family/caregiver, or Care coordination (not separately reported).           Fabio Foster MD  Upper GI / Hepatobiliary Surgical Oncology  [facility] [place], [place]  Office: [phone]  Fax: [phone]

## 2023-04-05 ENCOUNTER — OFFICE VISIT (OUTPATIENT)
Dept: HEMATOLOGY/ONCOLOGY | Facility: CLINIC | Age: 78
End: 2023-04-05
Payer: MEDICARE

## 2023-04-05 VITALS
HEIGHT: 68 IN | BODY MASS INDEX: 34.92 KG/M2 | SYSTOLIC BLOOD PRESSURE: 160 MMHG | HEART RATE: 47 BPM | DIASTOLIC BLOOD PRESSURE: 82 MMHG | WEIGHT: 230.38 LBS

## 2023-04-05 DIAGNOSIS — D49.0 IPMN (INTRADUCTAL PAPILLARY MUCINOUS NEOPLASM): ICD-10-CM

## 2023-04-05 DIAGNOSIS — K86.89 DILATED PANCREATIC DUCT: ICD-10-CM

## 2023-04-05 PROCEDURE — 99999 PR PBB SHADOW E&M-EST. PATIENT-LVL III: CPT | Mod: PBBFAC,,, | Performed by: SURGERY

## 2023-04-05 PROCEDURE — 99214 OFFICE O/P EST MOD 30 MIN: CPT | Mod: S$PBB,,, | Performed by: SURGERY

## 2023-04-05 PROCEDURE — 99214 PR OFFICE/OUTPT VISIT, EST, LEVL IV, 30-39 MIN: ICD-10-PCS | Mod: S$PBB,,, | Performed by: SURGERY

## 2023-04-05 PROCEDURE — 99999 PR PBB SHADOW E&M-EST. PATIENT-LVL III: ICD-10-PCS | Mod: PBBFAC,,, | Performed by: SURGERY

## 2023-04-05 PROCEDURE — 99213 OFFICE O/P EST LOW 20 MIN: CPT | Mod: PBBFAC,PN | Performed by: SURGERY

## 2023-04-12 ENCOUNTER — TELEPHONE (OUTPATIENT)
Dept: FAMILY MEDICINE | Facility: CLINIC | Age: 78
End: 2023-04-12

## 2023-04-12 ENCOUNTER — CLINICAL SUPPORT (OUTPATIENT)
Dept: FAMILY MEDICINE | Facility: CLINIC | Age: 78
End: 2023-04-12
Payer: MEDICARE

## 2023-04-12 VITALS — HEART RATE: 48 BPM | SYSTOLIC BLOOD PRESSURE: 162 MMHG | DIASTOLIC BLOOD PRESSURE: 78 MMHG

## 2023-04-12 DIAGNOSIS — I10 PRIMARY HYPERTENSION: Primary | ICD-10-CM

## 2023-04-12 RX ORDER — SPIRONOLACTONE 25 MG/1
25 TABLET ORAL 2 TIMES DAILY
Qty: 60 TABLET | Refills: 11 | Status: SHIPPED | OUTPATIENT
Start: 2023-04-12 | End: 2023-04-23

## 2023-04-12 NOTE — TELEPHONE ENCOUNTER
Pt in for blood pressure check.  Pt was changed from amlodipine due to leg swelling to Olmesartan 40 mg daily.  Pt is also currently prescribed Metoprolol 25 mg bid but is unsure if he is currently taking the metoprolol 25 mg bid but will check when he gets home.  The order was increased on 3/21.  Pt states swelling is down in his legs when he wakes up but he is still having swelling throughout the day although not as much.  Reports that he is taking the Lasix.  Home machine readings have been in the 180s/80s but home machines do not appear to be accurate.  Encouraged pt to purchase a new home machine.   Last dose of Olmesartan and Metoprolol was around 0545 this am.  Initial bp was 172/82 p 48 with repeat of 162/78.  Denies any complaints other than being aggravated by work today.  Please advise.

## 2023-04-12 NOTE — TELEPHONE ENCOUNTER
Continue olmesartan 40 mg daily  Continue metoprolol xl 25 mg bid.     Amlodipine causes lower leg swelling    He is taking lasix 20 mg daily.     Please tell him to:     STop lasix  Start spironolactone 25 mg bid    Check renal function in 1 weeks  Check BP in one week with nurse     Thanks

## 2023-04-12 NOTE — PROGRESS NOTES
,      Blood pressure reading after 15 minutes was 172/82, Pulse 48. Recheck of 162/78.     Dr. Pwoell notified.    Rohan Abarca 78 y.o. male is here today for Blood Pressure check.   History of HTN yes.    Review of patient's allergies indicates:  No Known Allergies  Creatinine   Date Value Ref Range Status   03/20/2023 0.9 0.5 - 1.4 mg/dL Final     Sodium   Date Value Ref Range Status   03/20/2023 144 136 - 145 mmol/L Final     Potassium   Date Value Ref Range Status   03/20/2023 4.0 3.5 - 5.1 mmol/L Final   ]  Patient verifies taking blood pressure medications on a regular basis at the same time of the day.     Current Outpatient Medications:     aspirin (ECOTRIN) 81 MG EC tablet, Take 1 tablet (81 mg total) by mouth once daily., Disp: 90 tablet, Rfl: 3    atorvastatin (LIPITOR) 20 MG tablet, TAKE 1 TABLET(20 MG) BY MOUTH EVERY DAY (Patient taking differently: Take 20 mg by mouth once daily.), Disp: 90 tablet, Rfl: 3    doxycycline (VIBRA-TABS) 100 MG tablet, Take 1 po qday with food and not within 1 hour prior to lying down, Disp: 30 tablet, Rfl: 2    furosemide (LASIX) 20 MG tablet, Take 2 tablets (40 mg total) by mouth once daily. (Patient not taking: Reported on 4/5/2023), Disp: 180 tablet, Rfl: 3    ketoconazole (NIZORAL) 2 % shampoo, APPLY TOPICALLY 3 TIMES A WEEK, Disp: 120 mL, Rfl: 3    metoprolol succinate (TOPROL-XL) 25 MG 24 hr tablet, Take 1 tablet (25 mg total) by mouth 2 (two) times daily., Disp: 90 tablet, Rfl: 3    minocycline (MINOCIN,DYNACIN) 100 MG capsule, TAKE 1 CAPSULE(100 MG) BY MOUTH EVERY DAY (Patient not taking: Reported on 4/5/2023), Disp: 30 capsule, Rfl: 4    olmesartan (BENICAR) 40 MG tablet, Take 1 tablet (40 mg total) by mouth once daily., Disp: 90 tablet, Rfl: 3    VIT A/VIT C/VIT E/ZINC/COPPER (PRESERVISION AREDS ORAL), Take 1 capsule by mouth 2 (two) times a day., Disp: , Rfl:   Does patient have record of home blood pressure readings yes. Readings have been averaging  October 18, 2021    Phani Alba  [address]  [facility] [id number]    Dear Phani           [place] [facility] the [facility] at the [facility]. We are located at [address], [address], [place] [id number]. Your appointment has been scheduled on November 11, 2021 at 2:30p with Ruben Butt MD .    At your first visit, you will meet your team of caregivers who will help you to develop pain management strategies that will last a lifetime. You will meet with our support staff to review your insurance information and collect your co-payment if required by your insurance company. You will meet with a medical pain specialist and care coordinator who will assess your pain and develop a plan of care for your successful pain rehabilitation. You should expect to spend approximately 1 hour at your first visit with us. Usually, patients work with us for a period of 6-12 months, and eventually return to their primary doctor once their pain management has stabilized.      To help us make your visit go as smoothly as possible, please bring the following items with you on your visit:     Completed Pain Questionnaire enclosed in this packet.  If you do not bring the completed questionnaire, we may have to reschedule your appointment.    List of any medicines that you are currently taking or have been prescribed    Pertinent NON-[place] medical information such as medical records or tests results (X-rays, or laboratory tests)    Your health insurance card    Financial resources to cover your co-payment or balance due at the time of service (cash, personal check, Visa, and MasterCard are acceptable methods of payment)     Due to the high demand for new patient evaluations, you must notify the scheduling department 48 hours (2 days) in advance if you are not able to keep this appointment.  Failure to do so could affect your ability to reschedule with  our clinic. Please do not assume that you will receive any prescription medications at your first visit.    Please call [phone] with any questions regarding your appointment. We look forward to meeting you and working to address your health care needs.     Sincerely,    [facility]                                                                   states 180s over low to mid 80s.  Home arm cuff reading was 188/100 then wrist cuff was 188/97.  Pt states he has changed the batteries but machine is older.  Encouraged pt to purchase a new home machine.   Last dose of blood pressure medication was taken at 0545.  Patient is asymptomatic.   Complains of had aggravating work day but otherwise no complaints.

## 2023-04-13 NOTE — TELEPHONE ENCOUNTER
This has been scheduled and pt notified of medication instructions per Dr. Powell as follows:    Continue olmesartan 40 mg daily  Continue metoprolol xl 25 mg bid.      Amlodipine causes lower leg swelling     He is taking lasix 20 mg daily.   STop lasix  Start spironolactone 25 mg bid      Pt verbalized understanding.

## 2023-04-19 ENCOUNTER — CLINICAL SUPPORT (OUTPATIENT)
Dept: FAMILY MEDICINE | Facility: CLINIC | Age: 78
End: 2023-04-19
Payer: MEDICARE

## 2023-04-19 ENCOUNTER — TELEPHONE (OUTPATIENT)
Dept: FAMILY MEDICINE | Facility: CLINIC | Age: 78
End: 2023-04-19

## 2023-04-19 VITALS — HEART RATE: 44 BPM | SYSTOLIC BLOOD PRESSURE: 138 MMHG | DIASTOLIC BLOOD PRESSURE: 78 MMHG

## 2023-04-19 NOTE — PROGRESS NOTES
,      Blood pressure reading after 15 minutes was 138/78, Pulse 44.  Dr. Rosa Powell notified.    Rohan Abarca 78 y.o. male is here today for Blood Pressure check.   History of HTN yes.    Review of patient's allergies indicates:  No Known Allergies  Creatinine   Date Value Ref Range Status   03/20/2023 0.9 0.5 - 1.4 mg/dL Final     Sodium   Date Value Ref Range Status   03/20/2023 144 136 - 145 mmol/L Final     Potassium   Date Value Ref Range Status   03/20/2023 4.0 3.5 - 5.1 mmol/L Final   ]  Patient verifies taking blood pressure medications on a regular basis at the same time of the day.     Current Outpatient Medications:     aspirin (ECOTRIN) 81 MG EC tablet, Take 1 tablet (81 mg total) by mouth once daily., Disp: 90 tablet, Rfl: 3    atorvastatin (LIPITOR) 20 MG tablet, TAKE 1 TABLET(20 MG) BY MOUTH EVERY DAY, Disp: 90 tablet, Rfl: 3    doxycycline (VIBRA-TABS) 100 MG tablet, Take 1 po qday with food and not within 1 hour prior to lying down, Disp: 30 tablet, Rfl: 2    ketoconazole (NIZORAL) 2 % shampoo, APPLY TOPICALLY 3 TIMES A WEEK, Disp: 120 mL, Rfl: 3    metoprolol succinate (TOPROL-XL) 25 MG 24 hr tablet, Take 1 tablet (25 mg total) by mouth 2 (two) times daily., Disp: 90 tablet, Rfl: 3    minocycline (MINOCIN,DYNACIN) 100 MG capsule, TAKE 1 CAPSULE(100 MG) BY MOUTH EVERY DAY (Patient not taking: Reported on 4/5/2023), Disp: 30 capsule, Rfl: 4    olmesartan (BENICAR) 40 MG tablet, Take 1 tablet (40 mg total) by mouth once daily., Disp: 90 tablet, Rfl: 3    spironolactone (ALDACTONE) 25 MG tablet, Take 1 tablet (25 mg total) by mouth 2 (two) times daily., Disp: 60 tablet, Rfl: 11    VIT A/VIT C/VIT E/ZINC/COPPER (PRESERVISION AREDS ORAL), Take 1 capsule by mouth 2 (two) times a day., Disp: , Rfl:   Does patient have record of home blood pressure readings , no. Readings have been averaging n/a.   Last dose of blood pressure medication was taken at this morning about 9:15 am .  Patient is  Asymptomatic.   Complains of n/a.

## 2023-04-20 ENCOUNTER — LAB VISIT (OUTPATIENT)
Dept: LAB | Facility: HOSPITAL | Age: 78
End: 2023-04-20
Attending: INTERNAL MEDICINE
Payer: MEDICARE

## 2023-04-20 DIAGNOSIS — I10 PRIMARY HYPERTENSION: ICD-10-CM

## 2023-04-20 LAB
ANION GAP SERPL CALC-SCNC: 7 MMOL/L (ref 8–16)
BUN SERPL-MCNC: 19 MG/DL (ref 8–23)
CALCIUM SERPL-MCNC: 10 MG/DL (ref 8.7–10.5)
CHLORIDE SERPL-SCNC: 104 MMOL/L (ref 95–110)
CO2 SERPL-SCNC: 32 MMOL/L (ref 23–29)
CREAT SERPL-MCNC: 1.1 MG/DL (ref 0.5–1.4)
EST. GFR  (NO RACE VARIABLE): >60 ML/MIN/1.73 M^2
GLUCOSE SERPL-MCNC: 99 MG/DL (ref 70–110)
POTASSIUM SERPL-SCNC: 4.4 MMOL/L (ref 3.5–5.1)
SODIUM SERPL-SCNC: 143 MMOL/L (ref 136–145)

## 2023-04-20 PROCEDURE — 80048 BASIC METABOLIC PNL TOTAL CA: CPT | Performed by: INTERNAL MEDICINE

## 2023-04-20 PROCEDURE — 36415 COLL VENOUS BLD VENIPUNCTURE: CPT | Mod: PO | Performed by: INTERNAL MEDICINE

## 2023-04-21 ENCOUNTER — PATIENT MESSAGE (OUTPATIENT)
Dept: FAMILY MEDICINE | Facility: CLINIC | Age: 78
End: 2023-04-21
Payer: MEDICARE

## 2023-04-23 RX ORDER — SPIRONOLACTONE 50 MG/1
50 TABLET, FILM COATED ORAL 2 TIMES DAILY
Qty: 180 TABLET | Refills: 3 | Status: SHIPPED | OUTPATIENT
Start: 2023-04-23 | End: 2023-11-22 | Stop reason: SDUPTHER

## 2023-04-26 ENCOUNTER — OFFICE VISIT (OUTPATIENT)
Dept: PODIATRY | Facility: CLINIC | Age: 78
End: 2023-04-26
Payer: MEDICARE

## 2023-04-26 VITALS — WEIGHT: 230 LBS | HEIGHT: 68 IN | BODY MASS INDEX: 34.86 KG/M2

## 2023-04-26 DIAGNOSIS — B35.1 ONYCHOMYCOSIS DUE TO DERMATOPHYTE: ICD-10-CM

## 2023-04-26 DIAGNOSIS — L60.0 INGROWN NAIL OF GREAT TOE OF RIGHT FOOT: Primary | ICD-10-CM

## 2023-04-26 PROCEDURE — 99213 OFFICE O/P EST LOW 20 MIN: CPT | Mod: S$PBB,,, | Performed by: PODIATRIST

## 2023-04-26 PROCEDURE — 99999 PR PBB SHADOW E&M-EST. PATIENT-LVL III: CPT | Mod: PBBFAC,,, | Performed by: PODIATRIST

## 2023-04-26 PROCEDURE — 99213 OFFICE O/P EST LOW 20 MIN: CPT | Mod: PBBFAC,PN | Performed by: PODIATRIST

## 2023-04-26 PROCEDURE — 99213 PR OFFICE/OUTPT VISIT, EST, LEVL III, 20-29 MIN: ICD-10-PCS | Mod: S$PBB,,, | Performed by: PODIATRIST

## 2023-04-26 PROCEDURE — 99999 PR PBB SHADOW E&M-EST. PATIENT-LVL III: ICD-10-PCS | Mod: PBBFAC,,, | Performed by: PODIATRIST

## 2023-04-26 RX ORDER — COVID-19 MOLECULAR TEST ASSAY
KIT MISCELLANEOUS
COMMUNITY
Start: 2023-04-16 | End: 2023-07-11

## 2023-04-26 RX ORDER — AMLODIPINE BESYLATE 5 MG/1
5 TABLET ORAL
COMMUNITY
Start: 2023-04-25 | End: 2023-07-11

## 2023-04-26 NOTE — PROGRESS NOTES
Subjective:      Patient ID: Rohan Abarca is a 78 y.o. male.    Chief Complaint: Nail Problem (R #1 ingrown vs fungus)      HPI:  Rohan Abarca is a 78 y.o. male who presents to clinic with a chief complaint of right great toe thickness and ingrowing nail.  Patient reports applying antifungal medication to toenails in the past had seen Dr. Bernard.  He denies any problems with use of topical antifungal toenail solution.  Patient denies any other pedal complaints at this time. Pain is with shoe wear and pressure    PCP:  Rosa Powell,   Date last seen:  3/6/2023    Review of Systems   Constitutional: Negative for appetite change, fever, chills, fatigue and unexpected weight change.   Respiratory: Negative for cough, wheezing, shortness of breath.  Cardiovascular: Negative for chest pain, claudication, cyanosis, and leg swelling.  Musculoskeletal: Negative for back pain, arthritis, joint pain, joint swelling, myalgias, and stiffness.   Skin: Negative for rash, itching, poor wound healing, suspicious lesion, and unusual hair distribution.  Positive for nail bed changes, discoloration.  Neurological: Negative for loss of balance, sensory change, paresthesias, and numbness.    Hematological: Negative for adenopathy, bleeding, and bruising easily.   Psychiatric/Behavioral: The patient is not nervous/anxious.  Negative for altered mental status.    Hemoglobin A1C   Date Value Ref Range Status   12/03/2019 5.3 4.0 - 5.6 % Final     Comment:     ADA Screening Guidelines:  5.7-6.4%  Consistent with prediabetes  >or=6.5%  Consistent with diabetes  High levels of fetal hemoglobin interfere with the HbA1C  assay. Heterozygous hemoglobin variants (HbS, HgC, etc)do  not significantly interfere with this assay.   However, presence of multiple variants may affect accuracy.     12/07/2018 5.1 4.0 - 5.6 % Final     Comment:     ADA Screening Guidelines:  5.7-6.4%  Consistent with prediabetes  >or=6.5%  Consistent with  diabetes  High levels of fetal hemoglobin interfere with the HbA1C  assay. Heterozygous hemoglobin variants (HbS, HgC, etc)do  not significantly interfere with this assay.   However, presence of multiple variants may affect accuracy.         Past Medical History:   Diagnosis Date    Arthritis     Bilateral cataracts     hx    Colon polyp     benign    Gross hematuria     HBP (high blood pressure)     Hearing loss     History of detached retina repair     left    Kidney stones     Macular degeneration     REGAN (obstructive sleep apnea)     uses CPAP    Prostate cancer 2012    RBBB     Rosacea     Squamous cell carcinoma of skin     SVT (supraventricular tachycardia)      Past Surgical History:   Procedure Laterality Date    BUNIONECTOMY Right     CATARACT EXTRACTION BILATERAL W/ ANTERIOR VITRECTOMY Bilateral     COLONOSCOPY N/A 3/20/2017    Procedure: COLONOSCOPY;  Surgeon: Carl Marcelino MD;  Location: [facility] ENDO;  Service: Endoscopy;  Laterality: N/A;    COLONOSCOPY N/A 10/4/2022    Procedure: COLONOSCOPY;  Surgeon: Carl Marcelino MD;  Location: [facility] ENDO;  Service: Endoscopy;  Laterality: N/A;    CYSTOSCOPY N/A 12/30/2020    Procedure: CYSTOSCOPY;  Surgeon: Kevin De Leon Jr., MD;  Location: [facility] OR;  Service: Urology;  Laterality: N/A;    CYSTOURETEROSCOPY WITH RETROGRADE PYELOGRAPHY AND INSERTION OF STENT INTO URETER Right 11/8/2022    Procedure: CYSTOURETEROSCOPY, WITH RETROGRADE PYELOGRAM AND URETERAL STENT INSERTION;  Surgeon: FEI Rangel MD;  Location: [facility] OR;  Service: Urology;  Laterality: Right;    DIAGNOSTIC CARDIAC ELECTROPHYSIOLOGY STUDY N/A 7/7/2022    Procedure: CARDIAC ELECTROPHYSIOLOGY STUDY, DIAGNOSTIC;  Surgeon: Tom Patiño MD;  Location: [facility] EP LAB;  Service: Cardiology;  Laterality: N/A;  SVT, RFA, LENNY, MAC, SK, 3 Prep    ENDOSCOPIC ULTRASOUND OF UPPER GASTROINTESTINAL TRACT Left 3/12/2021    Procedure: ULTRASOUND, UPPER GI TRACT, ENDOSCOPIC;  Surgeon: Carlton Oliveros MD;   Location: [facility] ENDO;  Service: Endoscopy;  Laterality: Left;  Linear    ENDOSCOPIC ULTRASOUND OF UPPER GASTROINTESTINAL TRACT Left 10/20/2021    Procedure: ULTRASOUND, UPPER GI TRACT, ENDOSCOPIC;  Surgeon: Carlton Oliveros MD;  Location: [facility] [place] ENDO;  Service: Endoscopy;  Laterality: Left;  Linear scope    ENDOSCOPIC ULTRASOUND OF UPPER GASTROINTESTINAL TRACT Left 11/30/2022    Procedure: ULTRASOUND, UPPER GI TRACT, ENDOSCOPIC;  Surgeon: Carlton Oliveros MD;  Location: [facility] [place] ENDO;  Service: Endoscopy;  Laterality: Left;  Linear    ESOPHAGOGASTRODUODENOSCOPY N/A 3/12/2021    Procedure: EGD (ESOPHAGOGASTRODUODENOSCOPY);  Surgeon: Carlton Oliveros MD;  Location: [facility] [place] ENDO;  Service: Endoscopy;  Laterality: N/A;    ESOPHAGOGASTRODUODENOSCOPY N/A 10/20/2021    Procedure: EGD (ESOPHAGOGASTRODUODENOSCOPY);  Surgeon: Carlton Oliveros MD;  Location: [facility] [place] ENDO;  Service: Endoscopy;  Laterality: N/A;    ESOPHAGOGASTRODUODENOSCOPY N/A 11/30/2022    Procedure: EGD (ESOPHAGOGASTRODUODENOSCOPY);  Surgeon: Carlton Oliveros MD;  Location: [facility] [place] ENDO;  Service: Endoscopy;  Laterality: N/A;    HERNIA REPAIR      x 2    JOINT REPLACEMENT Right 2012    per Dr. Heriberto Colón    JOINT REPLACEMENT Left 2004    per Dr. Heriberto Colón    KNEE SURGERY      left , right    LASER LITHOTRIPSY Right 11/8/2022    Procedure: LITHOTRIPSY, USING LASER-THULIUM;  Surgeon: FEI Rangel MD;  Location: [facility];  Service: Urology;  Laterality: Right;    MASTOID SURGERY Right     right    PROSTATECTOMY  2012    RETINAL DETACHMENT SURGERY      left    ROTATOR CUFF REPAIR      bilateral    SHOULDER SURGERY Left     RCR per Dr. Heriberto Colón    SHOULDER SURGERY Right     RCR per Dr. Heriberto Colón    TONSILLECTOMY, ADENOIDECTOMY      TREATMENT OF CARDIAC ARRHYTHMIA  7/7/2022    Procedure: Cardioversion or Defibrillation;  Surgeon: Tom Patiño MD;  Location: [facility] EP LAB;  Service: Cardiology;;     Family History   Problem Relation Age of Onset     "Cancer Sister     Stroke Sister     Cancer Sister     Stroke Unknown     Heart attack Unknown      Social History     Socioeconomic History    Marital status:    Tobacco Use    Smoking status: Former     Packs/day: 0.50     Years: 5.00     Pack years: 2.50     Types: Cigarettes     Quit date: 10/1/1967     Years since quittin.6    Smokeless tobacco: Never   Substance and Sexual Activity    Alcohol use: Yes     Alcohol/week: 1.7 standard drinks     Types: 2 Standard drinks or equivalent per week     Comment: occ    Drug use: No    Sexual activity: Yes     Partners: Female   Social History Narrative    Was working 4 days a week delivering seafood, unloading and loading truck by hand, retired Friday    No dietary restrictions           Objective:        Ht 5' 7.5" (1.715 m)   Wt 104.3 kg (230 lb)   BMI 35.49 kg/m²     Physical Exam   Constitutional: Patient is oriented to person, place, and time. Patient appears well-developed and well-nourished. No acute distress.     Psychiatric: Patient has a normal mood and affect. Patient's speech is normal and behavior is normal. Judgment is normal. Cognition and memory are normal.     Bilateral pedal exam was performed today.  Vascular: Pedal pulses palpable 1/4 DP & PT.  CFT is = 3 seconds to the hallux.  Skin temperature is warm to cool proximal tibia to distal toes without localized increase in calor noted.  No erythema, edema, or ecchymosis noted to the foot or ankle.  Hair growth present distally to the LE.     Musculoskeletal: Ankle and pedal joint ROM are decreased.  Ankle joint dorsiflexion is restricted with the knee extended and flexed per Silf[place]kiold exam.  Muscle strength is 5/5 for all LE muscle groups tested.    Neurological:  Epicritic sensation is grossly Intact to the foot.  Chaddock STR is intact to the LE.   No tenderness to palpation noted to the foot or ankle.    Dermatological: Toenails 1-5 bilateral are elongated, thickened, dystrophic, " brittle, discolored, and mycotic with subungal debris present.  Webspaces 1-4 bilateral are clean, dry, and intact.  Skin turgor is supple.  No open wounds noted to the foot or ankle. medial hallux nail margin of the right foot with ingrown nail plate. No Surrounding erythema and minimal edema is noted there is no granuloma formation noted. No drainage or malodor         Nursing note and vitals reviewed.        Assessment:       Encounter Diagnoses   Name Primary?    Ingrown nail of great toe of right foot Yes    Onychomycosis due to dermatophyte            Plan:       Rohan was seen today for nail problem.    Diagnoses and all orders for this visit:    Ingrown nail of great toe of right foot    Onychomycosis due to dermatophyte        I counseled the patient on his conditions, their implications and medical management.    Utilizing sterile toenail clippers I aggressively debrided the offending nail border approximately 3 mm from its edge and carried the nail plate incision down at an angle in order to wedge out the offending cryptotic portion of the nail plate. The offending border was then removed in toto.  No blood was drawn. Patient tolerated the procedure well and related significant relief.    Discussed removing part of the nail permanently if the pain persists    Return PRN for  PNA with phenol as needed    Timothy Guerra DPM

## 2023-05-08 ENCOUNTER — PATIENT MESSAGE (OUTPATIENT)
Dept: FAMILY MEDICINE | Facility: CLINIC | Age: 78
End: 2023-05-08
Payer: MEDICARE

## 2023-06-13 DIAGNOSIS — L71.9 ROSACEA: ICD-10-CM

## 2023-06-13 RX ORDER — DOXYCYCLINE HYCLATE 100 MG
TABLET ORAL
Qty: 30 TABLET | Refills: 2 | Status: SHIPPED | OUTPATIENT
Start: 2023-06-13 | End: 2023-09-05 | Stop reason: SDUPTHER

## 2023-06-19 NOTE — PROGRESS NOTES
"Subjective:    Patient ID:  Rohan Abarca is a 78 y.o. male who presents for follow-up of Follow-up (6 month f/u- no concerns)      Problem List Items Addressed This Visit          Cardiac/Vascular    Aortic atherosclerosis - Primary    Chronic diastolic heart failure    Coronary artery disease involving native coronary artery of native heart without angina pectoris    Essential hypertension    Mild aortic stenosis    Mixed hyperlipidemia    SVT (supraventricular tachycardia)       HPI    Patient was last seen at [facility] after episodes of SVT.  Has since been seen by electrophysiology and Christel Graham.  Electrophysiology recommended pacemaker and increased beta-blocker dosing.  When seen by Christel Graham, patient was not having any further SVT.    On assessment today, the patient states that he feels well.    No chest pain.  No shortness of breath.  No palpitations.    Further issues of SVT - None at this time    No dizziness, lightheadedness, presyncope, or syncope.           Objective:     Vitals:    06/20/23 1304   BP: 121/69   Pulse: (!) 52   Resp: 18   Weight: 103 kg (227 lb 1.2 oz)   Height: 5' 9" (1.753 m)       BP Readings from Last 5 Encounters:   06/20/23 121/69   04/19/23 138/78   04/12/23 (!) 162/78   04/05/23 (!) 160/82   03/21/23 (!) 142/68        Physical Exam  Constitutional:       Appearance: He is well-developed.   HENT:      Head: Normocephalic and atraumatic.   Neck:      Vascular: No JVD.   Cardiovascular:      Rate and Rhythm: Normal rate and regular rhythm.      Heart sounds: Normal heart sounds. No murmur heard.    No friction rub. No gallop.   Pulmonary:      Effort: Pulmonary effort is normal. No respiratory distress.      Breath sounds: Normal breath sounds. No wheezing or rales.   Abdominal:      General: Bowel sounds are normal.      Palpations: Abdomen is soft.      Tenderness: There is no abdominal tenderness. There is no guarding or rebound. "   Musculoskeletal:      Cervical back: Normal range of motion and neck supple.   Skin:     General: Skin is warm and dry.   Neurological:      Mental Status: He is alert and oriented to person, place, and time.   Psychiatric:         Behavior: Behavior normal.           Current Outpatient Medications   Medication Instructions    amLODIPine (NORVASC) 5 mg, Oral    aspirin (ECOTRIN) 81 mg, Oral, Daily    atorvastatin (LIPITOR) 20 MG tablet TAKE 1 TABLET(20 MG) BY MOUTH EVERY DAY    BINAXNOW COVID-19 AG SELF TEST Kit TEST AS DIRECTED TODAY    doxycycline (VIBRA-TABS) 100 MG tablet Take 1 po qday with food and not within 1 hour prior to lying down    furosemide (LASIX) 20 mg, Oral, Daily PRN    ketoconazole (NIZORAL) 2 % shampoo APPLY TOPICALLY 3 TIMES A WEEK    metoprolol succinate (TOPROL-XL) 25 mg, Oral, 2 times daily    olmesartan (BENICAR) 40 mg, Oral, Daily    spironolactone (ALDACTONE) 50 mg, Oral, 2 times daily    VIT A/VIT C/VIT E/ZINC/COPPER (PRESERVISION AREDS ORAL) 1 capsule, Oral, 2 times daily       Lipid Panel:   Lab Results   Component Value Date    CHOL 102 (L) 08/22/2022    HDL 42 08/22/2022    LDLCALC 51.0 (L) 08/22/2022    TRIG 45 08/22/2022    CHOLHDL 41.2 08/22/2022       The ASCVD Risk score (Tae SAMANIEGO, et al., 2019) failed to calculate for the following reasons:    The valid total cholesterol range is 130 to 320 mg/dL    All pertinent labs, imaging, and EKGs reviewed.  Patient's most recent EKG tracing was personally interpreted by this provider.    Most Recent EKG Results  Results for orders placed or performed during the hospital encounter of 07/07/22   EKG 12-lead    Collection Time: 07/07/22  3:26 PM    Narrative    Test Reason : I45.10,    Vent. Rate : 066 BPM     Atrial Rate : 066 BPM     P-R Int : 190 ms          QRS Dur : 178 ms      QT Int : 466 ms       P-R-T Axes : 032 040 004 degrees     QTc Int : 488 ms    Normal sinus rhythm  Right bundle branch block  Inferior infarct ,age  undetermined  Abnormal ECG  When compared with ECG of 07-JUL-2022 11:20,  Inferior infarct is now Present  T wave inversion now evident in Inferior leads  Confirmed by SHAHLA MCDONNELL MD ([id number]) on 7/7/2022 9:43:31 PM    Referred By: AGUSTIN NICHOLSON           Confirmed By:SHAHLA MCDONNELL MD       Most Recent Echocardiogram Results  Results for orders placed during the hospital encounter of 05/17/22    Echo    Interpretation Summary  · The left ventricle is normal in size with concentric hypertrophy and normal systolic function.  · The estimated ejection fraction is 60-65%.  · Grade II left ventricular diastolic dysfunction.  · Normal right ventricular size with normal right ventricular systolic function.  · Severe left atrial enlargement.  · Mild right atrial enlargement.  · There is mild aortic valve stenosis.  · Aortic valve area is 1.94 cm2; peak velocity is 2.30 m/s; mean gradient is 11 mmHg.  · Mild tricuspid regurgitation.  · Normal central venous pressure (3 mmHg).  · The estimated PA systolic pressure is 23 mmHg.      Most Recent Nuclear Stress Test Results  Results for orders placed during the hospital encounter of 05/17/22    Nuclear Stress - Cardiology Interpreted    Interpretation Summary    Normal myocardial perfusion scan. There is no evidence of myocardial ischemia or infarction.    There is a  mild intensity fixed perfusion abnormality in the inferior and inferoseptal wall of the left ventricle secondary to diaphragm attenuation.    There is moderate apical thinning which is a normal variant.    The gated perfusion images showed an ejection fraction of 64% at rest.    There is normal wall motion at rest.    The EKG portion of this study is negative for ischemia.    When compared to the previous study from 9/11/2020, there are no significant changes.      Most Recent Cardiac PET Stress Test Results  No results found for this or any previous visit.      Most Recent Cardiovascular Angiogram results  No results  found for this or any previous visit.      Other Most Recent Cardiology Results  Results for orders placed during the hospital encounter of 07/07/22    CARDIAC MONITORING STRIPS        Assessment:       1. Aortic atherosclerosis    2. Chronic diastolic heart failure    3. Coronary artery disease involving native coronary artery of native heart without angina pectoris    4. Essential hypertension    5. Mixed hyperlipidemia    6. Mild aortic stenosis    7. SVT (supraventricular tachycardia)         Plan:     Symptoms OK today  BP OK, pulse low  Most recent echocardiogram reviewed personally   If SVT or symptomatic bradycardia occurs, will     Continue amlodipine 5 mg PO Daily  Continue aspirin 81 mg PO Daily  Continue atorvastatin 20 mg PO Daily  Continue metoprolol succinate 25 mg PO BID  Continue olmesartan 40 mg PO Daily   Continue spironolactone 50 mg PO BID    Continue other cardiac medications  Mediterranean Diet/Cardiovascular Exercise Program    Patient queried and all questions were answered.    F/u in 6-9 months to reassess      Signed:    Rodrigo Stevenson MD  6/20/2023 3:55 PM

## 2023-06-20 ENCOUNTER — OFFICE VISIT (OUTPATIENT)
Dept: CARDIOLOGY | Facility: CLINIC | Age: 78
End: 2023-06-20
Payer: MEDICARE

## 2023-06-20 VITALS
BODY MASS INDEX: 33.63 KG/M2 | HEIGHT: 69 IN | RESPIRATION RATE: 18 BRPM | HEART RATE: 52 BPM | DIASTOLIC BLOOD PRESSURE: 69 MMHG | SYSTOLIC BLOOD PRESSURE: 121 MMHG | WEIGHT: 227.06 LBS

## 2023-06-20 DIAGNOSIS — I70.0 AORTIC ATHEROSCLEROSIS: Primary | ICD-10-CM

## 2023-06-20 DIAGNOSIS — E78.2 MIXED HYPERLIPIDEMIA: ICD-10-CM

## 2023-06-20 DIAGNOSIS — I47.10 SVT (SUPRAVENTRICULAR TACHYCARDIA): ICD-10-CM

## 2023-06-20 DIAGNOSIS — I35.0 MILD AORTIC STENOSIS: ICD-10-CM

## 2023-06-20 DIAGNOSIS — I50.32 CHRONIC DIASTOLIC HEART FAILURE: ICD-10-CM

## 2023-06-20 DIAGNOSIS — I10 ESSENTIAL HYPERTENSION: ICD-10-CM

## 2023-06-20 DIAGNOSIS — I25.10 CORONARY ARTERY DISEASE INVOLVING NATIVE CORONARY ARTERY OF NATIVE HEART WITHOUT ANGINA PECTORIS: ICD-10-CM

## 2023-06-20 PROCEDURE — 99214 OFFICE O/P EST MOD 30 MIN: CPT | Mod: S$PBB,,, | Performed by: INTERNAL MEDICINE

## 2023-06-20 PROCEDURE — 99214 PR OFFICE/OUTPT VISIT, EST, LEVL IV, 30-39 MIN: ICD-10-PCS | Mod: S$PBB,,, | Performed by: INTERNAL MEDICINE

## 2023-06-20 PROCEDURE — 99999 PR PBB SHADOW E&M-EST. PATIENT-LVL III: CPT | Mod: PBBFAC,,, | Performed by: INTERNAL MEDICINE

## 2023-06-20 PROCEDURE — 99999 PR PBB SHADOW E&M-EST. PATIENT-LVL III: ICD-10-PCS | Mod: PBBFAC,,, | Performed by: INTERNAL MEDICINE

## 2023-06-20 PROCEDURE — 99213 OFFICE O/P EST LOW 20 MIN: CPT | Mod: PBBFAC,PO | Performed by: INTERNAL MEDICINE

## 2023-06-20 RX ORDER — FUROSEMIDE 20 MG/1
20 TABLET ORAL DAILY PRN
COMMUNITY
Start: 2023-05-21

## 2023-07-07 ENCOUNTER — LAB VISIT (OUTPATIENT)
Dept: LAB | Facility: HOSPITAL | Age: 78
End: 2023-07-07
Attending: INTERNAL MEDICINE
Payer: MEDICARE

## 2023-07-07 DIAGNOSIS — I10 ESSENTIAL HYPERTENSION: ICD-10-CM

## 2023-07-07 DIAGNOSIS — Z12.5 SCREENING FOR PROSTATE CANCER: ICD-10-CM

## 2023-07-07 DIAGNOSIS — R73.09 ELEVATED GLUCOSE: ICD-10-CM

## 2023-07-07 LAB
ALBUMIN SERPL BCP-MCNC: 3.9 G/DL (ref 3.5–5.2)
ALP SERPL-CCNC: 61 U/L (ref 55–135)
ALT SERPL W/O P-5'-P-CCNC: 20 U/L (ref 10–44)
ANION GAP SERPL CALC-SCNC: 7 MMOL/L (ref 8–16)
AST SERPL-CCNC: 25 U/L (ref 10–40)
BASOPHILS # BLD AUTO: 0.04 K/UL (ref 0–0.2)
BASOPHILS NFR BLD: 0.7 % (ref 0–1.9)
BILIRUB SERPL-MCNC: 0.5 MG/DL (ref 0.1–1)
BUN SERPL-MCNC: 21 MG/DL (ref 8–23)
CALCIUM SERPL-MCNC: 10.1 MG/DL (ref 8.7–10.5)
CHLORIDE SERPL-SCNC: 105 MMOL/L (ref 95–110)
CHOLEST SERPL-MCNC: 152 MG/DL (ref 120–199)
CHOLEST/HDLC SERPL: 4.5 {RATIO} (ref 2–5)
CO2 SERPL-SCNC: 29 MMOL/L (ref 23–29)
COMPLEXED PSA SERPL-MCNC: <0.01 NG/ML (ref 0–4)
CREAT SERPL-MCNC: 1 MG/DL (ref 0.5–1.4)
DIFFERENTIAL METHOD: ABNORMAL
EOSINOPHIL # BLD AUTO: 0.1 K/UL (ref 0–0.5)
EOSINOPHIL NFR BLD: 2.6 % (ref 0–8)
ERYTHROCYTE [DISTWIDTH] IN BLOOD BY AUTOMATED COUNT: 13.1 % (ref 11.5–14.5)
EST. GFR  (NO RACE VARIABLE): >60 ML/MIN/1.73 M^2
ESTIMATED AVG GLUCOSE: 108 MG/DL (ref 68–131)
GLUCOSE SERPL-MCNC: 104 MG/DL (ref 70–110)
HBA1C MFR BLD: 5.4 % (ref 4–5.6)
HCT VFR BLD AUTO: 42.1 % (ref 40–54)
HDLC SERPL-MCNC: 34 MG/DL (ref 40–75)
HDLC SERPL: 22.4 % (ref 20–50)
HGB BLD-MCNC: 14.1 G/DL (ref 14–18)
IMM GRANULOCYTES # BLD AUTO: 0.03 K/UL (ref 0–0.04)
IMM GRANULOCYTES NFR BLD AUTO: 0.5 % (ref 0–0.5)
LDLC SERPL CALC-MCNC: 88.8 MG/DL (ref 63–159)
LYMPHOCYTES # BLD AUTO: 1.8 K/UL (ref 1–4.8)
LYMPHOCYTES NFR BLD: 31.9 % (ref 18–48)
MCH RBC QN AUTO: 31.4 PG (ref 27–31)
MCHC RBC AUTO-ENTMCNC: 33.5 G/DL (ref 32–36)
MCV RBC AUTO: 94 FL (ref 82–98)
MONOCYTES # BLD AUTO: 0.7 K/UL (ref 0.3–1)
MONOCYTES NFR BLD: 12.6 % (ref 4–15)
NEUTROPHILS # BLD AUTO: 2.8 K/UL (ref 1.8–7.7)
NEUTROPHILS NFR BLD: 51.7 % (ref 38–73)
NONHDLC SERPL-MCNC: 118 MG/DL
NRBC BLD-RTO: 0 /100 WBC
PLATELET # BLD AUTO: 198 K/UL (ref 150–450)
PMV BLD AUTO: 11.7 FL (ref 9.2–12.9)
POTASSIUM SERPL-SCNC: 4.8 MMOL/L (ref 3.5–5.1)
PROT SERPL-MCNC: 6.5 G/DL (ref 6–8.4)
RBC # BLD AUTO: 4.49 M/UL (ref 4.6–6.2)
SODIUM SERPL-SCNC: 141 MMOL/L (ref 136–145)
TRIGL SERPL-MCNC: 146 MG/DL (ref 30–150)
TSH SERPL DL<=0.005 MIU/L-ACNC: 2.22 UIU/ML (ref 0.4–4)
WBC # BLD AUTO: 5.48 K/UL (ref 3.9–12.7)

## 2023-07-07 PROCEDURE — 36415 COLL VENOUS BLD VENIPUNCTURE: CPT | Mod: PO | Performed by: INTERNAL MEDICINE

## 2023-07-07 PROCEDURE — 80053 COMPREHEN METABOLIC PANEL: CPT | Performed by: INTERNAL MEDICINE

## 2023-07-07 PROCEDURE — 83036 HEMOGLOBIN GLYCOSYLATED A1C: CPT | Performed by: INTERNAL MEDICINE

## 2023-07-07 PROCEDURE — 84153 ASSAY OF PSA TOTAL: CPT | Performed by: INTERNAL MEDICINE

## 2023-07-07 PROCEDURE — 85025 COMPLETE CBC W/AUTO DIFF WBC: CPT | Performed by: INTERNAL MEDICINE

## 2023-07-07 PROCEDURE — 84443 ASSAY THYROID STIM HORMONE: CPT | Performed by: INTERNAL MEDICINE

## 2023-07-07 PROCEDURE — 80061 LIPID PANEL: CPT | Performed by: INTERNAL MEDICINE

## 2023-07-11 ENCOUNTER — OFFICE VISIT (OUTPATIENT)
Dept: FAMILY MEDICINE | Facility: CLINIC | Age: 78
End: 2023-07-11
Payer: MEDICARE

## 2023-07-11 VITALS
WEIGHT: 229.94 LBS | HEIGHT: 69 IN | OXYGEN SATURATION: 99 % | BODY MASS INDEX: 34.06 KG/M2 | DIASTOLIC BLOOD PRESSURE: 76 MMHG | TEMPERATURE: 97 F | SYSTOLIC BLOOD PRESSURE: 132 MMHG | HEART RATE: 62 BPM

## 2023-07-11 DIAGNOSIS — D49.0 IPMN (INTRADUCTAL PAPILLARY MUCINOUS NEOPLASM): ICD-10-CM

## 2023-07-11 DIAGNOSIS — E78.5 HYPERLIPIDEMIA, UNSPECIFIED HYPERLIPIDEMIA TYPE: ICD-10-CM

## 2023-07-11 DIAGNOSIS — Z85.46 HISTORY OF PROSTATE CANCER: ICD-10-CM

## 2023-07-11 DIAGNOSIS — G56.01 CARPAL TUNNEL SYNDROME ON RIGHT: ICD-10-CM

## 2023-07-11 DIAGNOSIS — I49.5 TACHY-BRADY SYNDROME: ICD-10-CM

## 2023-07-11 DIAGNOSIS — I70.0 AORTIC ATHEROSCLEROSIS: ICD-10-CM

## 2023-07-11 DIAGNOSIS — G31.84 MCI (MILD COGNITIVE IMPAIRMENT): ICD-10-CM

## 2023-07-11 DIAGNOSIS — I65.23 BILATERAL CAROTID ARTERY STENOSIS: ICD-10-CM

## 2023-07-11 DIAGNOSIS — H35.3223 EXUDATIVE AGE-RELATED MACULAR DEGENERATION, LEFT EYE, WITH INACTIVE SCAR: ICD-10-CM

## 2023-07-11 DIAGNOSIS — L71.9 ROSACEA: ICD-10-CM

## 2023-07-11 DIAGNOSIS — G47.33 OSA (OBSTRUCTIVE SLEEP APNEA): ICD-10-CM

## 2023-07-11 DIAGNOSIS — I25.10 CORONARY ARTERY DISEASE INVOLVING NATIVE CORONARY ARTERY OF NATIVE HEART WITHOUT ANGINA PECTORIS: Primary | ICD-10-CM

## 2023-07-11 DIAGNOSIS — N13.2 HYDRONEPHROSIS WITH URINARY OBSTRUCTION DUE TO RENAL CALCULUS: ICD-10-CM

## 2023-07-11 DIAGNOSIS — I50.32 CHRONIC DIASTOLIC HEART FAILURE: ICD-10-CM

## 2023-07-11 DIAGNOSIS — I47.19 ATRIAL TACHYCARDIA: ICD-10-CM

## 2023-07-11 DIAGNOSIS — I10 ESSENTIAL HYPERTENSION: ICD-10-CM

## 2023-07-11 DIAGNOSIS — E66.01 CLASS 2 SEVERE OBESITY WITH SERIOUS COMORBIDITY AND BODY MASS INDEX (BMI) OF 36.0 TO 36.9 IN ADULT, UNSPECIFIED OBESITY TYPE: ICD-10-CM

## 2023-07-11 DIAGNOSIS — I35.0 MILD AORTIC STENOSIS: ICD-10-CM

## 2023-07-11 PROCEDURE — 99214 OFFICE O/P EST MOD 30 MIN: CPT | Mod: S$GLB,,, | Performed by: INTERNAL MEDICINE

## 2023-07-11 PROCEDURE — 99214 PR OFFICE/OUTPT VISIT, EST, LEVL IV, 30-39 MIN: ICD-10-PCS | Mod: S$GLB,,, | Performed by: INTERNAL MEDICINE

## 2023-07-11 RX ORDER — ATORVASTATIN CALCIUM 20 MG/1
20 TABLET, FILM COATED ORAL DAILY
Qty: 90 TABLET | Refills: 3 | Status: SHIPPED | OUTPATIENT
Start: 2023-07-11

## 2023-07-11 NOTE — PROGRESS NOTES
Subjective:       Patient ID: Rohan Abarca is a 78 y.o. male.    Medication List with Changes/Refills   Current Medications    ASPIRIN (ECOTRIN) 81 MG EC TABLET    Take 1 tablet (81 mg total) by mouth once daily.    DOXYCYCLINE (VIBRA-TABS) 100 MG TABLET    Take 1 po qday with food and not within 1 hour prior to lying down    FUROSEMIDE (LASIX) 20 MG TABLET    Take 20 mg by mouth daily as needed.    KETOCONAZOLE (NIZORAL) 2 % SHAMPOO    APPLY TOPICALLY 3 TIMES A WEEK    METOPROLOL SUCCINATE (TOPROL-XL) 25 MG 24 HR TABLET    Take 1 tablet (25 mg total) by mouth 2 (two) times daily.    OLMESARTAN (BENICAR) 40 MG TABLET    Take 1 tablet (40 mg total) by mouth once daily.    SPIRONOLACTONE (ALDACTONE) 50 MG TABLET    Take 1 tablet (50 mg total) by mouth 2 (two) times daily.    VIT A/VIT C/VIT E/ZINC/COPPER (PRESERVISION AREDS ORAL)    Take 1 capsule by mouth 2 (two) times a day.   Changed and/or Refilled Medications    Modified Medication Previous Medication    ATORVASTATIN (LIPITOR) 20 MG TABLET atorvastatin (LIPITOR) 20 MG tablet       Take 1 tablet (20 mg total) by mouth once daily.    TAKE 1 TABLET(20 MG) BY MOUTH EVERY DAY   Discontinued Medications    AMLODIPINE (NORVASC) 5 MG TABLET    Take 5 mg by mouth.    BINAXNOW COVID-19 AG SELF TEST KIT    TEST AS DIRECTED TODAY       Chief Complaint: Follow-up  He is here today to f/u on chronic medical issues.      He has recurrent SVT with last episode on 5/2022 where he was given adenosine x 2, calcium IV and magnesium IV and amiodarone without response and eventually had successful cardioversion.  Stress test was negative for ischemia. Echo on 5/2022 showed cLVH, EF 60%, positive diastolic dysfunction, severe LAE, mild JOHN, mild AS (PAMELA 1.94, gradient 11), mild TR and PAP of 23.  He was seen by EP who felt he would benefit from RFA for SVT but during procedure on 7/2022 was unable to induce SVT.  He was noted to have a low heart rate and ordered a 3 day holter. The  "holter showed occasional non-sustained atrial tachycardia with HR ranging from 38 to 92. He was diagnosed with tachy-bassem syndrome and advised to get a dual chamber pacer and then increase his beta blocker.  He does not want this done because he "feels fine".  He was seen by cardiology on 6/2023 and advised to f/u in 6 months and needs an echo every 2-3 years to monitor AS.      He has hypertension and is taking spironolactone 50 mg twice a day, metoprolol xl 25 mg twice a day and olmesartan 40 mg daily .  He does not check his BP at home. He denies chest pain or shortness of breath. He has no known CAD but recent CT urogram showed calcification in coronary arteries. He continues to have lower leg swelling and uses lasix 20 mg every other day.      He had a carotid u/s in 2014 that showed 50% stenosis at carotid bifurcations but no significant stenosis.  Repeat u.s on 10/2020 showed no significant stenosis.      He has hyperlipidemia and is taking atorvastatin 20 mg qday. HIs lipids on 7/2023 were 152/146/34/88.  He is taking aspirin daily.        Incidental finding on CT urogram was cystic lesion with complexity in the pancreas.MRI on 2/2021 showed Multilocular cystic mass centered in the uncinate process of the pancreas, similar in size as compared to the prior CT on 12/28/2020, with imaging features most suggestive of a side branch intraductal papillary mucinous neoplasm.  He had EUS on 3/2021 that showed gastritis and biopsied cystic lesion in uncinate process of pancreas that was 40 mm. Biopsy was negative. MRI on 5/2021 showed  Increase size of the lesion and then had subsequent EUS on 10/2021 showing cystic uncinate process lesion of pancreas without any duct abnormalities. He was seen by surgical oncology and advised repeat  MRI in 6 months. MRI on 8/2022 showed multi-septate cystic lesion with dilatation downstream consistent with a stable IPMN. He had a EUS on 11/2022 that showed increasing uncinated " cystic mass (from 4.3 to 4.5 cm) and increasing dilatation of pancreatic duct (from 5 mm to 6.2 mm).  Repeat EUS on 7/2023 showed stable cystic IPMN, no intraductal lesions (common bile duct or pancreatic duct) and advised to repeat EUS vs MRCP in 6 months. Dr. Oliveros's office will contact to schedule in 6 months. He was seen by surgical oncology on 4/2023 who agrees with surveillance at this time.      He has known renal stones on CT urogram on 12/2020 and a CT on 5/2022. CT stone protocol on 7/2022 showed There is right hydronephrosis, hydroureter with inflammatory stranding demonstrated about the proximal ureter, renal pelvis.  This correlates with a persistent calculus at the renal pelvis, caliceal junction albeit a proximal ureteral calculus measuring 3 x 4 x 3 mm.  Some superimposed early inflammation could also present in this fashion. MRI on 8/2022 showed Mild right hydro nephrosis.  There is low signal intensity stone in the proximal right ureter, most likely secondary to ureterolithiasis. He was seen by urology and underwent ureteroscopy with fragmentation of right kidney stone on 11/2022. He has not had any further f/u with urology or repeat imaging.      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 He had multiple excisions but bx still showed residual cancer.  He was treated with fluorouracil and did very well. He continues to follow with dermatology every 4 months.      He has rosacea and was started on minocycline 100 mg q3 days. He does have some discoloration around his neck and on his face but does not want to stop treatment.        He had complained of memory issues. He was seen by neurology in 6/2017 who ordered MRI (chronic microvascular ischemic changes) and neuropsych testing which was normal.  He was advised to start statin and aspirin.       He has REGAN and and uses CPAP nightly. He is tolerating well.      He has history of prostate cancer s/p radical prostatectomy  "in 2012.  He did not go on hormonal treatment. He no longer follows with urology.  His last PSA was undetectable on 7/2023.        He has right wrist CTS and gets full relief with wrist splints which he wears nightly.      He lives with his wife and feels safe at home. He is exercising at the gym 4 days a week. He tries to eat healthy.  He denies any balance issues or falls.        Colonoscopy----10/2022 repeat in 10 years    Tdap---more than 10 years  Pneumovax---12/2018  Prevnar----5/2018  Influenza vaccine----12/2018---refused  Shingrex vaccine----8/2020, 12/2020  Covid vaccine---4 doses   AAA screen---10/2016 neg       Review of Systems   Constitutional:  Negative for appetite change, fatigue, fever and unexpected weight change.   HENT:  Negative for congestion, ear pain, hearing loss, sore throat and trouble swallowing.    Eyes:  Negative for pain and visual disturbance.   Respiratory:  Negative for cough, chest tightness, shortness of breath and wheezing.    Cardiovascular:  Negative for chest pain, palpitations and leg swelling.   Gastrointestinal:  Negative for abdominal pain, blood in stool, constipation, diarrhea, nausea and vomiting.   Endocrine: Negative for polyuria.   Genitourinary:  Positive for frequency. Negative for dysuria and hematuria.   Musculoskeletal:  Negative for arthralgias, back pain and myalgias.   Skin:  Negative for rash.   Neurological:  Negative for dizziness, weakness, numbness and headaches.   Hematological:  Does not bruise/bleed easily.   Psychiatric/Behavioral:  Negative for dysphoric mood, sleep disturbance and suicidal ideas. The patient is not nervous/anxious.      Objective:      Vitals:    07/11/23 0859   BP: 132/76   BP Location: Left arm   Patient Position: Sitting   BP Method: Medium (Manual)   Pulse: 62   Temp: 97.2 °F (36.2 °C)   SpO2: 99%   Weight: 104.3 kg (229 lb 15 oz)   Height: 5' 9" (1.753 m)     Body mass index is 33.96 kg/m².  Physical Exam    General " appearance: No acute distress, cooperative  Eyes: PERRL, EOMI, conjunctiva clear  Ears: normal external ear and pinna, tm clear without drainage, canals clear  Nose: Normal mucosa without drainage  Throat: no exudates or erythema, tonsils not enlarged  Mouth: no sores or lesions, moist mucous membranes  Neck: FROM, soft, supple, no thyromegaly, no bruits  Lymph: no anterior or posterior cervical adenopathy  Heart::  Regular rate and rhythm, 2/6 systolic murmur  Lung: Clear to ascultation bilaterally, no wheezing, no rales, no rhonchi, no distress  Abdomen: Soft, nontender, no distention, no hepatosplenomegaly, bowel sounds normal, no guarding, no rebound, no peritoneal signs  Skin: no rashes, no lesions  Extremities: no edema, no cyanosis  Neuro: CN 2-12 intact, 5/5 muscle strength upper and lower extremity bilaterally, 2+ DTRs UE and LE bilaterally, normal gait  Peripheral pulses: diminished pedal pulses bilaterally  Musculoskeletal: FROM, good strenth, no tenderness  Joint: normal appearance, no swelling, no warmth, no deformity in all joints    Assessment:       1. Coronary artery disease involving native coronary artery of native heart without angina pectoris    2. Chronic diastolic heart failure    3. Atrial tachycardia    4. Tachy-bassem syndrome    5. Mild aortic stenosis    6. Essential hypertension    7. Hyperlipidemia, unspecified hyperlipidemia type    8. Aortic atherosclerosis    9. Bilateral carotid artery stenosis    10. IPMN (intraductal papillary mucinous neoplasm)    11. Hydronephrosis with urinary obstruction due to renal calculus    12. Rosacea    13. MCI (mild cognitive impairment)    14. History of prostate cancer    15. Carpal tunnel syndrome on right    16. Exudative age-related macular degeneration, left eye, with inactive scar    17. REGAN (obstructive sleep apnea)    18. Class 2 severe obesity with serious comorbidity and body mass index (BMI) of 36.0 to 36.9 in adult, unspecified obesity type         Plan:       Coronary artery disease involving native coronary artery of native heart without angina pectoris  STable and no active symptoms.    Chronic diastolic heart failure  Well compensated on lasix every other day and spironolactone bid    Atrial tachycardia  Stable on metoprolol    Tachy-bassem syndrome  He is asymptomatic and per cardiology continue to monitor    Mild aortic stenosis  Mild  and asymptomatic. Repeat echo in 5/2025    Essential hypertension  Well controlled and continue current regimen.   -     Basic Metabolic Panel; Future; Expected date: 07/11/2023    Hyperlipidemia, unspecified hyperlipidemia type  Continue atorvastatin and aspirin   -     atorvastatin (LIPITOR) 20 MG tablet; Take 1 tablet (20 mg total) by mouth once daily.  Dispense: 90 tablet; Refill: 3    Aortic atherosclerosis  Continue statin and aspirin    Bilateral carotid artery stenosis  Continue statin and aspirin    IPMN (intraductal papillary mucinous neoplasm)  Most recent EUS showed stable cystic lesion. Repeat EUS vs MRCP in 6 months with GI    Hydronephrosis with urinary obstruction due to renal calculus  S/p fragmentation    Rosacea  Well controlled and continue current regimen.     MCI (mild cognitive impairment)  Mild and continue to monitor    History of prostate cancer  No active disease    Carpal tunnel syndrome on right  Improved with wrist splints    Exudative age-related macular degeneration, left eye, with inactive scar  Stable and continue to follow with ophthalmology    REGAN (obstructive sleep apnea)  Stable and patient is compliant with use. Patient benefits from regular use of CPAP.     Class 2 severe obesity with serious comorbidity and body mass index (BMI) of 36.0 to 36.9 in adult, unspecified obesity type  Long discussion on the benefits of healthy eating and regular exercise to help lose weight and help control cad, hypertension and hyperlipidemia.     Follow up in about 6 months (around 1/11/2024) for  chronic medical issues.

## 2023-09-05 DIAGNOSIS — L71.9 ROSACEA: ICD-10-CM

## 2023-09-05 RX ORDER — DOXYCYCLINE HYCLATE 100 MG
TABLET ORAL
Qty: 30 TABLET | Refills: 2 | Status: SHIPPED | OUTPATIENT
Start: 2023-09-05

## 2023-10-18 NOTE — PROGRESS NOTES
"Subjective:   Patient ID:  Rohan Abarca is a 78 y.o. male who presents for follow-up of SVT      HPI 79 yo male with SVT, Htn, sleep apnea (CPAP), RBBB.    Background:  Primary cardiologist is Dr. Stevenson  Presented with sustained palpitations 5/17/22, noted to be in SVT with RBBB aberrancy. Notes indicate DCCVx3, followed by recurrence triggered by PAC's. Briefly put on amiodarone. Discharged on metoprolol.     Returned 5/29/22 with palpitations, headaches and chest pain. ECG revealed nsr. He reports symptoms improved prior to presentation.     Echo 5/17/22 normal biventricular structure and function severe LAE  Spect stress 5/17/22 negative     Baseline ECG revealed sinus bradycardia at 47 bpm RBBB, no pre-excitation  EPS 7/7/22 HV interval 75 msec. Induced sustained irregular rapid atrial tachycardia requiring DCCV.   Bardy patch revealed 9 episodes of nonsustained atrial tachycardia.  My recommendation was dual chamber PPM implantation with conduction system pacing and increasing beta blocker. They elected to defer.     Update:     Overall he is happy with how is feeling. Notes rare palpitations, nothing sustained. Denies syncope, dizziness.  Overall he is "fair to middlin."      Review of Systems   Constitutional: Negative. Negative for fever and malaise/fatigue.   HENT:  Negative for congestion and sore throat.    Cardiovascular:  Negative for chest pain, dyspnea on exertion, irregular heartbeat, leg swelling, near-syncope, orthopnea, palpitations, paroxysmal nocturnal dyspnea and syncope.   Respiratory:  Negative for cough and shortness of breath.    Gastrointestinal:  Negative for abdominal pain, constipation and diarrhea.   Neurological:  Negative for dizziness, light-headedness and weakness.   Psychiatric/Behavioral:  Negative for depression. The patient is not nervous/anxious.    All other systems reviewed and are negative.      Objective:   Physical Exam  Constitutional:       Appearance: He is " well-developed.   Eyes:      General: No scleral icterus.     Conjunctiva/sclera: Conjunctivae normal.   Neck:      Vascular: No JVD.      Trachea: No tracheal deviation.   Cardiovascular:      Rate and Rhythm: Normal rate and regular rhythm.      Chest Wall: PMI is not displaced.      Heart sounds: Normal heart sounds.   Pulmonary:      Effort: Pulmonary effort is normal. No respiratory distress.      Breath sounds: Normal breath sounds.   Abdominal:      Palpations: Abdomen is soft.      Tenderness: There is no abdominal tenderness.   Musculoskeletal:         General: No tenderness.   Skin:     General: Skin is warm and dry.      Findings: No rash.   Neurological:      Mental Status: He is alert and oriented to person, place, and time.   Psychiatric:         Behavior: Behavior normal.         Assessment:      1. SVT (supraventricular tachycardia)    2. Bradycardia    3. Coronary artery disease involving native coronary artery of native heart without angina pectoris    4. Chronic diastolic heart failure    5. Essential hypertension    6. Class 2 severe obesity with serious comorbidity and body mass index (BMI) of 36.0 to 36.9 in adult, unspecified obesity type    7. REGAN (obstructive sleep apnea)        Plan:     Doing well overall.  He is happy with his quality of life, no syncope. Discussed that if palpitations increase, he would reconsider PPM.  F/u in one year.

## 2023-10-23 ENCOUNTER — OFFICE VISIT (OUTPATIENT)
Dept: CARDIOLOGY | Facility: CLINIC | Age: 78
End: 2023-10-23
Payer: MEDICARE

## 2023-10-23 VITALS
SYSTOLIC BLOOD PRESSURE: 106 MMHG | DIASTOLIC BLOOD PRESSURE: 65 MMHG | HEIGHT: 69 IN | HEART RATE: 55 BPM | BODY MASS INDEX: 33.47 KG/M2 | WEIGHT: 226 LBS

## 2023-10-23 DIAGNOSIS — I25.10 CORONARY ARTERY DISEASE INVOLVING NATIVE CORONARY ARTERY OF NATIVE HEART WITHOUT ANGINA PECTORIS: ICD-10-CM

## 2023-10-23 DIAGNOSIS — E66.01 CLASS 2 SEVERE OBESITY WITH SERIOUS COMORBIDITY AND BODY MASS INDEX (BMI) OF 36.0 TO 36.9 IN ADULT, UNSPECIFIED OBESITY TYPE: ICD-10-CM

## 2023-10-23 DIAGNOSIS — R00.1 BRADYCARDIA: ICD-10-CM

## 2023-10-23 DIAGNOSIS — I50.32 CHRONIC DIASTOLIC HEART FAILURE: ICD-10-CM

## 2023-10-23 DIAGNOSIS — I10 ESSENTIAL HYPERTENSION: ICD-10-CM

## 2023-10-23 DIAGNOSIS — G47.33 OSA (OBSTRUCTIVE SLEEP APNEA): ICD-10-CM

## 2023-10-23 DIAGNOSIS — I47.10 SVT (SUPRAVENTRICULAR TACHYCARDIA): Primary | ICD-10-CM

## 2023-10-23 PROCEDURE — 99215 PR OFFICE/OUTPT VISIT, EST, LEVL V, 40-54 MIN: ICD-10-PCS | Mod: S$PBB,,, | Performed by: INTERNAL MEDICINE

## 2023-10-23 PROCEDURE — 99215 OFFICE O/P EST HI 40 MIN: CPT | Mod: S$PBB,,, | Performed by: INTERNAL MEDICINE

## 2023-10-23 PROCEDURE — 99999 PR PBB SHADOW E&M-EST. PATIENT-LVL II: CPT | Mod: PBBFAC,,, | Performed by: INTERNAL MEDICINE

## 2023-10-23 PROCEDURE — 99999 PR PBB SHADOW E&M-EST. PATIENT-LVL II: ICD-10-PCS | Mod: PBBFAC,,, | Performed by: INTERNAL MEDICINE

## 2023-10-23 PROCEDURE — 99212 OFFICE O/P EST SF 10 MIN: CPT | Mod: PBBFAC,PO | Performed by: INTERNAL MEDICINE

## 2023-11-16 ENCOUNTER — PATIENT MESSAGE (OUTPATIENT)
Dept: FAMILY MEDICINE | Facility: CLINIC | Age: 78
End: 2023-11-16
Payer: MEDICARE

## 2023-11-16 NOTE — TELEPHONE ENCOUNTER
Spoke with Dr. Powell about wife's message. Instructed to decrease the Metoprolol dose in half. Pts wife states he is taking 25 mg nightly - instructed to decrease the dose to 12.5 mg (1/2 tab) nightly. Appointment scheduled for 11/22 @ 7am. Instructed if pt is having acute symptoms to not wait for Wednesday and go to the ER for evaluation.

## 2023-11-22 ENCOUNTER — OFFICE VISIT (OUTPATIENT)
Dept: FAMILY MEDICINE | Facility: CLINIC | Age: 78
End: 2023-11-22
Payer: MEDICARE

## 2023-11-22 VITALS
TEMPERATURE: 98 F | BODY MASS INDEX: 33.4 KG/M2 | HEART RATE: 61 BPM | WEIGHT: 225.5 LBS | DIASTOLIC BLOOD PRESSURE: 68 MMHG | OXYGEN SATURATION: 100 % | HEIGHT: 69 IN | RESPIRATION RATE: 16 BRPM | SYSTOLIC BLOOD PRESSURE: 114 MMHG

## 2023-11-22 DIAGNOSIS — Z23 NEED FOR VACCINATION: ICD-10-CM

## 2023-11-22 DIAGNOSIS — Z23 NEED FOR COVID-19 VACCINE: ICD-10-CM

## 2023-11-22 DIAGNOSIS — R53.83 FATIGUE, UNSPECIFIED TYPE: ICD-10-CM

## 2023-11-22 DIAGNOSIS — I49.5 TACHY-BRADY SYNDROME: Primary | ICD-10-CM

## 2023-11-22 DIAGNOSIS — I10 ESSENTIAL HYPERTENSION: ICD-10-CM

## 2023-11-22 LAB
ANION GAP SERPL CALC-SCNC: 11 MMOL/L (ref 8–16)
BASOPHILS # BLD AUTO: 0.03 K/UL (ref 0–0.2)
BASOPHILS NFR BLD: 0.5 % (ref 0–1.9)
BUN SERPL-MCNC: 26 MG/DL (ref 8–23)
CALCIUM SERPL-MCNC: 10.5 MG/DL (ref 8.7–10.5)
CHLORIDE SERPL-SCNC: 107 MMOL/L (ref 95–110)
CO2 SERPL-SCNC: 25 MMOL/L (ref 23–29)
CREAT SERPL-MCNC: 1.2 MG/DL (ref 0.5–1.4)
DIFFERENTIAL METHOD: ABNORMAL
EOSINOPHIL # BLD AUTO: 0.1 K/UL (ref 0–0.5)
EOSINOPHIL NFR BLD: 2.1 % (ref 0–8)
ERYTHROCYTE [DISTWIDTH] IN BLOOD BY AUTOMATED COUNT: 12 % (ref 11.5–14.5)
EST. GFR  (NO RACE VARIABLE): >60 ML/MIN/1.73 M^2
GLUCOSE SERPL-MCNC: 96 MG/DL (ref 70–110)
HCT VFR BLD AUTO: 40.6 % (ref 40–54)
HGB BLD-MCNC: 13.7 G/DL (ref 14–18)
IMM GRANULOCYTES # BLD AUTO: 0.01 K/UL (ref 0–0.04)
IMM GRANULOCYTES NFR BLD AUTO: 0.2 % (ref 0–0.5)
LYMPHOCYTES # BLD AUTO: 2 K/UL (ref 1–4.8)
LYMPHOCYTES NFR BLD: 32.1 % (ref 18–48)
MCH RBC QN AUTO: 32.5 PG (ref 27–31)
MCHC RBC AUTO-ENTMCNC: 33.7 G/DL (ref 32–36)
MCV RBC AUTO: 96 FL (ref 82–98)
MONOCYTES # BLD AUTO: 0.9 K/UL (ref 0.3–1)
MONOCYTES NFR BLD: 14.2 % (ref 4–15)
NEUTROPHILS # BLD AUTO: 3.2 K/UL (ref 1.8–7.7)
NEUTROPHILS NFR BLD: 50.9 % (ref 38–73)
NRBC BLD-RTO: 0 /100 WBC
PLATELET # BLD AUTO: 201 K/UL (ref 150–450)
PMV BLD AUTO: 12.1 FL (ref 9.2–12.9)
POTASSIUM SERPL-SCNC: 5.5 MMOL/L (ref 3.5–5.1)
RBC # BLD AUTO: 4.21 M/UL (ref 4.6–6.2)
SODIUM SERPL-SCNC: 143 MMOL/L (ref 136–145)
TSH SERPL DL<=0.005 MIU/L-ACNC: 2.12 UIU/ML (ref 0.4–4)
WBC # BLD AUTO: 6.26 K/UL (ref 3.9–12.7)

## 2023-11-22 PROCEDURE — 91322 COVID-19 VAC, MRNA 2023 (MODERNA)(PF) 50 MCG/0.5 ML IM SUSR (12+): ICD-10-PCS | Mod: S$GLB,,, | Performed by: INTERNAL MEDICINE

## 2023-11-22 PROCEDURE — 99214 PR OFFICE/OUTPT VISIT, EST, LEVL IV, 30-39 MIN: ICD-10-PCS | Mod: S$GLB,,, | Performed by: INTERNAL MEDICINE

## 2023-11-22 PROCEDURE — 36415 COLL VENOUS BLD VENIPUNCTURE: CPT | Mod: S$GLB,,, | Performed by: INTERNAL MEDICINE

## 2023-11-22 PROCEDURE — 91322 SARSCOV2 VAC 50 MCG/0.5ML IM: CPT | Mod: S$GLB,,, | Performed by: INTERNAL MEDICINE

## 2023-11-22 PROCEDURE — 84443 ASSAY THYROID STIM HORMONE: CPT | Performed by: INTERNAL MEDICINE

## 2023-11-22 PROCEDURE — 90480 COVID-19 VAC, MRNA 2023 (MODERNA)(PF) 50 MCG/0.5 ML IM SUSR (12+): ICD-10-PCS | Mod: S$GLB,,, | Performed by: INTERNAL MEDICINE

## 2023-11-22 PROCEDURE — 85025 COMPLETE CBC W/AUTO DIFF WBC: CPT | Performed by: INTERNAL MEDICINE

## 2023-11-22 PROCEDURE — 36415 PR COLLECTION VENOUS BLOOD,VENIPUNCTURE: ICD-10-PCS | Mod: S$GLB,,, | Performed by: INTERNAL MEDICINE

## 2023-11-22 PROCEDURE — 90480 ADMN SARSCOV2 VAC 1/ONLY CMP: CPT | Mod: S$GLB,,, | Performed by: INTERNAL MEDICINE

## 2023-11-22 PROCEDURE — 80048 BASIC METABOLIC PNL TOTAL CA: CPT | Performed by: INTERNAL MEDICINE

## 2023-11-22 PROCEDURE — 99214 OFFICE O/P EST MOD 30 MIN: CPT | Mod: S$GLB,,, | Performed by: INTERNAL MEDICINE

## 2023-11-22 RX ORDER — SPIRONOLACTONE 50 MG/1
25 TABLET, FILM COATED ORAL 2 TIMES DAILY
Qty: 180 TABLET | Refills: 3
Start: 2023-11-22 | End: 2024-01-11

## 2023-11-22 NOTE — PATIENT INSTRUCTIONS
Venipuncture performed with 23' gauge butterfly, x's 1 attempt,  to R Antecubital vein. Blood draw was unsuccessful.      Pressure dressing applied to site, instructed patient to remove dressing in 10-15 minutes, OK to re-adjust dressing if pressure causing any discomfort, to observe closely for numbness and/or discoloration to hand or fingers, and to notify provider if bleeding persists after applying constant pressure lasting 30 minutes.         Venipuncture performed with 23' gauge butterfly, x's 2 attempt,  to L Antecubital vein.  Specimens collected per orders.      Pressure dressing applied to site, instructed patient to remove dressing in 10-15 minutes, OK to re-adjust dressing if pressure causing any discomfort, to observe closely for numbness and/or discoloration to hand or fingers, and to notify provider if bleeding persists after applying constant pressure lasting 30 minutes.

## 2023-11-22 NOTE — PROGRESS NOTES
"Subjective:       Patient ID: Rohan Abarca is a 78 y.o. male.    Medication List with Changes/Refills   Current Medications    ASPIRIN (ECOTRIN) 81 MG EC TABLET    Take 1 tablet (81 mg total) by mouth once daily.    ATORVASTATIN (LIPITOR) 20 MG TABLET    Take 1 tablet (20 mg total) by mouth once daily.    DOXYCYCLINE (VIBRA-TABS) 100 MG TABLET    Take 1 po qday with food and not within 1 hour prior to lying down    FUROSEMIDE (LASIX) 20 MG TABLET    Take 20 mg by mouth daily as needed.    KETOCONAZOLE (NIZORAL) 2 % SHAMPOO    APPLY TOPICALLY 3 TIMES A WEEK    METOPROLOL SUCCINATE (TOPROL-XL) 25 MG 24 HR TABLET    Take 1 tablet (25 mg total) by mouth 2 (two) times daily.    OLMESARTAN (BENICAR) 40 MG TABLET    Take 1 tablet (40 mg total) by mouth once daily.    VIT A/VIT C/VIT E/ZINC/COPPER (PRESERVISION AREDS ORAL)    Take 1 capsule by mouth 2 (two) times a day.   Changed and/or Refilled Medications    Modified Medication Previous Medication    SPIRONOLACTONE (ALDACTONE) 50 MG TABLET spironolactone (ALDACTONE) 50 MG tablet       Take 0.5 tablets (25 mg total) by mouth 2 (two) times daily.    Take 1 tablet (50 mg total) by mouth 2 (two) times daily.       Chief Complaint: Follow-up  He is here today to recheck BP and HR with medication adjustments last week.     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 He is followed by EP and has not wanted a pacemaker because he had no symptoms.  He is on a limited beta blocker due to low heart rate.  He has not been having palpitations.  On 11/16/2023 his wife sent this message "Rohan recently had a followup appointment with Dr Patiño who said we should let you know if his heart rate stays under 50 and blood pressure is low. Rohan's heart rate has been between 46 and 49 and his average blood pressure has been 120/70 for a few weeks. I've noticed he is more lethargic than usual. His actions, speech, and thought process are slower than usual also."  His " "does of metoprolol was decreased from 25 mg nightly to 12.5 mg nightly.  He continues on olmesartan 40 mg daily and spironolactone 50 mg twice a day. His home BP readings run 121-98/60s.  He has frequent readings with SBP < 100. His HR runs 50-66 with most in the 50s. He does report lightheadedness at times with changing position. No palpitations. No cheat pain or shortness of breath. He does have some fatigue and low energy. He sleeps more then usual over the last year. He does use his cpap nightly. He was seen by EP in 10/2023 who again recommended a pacer but wrote "that he is happy with his quality of life and has not had any syncopal episodes".  Advised to f/u in one year or sooner if he changes his mind about a pacemaker. He does have some bilateral breast tenderness since starting spironolactone.     Review of Systems   Constitutional:  Positive for fatigue. Negative for appetite change, fever and unexpected weight change.   HENT:  Negative for congestion, ear pain, hearing loss, sore throat and trouble swallowing.    Eyes:  Negative for pain and visual disturbance.   Respiratory:  Negative for cough, chest tightness, shortness of breath and wheezing.    Cardiovascular:  Negative for chest pain, palpitations and leg swelling.   Gastrointestinal:  Negative for abdominal pain, blood in stool, constipation, diarrhea, nausea and vomiting.   Endocrine: Negative for polyuria.   Genitourinary:  Negative for dysuria and hematuria.   Musculoskeletal:  Negative for arthralgias, back pain and myalgias.   Skin:  Negative for rash.   Neurological:  Positive for light-headedness. Negative for dizziness, weakness, numbness and headaches.   Hematological:  Does not bruise/bleed easily.   Psychiatric/Behavioral:  Negative for dysphoric mood, sleep disturbance and suicidal ideas. The patient is not nervous/anxious.        Objective:      Vitals:    11/22/23 0701   BP: 114/68   Pulse: 61   Resp: 16   Temp: 98.2 °F (36.8 °C) " "  SpO2: 100%   Weight: 102.3 kg (225 lb 8.5 oz)   Height: 5' 9" (1.753 m)     Body mass index is 33.31 kg/m².  Physical Exam    General appearance: No acute distress, cooperative  Neck: FROM, soft, supple, no thyromegaly, no bruits  Lymph: no anterior or posterior cervical adenopathy  Heart::  Regular rate and rhythm, 3/6 systolic murmur  Lung: Clear to ascultation bilaterally, no wheezing, no rales, no rhonchi, no distress  Abdomen: Soft, nontender, no distention, no hepatosplenomegaly, bowel sounds normal, no guarding, no rebound, no peritoneal signs  Skin: no rashes, no lesions  Extremities: no edema, no cyanosis  Peripheral pulses: 2+ pedal pulses bilaterally    Assessment:       1. Tachy-bassem syndrome    2. Essential hypertension    3. Fatigue, unspecified type    4. Need for COVID-19 vaccine        Plan:       Tachy-bassem syndrome  He is asymptomatic but has increasing fatigue and low energy.  His HR did improve with decreasing his metoprolol to 12.5 mg nightly.  Long discussion about pacemaker and he continues to consider at this point.  He will continue to monitor BP and HR.     Essential hypertension  He does have episodes of lightheadedness with low BP. Will decrease his spironolactone to 25 mg bid.  Continue metoprolol 12.5 mg daily and continue olmesartan 40 mg daily. He will send readings in one week.   -     CBC Auto Differential  -     Basic Metabolic Panel  -     TSH  -     spironolactone (ALDACTONE) 50 MG tablet; Take 0.5 tablets (25 mg total) by mouth 2 (two) times daily.  Dispense: 180 tablet; Refill: 3    Fatigue, unspecified type  Suspect due to episodes of low HR.  Will check labs today.     Need for COVID-19 vaccine  -     COVID-19 VAC, MRNA 2023 (MODERNA)(PF) 50 MCG/0.5 ML IM SUSR (12 YRS AND UP)    Follow up for already scheduled.        "

## 2023-11-27 ENCOUNTER — TELEPHONE (OUTPATIENT)
Dept: FAMILY MEDICINE | Facility: CLINIC | Age: 78
End: 2023-11-27
Payer: MEDICARE

## 2023-11-27 DIAGNOSIS — E87.5 HYPERKALEMIA: Primary | ICD-10-CM

## 2023-11-27 NOTE — TELEPHONE ENCOUNTER
Please let him know that on his previous dose of spironolactone his potasium and kidney function is impaired.     I need to recheck his labs in one week on his currently lower dose of medication.     Please schedule    Thanks

## 2023-11-28 ENCOUNTER — PATIENT MESSAGE (OUTPATIENT)
Dept: FAMILY MEDICINE | Facility: CLINIC | Age: 78
End: 2023-11-28
Payer: MEDICARE

## 2023-12-05 ENCOUNTER — LAB VISIT (OUTPATIENT)
Dept: LAB | Facility: HOSPITAL | Age: 78
End: 2023-12-05
Attending: INTERNAL MEDICINE
Payer: MEDICARE

## 2023-12-05 DIAGNOSIS — E87.5 HYPERKALEMIA: ICD-10-CM

## 2023-12-05 LAB
ANION GAP SERPL CALC-SCNC: 10 MMOL/L (ref 8–16)
BUN SERPL-MCNC: 29 MG/DL (ref 8–23)
CALCIUM SERPL-MCNC: 10 MG/DL (ref 8.7–10.5)
CHLORIDE SERPL-SCNC: 104 MMOL/L (ref 95–110)
CO2 SERPL-SCNC: 26 MMOL/L (ref 23–29)
CREAT SERPL-MCNC: 1.1 MG/DL (ref 0.5–1.4)
EST. GFR  (NO RACE VARIABLE): >60 ML/MIN/1.73 M^2
GLUCOSE SERPL-MCNC: 95 MG/DL (ref 70–110)
POTASSIUM SERPL-SCNC: 4.7 MMOL/L (ref 3.5–5.1)
SODIUM SERPL-SCNC: 140 MMOL/L (ref 136–145)

## 2023-12-05 PROCEDURE — 80048 BASIC METABOLIC PNL TOTAL CA: CPT | Performed by: INTERNAL MEDICINE

## 2023-12-05 PROCEDURE — 36415 COLL VENOUS BLD VENIPUNCTURE: CPT | Mod: PO | Performed by: INTERNAL MEDICINE

## 2023-12-08 ENCOUNTER — PATIENT MESSAGE (OUTPATIENT)
Dept: FAMILY MEDICINE | Facility: CLINIC | Age: 78
End: 2023-12-08
Payer: MEDICARE

## 2023-12-08 DIAGNOSIS — I10 ESSENTIAL HYPERTENSION: ICD-10-CM

## 2023-12-09 RX ORDER — OLMESARTAN MEDOXOMIL 40 MG/1
20 TABLET ORAL DAILY
Qty: 45 TABLET | Refills: 3
Start: 2023-12-09 | End: 2024-01-11

## 2023-12-11 RX ORDER — OLMESARTAN MEDOXOMIL 20 MG/1
20 TABLET ORAL DAILY
Qty: 90 TABLET | Refills: 3 | Status: SHIPPED | OUTPATIENT
Start: 2023-12-11 | End: 2024-12-10

## 2023-12-11 NOTE — TELEPHONE ENCOUNTER
No care due was identified.  [facility] Embedded Care Due Messages. Reference number: [id number].   12/11/2023 1:43:42 PM CST

## 2023-12-11 NOTE — TELEPHONE ENCOUNTER
----- Message from Laith Maurice sent at 12/11/2023 12:52 PM CST -----  Contact: tiffanie  Type: RX Refill Request        Who Called: Patient   Refill or New Rx: Refill  RX Name and Strength: olmesartan (BENICAR) 40 MG tablet  How is the patient currently taking it? (ex. 1XDay): as directed   Is this a 30 day or 90 day RX: n/a  Preferred Pharmacy with phone number:   [id number] [id number] [place] [address] [address] [address]  [id number] [place] [id number]  Phone: [phone] Fax: [phone]  Local or Mail Order: local   Ordering Provider: Dr. Andre Reddy Call Back Number: [phone]  Additional Information: Patient states he wants to know can get this medication in 20 mg if the  has it that way if not he will continue breaking it in half. Thanks

## 2023-12-18 ENCOUNTER — PATIENT MESSAGE (OUTPATIENT)
Dept: FAMILY MEDICINE | Facility: CLINIC | Age: 78
End: 2023-12-18
Payer: MEDICARE

## 2024-01-02 ENCOUNTER — PATIENT MESSAGE (OUTPATIENT)
Dept: FAMILY MEDICINE | Facility: CLINIC | Age: 79
End: 2024-01-02
Payer: MEDICARE

## 2024-01-08 ENCOUNTER — LAB VISIT (OUTPATIENT)
Dept: LAB | Facility: HOSPITAL | Age: 79
End: 2024-01-08
Attending: INTERNAL MEDICINE
Payer: MEDICARE

## 2024-01-08 DIAGNOSIS — I10 ESSENTIAL HYPERTENSION: ICD-10-CM

## 2024-01-08 LAB
ANION GAP SERPL CALC-SCNC: 6 MMOL/L (ref 8–16)
BUN SERPL-MCNC: 28 MG/DL (ref 8–23)
CALCIUM SERPL-MCNC: 9.8 MG/DL (ref 8.7–10.5)
CHLORIDE SERPL-SCNC: 108 MMOL/L (ref 95–110)
CO2 SERPL-SCNC: 26 MMOL/L (ref 23–29)
CREAT SERPL-MCNC: 1.1 MG/DL (ref 0.5–1.4)
EST. GFR  (NO RACE VARIABLE): >60 ML/MIN/1.73 M^2
GLUCOSE SERPL-MCNC: 90 MG/DL (ref 70–110)
POTASSIUM SERPL-SCNC: 4.7 MMOL/L (ref 3.5–5.1)
SODIUM SERPL-SCNC: 140 MMOL/L (ref 136–145)

## 2024-01-08 PROCEDURE — 80048 BASIC METABOLIC PNL TOTAL CA: CPT | Performed by: INTERNAL MEDICINE

## 2024-01-08 PROCEDURE — 36415 COLL VENOUS BLD VENIPUNCTURE: CPT | Mod: PO | Performed by: INTERNAL MEDICINE

## 2024-01-11 ENCOUNTER — OFFICE VISIT (OUTPATIENT)
Dept: FAMILY MEDICINE | Facility: CLINIC | Age: 79
End: 2024-01-11
Payer: MEDICARE

## 2024-01-11 VITALS
BODY MASS INDEX: 33.44 KG/M2 | OXYGEN SATURATION: 93 % | HEART RATE: 42 BPM | TEMPERATURE: 98 F | SYSTOLIC BLOOD PRESSURE: 92 MMHG | HEIGHT: 69 IN | DIASTOLIC BLOOD PRESSURE: 52 MMHG | WEIGHT: 225.75 LBS

## 2024-01-11 DIAGNOSIS — Z00.00 ENCOUNTER FOR MEDICARE ANNUAL WELLNESS EXAM: ICD-10-CM

## 2024-01-11 DIAGNOSIS — L71.9 ROSACEA: ICD-10-CM

## 2024-01-11 DIAGNOSIS — G47.33 OSA (OBSTRUCTIVE SLEEP APNEA): ICD-10-CM

## 2024-01-11 DIAGNOSIS — G56.01 CARPAL TUNNEL SYNDROME ON RIGHT: ICD-10-CM

## 2024-01-11 DIAGNOSIS — I35.0 MILD AORTIC STENOSIS: ICD-10-CM

## 2024-01-11 DIAGNOSIS — Z79.899 MEDICATION MANAGEMENT: ICD-10-CM

## 2024-01-11 DIAGNOSIS — I49.5 TACHY-BRADY SYNDROME: ICD-10-CM

## 2024-01-11 DIAGNOSIS — I65.23 BILATERAL CAROTID ARTERY STENOSIS: ICD-10-CM

## 2024-01-11 DIAGNOSIS — R53.83 FATIGUE, UNSPECIFIED TYPE: ICD-10-CM

## 2024-01-11 DIAGNOSIS — I50.32 CHRONIC DIASTOLIC HEART FAILURE: ICD-10-CM

## 2024-01-11 DIAGNOSIS — Z23 NEED FOR VACCINATION: ICD-10-CM

## 2024-01-11 DIAGNOSIS — J30.9 CHRONIC ALLERGIC RHINITIS: ICD-10-CM

## 2024-01-11 DIAGNOSIS — D49.0 IPMN (INTRADUCTAL PAPILLARY MUCINOUS NEOPLASM): ICD-10-CM

## 2024-01-11 DIAGNOSIS — H35.3223 EXUDATIVE AGE-RELATED MACULAR DEGENERATION, LEFT EYE, WITH INACTIVE SCAR: ICD-10-CM

## 2024-01-11 DIAGNOSIS — I25.10 CORONARY ARTERY DISEASE INVOLVING NATIVE CORONARY ARTERY OF NATIVE HEART WITHOUT ANGINA PECTORIS: Primary | ICD-10-CM

## 2024-01-11 DIAGNOSIS — Z85.46 HISTORY OF PROSTATE CANCER: ICD-10-CM

## 2024-01-11 DIAGNOSIS — G31.84 MCI (MILD COGNITIVE IMPAIRMENT): ICD-10-CM

## 2024-01-11 DIAGNOSIS — I10 ESSENTIAL HYPERTENSION: ICD-10-CM

## 2024-01-11 DIAGNOSIS — E66.01 CLASS 2 SEVERE OBESITY WITH SERIOUS COMORBIDITY AND BODY MASS INDEX (BMI) OF 36.0 TO 36.9 IN ADULT, UNSPECIFIED OBESITY TYPE: ICD-10-CM

## 2024-01-11 DIAGNOSIS — E78.5 HYPERLIPIDEMIA, UNSPECIFIED HYPERLIPIDEMIA TYPE: ICD-10-CM

## 2024-01-11 DIAGNOSIS — I70.0 AORTIC ATHEROSCLEROSIS: ICD-10-CM

## 2024-01-11 PROCEDURE — 90694 VACC AIIV4 NO PRSRV 0.5ML IM: CPT | Mod: S$GLB,,, | Performed by: INTERNAL MEDICINE

## 2024-01-11 PROCEDURE — 99214 OFFICE O/P EST MOD 30 MIN: CPT | Mod: S$GLB,,, | Performed by: INTERNAL MEDICINE

## 2024-01-11 PROCEDURE — G0008 ADMIN INFLUENZA VIRUS VAC: HCPCS | Mod: S$GLB,,, | Performed by: INTERNAL MEDICINE

## 2024-01-11 RX ORDER — METOPROLOL SUCCINATE 25 MG/1
25 TABLET, EXTENDED RELEASE ORAL DAILY
Start: 2024-01-11

## 2024-01-11 RX ORDER — FLUTICASONE PROPIONATE 50 MCG
2 SPRAY, SUSPENSION (ML) NASAL DAILY
Qty: 16 G | Refills: 6 | Status: SHIPPED | OUTPATIENT
Start: 2024-01-11

## 2024-01-11 RX ORDER — SPIRONOLACTONE 25 MG/1
25 TABLET ORAL 2 TIMES DAILY
COMMUNITY
Start: 2023-12-28

## 2024-01-11 NOTE — PROGRESS NOTES
Subjective:       Patient ID: Rohan Abarca is a 78 y.o. male.    Medication List with Changes/Refills   New Medications    FLUTICASONE PROPIONATE (FLONASE) 50 MCG/ACTUATION NASAL SPRAY    2 sprays (100 mcg total) by Each Nostril route once daily.   Current Medications    ASPIRIN (ECOTRIN) 81 MG EC TABLET    Take 1 tablet (81 mg total) by mouth once daily.    ATORVASTATIN (LIPITOR) 20 MG TABLET    Take 1 tablet (20 mg total) by mouth once daily.    DOXYCYCLINE (VIBRA-TABS) 100 MG TABLET    Take 1 po qday with food and not within 1 hour prior to lying down    FUROSEMIDE (LASIX) 20 MG TABLET    Take 20 mg by mouth daily as needed.    KETOCONAZOLE (NIZORAL) 2 % SHAMPOO    APPLY TOPICALLY 3 TIMES A WEEK    OLMESARTAN (BENICAR) 20 MG TABLET    Take 1 tablet (20 mg total) by mouth once daily.    SPIRONOLACTONE (ALDACTONE) 25 MG TABLET    Take 25 mg by mouth 2 (two) times daily.    VIT A/VIT C/VIT E/ZINC/COPPER (PRESERVISION AREDS ORAL)    Take 1 capsule by mouth 2 (two) times a day.   Changed and/or Refilled Medications    Modified Medication Previous Medication    METOPROLOL SUCCINATE (TOPROL-XL) 25 MG 24 HR TABLET metoprolol succinate (TOPROL-XL) 25 MG 24 hr tablet       Take 1 tablet (25 mg total) by mouth once daily.    Take 1 tablet (25 mg total) by mouth 2 (two) times daily.   Discontinued Medications    OLMESARTAN (BENICAR) 40 MG TABLET    Take 0.5 tablets (20 mg total) by mouth once daily.    SPIRONOLACTONE (ALDACTONE) 50 MG TABLET    Take 0.5 tablets (25 mg total) by mouth 2 (two) times daily.       Chief Complaint: Follow-up  He is here today to f/u on chronic medical issues.     He has tachy-bassem syndrome with runs of SVT and his episode was on 5/2022 where he was given adenosine x 2, calcium IV and magnesium IV and amiodarone without response and eventually had successful cardioversion. Stress test was negative for ischemia. Echo on 5/2022 showed cLVH, EF 60%, positive diastolic dysfunction, severe LAE,  mild JOHN, mild AS (PAMELA 1.94, gradient 11), mild TR and PAP of 23.  He was seen by EP who felt he would benefit from RFA for SVT but during procedure on 7/2022 was unable to induce SVT. He was diagnosed with tachy-bassem syndrome from a holter taht showed non-sustained atrial tachycardia with HR ranging from 38 to 92.  He was seen EP on 10/2023 who felt he would benefit from a PPM but since he was asymptomatic at that time advised continue monitoring. Today he reports that he is having worsening fatigue and lightheadedness over the last couple months. Home heart rate runs 40s to 50s and BP /60s.  His blood pressure medication has been adjusted.  He is due to see cardiology and EP in one week to discuss pacer placement.     He has AS with echo on 5/2022 showed cLVH, EF 60%, positive diastolic dysfunction, severe LAE, mild JOHN, mild AS (PAMELA 1.94, gradient 11), mild TR and PAP of 23..  He was seen by cardiology on 6/2023 and advised to f/u in 6 months and needs an echo every 2-3 years to monitor AS.      He has hypertension and is taking spironolactone 25 mg twice a day, metoprolol xl 25 mg nightly and olmesartan 20 mg daily.  He denies chest pain or shortness of breath. He has no known CAD but recent CT urogram showed calcification in coronary arteries. His lower leg swelling is improved on spironolactone and he has not needed to use lasix.  He does get lightheaded with changes in position.      He had a carotid u/s in 2014 that showed 50% stenosis at carotid bifurcations but no significant stenosis.  Repeat u/s on 10/2020 showed no significant stenosis.      He has hyperlipidemia and is taking atorvastatin 20 mg qday. HIs lipids on 7/2023 were 152/146/34/88.  He is taking aspirin daily.        Incidental finding on CT urogram was cystic lesion with complexity in the pancreas.MRI on 2/2021 showed Multilocular cystic mass centered in the uncinate process of the pancreas, similar in size as compared to the prior CT  on 12/28/2020, with imaging features most suggestive of a side branch intraductal papillary mucinous neoplasm.  He had EUS on 3/2021 that showed gastritis and biopsied cystic lesion in uncinate process of pancreas that was 40 mm. Biopsy was negative. MRI on 5/2021 showed  Increase size of the lesion and then had subsequent EUS on 10/2021 showing cystic uncinate process lesion of pancreas without any duct abnormalities. He was seen by surgical oncology and advised repeat  MRI in 6 months. MRI on 8/2022 showed multi-septate cystic lesion with dilatation downstream consistent with a stable IPMN. He had a EUS on 11/2022 that showed increasing uncinated cystic mass (from 4.3 to 4.5 cm) and increasing dilatation of pancreatic duct (from 5 mm to 6.2 mm).  Repeat EUS on 7/2023 showed stable cystic IPMN, no intraductal lesions (common bile duct or pancreatic duct) and advised to repeat EUS vs MRCP in 6 months. He was seen by surgical oncology on 4/2023 who agrees with surveillance at this time. He has not yet been scheduled for his MRI with MRCP and would like to get before the pacemaker placed.      He has known renal stones on CT urogram on 12/2020 and a CT on 5/2022. CT stone protocol on 7/2022 showed There is right hydronephrosis, hydroureter with inflammatory stranding demonstrated about the proximal ureter, renal pelvis.  This correlates with a persistent calculus at the renal pelvis, caliceal junction albeit a proximal ureteral calculus measuring 3 x 4 x 3 mm.  Some superimposed early inflammation could also present in this fashion. MRI on 8/2022 showed Mild right hydro nephrosis.  There is low signal intensity stone in the proximal right ureter, most likely secondary to ureterolithiasis. He was seen by urology and underwent ureteroscopy with fragmentation of right kidney stone on 11/2022. He has not had any further f/u with urology or repeat imaging.      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 He had multiple excisions but bx still showed residual cancer.  He was treated with fluorouracil and did very well. He continues to follow with dermatology every 4 months.      He has rosacea and was started on minocycline 100 mg q3 days. He does have some discoloration around his neck and on his face but does not want to stop treatment.        He continues with memory issues. He was seen by neurology in 6/2017 who ordered MRI (chronic microvascular ischemic changes) and neuropsych testing which was normal.  He was advised to start statin and aspirin.  He continues to struggle with remembering short term conversations and names.      He has REGAN and and uses CPAP nightly. He is tolerating well.      He has history of prostate cancer s/p radical prostatectomy in 2012.  He did not go on hormonal treatment. He no longer follows with urology.  His last PSA was undetectable on 7/2023.        He has right wrist CTS and gets full relief with wrist splints which he wears nightly. He had a right steroid injection with significant relief of symptoms on 12/2023.      He complains of chronic PND with congestion.  No cold symptoms. No fevers. He has had for over a year. In the past he was advised to use flonase by ENT but he struggled to use flonase consistently.      He lives with his wife and feels safe at home. He is not exercising due to fatigue. He tries to eat healthy.  He denies any balance issues or falls.        Colonoscopy----10/2022 repeat in 10 years    Tdap---more than 10 years  Pneumovax---12/2018  Prevnar----5/2018  Influenza vaccine----12/2018---refused  Shingrex vaccine----8/2020, 12/2020  Covid vaccine---4 doses   AAA screen---10/2016 neg        Review of Systems   Constitutional:  Positive for fatigue. Negative for appetite change, fever and unexpected weight change.   HENT:  Negative for congestion, ear pain, hearing loss, sore throat and trouble swallowing.    Eyes:  Negative for pain and visual  "disturbance.   Respiratory:  Negative for cough, chest tightness, shortness of breath and wheezing.    Cardiovascular:  Negative for chest pain, palpitations and leg swelling.   Gastrointestinal:  Negative for abdominal pain, blood in stool, constipation, diarrhea, nausea and vomiting.   Endocrine: Negative for polyuria.   Genitourinary:  Negative for dysuria and hematuria.   Musculoskeletal:  Negative for arthralgias, back pain and myalgias.   Skin:  Negative for rash.   Neurological:  Positive for light-headedness. Negative for dizziness, weakness, numbness and headaches.   Hematological:  Does not bruise/bleed easily.   Psychiatric/Behavioral:  Negative for dysphoric mood, sleep disturbance and suicidal ideas. The patient is not nervous/anxious.        Objective:      Vitals:    01/11/24 1016   BP: (!) 92/52   BP Location: Right arm   Patient Position: Sitting   BP Method: Medium (Manual)   Pulse: (!) 42   Temp: 98.2 °F (36.8 °C)   SpO2: (!) 93%   Weight: 102.4 kg (225 lb 12 oz)   Height: 5' 9" (1.753 m)     Body mass index is 33.34 kg/m².  Physical Exam    General appearance: No acute distress, cooperative  Eyes: PERRL, EOMI, conjunctiva clear  Ears: hearing aids   Nose: erythematous boggy mucosa without drainage  Throat: no exudates or erythema, tonsils not enlarged  Mouth: no sores or lesions, moist mucous membranes  Neck: FROM, soft, supple, no thyromegaly, no bruits  Lymph: no anterior or posterior cervical adenopathy  Heart::  bradycardia with regular rhythm, 2/6 systolic murmur  Lung: Clear to ascultation bilaterally, no wheezing, no rales, no rhonchi, no distress  Abdomen: Soft, nontender, no distention, no hepatosplenomegaly, bowel sounds normal, no guarding, no rebound, no peritoneal signs  Skin: no rashes, no lesions, hyperpigmentation around his neck   Extremities: no edema, no cyanosis  Neuro: CN 2-12 intact, 5/5 muscle strength upper and lower extremity bilaterally, 2+ DTRs UE and LE bilaterally, " normal gait  Peripheral pulses: 2+ pedal pulses bilaterally, good perfusion and color  Musculoskeletal: FROM, good strenth, no tenderness  Joint: normal appearance, no swelling, no warmth, no deformity in all joints    Assessment:       1. Coronary artery disease involving native coronary artery of native heart without angina pectoris    2. Tachy-bassem syndrome    3. Fatigue, unspecified type    4. Chronic diastolic heart failure    5. Mild aortic stenosis    6. Essential hypertension    7. Hyperlipidemia, unspecified hyperlipidemia type    8. Aortic atherosclerosis    9. Bilateral carotid artery stenosis    10. Chronic allergic rhinitis    11. IPMN (intraductal papillary mucinous neoplasm)    12. Rosacea    13. MCI (mild cognitive impairment)    14. History of prostate cancer    15. Exudative age-related macular degeneration, left eye, with inactive scar    16. Carpal tunnel syndrome on right    17. REGAN (obstructive sleep apnea)    18. Class 2 severe obesity with serious comorbidity and body mass index (BMI) of 36.0 to 36.9 in adult, unspecified obesity type    19. Medication management    20. Need for vaccination        Plan:       Coronary artery disease involving native coronary artery of native heart without angina pectoris  Stable and no active symptoms.     Tachy-bassem syndrome with Fatigue, unspecified type  He does report fatigue and lightheadedness. No syncope. He is scheduled to discuss with EP about PPM placement.     Chronic diastolic heart failure  Well compensated on spironolactone    Mild aortic stenosis  He will be due to repeat echo in 2024.     Essential hypertension  Good control on this regimen. He is only taking beta blocker once a day to help prevent SVT.  He continues with bradycardia.   -     metoprolol succinate (TOPROL-XL) 25 MG 24 hr tablet; Take 1 tablet (25 mg total) by mouth once daily.  -     CBC Auto Differential; Future; Expected date: 01/11/2024  -     Comprehensive Metabolic  Panel; Future; Expected date: 01/11/2024  -     Lipid Panel; Future; Expected date: 01/11/2024  -     TSH; Future; Expected date: 01/11/2024    Hyperlipidemia, unspecified hyperlipidemia type  Good control on atorvastatin. Continue aspirin daily    Aortic atherosclerosis  Continue statin and aspirin    Bilateral carotid artery stenosis  Continue statin and aspirin    Chronic allergic rhinitis  Uncontrolled and he is willing to try flonase again. Use nightly.   -     fluticasone propionate (FLONASE) 50 mcg/actuation nasal spray; 2 sprays (100 mcg total) by Each Nostril route once daily.  Dispense: 16 g; Refill: 6    IPMN (intraductal papillary mucinous neoplasm)  Will get MRI/MRCP for surveillance as recommended by Dr. Oliveros now and then he will f/u for EUS in 1/2024.    -     MRI Abdomen W WO Contrast_INC MRCP (XPD); Future; Expected date: 01/11/2024    Rosacea  Continue on minocycyline    MCI (mild cognitive impairment)  Mild and continue to monitor.    History of prostate cancer  No recurrence. Continue to monitor PSA  -     Prostate Specific Antigen, Diagnostic; Future; Expected date: 01/11/2024    Exudative age-related macular degeneration, left eye, with inactive scar  Stable and continue to follow with ophthalmology    Carpal tunnel syndrome on right  S/p steroid and doing better    REGAN (obstructive sleep apnea)  Stable and patient is compliant with use. Patient benefits from regular use of CPAP.     Class 2 severe obesity with serious comorbidity and body mass index (BMI) of 36.0 to 36.9 in adult, unspecified obesity type  Long discussion on the benefits of healthy eating and regular exercise to help lose weight and help control cad, hypertension and hyperlipidemia.     Medication management  -     Hemoglobin A1C; Future; Expected date: 01/11/2024    Need for vaccination  -     Influenza - Quadrivalent (Adjuvanted)    Follow up in about 6 months (around 7/11/2024) for chronic medical issues.

## 2024-01-18 ENCOUNTER — TELEPHONE (OUTPATIENT)
Dept: ELECTROPHYSIOLOGY | Facility: CLINIC | Age: 79
End: 2024-01-18
Payer: MEDICARE

## 2024-01-18 ENCOUNTER — PATIENT MESSAGE (OUTPATIENT)
Dept: FAMILY MEDICINE | Facility: CLINIC | Age: 79
End: 2024-01-18
Payer: MEDICARE

## 2024-01-18 NOTE — TELEPHONE ENCOUNTER
Called patient to reschedule appointment with Dr. Patiño from 1/24/24. Patient agreed to virtual appointment on 1/29/24.

## 2024-01-19 NOTE — TELEPHONE ENCOUNTER
Spoke with pts wife - MRI rescheduled for 2/5. Dr. Powell informed - states to just make sure that if Dr. Patiño suggest inserting a Pacemaker it would need to be scheduled for after the MRI appointment date - pts wife informed and verbalized understanding.

## 2024-01-20 NOTE — PROGRESS NOTES
"Subjective:    Patient ID:  Rohan Abarca is a 78 y.o. male who presents for follow-up of Hypertension      Problem List Items Addressed This Visit          Cardiac/Vascular    Aortic atherosclerosis - Primary    Bilateral carotid artery stenosis    Overview     Carotid US 8/29/14         Bradycardia    Chronic diastolic heart failure    Coronary artery disease involving native coronary artery of native heart without angina pectoris    Essential hypertension    Mild aortic stenosis    Mixed hyperlipidemia    RBBB    SVT (supraventricular tachycardia)       HPI    Patient was last seen on 06/20/2023 at which time he was doing okay from cardiac standpoint.    On assessment today, the patient states that he feels OK.    No chest pain.  No shortness of breath.  No palpitations.    Stopped metoprolol as pulse was getting too low, not at risk for SVT.  Patient now is interested in pacemaker, has virtual appt with Dr. Patiño Monday to discuss    Further SVT - No       Objective:     Vitals:    01/22/24 1114   BP: 111/73   BP Location: Left arm   Patient Position: Sitting   BP Method: Large (Automatic)   Pulse: 82   Weight: 102.9 kg (226 lb 13.7 oz)   Height: 5' 9" (1.753 m)       BP Readings from Last 5 Encounters:   01/22/24 111/73   01/11/24 (!) 92/52   11/22/23 114/68   10/23/23 106/65   07/11/23 132/76        Physical Exam  Constitutional:       Appearance: He is well-developed.   HENT:      Head: Normocephalic and atraumatic.   Neck:      Vascular: No JVD.   Cardiovascular:      Rate and Rhythm: Normal rate and regular rhythm.      Heart sounds: Normal heart sounds. No murmur heard.     No friction rub. No gallop.   Pulmonary:      Effort: Pulmonary effort is normal. No respiratory distress.      Breath sounds: Normal breath sounds. No wheezing or rales.   Abdominal:      General: Bowel sounds are normal.      Palpations: Abdomen is soft.      Tenderness: There is no abdominal tenderness. There is no guarding or " rebound.   Musculoskeletal:      Cervical back: Normal range of motion and neck supple.   Skin:     General: Skin is warm and dry.   Neurological:      Mental Status: He is alert and oriented to person, place, and time.   Psychiatric:         Behavior: Behavior normal.             Current Outpatient Medications   Medication Instructions    aspirin (ECOTRIN) 81 mg, Oral, Daily    atorvastatin (LIPITOR) 20 mg, Oral, Daily    doxycycline (VIBRA-TABS) 100 MG tablet Take 1 po qday with food and not within 1 hour prior to lying down    fluticasone propionate (FLONASE) 100 mcg, Each Nostril, Daily    furosemide (LASIX) 20 mg, Oral, Daily PRN    ketoconazole (NIZORAL) 2 % shampoo APPLY TOPICALLY 3 TIMES A WEEK    metoprolol succinate (TOPROL-XL) 25 mg, Oral, Daily    olmesartan (BENICAR) 20 mg, Oral, Daily    spironolactone (ALDACTONE) 25 mg, Oral, 2 times daily    VIT A/VIT C/VIT E/ZINC/COPPER (PRESERVISION AREDS ORAL) 1 capsule, Oral, 2 times daily       Lipid Panel:   Lab Results   Component Value Date    CHOL 152 07/07/2023    HDL 34 (L) 07/07/2023    LDLCALC 88.8 07/07/2023    TRIG 146 07/07/2023    CHOLHDL 22.4 07/07/2023       The 10-year ASCVD risk score (Tae SAMANIEGO, et al., 2019) is: 29.7%    Values used to calculate the score:      Age: 78 years      Sex: Male      Is Non- : No      Diabetic: No      Tobacco smoker: No      Systolic Blood Pressure: 111 mmHg      Is BP treated: Yes      HDL Cholesterol: 34 mg/dL      Total Cholesterol: 152 mg/dL    All pertinent labs, imaging, and EKGs reviewed.  Patient's most recent EKG tracing was personally interpreted by this provider.    Most Recent EKG Results  Results for orders placed or performed during the hospital encounter of 07/07/22   EKG 12-lead    Collection Time: 07/07/22  3:26 PM    Narrative    Test Reason : I45.10,    Vent. Rate : 066 BPM     Atrial Rate : 066 BPM     P-R Int : 190 ms          QRS Dur : 178 ms      QT Int : 466 ms        P-R-T Axes : 032 040 004 degrees     QTc Int : 488 ms    Normal sinus rhythm  Right bundle branch block  Inferior infarct ,age undetermined  Abnormal ECG  When compared with ECG of 07-JUL-2022 11:20,  Inferior infarct is now Present  T wave inversion now evident in Inferior leads  Confirmed by SHAHLA MCDONNELL MD ([id number]) on 7/7/2022 9:43:31 PM    Referred By: AGUSTIN NICHOLSON           Confirmed By:SHAHLA MCDONNELL MD       Most Recent Echocardiogram Results  Results for orders placed during the hospital encounter of 05/17/22    Echo    Interpretation Summary  · The left ventricle is normal in size with concentric hypertrophy and normal systolic function.  · The estimated ejection fraction is 60-65%.  · Grade II left ventricular diastolic dysfunction.  · Normal right ventricular size with normal right ventricular systolic function.  · Severe left atrial enlargement.  · Mild right atrial enlargement.  · There is mild aortic valve stenosis.  · Aortic valve area is 1.94 cm2; peak velocity is 2.30 m/s; mean gradient is 11 mmHg.  · Mild tricuspid regurgitation.  · Normal central venous pressure (3 mmHg).  · The estimated PA systolic pressure is 23 mmHg.      Most Recent Nuclear Stress Test Results  Results for orders placed during the hospital encounter of 05/17/22    Nuclear Stress - Cardiology Interpreted    Interpretation Summary    Normal myocardial perfusion scan. There is no evidence of myocardial ischemia or infarction.    There is a  mild intensity fixed perfusion abnormality in the inferior and inferoseptal wall of the left ventricle secondary to diaphragm attenuation.    There is moderate apical thinning which is a normal variant.    The gated perfusion images showed an ejection fraction of 64% at rest.    There is normal wall motion at rest.    The EKG portion of this study is negative for ischemia.    When compared to the previous study from 9/11/2020, there are no significant changes.      Most Recent Cardiac PET Stress  Test Results  No results found for this or any previous visit.      Most Recent Cardiovascular Angiogram results  No results found for this or any previous visit.      Other Most Recent Cardiology Results  Results for orders placed during the hospital encounter of 07/07/22    CARDIAC MONITORING STRIPS        Assessment:       1. Aortic atherosclerosis    2. Bradycardia    3. Bilateral carotid artery stenosis    4. Chronic diastolic heart failure    5. Coronary artery disease involving native coronary artery of native heart without angina pectoris    6. Essential hypertension    7. Mild aortic stenosis    8. Mixed hyperlipidemia    9. SVT (supraventricular tachycardia)    10. RBBB         Plan:     Symptoms OK today  BP/Pulse OK today  Most recent echocardiogram reviewed personally     Continue aspirin 81 mg PO Daily  Continue atorvastatin 20 mg PO Daily   Continue Lasix 20 mg PO Daily PRN volume   Continue olmesartan 20 mg PO Daily   Continue spironolactone 25 mg PO BID  Educated on signs and symptoms of symptomatic bradycardia   Agree with PPM and restarting metoprolol in the future    Continue other cardiac medications  Mediterranean Diet/Cardiovascular Exercise Program    Patient queried and all questions were answered.    F/u in 1 year to reassess      Signed:    Rodrigo Stevenson MD  1/22/2024 2:30 PM

## 2024-01-22 ENCOUNTER — TELEPHONE (OUTPATIENT)
Dept: ELECTROPHYSIOLOGY | Facility: CLINIC | Age: 79
End: 2024-01-22
Payer: MEDICARE

## 2024-01-22 ENCOUNTER — OFFICE VISIT (OUTPATIENT)
Dept: ELECTROPHYSIOLOGY | Facility: CLINIC | Age: 79
End: 2024-01-22
Payer: MEDICARE

## 2024-01-22 ENCOUNTER — PATIENT MESSAGE (OUTPATIENT)
Dept: FAMILY MEDICINE | Facility: CLINIC | Age: 79
End: 2024-01-22
Payer: MEDICARE

## 2024-01-22 ENCOUNTER — OFFICE VISIT (OUTPATIENT)
Dept: CARDIOLOGY | Facility: CLINIC | Age: 79
End: 2024-01-22
Payer: MEDICARE

## 2024-01-22 ENCOUNTER — TELEPHONE (OUTPATIENT)
Dept: ELECTROPHYSIOLOGY | Facility: CLINIC | Age: 79
End: 2024-01-22

## 2024-01-22 VITALS
SYSTOLIC BLOOD PRESSURE: 111 MMHG | WEIGHT: 226.88 LBS | HEIGHT: 69 IN | DIASTOLIC BLOOD PRESSURE: 73 MMHG | BODY MASS INDEX: 33.6 KG/M2 | HEART RATE: 82 BPM

## 2024-01-22 DIAGNOSIS — D49.0 IPMN (INTRADUCTAL PAPILLARY MUCINOUS NEOPLASM): ICD-10-CM

## 2024-01-22 DIAGNOSIS — I25.10 CORONARY ARTERY DISEASE INVOLVING NATIVE CORONARY ARTERY OF NATIVE HEART WITHOUT ANGINA PECTORIS: ICD-10-CM

## 2024-01-22 DIAGNOSIS — I49.5 TACHY-BRADY SYNDROME: ICD-10-CM

## 2024-01-22 DIAGNOSIS — I50.32 CHRONIC DIASTOLIC HEART FAILURE: ICD-10-CM

## 2024-01-22 DIAGNOSIS — R00.1 BRADYCARDIA: ICD-10-CM

## 2024-01-22 DIAGNOSIS — I45.10 RBBB: ICD-10-CM

## 2024-01-22 DIAGNOSIS — G47.33 OSA (OBSTRUCTIVE SLEEP APNEA): ICD-10-CM

## 2024-01-22 DIAGNOSIS — I65.23 BILATERAL CAROTID ARTERY STENOSIS: ICD-10-CM

## 2024-01-22 DIAGNOSIS — I10 ESSENTIAL HYPERTENSION: ICD-10-CM

## 2024-01-22 DIAGNOSIS — E66.01 CLASS 2 SEVERE OBESITY WITH SERIOUS COMORBIDITY AND BODY MASS INDEX (BMI) OF 36.0 TO 36.9 IN ADULT, UNSPECIFIED OBESITY TYPE: ICD-10-CM

## 2024-01-22 DIAGNOSIS — E78.2 MIXED HYPERLIPIDEMIA: ICD-10-CM

## 2024-01-22 DIAGNOSIS — I47.10 SVT (SUPRAVENTRICULAR TACHYCARDIA): ICD-10-CM

## 2024-01-22 DIAGNOSIS — I47.10 SVT (SUPRAVENTRICULAR TACHYCARDIA): Primary | ICD-10-CM

## 2024-01-22 DIAGNOSIS — I35.0 MILD AORTIC STENOSIS: ICD-10-CM

## 2024-01-22 DIAGNOSIS — I70.0 AORTIC ATHEROSCLEROSIS: Primary | ICD-10-CM

## 2024-01-22 PROCEDURE — 99214 OFFICE O/P EST MOD 30 MIN: CPT | Mod: S$PBB,,, | Performed by: INTERNAL MEDICINE

## 2024-01-22 PROCEDURE — 99213 OFFICE O/P EST LOW 20 MIN: CPT | Mod: PBBFAC,PO | Performed by: INTERNAL MEDICINE

## 2024-01-22 PROCEDURE — 99999 PR PBB SHADOW E&M-EST. PATIENT-LVL III: CPT | Mod: PBBFAC,,, | Performed by: INTERNAL MEDICINE

## 2024-01-22 PROCEDURE — 99214 OFFICE O/P EST MOD 30 MIN: CPT | Mod: 95,,, | Performed by: INTERNAL MEDICINE

## 2024-01-22 NOTE — H&P (VIEW-ONLY)
Subjective:   Patient ID:  Rohan Abarca is a 78 y.o. male who presents for follow-up of No chief complaint on file.      HPI 77 yo male with SVT, Htn, sleep apnea (CPAP), RBBB.    The patient location is: Home  The chief complaint leading to consultation is: tachy-bassem syndrome     Visit type: audiovisual     Face to Face time with patient: 15 minutes  25 minutes of total time spent on the encounter, which includes face to face time and non-face to face time preparing to see the patient (eg, review of tests), Obtaining and/or reviewing separately obtained history, Documenting clinical information in the electronic or other health record, Independently interpreting results (not separately reported) and communicating results to the patient/family/caregiver, or Care coordination (not separately reported).        Each patient to whom he or she provides medical services by telemedicine is:  (1) informed of the relationship between the physician and patient and the respective role of any other health care provider with respect to management of the patient; and (2) notified that he or she may decline to receive medical services by telemedicine and may withdraw from such care at any time.       Background:  Primary cardiologist is Dr. Stevenson  Presented with sustained palpitations 5/17/22, noted to be in SVT with RBBB aberrancy. Notes indicate DCCVx3, followed by recurrence triggered by PAC's. Briefly put on amiodarone. Discharged on metoprolol.     Returned 5/29/22 with palpitations, headaches and chest pain. ECG revealed nsr. He reports symptoms improved prior to presentation.     Echo 5/17/22 normal biventricular structure and function severe LAE  Spect stress 5/17/22 negative     Baseline ECG revealed sinus bradycardia at 47 bpm RBBB, no pre-excitation  EPS 7/7/22 HV interval 75 msec. Induced sustained irregular rapid atrial tachycardia requiring DCCV.   Bardy patch revealed 9 episodes of nonsustained atrial  tachycardia.  My recommendation was dual chamber PPM implantation with conduction system pacing and increasing beta blocker. They elected to defer.     Update:  Notes occasional episodes of weakness, manifesting more in the chest area. Also notes increasing dyspnea on exertion.  Metoprolol discontinued due to bradycardia.  Notes occasional dizziness with standing.    MRI is scheduled for 2/5/24 for monitoring of his pancreas.    Review of Systems   Constitutional: Negative. Negative for fever and malaise/fatigue.   HENT:  Negative for congestion and sore throat.    Cardiovascular:  Positive for palpitations. Negative for chest pain, dyspnea on exertion, irregular heartbeat, leg swelling, near-syncope, orthopnea, paroxysmal nocturnal dyspnea and syncope.   Respiratory:  Negative for cough and shortness of breath.    Gastrointestinal:  Negative for abdominal pain, constipation and diarrhea.   Neurological:  Positive for dizziness and light-headedness. Negative for weakness.   Psychiatric/Behavioral:  Negative for depression. The patient is not nervous/anxious.          Assessment:      1. SVT (supraventricular tachycardia)    2. RBBB    3. Bradycardia    4. Coronary artery disease involving native coronary artery of native heart without angina pectoris    5. Chronic diastolic heart failure    6. Essential hypertension    7. IPMN (intraductal papillary mucinous neoplasm)    8. Class 2 severe obesity with serious comorbidity and body mass index (BMI) of 36.0 to 36.9 in adult, unspecified obesity type    9. REGAN (obstructive sleep apnea)    10. Tachy-bassem syndrome        Plan:     Tachy-bassem syndrome, symptomatic.  Echo with color flow.    Recommend dual chamber PPM with conduction system pacing. Defer until after MRI 2/5/24.  Risks and benefits discussed, he would like to proceed.

## 2024-01-22 NOTE — PROGRESS NOTES
Subjective:   Patient ID:  Rohan Abarca is a 78 y.o. male who presents for follow-up of No chief complaint on file.      HPI 79 yo male with SVT, Htn, sleep apnea (CPAP), RBBB.    The patient location is: Home  The chief complaint leading to consultation is: tachy-bassem syndrome     Visit type: audiovisual     Face to Face time with patient: 15 minutes  25 minutes of total time spent on the encounter, which includes face to face time and non-face to face time preparing to see the patient (eg, review of tests), Obtaining and/or reviewing separately obtained history, Documenting clinical information in the electronic or other health record, Independently interpreting results (not separately reported) and communicating results to the patient/family/caregiver, or Care coordination (not separately reported).        Each patient to whom he or she provides medical services by telemedicine is:  (1) informed of the relationship between the physician and patient and the respective role of any other health care provider with respect to management of the patient; and (2) notified that he or she may decline to receive medical services by telemedicine and may withdraw from such care at any time.       Background:  Primary cardiologist is Dr. Stevenson  Presented with sustained palpitations 5/17/22, noted to be in SVT with RBBB aberrancy. Notes indicate DCCVx3, followed by recurrence triggered by PAC's. Briefly put on amiodarone. Discharged on metoprolol.     Returned 5/29/22 with palpitations, headaches and chest pain. ECG revealed nsr. He reports symptoms improved prior to presentation.     Echo 5/17/22 normal biventricular structure and function severe LAE  Spect stress 5/17/22 negative     Baseline ECG revealed sinus bradycardia at 47 bpm RBBB, no pre-excitation  EPS 7/7/22 HV interval 75 msec. Induced sustained irregular rapid atrial tachycardia requiring DCCV.   Bardy patch revealed 9 episodes of nonsustained atrial  tachycardia.  My recommendation was dual chamber PPM implantation with conduction system pacing and increasing beta blocker. They elected to defer.     Update:  Notes occasional episodes of weakness, manifesting more in the chest area. Also notes increasing dyspnea on exertion.  Metoprolol discontinued due to bradycardia.  Notes occasional dizziness with standing.    MRI is scheduled for 2/5/24 for monitoring of his pancreas.    Review of Systems   Constitutional: Negative. Negative for fever and malaise/fatigue.   HENT:  Negative for congestion and sore throat.    Cardiovascular:  Positive for palpitations. Negative for chest pain, dyspnea on exertion, irregular heartbeat, leg swelling, near-syncope, orthopnea, paroxysmal nocturnal dyspnea and syncope.   Respiratory:  Negative for cough and shortness of breath.    Gastrointestinal:  Negative for abdominal pain, constipation and diarrhea.   Neurological:  Positive for dizziness and light-headedness. Negative for weakness.   Psychiatric/Behavioral:  Negative for depression. The patient is not nervous/anxious.          Assessment:      1. SVT (supraventricular tachycardia)    2. RBBB    3. Bradycardia    4. Coronary artery disease involving native coronary artery of native heart without angina pectoris    5. Chronic diastolic heart failure    6. Essential hypertension    7. IPMN (intraductal papillary mucinous neoplasm)    8. Class 2 severe obesity with serious comorbidity and body mass index (BMI) of 36.0 to 36.9 in adult, unspecified obesity type    9. REGAN (obstructive sleep apnea)    10. Tachy-bassem syndrome        Plan:     Tachy-bassem syndrome, symptomatic.  Echo with color flow.    Recommend dual chamber PPM with conduction system pacing. Defer until after MRI 2/5/24.  Risks and benefits discussed, he would like to proceed.

## 2024-01-22 NOTE — TELEPHONE ENCOUNTER
I spoke with patient and scheduled his procedure for 2/19/2024. Procedure details reviewed and instructions will be sent via email as requested.

## 2024-01-24 DIAGNOSIS — I49.5 TACHY-BRADY SYNDROME: Primary | ICD-10-CM

## 2024-01-24 DIAGNOSIS — I45.10 RBBB: ICD-10-CM

## 2024-01-24 DIAGNOSIS — I49.9 CARDIAC ARRHYTHMIA, UNSPECIFIED CARDIAC ARRHYTHMIA TYPE: ICD-10-CM

## 2024-01-25 DIAGNOSIS — Z95.0 CARDIAC PACEMAKER IN SITU: Primary | ICD-10-CM

## 2024-01-26 ENCOUNTER — HOSPITAL ENCOUNTER (OUTPATIENT)
Dept: CARDIOLOGY | Facility: HOSPITAL | Age: 79
Discharge: HOME | End: 2024-01-26
Attending: INTERNAL MEDICINE
Payer: MEDICARE

## 2024-01-26 VITALS — WEIGHT: 226 LBS | HEIGHT: 69 IN | BODY MASS INDEX: 33.47 KG/M2

## 2024-01-26 PROCEDURE — 93306 TTE W/DOPPLER COMPLETE: CPT | Mod: 26,,, | Performed by: INTERNAL MEDICINE

## 2024-01-26 PROCEDURE — 93306 TTE W/DOPPLER COMPLETE: CPT | Mod: PO

## 2024-01-28 LAB
ASCENDING AORTA: 3.25 CM
AV INDEX (PROSTH): 0.8
AV MEAN GRADIENT: 8 MMHG
AV PEAK GRADIENT: 17 MMHG
AV VALVE AREA BY VELOCITY RATIO: 2.73 CM²
AV VALVE AREA: 3.05 CM²
AV VELOCITY RATIO: 0.71
BSA FOR ECHO PROCEDURE: 2.23 M2
CV ECHO LV RWT: 0.4 CM
DOP CALC AO PEAK VEL: 2.04 M/S
DOP CALC AO VTI: 39.3 CM
DOP CALC LVOT AREA: 3.8 CM2
DOP CALC LVOT DIAMETER: 2.21 CM
DOP CALC LVOT PEAK VEL: 1.45 M/S
DOP CALC LVOT STROKE VOLUME: 120 CM3
DOP CALCLVOT PEAK VEL VTI: 31.3 CM
E WAVE DECELERATION TIME: 357.33 MSEC
E/A RATIO: 0.69
E/E' RATIO: 12.36 M/S
ECHO LV POSTERIOR WALL: 1.05 CM (ref 0.6–1.1)
EJECTION FRACTION: 65 %
FRACTIONAL SHORTENING: 42 % (ref 28–44)
INTERVENTRICULAR SEPTUM: 1.2 CM (ref 0.6–1.1)
IVRT: 102.76 MSEC
LEFT ATRIUM SIZE: 4.57 CM
LEFT ATRIUM VOLUME INDEX MOD: 24.8 ML/M2
LEFT ATRIUM VOLUME MOD: 54.12 CM3
LEFT INTERNAL DIMENSION IN SYSTOLE: 3.02 CM (ref 2.1–4)
LEFT VENTRICLE DIASTOLIC VOLUME INDEX: 60.51 ML/M2
LEFT VENTRICLE DIASTOLIC VOLUME: 131.92 ML
LEFT VENTRICLE MASS INDEX: 106 G/M2
LEFT VENTRICLE SYSTOLIC VOLUME INDEX: 16.3 ML/M2
LEFT VENTRICLE SYSTOLIC VOLUME: 35.44 ML
LEFT VENTRICULAR INTERNAL DIMENSION IN DIASTOLE: 5.24 CM (ref 3.5–6)
LEFT VENTRICULAR MASS: 230.49 G
LV LATERAL E/E' RATIO: 11.33 M/S
LV SEPTAL E/E' RATIO: 13.6 M/S
LVOT MG: 4.36 MMHG
LVOT MV: 0.97 CM/S
MV PEAK A VEL: 0.99 M/S
MV PEAK E VEL: 0.68 M/S
MV STENOSIS PRESSURE HALF TIME: 103.63 MS
MV VALVE AREA P 1/2 METHOD: 2.12 CM2
PISA TR MAX VEL: 1.83 M/S
PULM VEIN S/D RATIO: 1.33
PV PEAK D VEL: 0.43 M/S
PV PEAK S VEL: 0.57 M/S
RA MAJOR: 5.64 CM
RA WIDTH: 3.8 CM
RIGHT VENTRICULAR END-DIASTOLIC DIMENSION: 4.49 CM
RIGHT VENTRICULAR LENGTH IN DIASTOLE (APICAL 4-CHAMBER VIEW): 5.66 CM
RV MID DIAMA: 2.69 CM
RV TISSUE DOPPLER FREE WALL SYSTOLIC VELOCITY 1 (APICAL 4 CHAMBER VIEW): 13.14 CM/S
SINUS: 3.79 CM
STJ: 3.2 CM
TDI LATERAL: 0.06 M/S
TDI SEPTAL: 0.05 M/S
TDI: 0.06 M/S
TR MAX PG: 13 MMHG
TRICUSPID ANNULAR PLANE SYSTOLIC EXCURSION: 2.38 CM
Z-SCORE OF LEFT VENTRICULAR DIMENSION IN END DIASTOLE: -3.3
Z-SCORE OF LEFT VENTRICULAR DIMENSION IN END SYSTOLE: -3.03

## 2024-01-29 ENCOUNTER — TELEPHONE (OUTPATIENT)
Dept: ELECTROPHYSIOLOGY | Facility: CLINIC | Age: 79
End: 2024-01-29
Payer: MEDICARE

## 2024-01-29 DIAGNOSIS — I47.10 SVT (SUPRAVENTRICULAR TACHYCARDIA): Primary | ICD-10-CM

## 2024-01-29 NOTE — TELEPHONE ENCOUNTER
Spoke with patient and relayed results of echo, per Dr Patiño:    Tom Patiño MD [place], [place] ANDRE RN  Echo reveals normal structure and function please relay to patient    He verbalized understanding and was appreciative of the call.

## 2024-01-29 NOTE — TELEPHONE ENCOUNTER
----- Message from Tom Patiño MD sent at 1/29/2024  8:19 AM CST -----  Echo reveals normal structure and function please relay to patient  ----- Message -----  From: Rodrigo Stevenson MD  Sent: 1/28/2024   9:34 AM CST  To: Tom Patiño MD

## 2024-01-29 NOTE — TELEPHONE ENCOUNTER
Spoke with patient regarding his elevated K+=5.8. Denies any OTC potassium supplements, but does admit to eating a banana daily along with milk and sweet potatoes (all high in K+). He is on olmesartan and aldactone. He will cut back on dietary potassium and repeat K+ level on 2/5/2024 when he has his MRI.

## 2024-02-02 ENCOUNTER — PATIENT MESSAGE (OUTPATIENT)
Dept: FAMILY MEDICINE | Facility: CLINIC | Age: 79
End: 2024-02-02
Payer: MEDICARE

## 2024-02-05 NOTE — TELEPHONE ENCOUNTER
Spoke with MRI tech - was able to get pt rescheduled for MRI on 2/12 in [place] and labs rescheduled for 2/6 in [place]. Spoke with pt and notified him of this.

## 2024-02-06 ENCOUNTER — LAB VISIT (OUTPATIENT)
Dept: LAB | Facility: HOSPITAL | Age: 79
End: 2024-02-06
Attending: INTERNAL MEDICINE
Payer: MEDICARE

## 2024-02-06 DIAGNOSIS — I47.10 SVT (SUPRAVENTRICULAR TACHYCARDIA): ICD-10-CM

## 2024-02-06 LAB — POTASSIUM SERPL-SCNC: 5.1 MMOL/L (ref 3.5–5.1)

## 2024-02-06 PROCEDURE — 36415 COLL VENOUS BLD VENIPUNCTURE: CPT | Mod: PO | Performed by: INTERNAL MEDICINE

## 2024-02-06 PROCEDURE — 84132 ASSAY OF SERUM POTASSIUM: CPT | Performed by: INTERNAL MEDICINE

## 2024-02-12 ENCOUNTER — HOSPITAL ENCOUNTER (OUTPATIENT)
Dept: RADIOLOGY | Facility: HOSPITAL | Age: 79
Discharge: HOME | End: 2024-02-12
Attending: INTERNAL MEDICINE
Payer: MEDICARE

## 2024-02-12 DIAGNOSIS — D49.0 IPMN (INTRADUCTAL PAPILLARY MUCINOUS NEOPLASM): ICD-10-CM

## 2024-02-12 PROCEDURE — A9585 GADOBUTROL INJECTION: HCPCS

## 2024-02-12 PROCEDURE — 74183 MRI ABD W/O CNTR FLWD CNTR: CPT | Mod: 26,,, | Performed by: RADIOLOGY

## 2024-02-12 PROCEDURE — 76376 3D RENDER W/INTRP POSTPROCES: CPT | Mod: 26,,, | Performed by: RADIOLOGY

## 2024-02-12 PROCEDURE — 76376 3D RENDER W/INTRP POSTPROCES: CPT | Mod: TC

## 2024-02-12 PROCEDURE — 25500020 PHARM REV CODE 255

## 2024-02-12 RX ORDER — GADOBUTROL 604.72 MG/ML
INJECTION INTRAVENOUS
Status: COMPLETED
Start: 2024-02-12 | End: 2024-02-12

## 2024-02-12 RX ADMIN — GADOBUTROL 10 ML: 604.72 INJECTION INTRAVENOUS at 03:02

## 2024-02-15 ENCOUNTER — PATIENT MESSAGE (OUTPATIENT)
Dept: FAMILY MEDICINE | Facility: CLINIC | Age: 79
End: 2024-02-15
Payer: MEDICARE

## 2024-02-16 ENCOUNTER — TELEPHONE (OUTPATIENT)
Dept: ELECTROPHYSIOLOGY | Facility: CLINIC | Age: 79
End: 2024-02-16
Payer: MEDICARE

## 2024-02-16 ENCOUNTER — ANESTHESIA EVENT (OUTPATIENT)
Dept: MEDSURG UNIT | Facility: HOSPITAL | Age: 79
End: 2024-02-16
Payer: MEDICARE

## 2024-02-16 NOTE — TELEPHONE ENCOUNTER
Spoke to patient and his wife    CONFIRMED procedure arrival time of 5:30am on 2/19/2024    Reiterated instructions including:  -Directions to check in desk  -NPO after midnight night prior to procedure  -Pre-procedure LABS reviewed. K+ was 5.8, but 5.1 on repeat check, no other alerts  -Confirmed absence or presence of implanted device/stimulator - confirms no implanted devices  -Confirmed no fever, cough, or shortness of breath in the past 30 days  -Bathe night prior and morning prior to procedure with Hibiclens solution or an antibacterial soap  -Reviewed current visitor policy    Patient and wife verbalized understanding of above and appreciated the call.

## 2024-02-19 ENCOUNTER — HOSPITAL ENCOUNTER (OUTPATIENT)
Facility: HOSPITAL | Age: 79
Discharge: HOME | End: 2024-02-19
Attending: INTERNAL MEDICINE | Admitting: INTERNAL MEDICINE
Payer: MEDICARE

## 2024-02-19 ENCOUNTER — ANESTHESIA (OUTPATIENT)
Dept: MEDSURG UNIT | Facility: HOSPITAL | Age: 79
End: 2024-02-19
Payer: MEDICARE

## 2024-02-19 VITALS
SYSTOLIC BLOOD PRESSURE: 98 MMHG | WEIGHT: 225 LBS | RESPIRATION RATE: 18 BRPM | BODY MASS INDEX: 33.33 KG/M2 | DIASTOLIC BLOOD PRESSURE: 69 MMHG | TEMPERATURE: 98 F | OXYGEN SATURATION: 96 % | HEART RATE: 97 BPM | HEIGHT: 69 IN

## 2024-02-19 DIAGNOSIS — I49.9 CARDIAC ARRHYTHMIA, UNSPECIFIED CARDIAC ARRHYTHMIA TYPE: ICD-10-CM

## 2024-02-19 DIAGNOSIS — I49.9 ARRHYTHMIA: ICD-10-CM

## 2024-02-19 DIAGNOSIS — I45.10 RBBB: ICD-10-CM

## 2024-02-19 DIAGNOSIS — Z95.0 PACEMAKER: ICD-10-CM

## 2024-02-19 DIAGNOSIS — Z95.9 CARDIAC DEVICE IN SITU: ICD-10-CM

## 2024-02-19 DIAGNOSIS — I49.5 TACHY-BRADY SYNDROME: ICD-10-CM

## 2024-02-19 DIAGNOSIS — I95.9 HYPOTENSION: ICD-10-CM

## 2024-02-19 LAB
ASCENDING AORTA: 3.93 CM
BSA FOR ECHO PROCEDURE: 2.23 M2
CV ECHO LV RWT: 0.39 CM
DOP CALC LVOT AREA: 4.1 CM2
DOP CALC LVOT DIAMETER: 2.29 CM
DOP CALC LVOT PEAK VEL: 2.13 M/S
DOP CALC LVOT STROKE VOLUME: 182.12 CM3
DOP CALCLVOT PEAK VEL VTI: 44.24 CM
E WAVE DECELERATION TIME: 270.11 MSEC
E/A RATIO: 0.77
E/E' RATIO: 8.95 M/S
ECHO LV POSTERIOR WALL: 0.98 CM (ref 0.6–1.1)
FRACTIONAL SHORTENING: 39 % (ref 28–44)
INTERVENTRICULAR SEPTUM: 1 CM (ref 0.6–1.1)
LA MAJOR: 5.64 CM
LA MINOR: 5.65 CM
LA WIDTH: 3.93 CM
LEFT ATRIUM SIZE: 3.52 CM
LEFT ATRIUM VOLUME INDEX MOD: 34.2 ML/M2
LEFT ATRIUM VOLUME INDEX: 30.6 ML/M2
LEFT ATRIUM VOLUME MOD: 74.2 CM3
LEFT ATRIUM VOLUME: 66.38 CM3
LEFT INTERNAL DIMENSION IN SYSTOLE: 3.04 CM (ref 2.1–4)
LEFT VENTRICLE DIASTOLIC VOLUME INDEX: 54.9 ML/M2
LEFT VENTRICLE DIASTOLIC VOLUME: 119.14 ML
LEFT VENTRICLE MASS INDEX: 83 G/M2
LEFT VENTRICLE SYSTOLIC VOLUME INDEX: 16.7 ML/M2
LEFT VENTRICLE SYSTOLIC VOLUME: 36.24 ML
LEFT VENTRICULAR INTERNAL DIMENSION IN DIASTOLE: 5.02 CM (ref 3.5–6)
LEFT VENTRICULAR MASS: 180.72 G
LV LATERAL E/E' RATIO: 7.73 M/S
LV SEPTAL E/E' RATIO: 10.63 M/S
MV PEAK A VEL: 1.11 M/S
MV PEAK E VEL: 0.85 M/S
MV STENOSIS PRESSURE HALF TIME: 78.33 MS
MV VALVE AREA P 1/2 METHOD: 2.81 CM2
OHS QRS DURATION: 172 MS
OHS QRS DURATION: 176 MS
OHS QRS DURATION: 178 MS
OHS QTC CALCULATION: 457 MS
OHS QTC CALCULATION: 494 MS
OHS QTC CALCULATION: 497 MS
RA MAJOR: 6.22 CM
RA PRESSURE ESTIMATED: 8 MMHG
RA WIDTH: 4.69 CM
RIGHT VENTRICULAR END-DIASTOLIC DIMENSION: 4.37 CM
SINUS: 4.51 CM
STJ: 3.69 CM
TDI LATERAL: 0.11 M/S
TDI SEPTAL: 0.08 M/S
TDI: 0.1 M/S
TRICUSPID ANNULAR PLANE SYSTOLIC EXCURSION: 2.57 CM
Z-SCORE OF LEFT VENTRICULAR DIMENSION IN END DIASTOLE: -3.58
Z-SCORE OF LEFT VENTRICULAR DIMENSION IN END SYSTOLE: -2.87

## 2024-02-19 PROCEDURE — 93005 ELECTROCARDIOGRAM TRACING: CPT

## 2024-02-19 PROCEDURE — 93010 ELECTROCARDIOGRAM REPORT: CPT | Mod: ,,, | Performed by: INTERNAL MEDICINE

## 2024-02-19 PROCEDURE — D9220A PRA ANESTHESIA: Mod: CRNA,,, | Performed by: NURSE ANESTHETIST, CERTIFIED REGISTERED

## 2024-02-19 PROCEDURE — C1898 LEAD, PMKR, OTHER THAN TRANS: HCPCS | Performed by: INTERNAL MEDICINE

## 2024-02-19 PROCEDURE — C1785 PMKR, DUAL, RATE-RESP: HCPCS | Performed by: INTERNAL MEDICINE

## 2024-02-19 PROCEDURE — C1887 CATHETER, GUIDING: HCPCS | Performed by: INTERNAL MEDICINE

## 2024-02-19 PROCEDURE — D9220A PRA ANESTHESIA: Mod: ANES,,, | Performed by: ANESTHESIOLOGY

## 2024-02-19 PROCEDURE — 37000008 HC ANESTHESIA 1ST 15 MINUTES: Performed by: INTERNAL MEDICINE

## 2024-02-19 PROCEDURE — 63600175 PHARM REV CODE 636 W HCPCS: Performed by: INTERNAL MEDICINE

## 2024-02-19 PROCEDURE — 33208 INSRT HEART PM ATRIAL & VENT: CPT | Mod: KX,GC,, | Performed by: INTERNAL MEDICINE

## 2024-02-19 PROCEDURE — C1894 INTRO/SHEATH, NON-LASER: HCPCS | Performed by: INTERNAL MEDICINE

## 2024-02-19 PROCEDURE — 63600175 PHARM REV CODE 636 W HCPCS: Performed by: NURSE ANESTHETIST, CERTIFIED REGISTERED

## 2024-02-19 PROCEDURE — 27201423 OPTIME MED/SURG SUP & DEVICES STERILE SUPPLY: Performed by: INTERNAL MEDICINE

## 2024-02-19 PROCEDURE — 25000003 PHARM REV CODE 250: Performed by: NURSE PRACTITIONER

## 2024-02-19 PROCEDURE — 33208 INSRT HEART PM ATRIAL & VENT: CPT | Mod: KX | Performed by: INTERNAL MEDICINE

## 2024-02-19 PROCEDURE — 93005 ELECTROCARDIOGRAM TRACING: CPT | Mod: 59

## 2024-02-19 PROCEDURE — C1769 GUIDE WIRE: HCPCS | Performed by: INTERNAL MEDICINE

## 2024-02-19 PROCEDURE — 63600175 PHARM REV CODE 636 W HCPCS: Performed by: STUDENT IN AN ORGANIZED HEALTH CARE EDUCATION/TRAINING PROGRAM

## 2024-02-19 PROCEDURE — 37000009 HC ANESTHESIA EA ADD 15 MINS: Performed by: INTERNAL MEDICINE

## 2024-02-19 PROCEDURE — 63600175 PHARM REV CODE 636 W HCPCS: Performed by: NURSE PRACTITIONER

## 2024-02-19 PROCEDURE — 25000003 PHARM REV CODE 250: Performed by: NURSE ANESTHETIST, CERTIFIED REGISTERED

## 2024-02-19 PROCEDURE — 25000003 PHARM REV CODE 250: Performed by: INTERNAL MEDICINE

## 2024-02-19 DEVICE — LEAD 5076-52 MRI US RCMCRD
Type: IMPLANTABLE DEVICE | Site: HEART | Status: FUNCTIONAL
Brand: CAPSUREFIX NOVUS MRI™ SURESCAN®

## 2024-02-19 DEVICE — LEAD 3830 US MKT/ 69CM MRI LBBAP
Type: IMPLANTABLE DEVICE | Site: HEART | Status: FUNCTIONAL
Brand: SELECTSECURE™ MRI SURESCAN™

## 2024-02-19 DEVICE — IPG W3DR01 AZURE S DR MRI USA
Type: IMPLANTABLE DEVICE | Site: HEART | Status: FUNCTIONAL
Brand: AZURE™ S DR MRI SURESCAN™

## 2024-02-19 RX ORDER — FENTANYL CITRATE 50 UG/ML
INJECTION, SOLUTION INTRAMUSCULAR; INTRAVENOUS
Status: DISCONTINUED | OUTPATIENT
Start: 2024-02-19 | End: 2024-02-19

## 2024-02-19 RX ORDER — LIDOCAINE HYDROCHLORIDE 20 MG/ML
INJECTION, SOLUTION INFILTRATION; PERINEURAL
Status: DISCONTINUED | OUTPATIENT
Start: 2024-02-19 | End: 2024-02-19 | Stop reason: HOSPADM

## 2024-02-19 RX ORDER — BUPIVACAINE HYDROCHLORIDE 2.5 MG/ML
INJECTION, SOLUTION EPIDURAL; INFILTRATION; INTRACAUDAL
Status: DISCONTINUED | OUTPATIENT
Start: 2024-02-19 | End: 2024-02-19 | Stop reason: HOSPADM

## 2024-02-19 RX ORDER — PROPOFOL 10 MG/ML
VIAL (ML) INTRAVENOUS CONTINUOUS PRN
Status: DISCONTINUED | OUTPATIENT
Start: 2024-02-19 | End: 2024-02-19

## 2024-02-19 RX ORDER — VANCOMYCIN HYDROCHLORIDE 1 G/20ML
INJECTION, POWDER, LYOPHILIZED, FOR SOLUTION INTRAVENOUS
Status: DISCONTINUED | OUTPATIENT
Start: 2024-02-19 | End: 2024-02-19 | Stop reason: HOSPADM

## 2024-02-19 RX ORDER — FENTANYL CITRATE 50 UG/ML
25 INJECTION, SOLUTION INTRAMUSCULAR; INTRAVENOUS EVERY 5 MIN PRN
Status: DISCONTINUED | OUTPATIENT
Start: 2024-02-19 | End: 2024-02-19 | Stop reason: HOSPADM

## 2024-02-19 RX ORDER — SODIUM CHLORIDE 0.9 G/100ML
IRRIGANT IRRIGATION
Status: DISCONTINUED | OUTPATIENT
Start: 2024-02-19 | End: 2024-02-19 | Stop reason: HOSPADM

## 2024-02-19 RX ORDER — CEPHALEXIN 500 MG/1
500 CAPSULE ORAL EVERY 8 HOURS
Qty: 15 CAPSULE | Refills: 0 | Status: SHIPPED | OUTPATIENT
Start: 2024-02-19 | End: 2024-02-24

## 2024-02-19 RX ORDER — PROCHLORPERAZINE EDISYLATE 5 MG/ML
5 INJECTION INTRAMUSCULAR; INTRAVENOUS EVERY 30 MIN PRN
Status: DISCONTINUED | OUTPATIENT
Start: 2024-02-19 | End: 2024-02-19 | Stop reason: HOSPADM

## 2024-02-19 RX ORDER — DEXMEDETOMIDINE HYDROCHLORIDE 100 UG/ML
INJECTION, SOLUTION INTRAVENOUS
Status: DISCONTINUED | OUTPATIENT
Start: 2024-02-19 | End: 2024-02-19

## 2024-02-19 RX ORDER — PHENYLEPHRINE HYDROCHLORIDE 10 MG/ML
INJECTION INTRAVENOUS
Status: DISCONTINUED | OUTPATIENT
Start: 2024-02-19 | End: 2024-02-19

## 2024-02-19 RX ADMIN — SODIUM CHLORIDE: 0.9 INJECTION, SOLUTION INTRAVENOUS at 07:02

## 2024-02-19 RX ADMIN — FENTANYL CITRATE 25 MCG: 50 INJECTION, SOLUTION INTRAMUSCULAR; INTRAVENOUS at 08:02

## 2024-02-19 RX ADMIN — PHENYLEPHRINE HYDROCHLORIDE 0.2 MCG/KG/MIN: 10 INJECTION INTRAVENOUS at 07:02

## 2024-02-19 RX ADMIN — DEXMEDETOMIDINE 8 MCG: 100 INJECTION, SOLUTION, CONCENTRATE INTRAVENOUS at 07:02

## 2024-02-19 RX ADMIN — PROPOFOL 50 MG: 10 INJECTION, EMULSION INTRAVENOUS at 07:02

## 2024-02-19 RX ADMIN — PHENYLEPHRINE HYDROCHLORIDE 100 MCG: 10 INJECTION INTRAVENOUS at 07:02

## 2024-02-19 RX ADMIN — PROCHLORPERAZINE EDISYLATE 5 MG: 5 INJECTION INTRAMUSCULAR; INTRAVENOUS at 10:02

## 2024-02-19 RX ADMIN — CEFAZOLIN 2 G: 2 INJECTION, POWDER, FOR SOLUTION INTRAMUSCULAR; INTRAVENOUS at 07:02

## 2024-02-19 RX ADMIN — PROPOFOL 70 MCG/KG/MIN: 10 INJECTION, EMULSION INTRAVENOUS at 07:02

## 2024-02-19 RX ADMIN — FENTANYL CITRATE 50 MCG: 50 INJECTION, SOLUTION INTRAMUSCULAR; INTRAVENOUS at 07:02

## 2024-02-19 NOTE — PROGRESS NOTES
Patient b/p drop to 80's sbp no symptoms of low b/p verbalized by patient. Dr brown at bedside doing echo will monitor.

## 2024-02-19 NOTE — NURSING TRANSFER
Nursing Transfer Note      2/19/2024   1:41 PM    Nurse giving handoff:yoandy gutierrez   Nurse receiving handoff:cindy gutierrez     Reason patient is being transferred: d/c critieria met     Transfer To: cath lab holding 8    Transfer via stretcher    Transfer with cardiac monitoring    Transported by yoandy rn    Transfer Vital Signs:  Blood Pressure:see epic   Heart Rate:see epic   O2:room air   Temperature:see epic    Respirations:see epic     Telemetry: yes   Order for Tele Monitor? Yes     Additional Lines: none     4eyes on Skin: yes in recovery     Medicines sent: none but pacer monitor on stretcher with patient     Any special needs or follow-up needed: dr. Karina mcmahan with patient moving to [facility]     Patient belongings transferred with patient: none at this time     Chart send with patient: yes     Notified: wife at bedside      Patient reassessed at: see epic  (date, time)  1  Upon arrival to floor: to room no complaints no distress noted.

## 2024-02-19 NOTE — NURSING
Ambulated around unit without difficulty.  Denies pain or dizziness.  Mepilex dressing to left upper chest remains clean dry and intact.  Voided.  IV's d/c'd x 2 with cath tips intact.  Pt and pt wife instructed on sling applicatioin and post op sling instructions for the 6 weeks follow up.  Pt wife demonstrated sling application on patient.  VSS. Tolerated food and drinks.

## 2024-02-19 NOTE — ANESTHESIA PREPROCEDURE EVALUATION
[facility]  Anesthesia Pre-Operative Evaluation         Patient Name: Rohan Abarca  YOB: 1945  MRN: [id number]    SUBJECTIVE:     Pre-operative evaluation for Procedure(s) (LRB):  INSERTION, CARDIAC PACEMAKER, DUAL CHAMBER (N/A)     02/19/2024    Rohan Abarca is a 79 y.o. male w/ a significant PMHx of HTN, HLD, REGAN, SVT, Tachy-Bassem Syndrome, RBBB, CAD, hx of prostate CA s/p resection, and bilateral carotid stenosis who presents for the above procedure.      LDA: None documented.    Prev airway: None documented.     Drips: None documented.    Patient Active Problem List   Diagnosis    Essential hypertension    Status post prostatectomy (07/23/2012)    Erectile dysfunction following radical prostatectomy    Myofascial pain    DDD (degenerative disc disease), lumbar    Lumbar spondylosis    Lumbar stenosis    Bilateral hearing loss    Personal history of prostate cancer    Memory loss    Screening for colon cancer    Onychomycosis due to dermatophyte    Bilateral carotid artery stenosis    Mixed hyperlipidemia    Rosacea    REGAN (obstructive sleep apnea)    Primary osteoarthritis of right hip    Class 2 severe obesity with serious comorbidity and body mass index (BMI) of 36.0 to 36.9 in adult    Gross hematuria    Mild aortic stenosis    Abnormal finding on imaging    IPMN (intraductal papillary mucinous neoplasm)    Cyst of pancreas    Atypical chest pain    SVT (supraventricular tachycardia)    ACP (advance care planning)    RBBB    Bradycardia    Coronary artery disease involving native coronary artery of native heart without angina pectoris    Chronic diastolic heart failure    Hydronephrosis with urinary obstruction due to renal calculus    Kidney stone    Aortic atherosclerosis    Exudative age-related macular degeneration, left eye, with inactive scar    Tachy-bassem syndrome       Review of patient's allergies indicates:  No Known Allergies    Current Inpatient  Medications:      No current facility-administered medications on file prior to encounter.     Current Outpatient Medications on File Prior to Encounter   Medication Sig Dispense Refill    aspirin (ECOTRIN) 81 MG EC tablet Take 1 tablet (81 mg total) by mouth once daily. 90 tablet 3    doxycycline (VIBRA-TABS) 100 MG tablet Take 1 po qday with food and not within 1 hour prior to lying down 30 tablet 2    fluticasone propionate (FLONASE) 50 mcg/actuation nasal spray 2 sprays (100 mcg total) by Each Nostril route once daily. 16 g 6    ketoconazole (NIZORAL) 2 % shampoo APPLY TOPICALLY 3 TIMES A WEEK 120 mL 3    metoprolol succinate (TOPROL-XL) 25 MG 24 hr tablet Take 1 tablet (25 mg total) by mouth once daily.      olmesartan (BENICAR) 20 MG tablet Take 1 tablet (20 mg total) by mouth once daily. 90 tablet 3    spironolactone (ALDACTONE) 25 MG tablet Take 25 mg by mouth 2 (two) times daily.      VIT A/VIT C/VIT E/ZINC/COPPER (PRESERVISION AREDS ORAL) Take 1 capsule by mouth 2 (two) times a day.      atorvastatin (LIPITOR) 20 MG tablet Take 1 tablet (20 mg total) by mouth once daily. 90 tablet 3    furosemide (LASIX) 20 MG tablet Take 20 mg by mouth daily as needed.      [DISCONTINUED] clobetasol (TEMOVATE) 0.05 % cream Apply topically 2 (two) times daily. 60 g 3       Past Surgical History:   Procedure Laterality Date    BUNIONECTOMY Right     CATARACT EXTRACTION BILATERAL W/ ANTERIOR VITRECTOMY Bilateral     COLONOSCOPY N/A 3/20/2017    Procedure: COLONOSCOPY;  Surgeon: Carl Marcelino MD;  Location: [facility] ENDO;  Service: Endoscopy;  Laterality: N/A;    COLONOSCOPY N/A 10/4/2022    Procedure: COLONOSCOPY;  Surgeon: Carl Marcelino MD;  Location: [facility] ENDO;  Service: Endoscopy;  Laterality: N/A;    CYSTOSCOPY N/A 12/30/2020    Procedure: CYSTOSCOPY;  Surgeon: Kevin De Leon Jr., MD;  Location: [facility] OR;  Service: Urology;  Laterality: N/A;    CYSTOURETEROSCOPY WITH RETROGRADE PYELOGRAPHY AND INSERTION OF STENT  INTO URETER Right 11/8/2022    Procedure: CYSTOURETEROSCOPY, WITH RETROGRADE PYELOGRAM AND URETERAL STENT INSERTION;  Surgeon: FEI Rangel MD;  Location: [facility] OR;  Service: Urology;  Laterality: Right;    DIAGNOSTIC CARDIAC ELECTROPHYSIOLOGY STUDY N/A 7/7/2022    Procedure: CARDIAC ELECTROPHYSIOLOGY STUDY, DIAGNOSTIC;  Surgeon: Tom Patiño MD;  Location: [facility] EP LAB;  Service: Cardiology;  Laterality: N/A;  SVT, RFA, LENNY, MAC, SK, 3 Prep    ENDOSCOPIC ULTRASOUND OF UPPER GASTROINTESTINAL TRACT Left 3/12/2021    Procedure: ULTRASOUND, UPPER GI TRACT, ENDOSCOPIC;  Surgeon: Carlton Oliveros MD;  Location: [facility] ENDO;  Service: Endoscopy;  Laterality: Left;  Linear    ENDOSCOPIC ULTRASOUND OF UPPER GASTROINTESTINAL TRACT Left 10/20/2021    Procedure: ULTRASOUND, UPPER GI TRACT, ENDOSCOPIC;  Surgeon: Carlton Oliveros MD;  [facility];  Service: Endoscopy;  Laterality: Left;  Linear scope    ENDOSCOPIC ULTRASOUND OF UPPER GASTROINTESTINAL TRACT Left 11/30/2022    Procedure: ULTRASOUND, UPPER GI TRACT, ENDOSCOPIC;  Surgeon: Carlton Oliveros MD;  Location: [facility] ENDO;  Service: Endoscopy;  Laterality: Left;  Linear    ENDOSCOPIC ULTRASOUND OF UPPER GASTROINTESTINAL TRACT Left 7/5/2023    Procedure: ULTRASOUND, UPPER GI TRACT, ENDOSCOPIC;  Surgeon: Carlton Oliveros MD;  Location: [facility] ENDO;  Service: Endoscopy;  Laterality: Left;    ESOPHAGOGASTRODUODENOSCOPY N/A 3/12/2021    Procedure: EGD (ESOPHAGOGASTRODUODENOSCOPY);  Surgeon: Carlton Oliveros MD;  Location: [facility] ENDO;  Service: Endoscopy;  Laterality: N/A;    ESOPHAGOGASTRODUODENOSCOPY N/A 10/20/2021    Procedure: EGD (ESOPHAGOGASTRODUODENOSCOPY);  Surgeon: Carlton Oliveros MD;  Location: [facility] ENDO;  Service: Endoscopy;  Laterality: N/A;    ESOPHAGOGASTRODUODENOSCOPY N/A 11/30/2022    Procedure: EGD (ESOPHAGOGASTRODUODENOSCOPY);  Surgeon: Carlton Oliveros MD;  Location: [facility] ENDO;  Service: Endoscopy;  Laterality: N/A;     ESOPHAGOGASTRODUODENOSCOPY N/A 2023    Procedure: EGD (ESOPHAGOGASTRODUODENOSCOPY);  Surgeon: Carlton Oliveros MD;  Location: [facility] ENDO;  Service: Endoscopy;  Laterality: N/A;    HERNIA REPAIR      x 2    JOINT REPLACEMENT Right     per Dr. Heriberto Colón    JOINT REPLACEMENT Left     per Dr. Heriberto Colón    KNEE SURGERY      left , right    LASER LITHOTRIPSY Right 2022    Procedure: LITHOTRIPSY, USING LASER-THULIUM;  Surgeon: FEI Rangel MD;  Location: [facility] OR;  Service: Urology;  Laterality: Right;    MASTOID SURGERY Right     right    PROSTATECTOMY      RETINAL DETACHMENT SURGERY      left    ROTATOR CUFF REPAIR      bilateral    SHOULDER SURGERY Left     RCR per Dr. Heriberto Colón    SHOULDER SURGERY Right     RCR per Dr. Heriberto Colón    TONSILLECTOMY, ADENOIDECTOMY      TREATMENT OF CARDIAC ARRHYTHMIA  2022    Procedure: Cardioversion or Defibrillation;  Surgeon: Tom Patiño MD;  Location: [facility] EP LAB;  Service: Cardiology;;       Social History     Socioeconomic History    Marital status:    Tobacco Use    Smoking status: Former     Current packs/day: 0.00     Average packs/day: 0.5 packs/day for 5.0 years (2.5 ttl pk-yrs)     Types: Cigarettes     Start date: 10/1/1962     Quit date: 10/1/1967     Years since quittin.4    Smokeless tobacco: Never   Substance and Sexual Activity    Alcohol use: Yes     Alcohol/week: 1.7 standard drinks of alcohol     Types: 2 Standard drinks or equivalent per week     Comment: occ    Drug use: No    Sexual activity: Yes     Partners: Female   Social History Narrative    Was working 4 days a week delivering seafood, unloading and loading truck by hand, retired Friday    No dietary restrictions     Social Determinants of Health     Financial Resource Strain: Low Risk  (2024)    Overall Financial Resource Strain (CARDIA)     Difficulty of Paying Living Expenses: Not hard at all   Food Insecurity: No Food Insecurity (2024)     "Hunger Vital Sign     Worried About Running Out of Food in the Last Year: Never true     Ran Out of Food in the Last Year: Never true   Transportation Needs: No Transportation Needs (1/22/2024)    PRAPARE - Transportation     Lack of Transportation (Medical): No     Lack of Transportation (Non-Medical): No   Physical Activity: Inactive (1/22/2024)    Exercise Vital Sign     Days of Exercise per Week: 0 days     Minutes of Exercise per Session: 0 min   Stress: No Stress Concern Present (1/22/2024)    [place] [employer] - Occupational Stress Questionnaire     Feeling of Stress : Not at all   Social Connections: Unknown (1/22/2024)    Social Connection and Isolation Panel [NHANES]     Frequency of Communication with Friends and Family: Three times a week     Frequency of Social Gatherings with Friends and Family: More than three times a week     Active Member of Clubs or Organizations: No     Attends Club or Organization Meetings: Never     Marital Status:    Housing Stability: Low Risk  (1/22/2024)    Housing Stability Vital Sign     Unable to Pay for Housing in the Last Year: No     Number of Places Lived in the Last Year: 1     Unstable Housing in the Last Year: No       OBJECTIVE:     Vital Signs Range (Last 24H):         CBC:   No results for input(s): "WBC", "RBC", "HGB", "HCT", "PLT", "MCV", "MCH", "MCHC" in the last 72 hours.    CMP: No results for input(s): "NA", "K", "CL", "CO2", "BUN", "CREATININE", "GLU", "MG", "PHOS", "CALCIUM", "ALBUMIN", "PROT", "ALKPHOS", "ALT", "AST", "BILITOT" in the last 72 hours.    INR:  No results for input(s): "PT", "INR", "PROTIME", "APTT" in the last 72 hours.    Diagnostic Studies: No relevant studies.    EKG:   Results for orders placed or performed during the hospital encounter of 07/07/22   EKG 12-lead    Collection Time: 07/07/22  3:26 PM    Narrative    Test Reason : I45.10,    Vent. Rate : 066 BPM     Atrial Rate : 066 BPM     P-R Int : 190 " ms          QRS Dur : 178 ms      QT Int : 466 ms       P-R-T Axes : 032 040 004 degrees     QTc Int : 488 ms    Normal sinus rhythm  Right bundle branch block  Inferior infarct ,age undetermined  Abnormal ECG  When compared with ECG of 07-JUL-2022 11:20,  Inferior infarct is now Present  T wave inversion now evident in Inferior leads  Confirmed by SHAHLA MCDONNELL MD ([id number]) on 7/7/2022 9:43:31 PM    Referred By: AGUSTIN NICHOLSON           Confirmed By:SHAHLA MCDONNELL MD        2D ECHO:   Results for orders placed in visit on 01/22/24    Echo    Interpretation Summary    Left Ventricle: The left ventricle is normal in size. Normal wall thickness. Normal wall motion. There is normal systolic function. Ejection fraction by visual approximation is 65%. There is normal diastolic function.    Right Ventricle: Normal right ventricular cavity size. Wall thickness is normal. Right ventricle wall motion  is normal. Systolic function is normal.    Aortic Valve: The aortic valve is a trileaflet valve. There is mild aortic valve sclerosis without stenosis.    Mitral Valve: There is mild mitral annular calcification present.    IVC/SVC: IVC was not well visualized due to poor acoustic window.         ASSESSMENT/PLAN:         Pre-op Assessment    I have reviewed the Patient Summary Reports.     I have reviewed the Nursing Notes.    I have reviewed the Medications.     Review of Systems  Anesthesia Hx:  No problems with previous Anesthesia   History of prior surgery of interest to airway management or planning:          Denies Family Hx of Anesthesia complications.    Denies Personal Hx of Anesthesia complications.                    Social:  Non-Smoker       Hematology/Oncology:                        --  Cancer in past history:                     Cardiovascular:     Hypertension   CAD    Dysrhythmias   Denies Angina.     hyperlipidemia   ECG has been reviewed. >4 METS                         Pulmonary:       Denies Shortness of breath.   Denies Recent URI. Sleep Apnea                Renal/:  Chronic Renal Disease                Hepatic/GI:      Denies GERD. Denies Liver Disease.            Musculoskeletal:  Arthritis               Neurological:    Denies CVA.    Denies Seizures.                                Endocrine:  Endocrine Normal Denies Diabetes.               Physical Exam  General: Well nourished, Cooperative and Alert    Airway:  Mallampati: III / II  Mouth Opening: Normal  TM Distance: Normal  Tongue: Normal  Neck ROM: Normal ROM    Dental:  Intact, Dentures, Partial Dentures        Anesthesia Plan  Type of Anesthesia, risks & benefits discussed:    Anesthesia Type: MAC, Gen ETT  Intra-op Monitoring Plan: Standard ASA Monitors  Post Op Pain Control Plan: multimodal analgesia  Induction:  IV  Airway Plan: Direct, Post-Induction  Informed Consent: Informed consent signed with the Patient and all parties understand the risks and agree with anesthesia plan.  All questions answered.   ASA Score: 3  Day of Surgery Review of History & Physical: H&P Update referred to the surgeon/provider.    Ready For Surgery From Anesthesia Perspective.     .

## 2024-02-19 NOTE — NURSING
Off unit via wheelchair and transporter for discharge home.  Pt to  Rx for antibiotics at his pharmacy on the way home per wife.  Sling to patient to left arm.

## 2024-02-19 NOTE — PROGRESS NOTES
Bedside echo being done and I called into the lab and let ludivina flowers rn know to let dr. Collins know they are at bedside doing echo.

## 2024-02-19 NOTE — TRANSFER OF CARE
"Anesthesia Transfer of Care Note    Patient: Rohan Abarca    Procedure(s) Performed: Procedure(s) (LRB):  INSERTION, CARDIAC PACEMAKER, DUAL CHAMBER (N/A)    Patient location: PACU    Anesthesia Type: general    Transport from OR: Transported from OR on 6-10 L/min O2 by face mask with adequate spontaneous ventilation    Post pain: adequate analgesia    Post assessment: no apparent anesthetic complications    Post vital signs: stable    Level of consciousness: sedated    Nausea/Vomiting: no nausea/vomiting    Complications: none    Transfer of care protocol was followed      Last vitals: Visit Vitals  /82   Pulse 62   Temp 37 °C (98.6 °F) (Temporal)   Resp 20   Ht 5' 9" (1.753 m)   Wt 102.1 kg (225 lb)   SpO2 98%   BMI 33.23 kg/m²     "

## 2024-02-19 NOTE — PROGRESS NOTES
Dr. Collins wants bedside echo with [facility] he spoke with them and let them know it is stat patient resting without complaints

## 2024-02-19 NOTE — INTERVAL H&P NOTE
The patient has been examined and the H&P has been reviewed: Patient here for dcPPM implantation with LBBAP secondary to tachy-bassem syndrome and baseline RBBB with prolonged HV interval by EPS 2022. ECG shows NSR with RBBB.     I concur with the findings and no changes have occurred since H&P was written.    Procedure risks, benefits and alternative options discussed and understood by patient/family.          There are no hospital problems to display for this patient.

## 2024-02-19 NOTE — ANESTHESIA POSTPROCEDURE EVALUATION
Anesthesia Post Evaluation    Patient: Rohan Abarca    Procedure(s) Performed: Procedure(s) (LRB):  INSERTION, CARDIAC PACEMAKER, DUAL CHAMBER (N/A)    Final Anesthesia Type: general      Patient location during evaluation: PACU  Patient participation: Yes- Able to Participate  Level of consciousness: awake and alert and oriented  Post-procedure vital signs: reviewed and stable  Pain management: adequate  Airway patency: patent    PONV status at discharge: No PONV  Anesthetic complications: no      Cardiovascular status: blood pressure returned to baseline and hemodynamically stable  Respiratory status: unassisted and spontaneous ventilation  Hydration status: euvolemic  Follow-up not needed.              Vitals Value Taken Time   BP 95/59 02/19/24 1201   Temp 36.6 °C (97.9 °F) 02/19/24 1100   Pulse 89 02/19/24 1201   Resp 18 02/19/24 1145   SpO2 95 % 02/19/24 1201   Vitals shown include unvalidated device data.      No case tracking events are documented in the log.      Pain/Rabia Score: Rabia Score: 9 (2/19/2024 10:00 AM)

## 2024-02-19 NOTE — PLAN OF CARE
Patient arrived to room. PIV placed. Admit assessment completed. Plan of care discussed with patient. Wife at bedside. Nurse call bell within reach.  Will monitor

## 2024-02-19 NOTE — PROGRESS NOTES
Patient admitted to recovery see [facility] for complete assessment pacu bcg's maintained safety measures verified patient instructed on pain scale. Called patient's wife but went to voice mail, called waiting room but no answer so I sent message via text. Also called for EKG and chest xray , EKG done and in chart.

## 2024-02-20 DIAGNOSIS — I47.10 SVT (SUPRAVENTRICULAR TACHYCARDIA): Primary | ICD-10-CM

## 2024-02-21 NOTE — DISCHARGE SUMMARY
[facility] - Short Stay Cardiac Unit  Cardiac Electrophysiology  Discharge Summary      Patient Name: Rohan Abarca  MRN: [id number]  Admission Date: 2/19/2024  Hospital Length of Stay: 0 days  Discharge Date and Time: 2/19/2024  2:42 PM  Attending Physician: No att. providers found    Discharging Provider: Rashaun Collins MD  Primary Care Physician: Rosa Powell DO    HPI:   No notes on file    Procedure(s) (LRB):  INSERTION, CARDIAC PACEMAKER, DUAL CHAMBER (N/A)     Indwelling Lines/Drains at time of discharge:  Lines/Drains/Airways       None                   Hospital Course:  S/p dual chamber PPM with LBBAP. Keflex x 5 days.    Goals of Care Treatment Preferences:  Code Status: Full Code      Consults:     Significant Diagnostic Studies: Labs: All labs within the past 24 hours have been reviewed    Pending Diagnostic Studies:       None            There are no hospital problems to display for this patient.    No new Assessment & Plan notes have been filed under this hospital service since the last note was generated.  Service: Arrhythmia      Discharged Condition: stable    Disposition: Home or Self Care    Follow Up:   Follow-up Information       Tom Patiño MD Follow up in 3 month(s).    Specialties: Electrophysiology, Cardiology  Contact information:  [address]  [facility] [address] [phone]               DEVICE CHECK [facility] Follow up in 1 week(s).                           Patient Instructions:   No discharge procedures on file.  Medications:  Reconciled Home Medications:      Medication List        START taking these medications      cephALEXin 500 MG capsule  Commonly known as: KEFLEX  Take 1 capsule (500 mg total) by mouth every 8 (eight) hours. for 5 days            CONTINUE taking these medications      aspirin 81 MG EC tablet  Commonly known as: ECOTRIN  Take 1 tablet (81 mg total) by mouth once daily.     atorvastatin 20 MG tablet  Commonly known as: LIPITOR  Take 1 tablet (20 mg  total) by mouth once daily.     doxycycline 100 MG tablet  Commonly known as: VIBRA-TABS  Take 1 po qday with food and not within 1 hour prior to lying down     fluticasone propionate 50 mcg/actuation nasal spray  Commonly known as: FLONASE  2 sprays (100 mcg total) by Each Nostril route once daily.     furosemide 20 MG tablet  Commonly known as: LASIX  Take 20 mg by mouth daily as needed.     ketoconazole 2 % shampoo  Commonly known as: NIZORAL  APPLY TOPICALLY 3 TIMES A WEEK     metoprolol succinate 25 MG 24 hr tablet  Commonly known as: TOPROL-XL  Take 1 tablet (25 mg total) by mouth once daily.     olmesartan 20 MG tablet  Commonly known as: BENICAR  Take 1 tablet (20 mg total) by mouth once daily.     PRESERVISION AREDS ORAL  Take 1 capsule by mouth 2 (two) times a day.     spironolactone 25 MG tablet  Commonly known as: ALDACTONE  Take 25 mg by mouth 2 (two) times daily.              Time spent on the discharge of patient: 15 minutes    Rashaun Collins MD  Cardiac Electrophysiology  [facility] - Short Stay Cardiac Unit

## 2024-02-26 ENCOUNTER — CLINICAL SUPPORT (OUTPATIENT)
Dept: CARDIOLOGY | Facility: HOSPITAL | Age: 79
End: 2024-02-26
Attending: INTERNAL MEDICINE
Payer: MEDICARE

## 2024-02-26 DIAGNOSIS — I49.5 TACHY-BRADY SYNDROME: ICD-10-CM

## 2024-02-26 DIAGNOSIS — I49.9 CARDIAC ARRHYTHMIA, UNSPECIFIED CARDIAC ARRHYTHMIA TYPE: ICD-10-CM

## 2024-02-26 DIAGNOSIS — I45.10 RBBB: ICD-10-CM

## 2024-02-26 DIAGNOSIS — R00.1 BRADYCARDIA, UNSPECIFIED: ICD-10-CM

## 2024-02-26 DIAGNOSIS — I47.10 SVT (SUPRAVENTRICULAR TACHYCARDIA): ICD-10-CM

## 2024-02-26 DIAGNOSIS — Z95.0 PRESENCE OF CARDIAC PACEMAKER: ICD-10-CM

## 2024-02-26 PROCEDURE — 93280 PM DEVICE PROGR EVAL DUAL: CPT

## 2024-02-27 LAB
OHS QRS DURATION: 156 MS
OHS QTC CALCULATION: 428 MS

## 2024-03-04 LAB — OHS CV RV PACING PERCENT: 0 %

## 2024-03-21 ENCOUNTER — CLINICAL SUPPORT (OUTPATIENT)
Dept: CARDIOLOGY | Facility: HOSPITAL | Age: 79
End: 2024-03-21
Attending: INTERNAL MEDICINE
Payer: MEDICARE

## 2024-03-21 DIAGNOSIS — R00.1 BRADYCARDIA, UNSPECIFIED: ICD-10-CM

## 2024-03-21 DIAGNOSIS — Z95.0 PRESENCE OF CARDIAC PACEMAKER: ICD-10-CM

## 2024-03-21 PROCEDURE — 93294 REM INTERROG EVL PM/LDLS PM: CPT | Mod: ,,, | Performed by: INTERNAL MEDICINE

## 2024-03-27 RX ORDER — FUROSEMIDE 20 MG/1
40 TABLET ORAL
Qty: 180 TABLET | Refills: 3 | OUTPATIENT
Start: 2024-03-27

## 2024-03-27 NOTE — TELEPHONE ENCOUNTER
Refill Decision Note   Rohan Abarca  is requesting a refill authorization.  Brief Assessment and Rationale for Refill:  Quick Discontinue     Medication Therapy Plan:  Med d/c on 04/12/23 by PCP; [facility]      Comments:     Note composed:5:02 AM 03/27/2024

## 2024-04-03 LAB
OHS CV AF BURDEN PERCENT: < 1
OHS CV DC REMOTE DEVICE TYPE: NORMAL
OHS CV RV PACING PERCENT: 0.11 %

## 2024-05-15 DIAGNOSIS — I47.10 SVT (SUPRAVENTRICULAR TACHYCARDIA): Primary | ICD-10-CM

## 2024-05-17 NOTE — PROGRESS NOTES
Mr. Abarca is a patient of Dr. Patiño and was last seen in clinic 1/22/2024.      Subjective:   Patient ID:  Rohan Abarca is an 79 y.o. male who presents for follow up of Pacemaker Check  .     HPI:    Mr. Abarca is an 79 y.o. male with SVT/AT, PACs, bradycardia here for follow up after pacemaker implantation.    Background:    Primary cardiologist is Dr. Stevenson  Presented with sustained palpitations 5/17/22, noted to be in SVT with RBBB aberrancy. Notes indicate DCCVx3, followed by recurrence triggered by PAC's. Briefly put on amiodarone. Discharged on metoprolol.  Returned 5/29/22 with palpitations, headaches and chest pain. ECG revealed nsr. He reports symptoms improved prior to presentation.  Echo 5/17/22 normal biventricular structure and function severe LAE  Spect stress 5/17/22 negative     Baseline ECG revealed sinus bradycardia at 47 bpm RBBB, no pre-excitation  EPS 7/7/22 HV interval 75 msec. Induced sustained irregular rapid atrial tachycardia requiring DCCV.   Bardy patch revealed 9 episodes of nonsustained atrial tachycardia.  My recommendation was dual chamber PPM implantation with conduction system pacing and increasing beta blocker. They elected to defer.     1/22/2024: Notes occasional episodes of weakness, manifesting more in the chest area. Also notes increasing dyspnea on exertion.  Metoprolol discontinued due to bradycardia.  Notes occasional dizziness with standing.    MRI is scheduled for 2/5/24 for monitoring of his pancreas.  Tachy-bassem syndrome, symptomatic.  Echo with color flow.  Recommend dual chamber PPM with conduction system pacing. Defer until after MRI 2/5/24.  Risks and benefits discussed, he would like to proceed.    Update (05/20/2024):    2/19/2024: Successful implantation of PPM Dual with left bundle branch area pacing     Today he says he is feeling at baseline. No significant change in symptoms since implant. No CP, worsening BACON, sustained palps, syncope. Exercises  and feels LH sometimes.    Device Interrogation (5/20/2024) reveals an intrinsic SR with stable lead and device function.   EKG were recorded in bipolar and unipolar pacing configurations at high and low outputs and reviewed with Dr. Hills  LBBA pacing lead impedance unipolar 399 Ohms, bipolar 589 Ohms; LBBA capture threshold unipolar 0.5V @0.5ms, bipolar 0.75V @ 0.4ms  No atrial arrhythmias. NSVTx3, max 3 seconds.  He paces 33% in the RA and <1% in the RV. Estimated battery longevity 14.7 years.   Rate response added.    I have personally reviewed the patient's EKG today, which shows APVP at 85bpm. NE interval is 230. QRS is 118. QTc is 485.    Relevant Cardiac Test Results:    2D Echo (2/19/2024):    Left Ventricle: There is normal systolic function with a visually estimated ejection fraction of 60 - 65%. There is normal diastolic function.    Right Ventricle: Normal right ventricular cavity size. Wall thickness is normal. Right ventricle wall motion  is normal. Systolic function is normal.    Aortic Valve: The aortic valve is a trileaflet valve. There is mild aortic valve sclerosis.    Mitral Valve: There is mild bileaflet sclerosis. There is moderate mitral annular calcification present.    IVC/SVC: Intermediate venous pressure at 8 mmHg.    Pericardium: There is a small anterior, partially organized effusion.  I cannot differentaite bettwen epicardial fat pad or possibly pericardial thrombus.  There is variation on the mitral and tricuspid inflows, but there is no RV or RA diastolic collapse. Suggest serial studies.    Current Outpatient Medications   Medication Sig    aspirin (ECOTRIN) 81 MG EC tablet Take 1 tablet (81 mg total) by mouth once daily.    atorvastatin (LIPITOR) 20 MG tablet Take 1 tablet (20 mg total) by mouth once daily.    doxycycline (VIBRA-TABS) 100 MG tablet Take 1 po qday with food and not within 1 hour prior to lying down    fluticasone propionate (FLONASE) 50 mcg/actuation nasal spray 2  sprays (100 mcg total) by Each Nostril route once daily.    furosemide (LASIX) 20 MG tablet Take 20 mg by mouth daily as needed.    ketoconazole (NIZORAL) 2 % shampoo APPLY TOPICALLY 3 TIMES A WEEK    metoprolol succinate (TOPROL-XL) 25 MG 24 hr tablet Take 1 tablet (25 mg total) by mouth once daily.    olmesartan (BENICAR) 20 MG tablet Take 1 tablet (20 mg total) by mouth once daily.    spironolactone (ALDACTONE) 25 MG tablet Take 25 mg by mouth 2 (two) times daily.    VIT A/VIT C/VIT E/ZINC/COPPER (PRESERVISION AREDS ORAL) Take 1 capsule by mouth 2 (two) times a day.     No current facility-administered medications for this visit.       Review of Systems   Constitutional: Negative for malaise/fatigue.   Cardiovascular:  Negative for chest pain, dyspnea on exertion, irregular heartbeat, leg swelling and palpitations.   Respiratory:  Negative for shortness of breath.    Hematologic/Lymphatic: Negative for bleeding problem.   Skin:  Negative for rash.   Musculoskeletal:  Negative for myalgias.   Gastrointestinal:  Negative for hematemesis, hematochezia and nausea.   Genitourinary:  Negative for hematuria.   Neurological:  Negative for light-headedness.   Psychiatric/Behavioral:  Negative for altered mental status.    Allergic/Immunologic: Negative for persistent infections.       Objective:          /70   Pulse 85   Wt 103.6 kg (228 lb 6.3 oz)   BMI 33.73 kg/m²     Physical Exam  Vitals and nursing note reviewed.   Constitutional:       Appearance: Normal appearance. He is well-developed.   HENT:      Head: Normocephalic.      Nose: Nose normal.   Eyes:      Pupils: Pupils are equal, round, and reactive to light.   Cardiovascular:      Rate and Rhythm: Normal rate and regular rhythm.   Pulmonary:      Effort: No respiratory distress.      Breath sounds: Normal breath sounds.   Musculoskeletal:         General: Normal range of motion.   Skin:     General: Skin is warm and dry.      Findings: No erythema.    Neurological:      Mental Status: He is alert and oriented to person, place, and time.   Psychiatric:         Speech: Speech normal.         Behavior: Behavior normal.           Lab Results   Component Value Date     01/26/2024    K 5.1 02/06/2024    MG 2.0 05/18/2022    BUN 22 01/26/2024    CREATININE 1.0 01/26/2024    ALT 20 07/07/2023    AST 25 07/07/2023    HGB 15.5 01/26/2024    HCT 46.1 01/26/2024    TSH 2.117 11/22/2023    LDLCALC 88.8 07/07/2023       Recent Labs   Lab 06/30/22  0956 01/26/24  1353   INR 1.1 1.1       Assessment:     1. Cardiac pacemaker in situ - LBBA pacing lead    2. SVT (supraventricular tachycardia)    3. Essential hypertension      Plan:     In summary, Mr. Abarca is an 79 y.o. male with SVT/AT, PACs, bradycardia here for follow up after pacemaker implantation.  He is 3 months s/p implantation of PPM with LBBA pacing for tachy-bassem syndrome.   Mr. Abarca is doing well from a device perspective with stable lead and device function. LBBA testing stable. ECGs reviewed by Dr. Hills.   No sustained arrhythmia noted. He remains off metoprolol due to prior bradycardia but it is still on his list. Said he can restart this but to watch his BPs. Rate response added.    OK to restart toprol 25mg daily  Continue current medication regimen and device settings.   Follow up in device clinic as scheduled.   Follow up in EP clinic in 1 year, sooner as needed.     *A copy of this note has been sent to Dr. Patiño*    Follow up in about 1 year (around 5/20/2025).    ------------------------------------------------------------------    JASMYNE Mcdonald, NP-C  Cardiac Electrophysiology

## 2024-05-20 ENCOUNTER — OFFICE VISIT (OUTPATIENT)
Dept: ELECTROPHYSIOLOGY | Facility: CLINIC | Age: 79
End: 2024-05-20
Payer: MEDICARE

## 2024-05-20 ENCOUNTER — CLINICAL SUPPORT (OUTPATIENT)
Dept: CARDIOLOGY | Facility: HOSPITAL | Age: 79
End: 2024-05-20
Attending: INTERNAL MEDICINE
Payer: MEDICARE

## 2024-05-20 VITALS
BODY MASS INDEX: 33.73 KG/M2 | SYSTOLIC BLOOD PRESSURE: 108 MMHG | WEIGHT: 228.38 LBS | HEART RATE: 85 BPM | DIASTOLIC BLOOD PRESSURE: 70 MMHG

## 2024-05-20 DIAGNOSIS — I10 ESSENTIAL HYPERTENSION: ICD-10-CM

## 2024-05-20 DIAGNOSIS — I49.5 TACHY-BRADY SYNDROME: ICD-10-CM

## 2024-05-20 DIAGNOSIS — Z95.0 CARDIAC PACEMAKER IN SITU: Primary | ICD-10-CM

## 2024-05-20 DIAGNOSIS — Z95.0 CARDIAC PACEMAKER IN SITU: ICD-10-CM

## 2024-05-20 DIAGNOSIS — I49.9 CARDIAC ARRHYTHMIA, UNSPECIFIED CARDIAC ARRHYTHMIA TYPE: ICD-10-CM

## 2024-05-20 DIAGNOSIS — I47.10 SVT (SUPRAVENTRICULAR TACHYCARDIA): ICD-10-CM

## 2024-05-20 DIAGNOSIS — I45.10 RBBB: ICD-10-CM

## 2024-05-20 LAB
OHS QRS DURATION: 118 MS
OHS QTC CALCULATION: 485 MS

## 2024-05-20 PROCEDURE — 93280 PM DEVICE PROGR EVAL DUAL: CPT | Mod: 26,,, | Performed by: INTERNAL MEDICINE

## 2024-05-20 PROCEDURE — 93010 ELECTROCARDIOGRAM REPORT: CPT | Mod: S$PBB,,, | Performed by: INTERNAL MEDICINE

## 2024-05-20 PROCEDURE — 99999 PR PBB SHADOW E&M-EST. PATIENT-LVL III: CPT | Mod: PBBFAC,,, | Performed by: NURSE PRACTITIONER

## 2024-05-20 PROCEDURE — 99214 OFFICE O/P EST MOD 30 MIN: CPT | Mod: S$PBB,,, | Performed by: NURSE PRACTITIONER

## 2024-05-20 PROCEDURE — 93005 ELECTROCARDIOGRAM TRACING: CPT | Mod: 59,PBBFAC | Performed by: INTERNAL MEDICINE

## 2024-05-20 PROCEDURE — 99213 OFFICE O/P EST LOW 20 MIN: CPT | Mod: PBBFAC,25 | Performed by: NURSE PRACTITIONER

## 2024-05-20 PROCEDURE — 93280 PM DEVICE PROGR EVAL DUAL: CPT

## 2024-05-20 RX ORDER — METOPROLOL SUCCINATE 25 MG/1
25 TABLET, EXTENDED RELEASE ORAL DAILY
Qty: 90 TABLET | Refills: 3 | Status: SHIPPED | OUTPATIENT
Start: 2024-05-20

## 2024-05-22 LAB
OHS CV AF BURDEN PERCENT: < 1
OHS CV DC REMOTE DEVICE TYPE: NORMAL
OHS CV RV PACING PERCENT: 0.1 %

## 2024-06-14 DIAGNOSIS — E78.5 HYPERLIPIDEMIA, UNSPECIFIED HYPERLIPIDEMIA TYPE: ICD-10-CM

## 2024-06-14 DIAGNOSIS — I10 ESSENTIAL HYPERTENSION: ICD-10-CM

## 2024-06-14 RX ORDER — ATORVASTATIN CALCIUM 20 MG/1
20 TABLET, FILM COATED ORAL
Qty: 90 TABLET | Refills: 0 | Status: SHIPPED | OUTPATIENT
Start: 2024-06-14

## 2024-06-14 RX ORDER — SPIRONOLACTONE 25 MG/1
25 TABLET ORAL 2 TIMES DAILY
Qty: 180 TABLET | Refills: 3 | Status: SHIPPED | OUTPATIENT
Start: 2024-06-14

## 2024-06-14 NOTE — TELEPHONE ENCOUNTER
Care Due:                  Date            Visit Type   Department     Provider  --------------------------------------------------------------------------------                                EP -                              PRIMARY      [facility]  Last Visit: 01-      CARE ([facility])   MARK Powell                              EP -                              PRIMARY      [facility]  Next Visit: 07-      CARE ([facility])   MEDICINE       Rosa Powell                                                            Last  Test          Frequency    Reason                     Performed    Due Date  --------------------------------------------------------------------------------    CMP.........  12 months..  atorvastatin.............  07- 07-    Lipid Panel.  12 months..  atorvastatin.............  07- 07-    [facility] Embedded Care Due Messages. Reference number: [id number].   6/14/2024 6:10:35 AM CDT

## 2024-06-14 NOTE — TELEPHONE ENCOUNTER
Refill Routing Note   Medication(s) are not appropriate for processing by [facility] for the following reason(s):        No active prescription written by provider    [facility] action(s):  Approve  Defer   Requires labs : Yes             Appointments  past 12m or future 3m with PCP    Date Provider   Last Visit   1/11/2024 Rosa Powell, DO   Next Visit   7/16/2024 Rosa Powell, DO   ED visits in past 90 days: 0        Note composed:6:38 AM 06/14/2024

## 2024-06-20 ENCOUNTER — CLINICAL SUPPORT (OUTPATIENT)
Dept: CARDIOLOGY | Facility: HOSPITAL | Age: 79
End: 2024-06-20
Attending: INTERNAL MEDICINE
Payer: MEDICARE

## 2024-06-20 DIAGNOSIS — R00.1 BRADYCARDIA, UNSPECIFIED: ICD-10-CM

## 2024-06-20 PROCEDURE — 93297 REM INTERROG DEV EVAL ICPMS: CPT | Performed by: INTERNAL MEDICINE

## 2024-06-20 PROCEDURE — 93297 REM INTERROG DEV EVAL ICPMS: CPT | Mod: 26,,, | Performed by: INTERNAL MEDICINE

## 2024-06-26 LAB
OHS CV AF BURDEN PERCENT: < 1
OHS CV DC REMOTE DEVICE TYPE: NORMAL
OHS CV RV PACING PERCENT: 0.14 %

## 2024-07-09 ENCOUNTER — LAB VISIT (OUTPATIENT)
Dept: LAB | Facility: HOSPITAL | Age: 79
End: 2024-07-09
Attending: INTERNAL MEDICINE
Payer: MEDICARE

## 2024-07-09 DIAGNOSIS — Z79.899 MEDICATION MANAGEMENT: ICD-10-CM

## 2024-07-09 DIAGNOSIS — Z85.46 HISTORY OF PROSTATE CANCER: ICD-10-CM

## 2024-07-09 DIAGNOSIS — I10 ESSENTIAL HYPERTENSION: ICD-10-CM

## 2024-07-09 LAB
ALBUMIN SERPL BCP-MCNC: 3.9 G/DL (ref 3.5–5.2)
ALP SERPL-CCNC: 67 U/L (ref 55–135)
ALT SERPL W/O P-5'-P-CCNC: 26 U/L (ref 10–44)
ANION GAP SERPL CALC-SCNC: 7 MMOL/L (ref 8–16)
AST SERPL-CCNC: 24 U/L (ref 10–40)
BASOPHILS # BLD AUTO: 0.04 K/UL (ref 0–0.2)
BASOPHILS NFR BLD: 0.6 % (ref 0–1.9)
BILIRUB SERPL-MCNC: 0.7 MG/DL (ref 0.1–1)
BUN SERPL-MCNC: 28 MG/DL (ref 8–23)
CALCIUM SERPL-MCNC: 9.9 MG/DL (ref 8.7–10.5)
CHLORIDE SERPL-SCNC: 109 MMOL/L (ref 95–110)
CHOLEST SERPL-MCNC: 94 MG/DL (ref 120–199)
CHOLEST/HDLC SERPL: 2.4 {RATIO} (ref 2–5)
CO2 SERPL-SCNC: 25 MMOL/L (ref 23–29)
COMPLEXED PSA SERPL-MCNC: <0.01 NG/ML (ref 0–4)
CREAT SERPL-MCNC: 1.2 MG/DL (ref 0.5–1.4)
DIFFERENTIAL METHOD BLD: ABNORMAL
EOSINOPHIL # BLD AUTO: 0.2 K/UL (ref 0–0.5)
EOSINOPHIL NFR BLD: 2.1 % (ref 0–8)
ERYTHROCYTE [DISTWIDTH] IN BLOOD BY AUTOMATED COUNT: 12.5 % (ref 11.5–14.5)
EST. GFR  (NO RACE VARIABLE): >60 ML/MIN/1.73 M^2
ESTIMATED AVG GLUCOSE: 105 MG/DL (ref 68–131)
GLUCOSE SERPL-MCNC: 103 MG/DL (ref 70–110)
HBA1C MFR BLD: 5.3 % (ref 4–5.6)
HCT VFR BLD AUTO: 48.2 % (ref 40–54)
HDLC SERPL-MCNC: 39 MG/DL (ref 40–75)
HDLC SERPL: 41.5 % (ref 20–50)
HGB BLD-MCNC: 15.7 G/DL (ref 14–18)
IMM GRANULOCYTES # BLD AUTO: 0.02 K/UL (ref 0–0.04)
IMM GRANULOCYTES NFR BLD AUTO: 0.3 % (ref 0–0.5)
LDLC SERPL CALC-MCNC: 45 MG/DL (ref 63–159)
LYMPHOCYTES # BLD AUTO: 2.4 K/UL (ref 1–4.8)
LYMPHOCYTES NFR BLD: 33.1 % (ref 18–48)
MCH RBC QN AUTO: 31.9 PG (ref 27–31)
MCHC RBC AUTO-ENTMCNC: 32.6 G/DL (ref 32–36)
MCV RBC AUTO: 98 FL (ref 82–98)
MONOCYTES # BLD AUTO: 0.9 K/UL (ref 0.3–1)
MONOCYTES NFR BLD: 12.8 % (ref 4–15)
NEUTROPHILS # BLD AUTO: 3.6 K/UL (ref 1.8–7.7)
NEUTROPHILS NFR BLD: 51.1 % (ref 38–73)
NONHDLC SERPL-MCNC: 55 MG/DL
NRBC BLD-RTO: 0 /100 WBC
PLATELET # BLD AUTO: 195 K/UL (ref 150–450)
PMV BLD AUTO: 11.9 FL (ref 9.2–12.9)
POTASSIUM SERPL-SCNC: 5.6 MMOL/L (ref 3.5–5.1)
PROT SERPL-MCNC: 6.5 G/DL (ref 6–8.4)
RBC # BLD AUTO: 4.92 M/UL (ref 4.6–6.2)
SODIUM SERPL-SCNC: 141 MMOL/L (ref 136–145)
TRIGL SERPL-MCNC: 50 MG/DL (ref 30–150)
TSH SERPL DL<=0.005 MIU/L-ACNC: 1.58 UIU/ML (ref 0.4–4)
WBC # BLD AUTO: 7.1 K/UL (ref 3.9–12.7)

## 2024-07-09 PROCEDURE — 84443 ASSAY THYROID STIM HORMONE: CPT | Performed by: INTERNAL MEDICINE

## 2024-07-09 PROCEDURE — 80053 COMPREHEN METABOLIC PANEL: CPT | Performed by: INTERNAL MEDICINE

## 2024-07-09 PROCEDURE — 83036 HEMOGLOBIN GLYCOSYLATED A1C: CPT | Performed by: INTERNAL MEDICINE

## 2024-07-09 PROCEDURE — 36415 COLL VENOUS BLD VENIPUNCTURE: CPT | Mod: PO | Performed by: INTERNAL MEDICINE

## 2024-07-09 PROCEDURE — 84153 ASSAY OF PSA TOTAL: CPT | Performed by: INTERNAL MEDICINE

## 2024-07-09 PROCEDURE — 85025 COMPLETE CBC W/AUTO DIFF WBC: CPT | Performed by: INTERNAL MEDICINE

## 2024-07-09 PROCEDURE — 80061 LIPID PANEL: CPT | Performed by: INTERNAL MEDICINE

## 2024-07-10 ENCOUNTER — TELEPHONE (OUTPATIENT)
Dept: FAMILY MEDICINE | Facility: CLINIC | Age: 79
End: 2024-07-10
Payer: MEDICARE

## 2024-07-11 ENCOUNTER — PATIENT MESSAGE (OUTPATIENT)
Dept: FAMILY MEDICINE | Facility: CLINIC | Age: 79
End: 2024-07-11
Payer: MEDICARE

## 2024-07-11 NOTE — TELEPHONE ENCOUNTER
Please let him know that his potasium is elevated and I need him to stop spironolactone.  We will recheck his potasium level at his upcoming appt.     His other labs looked good.     Thanks

## 2024-07-11 NOTE — TELEPHONE ENCOUNTER
Spoke with pt - informed that he needs to stop the spironolactone due to his elevated potassium level and that Dr. Powell will recheck the potassium level at his visit next week.

## 2024-07-16 ENCOUNTER — OFFICE VISIT (OUTPATIENT)
Dept: FAMILY MEDICINE | Facility: CLINIC | Age: 79
End: 2024-07-16
Payer: MEDICARE

## 2024-07-16 VITALS
BODY MASS INDEX: 34.27 KG/M2 | TEMPERATURE: 98 F | WEIGHT: 231.38 LBS | HEIGHT: 69 IN | RESPIRATION RATE: 18 BRPM | SYSTOLIC BLOOD PRESSURE: 114 MMHG | OXYGEN SATURATION: 99 % | DIASTOLIC BLOOD PRESSURE: 74 MMHG | HEART RATE: 68 BPM

## 2024-07-16 DIAGNOSIS — G47.33 OSA (OBSTRUCTIVE SLEEP APNEA): ICD-10-CM

## 2024-07-16 DIAGNOSIS — H35.3223 EXUDATIVE AGE-RELATED MACULAR DEGENERATION, LEFT EYE, WITH INACTIVE SCAR: ICD-10-CM

## 2024-07-16 DIAGNOSIS — I49.5 TACHY-BRADY SYNDROME: ICD-10-CM

## 2024-07-16 DIAGNOSIS — I35.0 MILD AORTIC STENOSIS: ICD-10-CM

## 2024-07-16 DIAGNOSIS — L71.9 ROSACEA: ICD-10-CM

## 2024-07-16 DIAGNOSIS — G56.01 CARPAL TUNNEL SYNDROME ON RIGHT: ICD-10-CM

## 2024-07-16 DIAGNOSIS — I70.0 AORTIC ATHEROSCLEROSIS: ICD-10-CM

## 2024-07-16 DIAGNOSIS — D49.0 IPMN (INTRADUCTAL PAPILLARY MUCINOUS NEOPLASM): ICD-10-CM

## 2024-07-16 DIAGNOSIS — I10 ESSENTIAL HYPERTENSION: ICD-10-CM

## 2024-07-16 DIAGNOSIS — Z85.46 HISTORY OF PROSTATE CANCER: ICD-10-CM

## 2024-07-16 DIAGNOSIS — I25.10 CORONARY ARTERY DISEASE INVOLVING NATIVE CORONARY ARTERY OF NATIVE HEART WITHOUT ANGINA PECTORIS: Primary | ICD-10-CM

## 2024-07-16 DIAGNOSIS — I50.32 CHRONIC DIASTOLIC HEART FAILURE: ICD-10-CM

## 2024-07-16 DIAGNOSIS — I65.23 BILATERAL CAROTID ARTERY STENOSIS: ICD-10-CM

## 2024-07-16 DIAGNOSIS — E66.01 CLASS 2 SEVERE OBESITY WITH SERIOUS COMORBIDITY AND BODY MASS INDEX (BMI) OF 36.0 TO 36.9 IN ADULT, UNSPECIFIED OBESITY TYPE: ICD-10-CM

## 2024-07-16 DIAGNOSIS — G31.84 MCI (MILD COGNITIVE IMPAIRMENT): ICD-10-CM

## 2024-07-16 DIAGNOSIS — J30.9 CHRONIC ALLERGIC RHINITIS: ICD-10-CM

## 2024-07-16 DIAGNOSIS — E87.5 HYPERKALEMIA: ICD-10-CM

## 2024-07-16 DIAGNOSIS — E78.5 HYPERLIPIDEMIA, UNSPECIFIED HYPERLIPIDEMIA TYPE: ICD-10-CM

## 2024-07-16 PROCEDURE — G2211 COMPLEX E/M VISIT ADD ON: HCPCS | Mod: S$GLB,,, | Performed by: INTERNAL MEDICINE

## 2024-07-16 PROCEDURE — 99214 OFFICE O/P EST MOD 30 MIN: CPT | Mod: S$GLB,,, | Performed by: INTERNAL MEDICINE

## 2024-07-16 RX ORDER — METOPROLOL SUCCINATE 25 MG/1
12.5 TABLET, EXTENDED RELEASE ORAL NIGHTLY
Start: 2024-07-16

## 2024-07-16 NOTE — PROGRESS NOTES
Subjective:       Patient ID: Rohan Abarca is a 79 y.o. male.    Medication List with Changes/Refills   Current Medications    ASPIRIN (ECOTRIN) 81 MG EC TABLET    Take 1 tablet (81 mg total) by mouth once daily.    ATORVASTATIN (LIPITOR) 20 MG TABLET    TAKE 1 TABLET(20 MG) BY MOUTH EVERY DAY    DOXYCYCLINE (VIBRA-TABS) 100 MG TABLET    Take 1 po qday with food and not within 1 hour prior to lying down    FLUTICASONE PROPIONATE (FLONASE) 50 MCG/ACTUATION NASAL SPRAY    2 sprays (100 mcg total) by Each Nostril route once daily.    FUROSEMIDE (LASIX) 20 MG TABLET    Take 20 mg by mouth daily as needed.    KETOCONAZOLE (NIZORAL) 2 % SHAMPOO    APPLY TOPICALLY 3 TIMES A WEEK    OLMESARTAN (BENICAR) 20 MG TABLET    Take 1 tablet (20 mg total) by mouth once daily.    VIT A/VIT C/VIT E/ZINC/COPPER (PRESERVISION AREDS ORAL)    Take 1 capsule by mouth 2 (two) times a day.   Changed and/or Refilled Medications    Modified Medication Previous Medication    METOPROLOL SUCCINATE (TOPROL-XL) 25 MG 24 HR TABLET metoprolol succinate (TOPROL-XL) 25 MG 24 hr tablet       Take 0.5 tablets (12.5 mg total) by mouth every evening.    Take 1 tablet (25 mg total) by mouth once daily.       Chief Complaint: Follow-up (6 month )  He is here today to f/u on chronic medical issues.     He has mild chronic hyperkalemia since starting on spironolactone for hypertension. Labs on 7/0224 showed potasium of 5.6 and his spironolactone was stopped 3 days ago.      He has tachy-bassem syndrome with runs of SVT and his episode was on 5/2022 where he was given adenosine x 2, calcium IV and magnesium IV and amiodarone without response and eventually had successful cardioversion. Stress test was negative for ischemia. Echo on 5/2022 showed cLVH, EF 60%, positive diastolic dysfunction, severe LAE, mild JOHN, mild AS (PAMELA 1.94, gradient 11), mild TR and PAP of 23.  He was seen by EP who felt he would benefit from RFA for SVT but during procedure on 7/2022  was unable to induce SVT. He was diagnosed with tachy-bassem syndrome from a holter that showed non-sustained atrial tachycardia with HR ranging from 38 to 92. On 1/2024 he had a pacer placed and is doing well. He denies any palpitations or racing heart. He was seen by cardiology on 5/2024 and advised to restart low dose metoprolol but he has not yet restarted.      He has AS with echo on 5/2022 that was mild. Repeat echo on 2/2024 showed EF 60%, no diastolic dysfunction,  mild aortic valve sclerosis without stenosis,  moderate mitral annular valvular calcifications. She was seen by cardiology on 5/2024 and advised to f/u in one year.        He has hypertension and is taking olmesartan 20 mg daily.  He has metoprolol but has not restarted and spironolactone was stopped 3 days ago.  He denies chest pain or shortness of breath. He has no known CAD but recent CT urogram showed calcification in coronary arteries. His lower leg swelling is stable on lasix 20 mg three times a week.       He had a carotid u/s in 2014 that showed 50% stenosis at carotid bifurcations but no significant stenosis.  Repeat u/s on 10/2020 showed no significant stenosis.      He has hyperlipidemia and is taking atorvastatin 20 mg qday. HIs lipids on 7/2024 were 94/50/39/45.  He is taking aspirin daily.        Incidental finding on CT urogram was cystic lesion with complexity in the pancreas.MRI on 2/2021 showed Multilocular cystic mass centered in the uncinate process of the pancreas, similar in size as compared to the prior CT on 12/28/2020, with imaging features most suggestive of a side branch intraductal papillary mucinous neoplasm.  He had EUS on 3/2021 that showed gastritis and biopsied cystic lesion in uncinate process of pancreas that was 40 mm. Biopsy was negative. MRI on 5/2021 showed  Increase size of the lesion and then had subsequent EUS on 10/2021 showing cystic uncinate process lesion of pancreas without any duct abnormalities. He  was seen by surgical oncology and advised repeat  MRI in 6 months. MRI on 8/2022 showed multi-septate cystic lesion with dilatation downstream consistent with a stable IPMN. He had a EUS on 11/2022 that showed increasing uncinated cystic mass (from 4.3 to 4.5 cm) and increasing dilatation of pancreatic duct (from 5 mm to 6.2 mm).  Repeat EUS on 7/2023 showed stable cystic IPMN, no intraductal lesions (common bile duct or pancreatic duct) and advised to repeat EUS vs MRCP in 6 months. He was seen by surgical oncology on 4/2023 who agrees with surveillance at this time. MRCP on 2/2024 showed minimal increase in size of the proximal pancreatic cyst and no change in size of the pancreatic tail cyst. He has not yet seen GI to f/u on MRCP.      He has known renal stones on CT urogram on 12/2020 and a CT on 5/2022. CT stone protocol on 7/2022 showed There is right hydronephrosis, hydroureter with inflammatory stranding demonstrated about the proximal ureter, renal pelvis.  This correlates with a persistent calculus at the renal pelvis, caliceal junction albeit a proximal ureteral calculus measuring 3 x 4 x 3 mm.  Some superimposed early inflammation could also present in this fashion. MRI on 8/2022 showed Mild right hydro nephrosis.  There is low signal intensity stone in the proximal right ureter, most likely secondary to ureterolithiasis. He was seen by urology and underwent ureteroscopy with fragmentation of right kidney stone on 11/2022. He has not had any further f/u with urology or repeat imaging.      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 He had multiple excisions but bx still showed residual cancer.  He was treated with fluorouracil and did very well. He continues to follow with dermatology every 4 months.      He has rosacea  and continues on doxycycline 100 mg daily.  He does have some discoloration around his neck and on his face due to minocycline that is slowly resolving since  stopping this medication.      He continues with memory issues. He was seen by neurology in 6/2017 who ordered MRI (chronic microvascular ischemic changes) and neuropsych testing which was normal.  He was advised to start statin and aspirin.  He continues to struggle with remembering short term conversations and names.      He has REGAN but is not using cpap. He prefers to sleep in the recliner.      He has history of prostate cancer s/p radical prostatectomy in 2012.  He did not go on hormonal treatment. He no longer follows with urology.  His last PSA was undetectable on 7/2024.      He has right wrist CTS and gets full relief with wrist splints which he wears nightly. He had a right steroid injection with significant relief of symptoms on 12/2023.  He denies any active pain.      He lives with his wife and feels safe at home. He does exercise with walking. He tries to eat healthy.  He denies any balance issues or falls.        Colonoscopy----10/2022 repeat in 10 years    Tdap---more than 10 years  Pneumovax---12/2018  Prevnar----5/2018  Influenza vaccine----1/2024  Shingrex vaccine----8/2020, 12/2020  Covid vaccine---4 doses   RSV vaccine-----none      Review of Systems   Constitutional:  Negative for appetite change, fatigue, fever and unexpected weight change.   HENT:  Negative for congestion, ear pain, hearing loss, sore throat and trouble swallowing.    Eyes:  Negative for pain and visual disturbance.   Respiratory:  Negative for cough, chest tightness, shortness of breath and wheezing.    Cardiovascular:  Negative for chest pain, palpitations and leg swelling.   Gastrointestinal:  Negative for abdominal pain, blood in stool, constipation, diarrhea, nausea and vomiting.   Endocrine: Negative for polyuria.   Genitourinary:  Negative for dysuria and hematuria.   Musculoskeletal:  Negative for arthralgias, back pain and myalgias.   Skin:  Negative for rash.   Neurological:  Negative for dizziness, weakness,  "numbness and headaches.   Hematological:  Does not bruise/bleed easily.   Psychiatric/Behavioral:  Negative for dysphoric mood, sleep disturbance and suicidal ideas. The patient is not nervous/anxious.        Objective:      Vitals:    07/16/24 1015   BP: 114/74   BP Location: Left arm   Patient Position: Sitting   BP Method: X-Large (Manual)   Pulse: 68   Resp: 18   Temp: 98.2 °F (36.8 °C)   SpO2: 99%   Weight: 104.9 kg (231 lb 6 oz)   Height: 5' 9" (1.753 m)     Body mass index is 34.17 kg/m².  Physical Exam    General appearance: No acute distress, cooperative  Eyes: PERRL, EOMI, conjunctiva clear  Ears: normal external ear and pinna, tm clear without drainage, canals clear  Nose: Normal mucosa without drainage  Throat: no exudates or erythema, tonsils not enlarged  Mouth: no sores or lesions, moist mucous membranes  Neck: FROM, soft, supple, no thyromegaly, no bruits  Lymph: no anterior or posterior cervical adenopathy  Heart::  Regular rate and rhythm, no murmur  Lung: Clear to ascultation bilaterally, no wheezing, no rales, no rhonchi, no distress  Abdomen: Soft, nontender, no distention, no hepatosplenomegaly, bowel sounds normal, no guarding, no rebound, no peritoneal signs  Skin: no rashes, no lesions  Extremities: no edema, no cyanosis, varicose veins present   Neuro: CN 2-12 intact, 5/5 muscle strength upper and lower extremity bilaterally, 2+ DTRs UE and LE bilaterally, normal gait  Peripheral pulses: 1+ pedal pulses bilaterally  Musculoskeletal: FROM, good strenth, no tenderness  Joint: normal appearance, no swelling, no warmth, no deformity in all joints    Assessment:       1. Coronary artery disease involving native coronary artery of native heart without angina pectoris    2. Essential hypertension    3. Hyperkalemia    4. Tachy-bassem syndrome    5. Chronic diastolic heart failure    6. Mild aortic stenosis    7. Hyperlipidemia, unspecified hyperlipidemia type    8. Aortic atherosclerosis    9. " Bilateral carotid artery stenosis    10. Chronic allergic rhinitis    11. IPMN (intraductal papillary mucinous neoplasm)    12. Rosacea    13. MCI (mild cognitive impairment)    14. History of prostate cancer    15. Exudative age-related macular degeneration, left eye, with inactive scar    16. Carpal tunnel syndrome on right    17. REGAN (obstructive sleep apnea)    18. Class 2 severe obesity with serious comorbidity and body mass index (BMI) of 36.0 to 36.9 in adult, unspecified obesity type        Plan:       Coronary artery disease involving native coronary artery of native heart without angina pectoris  Stable and no active symptoms.    Essential hypertension  Stop spironolactone. Restart metoprolol 12.5 mg qhs. Continue olmesartan 20 mg daily.  Send BP readings in 2 weeks.   -     metoprolol succinate (TOPROL-XL) 25 MG 24 hr tablet; Take 0.5 tablets (12.5 mg total) by mouth every evening.  -     CBC Auto Differential; Future; Expected date: 07/16/2024  -     Basic Metabolic Panel; Future; Expected date: 07/16/2024    Hyperkalemia  Will recheck potasium level after stopping spironolactone in one week.   -     BASIC METABOLIC PANEL; Future; Expected date: 07/16/2024    Tachy-bassem syndrome s/p pacer  He is doing better after pacer. Will restart metoprolol.     Chronic diastolic heart failure  Well compensated on lasix three times a week    Mild aortic stenosis  No AS seen on recent echo    Hyperlipidemia, unspecified hyperlipidemia type  Good control on atorvastatin and aspirin    Aortic atherosclerosis  Continue statin and aspirin    Bilateral carotid artery stenosis  Continue statin and aspirin    Chronic allergic rhinitis  Well controlled and continue current regimen.     IPMN (intraductal papillary mucinous neoplasm)  Recent MRCP and awaiting f/u appt with GI. He will need an EUS in 2/2025.     Rosacea  Continue on doxycyline daily    MCI (mild cognitive impairment)  Stable and no complaints today    History  of prostate cancer  No recurrence    Exudative age-related macular degeneration, left eye, with inactive scar  Stable and continue to follow with ophthalmology    Carpal tunnel syndrome on right  No complaints today    REGAN (obstructive sleep apnea)  Uncontrolled and advised to restart cpap nightly. He will benefit from regular use    Class 2 severe obesity with serious comorbidity and body mass index (BMI) of 36.0 to 36.9 in adult, unspecified obesity type  Long discussion on the benefits of healthy eating and regular exercise to help lose weight and help control cad, hypertension and hyperlipidemia.     Follow up in about 6 months (around 1/16/2025) for chronic medical issues.

## 2024-07-16 NOTE — PATIENT INSTRUCTIONS
BP:  Stay off spironolactone  Add back metoprolol 12.5 mg nightly (1/2 pill)  Continue olmesartan 20 mg daily   Continue lasix 20 mg three times a week     He needs Tdap (tetanus vaccine) and RSV vaccine at the pharmacy.

## 2024-07-24 ENCOUNTER — LAB VISIT (OUTPATIENT)
Dept: LAB | Facility: HOSPITAL | Age: 79
End: 2024-07-24
Attending: INTERNAL MEDICINE
Payer: MEDICARE

## 2024-07-24 DIAGNOSIS — I10 ESSENTIAL HYPERTENSION: ICD-10-CM

## 2024-07-24 LAB
ANION GAP SERPL CALC-SCNC: 7 MMOL/L (ref 8–16)
BUN SERPL-MCNC: 19 MG/DL (ref 8–23)
CALCIUM SERPL-MCNC: 9.6 MG/DL (ref 8.7–10.5)
CHLORIDE SERPL-SCNC: 107 MMOL/L (ref 95–110)
CO2 SERPL-SCNC: 27 MMOL/L (ref 23–29)
CREAT SERPL-MCNC: 1 MG/DL (ref 0.5–1.4)
EST. GFR  (NO RACE VARIABLE): >60 ML/MIN/1.73 M^2
GLUCOSE SERPL-MCNC: 75 MG/DL (ref 70–110)
POTASSIUM SERPL-SCNC: 4.3 MMOL/L (ref 3.5–5.1)
SODIUM SERPL-SCNC: 141 MMOL/L (ref 136–145)

## 2024-07-24 PROCEDURE — 36415 COLL VENOUS BLD VENIPUNCTURE: CPT | Mod: PO | Performed by: INTERNAL MEDICINE

## 2024-07-24 PROCEDURE — 80048 BASIC METABOLIC PNL TOTAL CA: CPT | Performed by: INTERNAL MEDICINE

## 2024-07-25 ENCOUNTER — TELEPHONE (OUTPATIENT)
Dept: HEMATOLOGY/ONCOLOGY | Facility: CLINIC | Age: 79
End: 2024-07-25
Payer: MEDICARE

## 2024-07-25 NOTE — NURSING
Spoke with patient, scheduled to see Dr. Oliveros 8/7 per Dr. Oliveros.  Pt verbalized agreement to time date location.  Will continue to follow.

## 2024-07-26 ENCOUNTER — PATIENT MESSAGE (OUTPATIENT)
Dept: FAMILY MEDICINE | Facility: CLINIC | Age: 79
End: 2024-07-26
Payer: MEDICARE

## 2024-08-07 ENCOUNTER — DOCUMENTATION ONLY (OUTPATIENT)
Dept: HEMATOLOGY/ONCOLOGY | Facility: CLINIC | Age: 79
End: 2024-08-07
Payer: MEDICARE

## 2024-08-07 ENCOUNTER — OFFICE VISIT (OUTPATIENT)
Dept: GASTROENTEROLOGY | Facility: CLINIC | Age: 79
End: 2024-08-07
Payer: MEDICARE

## 2024-08-07 VITALS
DIASTOLIC BLOOD PRESSURE: 80 MMHG | BODY MASS INDEX: 34.22 KG/M2 | HEIGHT: 70 IN | RESPIRATION RATE: 16 BRPM | SYSTOLIC BLOOD PRESSURE: 142 MMHG | OXYGEN SATURATION: 98 % | TEMPERATURE: 98 F | WEIGHT: 239 LBS | HEART RATE: 88 BPM

## 2024-08-07 DIAGNOSIS — D49.0 IPMN (INTRADUCTAL PAPILLARY MUCINOUS NEOPLASM): ICD-10-CM

## 2024-08-07 DIAGNOSIS — K86.2 PANCREAS CYST: ICD-10-CM

## 2024-08-07 PROCEDURE — 99999 PR PBB SHADOW E&M-EST. PATIENT-LVL III: CPT | Mod: PBBFAC,,, | Performed by: INTERNAL MEDICINE

## 2024-08-07 PROCEDURE — 99213 OFFICE O/P EST LOW 20 MIN: CPT | Mod: S$PBB,,, | Performed by: INTERNAL MEDICINE

## 2024-08-07 PROCEDURE — 99213 OFFICE O/P EST LOW 20 MIN: CPT | Mod: PBBFAC,PN | Performed by: INTERNAL MEDICINE

## 2024-08-07 RX ORDER — DOXYCYCLINE HYCLATE 50 MG/1
50 CAPSULE, GELATIN COATED ORAL
COMMUNITY
Start: 2024-06-20

## 2024-09-12 DIAGNOSIS — E78.5 HYPERLIPIDEMIA, UNSPECIFIED HYPERLIPIDEMIA TYPE: ICD-10-CM

## 2024-09-12 RX ORDER — ATORVASTATIN CALCIUM 20 MG/1
20 TABLET, FILM COATED ORAL
Qty: 90 TABLET | Refills: 3 | Status: SHIPPED | OUTPATIENT
Start: 2024-09-12

## 2024-09-12 NOTE — TELEPHONE ENCOUNTER
No care due was identified.  [facility] Embedded Care Due Messages. Reference number: [id number].   9/12/2024 6:20:33 AM CDT

## 2024-09-12 NOTE — TELEPHONE ENCOUNTER
Rohan Abarca  is requesting a refill authorization.  Brief Assessment and Rationale for Refill:  Approve     Medication Therapy Plan:         Comments:     Note composed:11:59 AM 09/12/2024

## 2024-09-17 ENCOUNTER — PATIENT MESSAGE (OUTPATIENT)
Dept: FAMILY MEDICINE | Facility: CLINIC | Age: 79
End: 2024-09-17
Payer: MEDICARE

## 2024-09-17 RX ORDER — OLMESARTAN MEDOXOMIL 20 MG/1
20 TABLET ORAL DAILY
Qty: 90 TABLET | Refills: 3 | Status: SHIPPED | OUTPATIENT
Start: 2024-09-17 | End: 2025-09-17

## 2024-09-17 NOTE — TELEPHONE ENCOUNTER
No care due was identified.  [facility] Embedded Care Due Messages. Reference number: [id number].   9/17/2024 2:11:52 PM CDT

## 2024-09-30 ENCOUNTER — PATIENT MESSAGE (OUTPATIENT)
Dept: FAMILY MEDICINE | Facility: CLINIC | Age: 79
End: 2024-09-30
Payer: MEDICARE

## 2024-10-01 RX ORDER — OLMESARTAN MEDOXOMIL 20 MG/1
40 TABLET ORAL DAILY
Start: 2024-10-01 | End: 2025-10-01

## 2024-10-14 ENCOUNTER — TELEPHONE (OUTPATIENT)
Dept: CARDIOLOGY | Facility: CLINIC | Age: 79
End: 2024-10-14
Payer: MEDICARE

## 2024-10-14 NOTE — TELEPHONE ENCOUNTER
Cardiac clearance for Lumbar RFA. Anesthesia type not listed. Pt taking ASA.     [facility]  Fax: [phone]

## 2024-10-16 ENCOUNTER — PATIENT MESSAGE (OUTPATIENT)
Dept: FAMILY MEDICINE | Facility: CLINIC | Age: 79
End: 2024-10-16
Payer: MEDICARE

## 2024-10-16 ENCOUNTER — OFFICE VISIT (OUTPATIENT)
Dept: FAMILY MEDICINE | Facility: CLINIC | Age: 79
End: 2024-10-16
Payer: MEDICARE

## 2024-10-16 VITALS
OXYGEN SATURATION: 97 % | RESPIRATION RATE: 20 BRPM | WEIGHT: 237 LBS | SYSTOLIC BLOOD PRESSURE: 152 MMHG | DIASTOLIC BLOOD PRESSURE: 76 MMHG | BODY MASS INDEX: 33.93 KG/M2 | TEMPERATURE: 98 F | HEIGHT: 70 IN | HEART RATE: 50 BPM

## 2024-10-16 DIAGNOSIS — I49.5 TACHY-BRADY SYNDROME: ICD-10-CM

## 2024-10-16 DIAGNOSIS — Z95.0 PACEMAKER: ICD-10-CM

## 2024-10-16 DIAGNOSIS — I10 PRIMARY HYPERTENSION: Primary | ICD-10-CM

## 2024-10-16 LAB
OHS QRS DURATION: 162 MS
OHS QTC CALCULATION: 479 MS

## 2024-10-16 PROCEDURE — 93010 ELECTROCARDIOGRAM REPORT: CPT | Mod: S$PBB,,, | Performed by: INTERNAL MEDICINE

## 2024-10-16 RX ORDER — AMLODIPINE BESYLATE 5 MG/1
5 TABLET ORAL DAILY
Qty: 90 TABLET | Refills: 3 | Status: CANCELLED | OUTPATIENT
Start: 2024-10-16 | End: 2025-10-16

## 2024-10-16 RX ORDER — METOPROLOL SUCCINATE 25 MG/1
25 TABLET, EXTENDED RELEASE ORAL NIGHTLY
Qty: 90 TABLET | Refills: 3 | Status: SHIPPED | OUTPATIENT
Start: 2024-10-16

## 2024-10-16 RX ORDER — OLMESARTAN MEDOXOMIL 40 MG/1
40 TABLET ORAL DAILY
Qty: 90 TABLET | Refills: 3 | Status: SHIPPED | OUTPATIENT
Start: 2024-10-16 | End: 2025-10-16

## 2024-10-16 NOTE — PROGRESS NOTES
Subjective:       Patient ID: Rohan Abarca is a 79 y.o. male.    Medication List with Changes/Refills   New Medications    OLMESARTAN (BENICAR) 40 MG TABLET    Take 1 tablet (40 mg total) by mouth once daily.   Current Medications    ASPIRIN (ECOTRIN) 81 MG EC TABLET    Take 1 tablet (81 mg total) by mouth once daily.    ATORVASTATIN (LIPITOR) 20 MG TABLET    TAKE 1 TABLET(20 MG) BY MOUTH EVERY DAY    DOXYCYCLINE 50 MG CAPSULE    Take 50 mg by mouth.    FLUTICASONE PROPIONATE (FLONASE) 50 MCG/ACTUATION NASAL SPRAY    2 sprays (100 mcg total) by Each Nostril route once daily.    FUROSEMIDE (LASIX) 20 MG TABLET    Take 20 mg by mouth daily as needed.    KETOCONAZOLE (NIZORAL) 2 % SHAMPOO    APPLY TOPICALLY 3 TIMES A WEEK    VIT A/VIT C/VIT E/ZINC/COPPER (PRESERVISION AREDS ORAL)    Take 1 capsule by mouth 2 (two) times a day.   Changed and/or Refilled Medications    Modified Medication Previous Medication    METOPROLOL SUCCINATE (TOPROL-XL) 25 MG 24 HR TABLET metoprolol succinate (TOPROL-XL) 25 MG 24 hr tablet       Take 1 tablet (25 mg total) by mouth every evening.    Take 0.5 tablets (12.5 mg total) by mouth every evening.   Discontinued Medications    OLMESARTAN (BENICAR) 20 MG TABLET    Take 2 tablets (40 mg total) by mouth once daily.       Chief Complaint: Hypertension  He is here today to f/u on elevated BP at home.     Recall he has tachy-bassem syndrome with runs of SVT and is s/p pacer placement on 1/2024.  He continues on metoprolol 12.5 mg daily. He reports his home BP machine is running with HR in the mid 60s. He denies lightheadedness, fatigue or dizziness. His pacer was last interrogated in 6/2024.      He has hypertension and is taking metoprolol 12.5 mg daily and olmesartan 40 mg daily.  His home BP for about a week run 150-160s/90-100s.  He denies any changes or forgetting his medication. He denies any shortness of breath or chest pain. He has chronic lower leg swelling and is taking lasix 20 mg  "three times a week. He feels this controls his symptoms. In the past amlodipine worsened his lower leg swelling.     Review of Systems   Constitutional:  Negative for activity change, appetite change, chills, fatigue, fever and unexpected weight change.   HENT:  Negative for congestion, ear discharge, ear pain, hearing loss, mouth sores, postnasal drip, rhinorrhea, sinus pressure, sore throat and trouble swallowing.    Eyes:  Negative for pain, discharge, redness and visual disturbance.   Respiratory:  Negative for cough, chest tightness, shortness of breath and wheezing.    Cardiovascular:  Negative for chest pain, palpitations and leg swelling.   Gastrointestinal:  Negative for abdominal pain, blood in stool, constipation, diarrhea, nausea and vomiting.   Endocrine: Negative for polyuria.   Genitourinary:  Negative for dysuria and hematuria.   Musculoskeletal:  Negative for arthralgias, back pain, myalgias and neck stiffness.   Skin:  Negative for rash.   Neurological:  Negative for dizziness, weakness, numbness and headaches.   Hematological:  Negative for adenopathy. Does not bruise/bleed easily.   Psychiatric/Behavioral:  Negative for dysphoric mood, sleep disturbance and suicidal ideas. The patient is not nervous/anxious.        Objective:      Vitals:    10/16/24 1153 10/16/24 1201   BP: (!) 152/76    BP Location: Right arm    Patient Position: Sitting    Pulse: (!) 44 (!) 50   Resp: 20    Temp: 98.2 °F (36.8 °C)    SpO2: 97%    Weight: 107.5 kg (236 lb 15.9 oz)    Height: 5' 9.5" (1.765 m)      Body mass index is 34.5 kg/m².  Physical Exam    General appearance: No acute distress, cooperative  Neck: FROM, soft, supple, no thyromegaly, no bruits  Lymph: no anterior or posterior cervical adenopathy  Heart::  bradycardia rate and normal rhythm, no murmur  Lung: Clear to ascultation bilaterally, no wheezing, no rales, no rhonchi, no distress  Abdomen: Soft, nontender, no distention, no hepatosplenomegaly, bowel " sounds normal, no guarding, no rebound, no peritoneal signs  Skin: no rashes, no lesions  Extremities: no edema, no cyanosis  Peripheral pulses: 2+ pedal pulses bilaterally    Assessment:       1. Primary hypertension    2. Tachy-bassem syndrome    3. Pacemaker        Plan:       Primary hypertension  Uncontrolled and will continue olmesartan at 40 mg daily and increase metoprolol to 25 mg daily. EKG reassuring that HR in the 70s and has a pacemaker.  Consider adding low dose amlodipine if BP still elevated.   -     olmesartan (BENICAR) 40 MG tablet; Take 1 tablet (40 mg total) by mouth once daily.  Dispense: 90 tablet; Refill: 3  -     metoprolol succinate (TOPROL-XL) 25 MG 24 hr tablet; Take 1 tablet (25 mg total) by mouth every evening.  Dispense: 90 tablet; Refill: 3  -     Hypertension Digital Medicine (HDMP) Enrollment Order    Tachy-bassem syndrome with Pacemaker  HR low when arrived but EKG reassuring. Will increase metoprolol dose.   -     IN OFFICE EKG 12-LEAD (to Muse)    Follow up in about 2 weeks (around 10/30/2024) for with me to recheck BP .

## 2024-10-31 ENCOUNTER — OFFICE VISIT (OUTPATIENT)
Dept: FAMILY MEDICINE | Facility: CLINIC | Age: 79
End: 2024-10-31
Payer: MEDICARE

## 2024-10-31 VITALS
TEMPERATURE: 98 F | HEART RATE: 63 BPM | BODY MASS INDEX: 33.28 KG/M2 | DIASTOLIC BLOOD PRESSURE: 96 MMHG | HEIGHT: 70 IN | RESPIRATION RATE: 16 BRPM | WEIGHT: 232.5 LBS | OXYGEN SATURATION: 97 % | SYSTOLIC BLOOD PRESSURE: 154 MMHG

## 2024-10-31 DIAGNOSIS — Z23 NEED FOR COVID-19 VACCINE: ICD-10-CM

## 2024-10-31 DIAGNOSIS — Z23 NEED FOR INFLUENZA VACCINATION: ICD-10-CM

## 2024-10-31 DIAGNOSIS — I10 PRIMARY HYPERTENSION: Primary | ICD-10-CM

## 2024-10-31 PROCEDURE — 91322 SARSCOV2 VAC 50 MCG/0.5ML IM: CPT | Mod: S$GLB,,, | Performed by: INTERNAL MEDICINE

## 2024-10-31 PROCEDURE — G0008 ADMIN INFLUENZA VIRUS VAC: HCPCS | Mod: S$GLB,,, | Performed by: INTERNAL MEDICINE

## 2024-10-31 PROCEDURE — 99213 OFFICE O/P EST LOW 20 MIN: CPT | Mod: S$GLB,,, | Performed by: INTERNAL MEDICINE

## 2024-10-31 PROCEDURE — 90480 ADMN SARSCOV2 VAC 1/ONLY CMP: CPT | Mod: S$GLB,,, | Performed by: INTERNAL MEDICINE

## 2024-10-31 PROCEDURE — G2211 COMPLEX E/M VISIT ADD ON: HCPCS | Mod: S$GLB,,, | Performed by: INTERNAL MEDICINE

## 2024-10-31 PROCEDURE — 90653 IIV ADJUVANT VACCINE IM: CPT | Mod: S$GLB,,, | Performed by: INTERNAL MEDICINE

## 2024-10-31 RX ORDER — AMLODIPINE BESYLATE 5 MG/1
5 TABLET ORAL DAILY
Qty: 90 TABLET | Refills: 3 | Status: SHIPPED | OUTPATIENT
Start: 2024-10-31 | End: 2025-10-31

## 2024-10-31 RX ORDER — GABAPENTIN 100 MG/1
100 CAPSULE ORAL
COMMUNITY
Start: 2024-10-14

## 2024-11-13 ENCOUNTER — PATIENT MESSAGE (OUTPATIENT)
Dept: FAMILY MEDICINE | Facility: CLINIC | Age: 79
End: 2024-11-13
Payer: MEDICARE

## 2024-11-13 DIAGNOSIS — I10 PRIMARY HYPERTENSION: ICD-10-CM

## 2024-11-14 RX ORDER — METOPROLOL SUCCINATE 25 MG/1
25 TABLET, EXTENDED RELEASE ORAL 2 TIMES DAILY
Start: 2024-11-14

## 2024-11-26 ENCOUNTER — PATIENT MESSAGE (OUTPATIENT)
Dept: FAMILY MEDICINE | Facility: CLINIC | Age: 79
End: 2024-11-26
Payer: MEDICARE

## 2024-11-26 DIAGNOSIS — I10 PRIMARY HYPERTENSION: ICD-10-CM

## 2024-11-26 RX ORDER — AMLODIPINE BESYLATE 5 MG/1
5 TABLET ORAL 2 TIMES DAILY
Start: 2024-11-26 | End: 2025-11-26

## 2024-12-06 ENCOUNTER — PATIENT MESSAGE (OUTPATIENT)
Dept: FAMILY MEDICINE | Facility: CLINIC | Age: 79
End: 2024-12-06
Payer: MEDICARE

## 2024-12-16 ENCOUNTER — PATIENT MESSAGE (OUTPATIENT)
Dept: FAMILY MEDICINE | Facility: CLINIC | Age: 79
End: 2024-12-16
Payer: MEDICARE

## 2024-12-16 DIAGNOSIS — I87.2 VENOUS INSUFFICIENCY: Primary | ICD-10-CM

## 2024-12-16 DIAGNOSIS — I87.2 VENOUS INSUFFICIENCY (CHRONIC) (PERIPHERAL): ICD-10-CM

## 2024-12-16 DIAGNOSIS — I10 PRIMARY HYPERTENSION: ICD-10-CM

## 2024-12-18 ENCOUNTER — CLINICAL SUPPORT (OUTPATIENT)
Dept: CARDIOLOGY | Facility: HOSPITAL | Age: 79
End: 2024-12-18
Attending: INTERNAL MEDICINE
Payer: MEDICARE

## 2024-12-18 ENCOUNTER — CLINICAL SUPPORT (OUTPATIENT)
Dept: CARDIOLOGY | Facility: HOSPITAL | Age: 79
End: 2024-12-18
Payer: MEDICARE

## 2024-12-18 DIAGNOSIS — Z95.0 PRESENCE OF CARDIAC PACEMAKER: ICD-10-CM

## 2024-12-18 DIAGNOSIS — R00.1 BRADYCARDIA, UNSPECIFIED: ICD-10-CM

## 2024-12-18 PROCEDURE — 93294 REM INTERROG EVL PM/LDLS PM: CPT | Mod: ,,, | Performed by: INTERNAL MEDICINE

## 2024-12-18 RX ORDER — AMLODIPINE BESYLATE 5 MG/1
5 TABLET ORAL DAILY
Start: 2024-12-18 | End: 2025-12-18

## 2024-12-18 RX ORDER — METOPROLOL SUCCINATE 25 MG/1
50 TABLET, EXTENDED RELEASE ORAL 2 TIMES DAILY
Start: 2024-12-18

## 2024-12-18 NOTE — TELEPHONE ENCOUNTER
It looks like he is scheduled for one through cardiology at [facility] on 12/20. Please advise.

## 2024-12-20 ENCOUNTER — HOSPITAL ENCOUNTER (OUTPATIENT)
Dept: RADIOLOGY | Facility: HOSPITAL | Age: 79
Discharge: HOME | End: 2024-12-20
Attending: INTERNAL MEDICINE
Payer: MEDICARE

## 2024-12-20 DIAGNOSIS — I10 PRIMARY HYPERTENSION: ICD-10-CM

## 2024-12-20 DIAGNOSIS — I87.2 VENOUS INSUFFICIENCY: ICD-10-CM

## 2024-12-20 DIAGNOSIS — I87.2 VENOUS INSUFFICIENCY (CHRONIC) (PERIPHERAL): ICD-10-CM

## 2024-12-20 PROCEDURE — 93970 EXTREMITY STUDY: CPT | Mod: TC,PO

## 2024-12-20 PROCEDURE — 93925 LOWER EXTREMITY STUDY: CPT | Mod: 26,,, | Performed by: STUDENT IN AN ORGANIZED HEALTH CARE EDUCATION/TRAINING PROGRAM

## 2024-12-20 PROCEDURE — 93922 UPR/L XTREMITY ART 2 LEVELS: CPT | Mod: TC,PO

## 2024-12-20 PROCEDURE — 93922 UPR/L XTREMITY ART 2 LEVELS: CPT | Mod: 26,,, | Performed by: STUDENT IN AN ORGANIZED HEALTH CARE EDUCATION/TRAINING PROGRAM

## 2024-12-24 ENCOUNTER — PATIENT MESSAGE (OUTPATIENT)
Dept: FAMILY MEDICINE | Facility: CLINIC | Age: 79
End: 2024-12-24
Payer: MEDICARE

## 2024-12-24 DIAGNOSIS — I10 PRIMARY HYPERTENSION: Primary | ICD-10-CM

## 2024-12-24 RX ORDER — CHLORTHALIDONE 25 MG/1
25 TABLET ORAL DAILY
Qty: 90 TABLET | Refills: 3 | Status: SHIPPED | OUTPATIENT
Start: 2024-12-24 | End: 2025-12-24

## 2024-12-24 NOTE — TELEPHONE ENCOUNTER
See [employer] message    Schedule repeat bmp in about 10 days after starting chlorthalidone    Send BP readings at that time as well    Thanks

## 2025-01-02 ENCOUNTER — LAB VISIT (OUTPATIENT)
Dept: LAB | Facility: HOSPITAL | Age: 80
End: 2025-01-02
Attending: INTERNAL MEDICINE
Payer: MEDICARE

## 2025-01-02 DIAGNOSIS — I10 ESSENTIAL HYPERTENSION: ICD-10-CM

## 2025-01-02 DIAGNOSIS — I10 PRIMARY HYPERTENSION: ICD-10-CM

## 2025-01-02 LAB
ANION GAP SERPL CALC-SCNC: 11 MMOL/L (ref 8–16)
BASOPHILS # BLD AUTO: 0.04 K/UL (ref 0–0.2)
BASOPHILS NFR BLD: 0.4 % (ref 0–1.9)
BUN SERPL-MCNC: 17 MG/DL (ref 8–23)
CALCIUM SERPL-MCNC: 9.8 MG/DL (ref 8.7–10.5)
CHLORIDE SERPL-SCNC: 103 MMOL/L (ref 95–110)
CO2 SERPL-SCNC: 28 MMOL/L (ref 23–29)
CREAT SERPL-MCNC: 1 MG/DL (ref 0.5–1.4)
DIFFERENTIAL METHOD BLD: ABNORMAL
EOSINOPHIL # BLD AUTO: 0.2 K/UL (ref 0–0.5)
EOSINOPHIL NFR BLD: 1.5 % (ref 0–8)
ERYTHROCYTE [DISTWIDTH] IN BLOOD BY AUTOMATED COUNT: 13.3 % (ref 11.5–14.5)
EST. GFR  (NO RACE VARIABLE): >60 ML/MIN/1.73 M^2
GLUCOSE SERPL-MCNC: 99 MG/DL (ref 70–110)
HCT VFR BLD AUTO: 45.5 % (ref 40–54)
HGB BLD-MCNC: 15.2 G/DL (ref 14–18)
IMM GRANULOCYTES # BLD AUTO: 0.05 K/UL (ref 0–0.04)
IMM GRANULOCYTES NFR BLD AUTO: 0.5 % (ref 0–0.5)
LYMPHOCYTES # BLD AUTO: 2.1 K/UL (ref 1–4.8)
LYMPHOCYTES NFR BLD: 18.8 % (ref 18–48)
MCH RBC QN AUTO: 31.3 PG (ref 27–31)
MCHC RBC AUTO-ENTMCNC: 33.4 G/DL (ref 32–36)
MCV RBC AUTO: 94 FL (ref 82–98)
MONOCYTES # BLD AUTO: 1.3 K/UL (ref 0.3–1)
MONOCYTES NFR BLD: 11.4 % (ref 4–15)
NEUTROPHILS # BLD AUTO: 7.4 K/UL (ref 1.8–7.7)
NEUTROPHILS NFR BLD: 67.4 % (ref 38–73)
NRBC BLD-RTO: 0 /100 WBC
PLATELET # BLD AUTO: 202 K/UL (ref 150–450)
PMV BLD AUTO: 12.2 FL (ref 9.2–12.9)
POTASSIUM SERPL-SCNC: 3.8 MMOL/L (ref 3.5–5.1)
RBC # BLD AUTO: 4.86 M/UL (ref 4.6–6.2)
SODIUM SERPL-SCNC: 142 MMOL/L (ref 136–145)
WBC # BLD AUTO: 11.04 K/UL (ref 3.9–12.7)

## 2025-01-02 PROCEDURE — 85025 COMPLETE CBC W/AUTO DIFF WBC: CPT | Performed by: INTERNAL MEDICINE

## 2025-01-02 PROCEDURE — 36415 COLL VENOUS BLD VENIPUNCTURE: CPT | Mod: PO | Performed by: INTERNAL MEDICINE

## 2025-01-02 PROCEDURE — 80048 BASIC METABOLIC PNL TOTAL CA: CPT | Performed by: INTERNAL MEDICINE

## 2025-01-03 ENCOUNTER — PATIENT MESSAGE (OUTPATIENT)
Dept: FAMILY MEDICINE | Facility: CLINIC | Age: 80
End: 2025-01-03
Payer: MEDICARE

## 2025-01-10 LAB
OHS CV AF BURDEN PERCENT: < 1
OHS CV DC REMOTE DEVICE TYPE: NORMAL
OHS CV RV PACING PERCENT: 0.09 %

## 2025-01-13 DIAGNOSIS — Z00.00 ENCOUNTER FOR MEDICARE ANNUAL WELLNESS EXAM: ICD-10-CM

## 2025-01-16 ENCOUNTER — PATIENT MESSAGE (OUTPATIENT)
Dept: FAMILY MEDICINE | Facility: CLINIC | Age: 80
End: 2025-01-16

## 2025-01-16 ENCOUNTER — OFFICE VISIT (OUTPATIENT)
Dept: FAMILY MEDICINE | Facility: CLINIC | Age: 80
End: 2025-01-16
Payer: MEDICARE

## 2025-01-16 VITALS
HEIGHT: 70 IN | RESPIRATION RATE: 16 BRPM | SYSTOLIC BLOOD PRESSURE: 124 MMHG | OXYGEN SATURATION: 98 % | BODY MASS INDEX: 33.83 KG/M2 | TEMPERATURE: 98 F | DIASTOLIC BLOOD PRESSURE: 88 MMHG | WEIGHT: 236.31 LBS | HEART RATE: 64 BPM

## 2025-01-16 DIAGNOSIS — Z85.46 HISTORY OF PROSTATE CANCER: ICD-10-CM

## 2025-01-16 DIAGNOSIS — D49.0 IPMN (INTRADUCTAL PAPILLARY MUCINOUS NEOPLASM): ICD-10-CM

## 2025-01-16 DIAGNOSIS — G31.84 MCI (MILD COGNITIVE IMPAIRMENT): ICD-10-CM

## 2025-01-16 DIAGNOSIS — G56.01 CARPAL TUNNEL SYNDROME ON RIGHT: ICD-10-CM

## 2025-01-16 DIAGNOSIS — I10 PRIMARY HYPERTENSION: ICD-10-CM

## 2025-01-16 DIAGNOSIS — I49.5 TACHY-BRADY SYNDROME: ICD-10-CM

## 2025-01-16 DIAGNOSIS — Z95.0 PACEMAKER: ICD-10-CM

## 2025-01-16 DIAGNOSIS — I87.2 VENOUS INSUFFICIENCY: ICD-10-CM

## 2025-01-16 DIAGNOSIS — I65.23 BILATERAL CAROTID ARTERY STENOSIS: ICD-10-CM

## 2025-01-16 DIAGNOSIS — J30.9 CHRONIC ALLERGIC RHINITIS: ICD-10-CM

## 2025-01-16 DIAGNOSIS — H35.3223 EXUDATIVE AGE-RELATED MACULAR DEGENERATION, LEFT EYE, WITH INACTIVE SCAR: ICD-10-CM

## 2025-01-16 DIAGNOSIS — L71.9 ROSACEA: ICD-10-CM

## 2025-01-16 DIAGNOSIS — Z79.899 MEDICATION MANAGEMENT: ICD-10-CM

## 2025-01-16 DIAGNOSIS — E78.5 HYPERLIPIDEMIA, UNSPECIFIED HYPERLIPIDEMIA TYPE: ICD-10-CM

## 2025-01-16 DIAGNOSIS — I70.0 AORTIC ATHEROSCLEROSIS: ICD-10-CM

## 2025-01-16 DIAGNOSIS — I25.10 CORONARY ARTERY DISEASE INVOLVING NATIVE CORONARY ARTERY OF NATIVE HEART WITHOUT ANGINA PECTORIS: Primary | ICD-10-CM

## 2025-01-16 DIAGNOSIS — G47.33 OSA (OBSTRUCTIVE SLEEP APNEA): ICD-10-CM

## 2025-01-16 DIAGNOSIS — E66.812 CLASS 2 SEVERE OBESITY WITH SERIOUS COMORBIDITY AND BODY MASS INDEX (BMI) OF 36.0 TO 36.9 IN ADULT, UNSPECIFIED OBESITY TYPE: ICD-10-CM

## 2025-01-16 DIAGNOSIS — E66.01 CLASS 2 SEVERE OBESITY WITH SERIOUS COMORBIDITY AND BODY MASS INDEX (BMI) OF 36.0 TO 36.9 IN ADULT, UNSPECIFIED OBESITY TYPE: ICD-10-CM

## 2025-01-16 DIAGNOSIS — I50.32 CHRONIC DIASTOLIC HEART FAILURE: ICD-10-CM

## 2025-01-16 DIAGNOSIS — I35.0 MILD AORTIC STENOSIS: ICD-10-CM

## 2025-01-16 LAB
ANION GAP SERPL CALC-SCNC: 8 MMOL/L (ref 8–16)
BUN SERPL-MCNC: 21 MG/DL (ref 8–23)
CALCIUM SERPL-MCNC: 9.8 MG/DL (ref 8.7–10.5)
CHLORIDE SERPL-SCNC: 103 MMOL/L (ref 95–110)
CO2 SERPL-SCNC: 31 MMOL/L (ref 23–29)
CREAT SERPL-MCNC: 1 MG/DL (ref 0.5–1.4)
EST. GFR  (NO RACE VARIABLE): >60 ML/MIN/1.73 M^2
GLUCOSE SERPL-MCNC: 120 MG/DL (ref 70–110)
POTASSIUM SERPL-SCNC: 3.8 MMOL/L (ref 3.5–5.1)
SODIUM SERPL-SCNC: 142 MMOL/L (ref 136–145)

## 2025-01-16 PROCEDURE — 80048 BASIC METABOLIC PNL TOTAL CA: CPT | Performed by: INTERNAL MEDICINE

## 2025-01-16 PROCEDURE — 99214 OFFICE O/P EST MOD 30 MIN: CPT | Mod: S$GLB,,, | Performed by: INTERNAL MEDICINE

## 2025-01-16 PROCEDURE — G2211 COMPLEX E/M VISIT ADD ON: HCPCS | Mod: S$GLB,,, | Performed by: INTERNAL MEDICINE

## 2025-01-16 PROCEDURE — 36415 COLL VENOUS BLD VENIPUNCTURE: CPT | Mod: S$GLB,,, | Performed by: INTERNAL MEDICINE

## 2025-01-16 RX ORDER — METOPROLOL SUCCINATE 25 MG/1
25 TABLET, EXTENDED RELEASE ORAL DAILY
Start: 2025-01-16

## 2025-01-16 NOTE — PROGRESS NOTES
Subjective:       Patient ID: Rohan Abarca is a 79 y.o. male.    Medication List with Changes/Refills   Current Medications    ASPIRIN (ECOTRIN) 81 MG EC TABLET    Take 1 tablet (81 mg total) by mouth once daily.    ATORVASTATIN (LIPITOR) 20 MG TABLET    TAKE 1 TABLET(20 MG) BY MOUTH EVERY DAY    CHLORTHALIDONE (HYGROTEN) 25 MG TAB    Take 1 tablet (25 mg total) by mouth once daily.    DOXYCYCLINE 50 MG CAPSULE    Take 50 mg by mouth.    FLUTICASONE PROPIONATE (FLONASE) 50 MCG/ACTUATION NASAL SPRAY    2 sprays (100 mcg total) by Each Nostril route once daily.    FUROSEMIDE (LASIX) 20 MG TABLET    Take 20 mg by mouth daily as needed.    KETOCONAZOLE (NIZORAL) 2 % SHAMPOO    APPLY TOPICALLY 3 TIMES A WEEK    OLMESARTAN (BENICAR) 40 MG TABLET    Take 1 tablet (40 mg total) by mouth once daily.    VIT A/VIT C/VIT E/ZINC/COPPER (PRESERVISION AREDS ORAL)    Take 1 capsule by mouth 2 (two) times a day.   Changed and/or Refilled Medications    Modified Medication Previous Medication    METOPROLOL SUCCINATE (TOPROL-XL) 25 MG 24 HR TABLET metoprolol succinate (TOPROL-XL) 25 MG 24 hr tablet       Take 1 tablet (25 mg total) by mouth once daily.    Take 2 tablets (50 mg total) by mouth 2 (two) times daily.   Discontinued Medications    GABAPENTIN (NEURONTIN) 100 MG CAPSULE    Take 100 mg by mouth.       Chief Complaint: Follow-up  He is here today to f/u on chronic medical issues.      He has tachy-bassem syndrome with runs of SVT. He was seen by EP who felt he would benefit from RFA for SVT but during procedure on 7/2022 was unable to induce SVT. He was diagnosed with tachy-bassem syndrome from a holter that showed non-sustained atrial tachycardia with HR ranging from 38 to 92. On 1/2024 he had a pacer placed. He denies any palpitations or racing heart.      He has AS with echo on 5/2022 that was mild. Repeat echo on 2/2024 showed EF 60%, no diastolic dysfunction,  mild aortic valve sclerosis without stenosis,  moderate  mitral annular valvular calcifications. He was seen by cardiology on 5/2024 and advised to f/u in one year.        He has hypertension and is taking olmesartan 40 mg daily, metoprolol 25 mg once a day and chlorthalidone 25 mg daily. He is unable to take amlodipine due to lower leg edema and spironolactone caused hyperkalemia.   He denies chest pain or shortness of breath. He has no known CAD but  a CT urogram showed calcification in coronary arteries.     He has chronic venous insufficiency and is taking lasix 20 mg three times a week but has been taking daily for the last 2 weeks.  Venous u/s on 12/2024 showed significant reflux in the bilateral greater and lesser saphenous veins, right popliteal vein, and left common femoral, femoral, and popliteal veins.  Arterial u/s on 12/2024 was negative for stenosis.  He reports the swelling is much better after stopping amlodipine and taking lasix daily.      He had a carotid u/s in 2014 that showed 50% stenosis at carotid bifurcations but no significant stenosis.  Repeat u/s on 10/2020 showed no significant stenosis.      He has hyperlipidemia and is taking atorvastatin 20 mg qday. HIs lipids on 7/2024 were 94/50/39/45.  He is taking aspirin daily.        Incidental finding on CT urogram was cystic lesion with complexity in the pancreas.MRI on 2/2021 showed Multilocular cystic mass centered in the uncinate process of the pancreas, similar in size as compared to the prior CT on 12/28/2020, with imaging features most suggestive of a side branch intraductal papillary mucinous neoplasm.  He had EUS on 3/2021 that showed gastritis and biopsied cystic lesion in uncinate process of pancreas that was 40 mm. Biopsy was negative. MRI on 5/2021 showed  Increase size of the lesion and then had subsequent EUS on 10/2021 showing cystic uncinate process lesion of pancreas without any duct abnormalities. He was seen by surgical oncology and advised repeat  MRI in 6 months. MRI on 8/2022  showed multi-septate cystic lesion with dilatation downstream consistent with a stable IPMN. He had a EUS on 11/2022 that showed increasing uncinated cystic mass (from 4.3 to 4.5 cm) and increasing dilatation of pancreatic duct (from 5 mm to 6.2 mm).  Repeat EUS on 7/2023 showed stable cystic IPMN, no intraductal lesions (common bile duct or pancreatic duct) and advised to repeat EUS vs MRCP in 6 months. He was seen by surgical oncology on 4/2023 who agrees with surveillance at this time. MRCP on 2/2024 showed minimal increase in size of the proximal pancreatic cyst and no change in size of the pancreatic tail cyst. He was seen by GI on 8/2024 who advised to repeat EUS in 2/2025 and take samples at that time. If no change in size and sampling reassuring then GI advised no further follow up was needed.      He has known renal stones on CT urogram on 12/2020 and a CT on 5/2022. CT stone protocol on 7/2022 showed There is right hydronephrosis, hydroureter with inflammatory stranding demonstrated about the proximal ureter, renal pelvis.  This correlates with a persistent calculus at the renal pelvis, caliceal junction albeit a proximal ureteral calculus measuring 3 x 4 x 3 mm.  Some superimposed early inflammation could also present in this fashion. MRI on 8/2022 showed Mild right hydro nephrosis.  There is low signal intensity stone in the proximal right ureter, most likely secondary to ureterolithiasis. He was seen by urology and underwent ureteroscopy with fragmentation of right kidney stone on 11/2022. He has not had any further f/u with urology or repeat imaging.      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 He had multiple excisions but bx still showed residual cancer.  He was treated with fluorouracil and did very well. He continues to follow with dermatology every 4 months.      He has rosacea  and continues on doxycycline 100 mg daily.  He does have some discoloration around his  neck and on his face due to minocycline that is slowly resolving since stopping this medication.      He continues with memory issues. He was seen by neurology in 6/2017 who ordered MRI (chronic microvascular ischemic changes) and neuropsych testing which was normal.  He was advised to start statin and aspirin.  He continues to struggle with remembering short term conversations and names.      He has REGAN but is not using cpap. He would like a new sleep study and to see sleep medicine to discuss restarting cpap. He has not had a sleep study in over 10 years. He does complain of snoring and trouble sleeping.      He has history of prostate cancer s/p radical prostatectomy in 2012.  He did not go on hormonal treatment. He no longer follows with urology.  His last PSA was undetectable on 7/2024.      He has right wrist CTS and gets full relief with wrist splints which he wears nightly. He had a right steroid injection with significant relief of symptoms on 12/2023.  He denies any active pain.      He lives with his wife and feels safe at home. He does exercise with walking. He tries to eat healthy.  He denies any balance issues but did have a fall while walking up stairs on 12/2024. He had right shoulder pain and right wrist pain that has resolved. He was seen by orthopedics.      Colonoscopy----10/2022 repeat in 10 years    Tdap---more than 10 years  Pneumovax---12/2018  Prevnar----5/2018  Influenza vaccine----10/2024  Shingrex vaccine----8/2020, 12/2020  Covid vaccine---4 doses   RSV vaccine-----none      Review of Systems   Constitutional:  Negative for appetite change, fatigue, fever and unexpected weight change.   HENT:  Negative for congestion, ear pain, hearing loss, sore throat and trouble swallowing.    Eyes:  Negative for pain and visual disturbance.   Respiratory:  Negative for cough, chest tightness, shortness of breath and wheezing.    Cardiovascular:  Positive for leg swelling. Negative for chest pain and  "palpitations.   Gastrointestinal:  Negative for abdominal pain, blood in stool, constipation, diarrhea, nausea and vomiting.   Endocrine: Negative for polyuria.   Genitourinary:  Negative for dysuria and hematuria.   Musculoskeletal:  Negative for arthralgias, back pain and myalgias.   Skin:  Negative for rash.   Allergic/Immunologic: Positive for environmental allergies.   Neurological:  Negative for dizziness, weakness, numbness and headaches.   Hematological:  Does not bruise/bleed easily.   Psychiatric/Behavioral:  Positive for sleep disturbance. Negative for dysphoric mood and suicidal ideas. The patient is not nervous/anxious.        Objective:      Vitals:    01/16/25 0905   BP: 124/88   BP Location: Right arm   Patient Position: Sitting   Pulse: 64   Resp: 16   Temp: 98.4 °F (36.9 °C)   SpO2: 98%   Weight: 107.2 kg (236 lb 5.3 oz)   Height: 5' 9.5" (1.765 m)     Body mass index is 34.4 kg/m².  Physical Exam    General appearance: No acute distress, cooperative  Eyes: PERRL, EOMI, conjunctiva clear  Ears:  hearing aids   Nose: Normal mucosa without drainage  Throat: no exudates or erythema, tonsils not enlarged  Mouth: no sores or lesions, moist mucous membranes  Neck: FROM, soft, supple, no thyromegaly, no bruits  Lymph: no anterior or posterior cervical adenopathy  Heart::  Regular rate and rhythm, no murmur  Lung: Clear to ascultation bilaterally, no wheezing, no rales, no rhonchi, no distress  Abdomen: Soft, nontender, no distention, no hepatosplenomegaly, bowel sounds normal, no guarding, no rebound, no peritoneal signs  Skin: no rashes, no lesions  Extremities: no edema, no cyanosis  Neuro: CN 2-12 intact, 5/5 muscle strength upper and lower extremity bilaterally, 2+ DTRs UE and LE bilaterally, normal gait  Peripheral pulses: 2+ pedal pulses bilaterally  Musculoskeletal: FROM, good strenth, no tenderness  Joint: normal appearance, no swelling, no warmth, no deformity in all joints    Assessment:     "   1. Coronary artery disease involving native coronary artery of native heart without angina pectoris    2. Tachy-bassem syndrome    3. Pacemaker    4. Mild aortic stenosis    5. Chronic diastolic heart failure    6. Venous insufficiency    7. Primary hypertension    8. Hyperlipidemia, unspecified hyperlipidemia type    9. Aortic atherosclerosis    10. Bilateral carotid artery stenosis    11. Chronic allergic rhinitis    12. IPMN (intraductal papillary mucinous neoplasm)    13. Rosacea    14. MCI (mild cognitive impairment)    15. History of prostate cancer    16. Exudative age-related macular degeneration, left eye, with inactive scar    17. Carpal tunnel syndrome on right    18. REGAN (obstructive sleep apnea)    19. Class 2 severe obesity with serious comorbidity and body mass index (BMI) of 36.0 to 36.9 in adult, unspecified obesity type    20. Medication management        Plan:       Coronary artery disease involving native coronary artery of native heart without angina pectoris  STable and no active symptoms    Tachy-bassem syndrome with Pacemaker  He is doing well and continues to follow in interrogation clinic    Mild aortic stenosis  None seen on recent echo. Repeat echo in 2026    Chronic diastolic heart failure  Well compensated    Venous insufficiency  Lower leg swelling has improved with stopping amlodipine and using lasix 20 mg daily with chlorthalidone daily.  Okay to decrease lasix back to three times a week. Will check renal function and electrolytes today.     Primary hypertension  Well controlled and continue current regimen. All his labs look good and he is tolerating.   -     Basic Metabolic Panel  -     CBC Auto Differential; Future; Expected date: 01/16/2025  -     Comprehensive Metabolic Panel; Future; Expected date: 01/16/2025  -     Lipid Panel; Future; Expected date: 01/16/2025  -     TSH; Future; Expected date: 01/16/2025  -     metoprolol succinate (TOPROL-XL) 25 MG 24 hr tablet; Take 1  tablet (25 mg total) by mouth once daily.    Hyperlipidemia, unspecified hyperlipidemia type  Good control on atorvastatin and aspirin    Aortic atherosclerosis  Continue statin and aspirin    Bilateral carotid artery stenosis  Continue statin and aspirin    Chronic allergic rhinitis  Increased symptoms and advised to restart flonase    IPMN (intraductal papillary mucinous neoplasm)  Due to have an EUS in 2/205 and if reassuring then will not need further surveillance.     Rosacea  Continue on doxycyline. Good control    MCI (mild cognitive impairment)  Mild and stable    History of prostate cancer  No recurrence.   -     PROSTATE SPECIFIC ANTIGEN, DIAGNOSTIC; Future; Expected date: 01/16/2025    Exudative age-related macular degeneration, left eye, with inactive scar  Stable and continue to follow with ophthalmology    Carpal tunnel syndrome on right  Improved    REGAN (obstructive sleep apnea)  Strong suspicion for uncontrolled REGAN. Will get home sleep study.   -     Home Sleep Study; Future    Class 2 severe obesity with serious comorbidity and body mass index (BMI) of 36.0 to 36.9 in adult, unspecified obesity type  Long discussion on the benefits of healthy eating and regular exercise to help lose weight and help control cad, hypertension and hyperlipidemia.     Medication management  -     Hemoglobin A1C; Future; Expected date: 01/16/2025    Follow up in about 6 months (around 7/16/2025) for chronic medical issues.

## 2025-01-20 ENCOUNTER — PATIENT MESSAGE (OUTPATIENT)
Dept: FAMILY MEDICINE | Facility: CLINIC | Age: 80
End: 2025-01-20
Payer: MEDICARE

## 2025-01-27 NOTE — PROGRESS NOTES
"Subjective:    Patient ID:  Rohan Abarca is a 80 y.o. male who presents for follow-up of Follow-up (6month )      Problem List Items Addressed This Visit          Cardiac/Vascular    Aortic atherosclerosis - Primary    Bilateral carotid artery stenosis    Overview     Carotid US 8/29/14         Bradycardia    Cardiac pacemaker in situ    Overview     2/19/24    Medtronic conduction system pacing         Chronic diastolic heart failure    Coronary artery disease involving native coronary artery of native heart without angina pectoris    Essential hypertension    Mild aortic stenosis    Mixed hyperlipidemia    RBBB    SVT (supraventricular tachycardia)    Tachy-bassem syndrome         Patient was last seen on 01/22/2024 at which time he was pending pacemaker implantation.  Pacemaker implanted as detailed above.    History of Present Illness    CHIEF COMPLAINT:  Mr. Abarca presents today for follow up of pacemaker.    CARDIOVASCULAR:  He denies chest pain, shortness of breath, palpitations, major heart rhythm issues, or episodes of tachycardia. His pacemaker is functioning well and he denies noticing its presence.    PHYSICAL ACTIVITY:  He exercises at the gym and walks up and down stairs in parking garage. On non-exercise days, he spends significant time sitting at computer.    MEDICATIONS:  He reports no issues with current medications.    Parts of this note were transcribed using voice recognition and generative artificial intelligence software ([employer] and [employer]).  Please excuse any grammatical or syntax errors and reach out to me with any questions or clarifications needed.         Objective:     Vitals:    01/30/25 1022   BP: 132/86   BP Location: Right arm   Patient Position: Sitting   Pulse: 60   Weight: 108.8 kg (239 lb 13.8 oz)   Height: 5' 9" (1.753 m)       BP Readings from Last 5 Encounters:   01/30/25 132/86   01/16/25 124/88   10/31/24 (!) 154/96   10/16/24 (!) 152/76   08/07/24 (!) 142/80      "   Physical Exam  Constitutional:       Appearance: He is well-developed.   HENT:      Head: Normocephalic and atraumatic.   Neck:      Vascular: No JVD.   Cardiovascular:      Rate and Rhythm: Normal rate and regular rhythm.      Heart sounds: Normal heart sounds. No murmur heard.     No friction rub. No gallop.   Pulmonary:      Effort: Pulmonary effort is normal. No respiratory distress.      Breath sounds: Normal breath sounds. No wheezing or rales.   Abdominal:      General: Bowel sounds are normal.      Palpations: Abdomen is soft.      Tenderness: There is no abdominal tenderness. There is no guarding or rebound.   Musculoskeletal:      Cervical back: Normal range of motion and neck supple.   Skin:     General: Skin is warm and dry.   Neurological:      Mental Status: He is alert and oriented to person, place, and time.   Psychiatric:         Behavior: Behavior normal.             Current Outpatient Medications   Medication Instructions    aspirin (ECOTRIN) 81 mg, Oral, Daily    atorvastatin (LIPITOR) 20 mg, Oral    chlorthalidone (HYGROTEN) 25 mg, Oral, Daily    doxycycline 50 mg    fluticasone propionate (FLONASE) 100 mcg, Each Nostril, Daily    furosemide (LASIX) 20 mg, Daily PRN    ketoconazole (NIZORAL) 2 % shampoo APPLY TOPICALLY 3 TIMES A WEEK    metoprolol succinate (TOPROL-XL) 25 mg, Oral, Daily    olmesartan (BENICAR) 40 mg, Oral, Daily    VIT A/VIT C/VIT E/ZINC/COPPER (PRESERVISION AREDS ORAL) 1 capsule, 2 times daily       Lipid Panel:   Lab Results   Component Value Date    CHOL 94 (L) 07/09/2024    HDL 39 (L) 07/09/2024    LDLCALC 45.0 (L) 07/09/2024    TRIG 50 07/09/2024    CHOLHDL 41.5 07/09/2024       The ASCVD Risk score ([place] DK, et al., 2019) failed to calculate for the following reasons:    The 2019 ASCVD risk score is only valid for ages 40 to 79    Most Recent EKG Results  Results for orders placed or performed in visit on 10/16/24   IN OFFICE EKG 12-LEAD (to [place])    Collection Time:  10/16/24 12:14 PM   Result Value Ref Range    QRS Duration 162 ms    [facility] QTC Calculation 479 ms    Narrative    Test Reason : R00.1,Z95.0,    Vent. Rate : 074 BPM     Atrial Rate : 063 BPM     P-R Int : 252 ms          QRS Dur : 162 ms      QT Int : 432 ms       P-R-T Axes : 016 043 028 degrees     QTc Int : 479 ms    Atrial-paced rhythm with prolonged AV conduction with frequent Premature  ventricular complexes  Right bundle branch block  Inferior infarct ,age undetermined  Abnormal ECG  When compared with ECG of 20-MAY-2024 10:48,  Electronic atrial pacemaker has replaced Electronic ventricular pacemaker  Confirmed by Rufus Anthony MD ([id number]) on 10/16/2024 2:22:30 PM    Referred By: LADARIUS HOWE           Confirmed By:Rufus Anthony MD       Most Recent Echocardiogram Results  Results for orders placed during the hospital encounter of 02/19/24    Echo    Interpretation Summary    Left Ventricle: There is normal systolic function with a visually estimated ejection fraction of 60 - 65%. There is normal diastolic function.    Right Ventricle: Normal right ventricular cavity size. Wall thickness is normal. Right ventricle wall motion  is normal. Systolic function is normal.    Aortic Valve: The aortic valve is a trileaflet valve. There is mild aortic valve sclerosis.    Mitral Valve: There is mild bileaflet sclerosis. There is moderate mitral annular calcification present.    IVC/SVC: Intermediate venous pressure at 8 mmHg.    Pericardium: There is a small anterior, partially organized effusion.  I cannot differentaite bettwen epicardial fat pad or possibly pericardial thrombus.  There is variation on the mitral and tricuspid inflows, but there is no RV or RA diastolic collapse. Suggest serial studies.      Most Recent Nuclear Stress Test Results  Results for orders placed during the hospital encounter of 05/17/22    Nuclear Stress - Cardiology Interpreted    Interpretation Summary    Normal myocardial perfusion scan. There is  no evidence of myocardial ischemia or infarction.    There is a  mild intensity fixed perfusion abnormality in the inferior and inferoseptal wall of the left ventricle secondary to diaphragm attenuation.    There is moderate apical thinning which is a normal variant.    The gated perfusion images showed an ejection fraction of 64% at rest.    There is normal wall motion at rest.    The EKG portion of this study is negative for ischemia.    When compared to the previous study from 9/11/2020, there are no significant changes.      Most Recent Cardiac PET Stress Test Results  No results found for this or any previous visit.      Most Recent Cardiovascular Angiogram results  No results found for this or any previous visit.      Other Most Recent Cardiology Results  Results for orders placed in visit on 12/18/24    Cardiac device check - Remote        All pertinent data including labs, imaging, EKGs, and studies listed above were reviewed.  Patient's most recent EKG tracing was personally interpreted by this provider.    Diagnoses:       1. Aortic atherosclerosis    2. Bilateral carotid artery stenosis    3. Bradycardia    4. Cardiac pacemaker in situ    5. Chronic diastolic heart failure    6. Coronary artery disease involving native coronary artery of native heart without angina pectoris    7. Essential hypertension    8. Mild aortic stenosis    9. Mixed hyperlipidemia    10. RBBB    11. SVT (supraventricular tachycardia)    12. Tachy-bassem syndrome         Plan for treatment of the above diagnoses:     Assessment & Plan    Reviewed pacemaker function and patient's cardiac symptoms  Assessed recent ultrasound from February 24, which appeared normal  Evaluated patient's activity level and cardiovascular health    PLAN SUMMARY:  Continue current exercise routine including walking and gym visits  Follow-up in 3 months for cardiac check and pacemaker evaluation  Move at least once every hour during extended sitting  periods    PLAN NOTE:   Emphasized the importance of regular movement for overall health, especially at an advanced age.   Discussed the risks of prolonged sitting, including increased risk of blood clots after 4 hours.   Mr. Abarca to continue current exercise routine including walking in the parking garage and gym visits.   Mr. Abarca to get up and move at least once every hour when sitting for extended periods.        Continue aspirin 81 mg PO Daily   Continue atorvastatin 20 mg PO Daily   Continue chlorthalidone 25 mg PO Daily   Continue Lasix 20 mg PO Daily PRN volume   Continue metoprolol succinate 25 mg PO Daily   Continue olmesartan 40 mg PO Daily    Continue other cardiac medications  Mediterranean Diet/Cardiovascular Exercise Program    Visit today included increased complexity associated with the care of the episodic problem(s) addressed above in addition to managing the longitudinal care of the patient due to the serious and/or complex managed problem(s) listed above.    Parts of this note were transcribed using voice recognition and generative artificial intelligence software ([employer]).  Please excuse any grammatical or syntax errors and reach out to me with any questions or clarifications needed.    Patient queried and all questions were answered.    F/u in 1 year to reassess      Signed:    Rodrigo Stevenson MD  1/30/2025 10:27 AM

## 2025-01-30 ENCOUNTER — OFFICE VISIT (OUTPATIENT)
Dept: CARDIOLOGY | Facility: CLINIC | Age: 80
End: 2025-01-30
Payer: MEDICARE

## 2025-01-30 VITALS
HEART RATE: 60 BPM | WEIGHT: 239.88 LBS | SYSTOLIC BLOOD PRESSURE: 132 MMHG | HEIGHT: 69 IN | BODY MASS INDEX: 35.53 KG/M2 | DIASTOLIC BLOOD PRESSURE: 86 MMHG

## 2025-01-30 DIAGNOSIS — I45.10 RBBB: ICD-10-CM

## 2025-01-30 DIAGNOSIS — I10 ESSENTIAL HYPERTENSION: ICD-10-CM

## 2025-01-30 DIAGNOSIS — E78.2 MIXED HYPERLIPIDEMIA: ICD-10-CM

## 2025-01-30 DIAGNOSIS — I25.10 CORONARY ARTERY DISEASE INVOLVING NATIVE CORONARY ARTERY OF NATIVE HEART WITHOUT ANGINA PECTORIS: ICD-10-CM

## 2025-01-30 DIAGNOSIS — Z95.0 CARDIAC PACEMAKER IN SITU: ICD-10-CM

## 2025-01-30 DIAGNOSIS — I35.0 MILD AORTIC STENOSIS: ICD-10-CM

## 2025-01-30 DIAGNOSIS — I70.0 AORTIC ATHEROSCLEROSIS: Primary | ICD-10-CM

## 2025-01-30 DIAGNOSIS — I65.23 BILATERAL CAROTID ARTERY STENOSIS: ICD-10-CM

## 2025-01-30 DIAGNOSIS — I47.10 SVT (SUPRAVENTRICULAR TACHYCARDIA): ICD-10-CM

## 2025-01-30 DIAGNOSIS — I50.32 CHRONIC DIASTOLIC HEART FAILURE: ICD-10-CM

## 2025-01-30 DIAGNOSIS — I49.5 TACHY-BRADY SYNDROME: ICD-10-CM

## 2025-01-30 DIAGNOSIS — R00.1 BRADYCARDIA: ICD-10-CM

## 2025-01-30 PROCEDURE — G2211 COMPLEX E/M VISIT ADD ON: HCPCS | Mod: S$PBB,,, | Performed by: INTERNAL MEDICINE

## 2025-01-30 PROCEDURE — 99213 OFFICE O/P EST LOW 20 MIN: CPT | Mod: PBBFAC,PO | Performed by: INTERNAL MEDICINE

## 2025-01-30 PROCEDURE — 99214 OFFICE O/P EST MOD 30 MIN: CPT | Mod: S$PBB,,, | Performed by: INTERNAL MEDICINE

## 2025-01-30 PROCEDURE — 99999 PR PBB SHADOW E&M-EST. PATIENT-LVL III: CPT | Mod: PBBFAC,,, | Performed by: INTERNAL MEDICINE

## 2025-02-18 NOTE — ANESTHESIA POSTPROCEDURE EVALUATION
Anesthesia Post Evaluation    Patient: Rohan Abarca    Procedure(s) Performed: Procedure(s) (LRB):  CARDIAC ELECTROPHYSIOLOGY STUDY, DIAGNOSTIC (N/A)  Cardioversion or Defibrillation    Final Anesthesia Type: general      Patient location during evaluation: PACU  Patient participation: Yes- Able to Participate  Level of consciousness: awake and alert  Post-procedure vital signs: reviewed and stable  Pain management: adequate  Airway patency: patent    PONV status at discharge: No PONV  Anesthetic complications: no      Cardiovascular status: blood pressure returned to baseline  Respiratory status: unassisted  Hydration status: euvolemic  Follow-up not needed.          Vitals Value Taken Time   /82 07/07/22 1830   Temp 36.5 °C (97.7 °F) 07/07/22 1639   Pulse 68 07/07/22 1830   Resp 20 07/07/22 1830   SpO2 94 % 07/07/22 1830         No case tracking events are documented in the log.      Pain/Rabia Score: Pain Rating Prior to Med Admin: 3 (7/7/2022  3:56 PM)  Rabia Score: 10 (7/7/2022  4:39 PM)         Opt out

## 2025-03-10 ENCOUNTER — PATIENT MESSAGE (OUTPATIENT)
Dept: FAMILY MEDICINE | Facility: CLINIC | Age: 80
End: 2025-03-10
Payer: MEDICARE

## 2025-03-19 ENCOUNTER — CLINICAL SUPPORT (OUTPATIENT)
Dept: CARDIOLOGY | Facility: HOSPITAL | Age: 80
End: 2025-03-19
Attending: INTERNAL MEDICINE
Payer: MEDICARE

## 2025-03-19 ENCOUNTER — CLINICAL SUPPORT (OUTPATIENT)
Dept: CARDIOLOGY | Facility: HOSPITAL | Age: 80
End: 2025-03-19
Payer: MEDICARE

## 2025-03-19 DIAGNOSIS — R00.1 BRADYCARDIA, UNSPECIFIED: ICD-10-CM

## 2025-03-19 DIAGNOSIS — Z95.0 PRESENCE OF CARDIAC PACEMAKER: ICD-10-CM

## 2025-03-19 PROCEDURE — 93294 REM INTERROG EVL PM/LDLS PM: CPT | Mod: ,,, | Performed by: INTERNAL MEDICINE

## 2025-03-26 ENCOUNTER — DOCUMENTATION ONLY (OUTPATIENT)
Dept: HEMATOLOGY/ONCOLOGY | Facility: CLINIC | Age: 80
End: 2025-03-26
Payer: MEDICARE

## 2025-03-26 NOTE — NURSING
Scheduled patient for fu to discuss EUS per Dr. Oliveros 3/26. Pt scheduled 5/7 per Dr. Oliveros. Appointment time/date/location will be discussed at discharge from EUS procedure, will fu with patient tomorrow to ensure appointment time/date/location. Will continue to follow.

## 2025-04-23 NOTE — PROGRESS NOTES
Venipuncture performed with 23 gauge butterfly, x's 1 attempt,  to R Antecubital vein.  Specimens collected per orders.      Pressure dressing applied to site, instructed patient to remove dressing in 10-15 minutes, OK to re-adjust dressing if pressure causing any discomfort, to observe closely for numbness and/or discoloration to hand or fingers, and to notify provider if bleeding persists after applying constant pressure lasting 30 minutes.                      Warm/Dry

## 2025-05-01 NOTE — PROGRESS NOTES
Dr. Oliveros,     I just wanted to let you know that this patient is potentially eligible for Dr. Foster's Early Detection of Pancreatic Cancer: Prospective Study.  If you mention the study to the patient at this visit, we will be glad to discuss the details of the study with him if he is interested. DREW Bloom RN/ 2nd-floor [facility].

## 2025-05-07 ENCOUNTER — OFFICE VISIT (OUTPATIENT)
Dept: GASTROENTEROLOGY | Facility: CLINIC | Age: 80
End: 2025-05-07
Payer: MEDICARE

## 2025-05-07 VITALS
TEMPERATURE: 98 F | BODY MASS INDEX: 34.65 KG/M2 | WEIGHT: 233.94 LBS | HEIGHT: 69 IN | OXYGEN SATURATION: 98 % | HEART RATE: 88 BPM | DIASTOLIC BLOOD PRESSURE: 82 MMHG | RESPIRATION RATE: 15 BRPM | SYSTOLIC BLOOD PRESSURE: 121 MMHG

## 2025-05-07 DIAGNOSIS — K86.2 PANCREAS CYST: Primary | ICD-10-CM

## 2025-05-07 DIAGNOSIS — D49.0 IPMN (INTRADUCTAL PAPILLARY MUCINOUS NEOPLASM): ICD-10-CM

## 2025-05-07 PROCEDURE — 99213 OFFICE O/P EST LOW 20 MIN: CPT | Mod: S$PBB,,, | Performed by: INTERNAL MEDICINE

## 2025-05-07 PROCEDURE — 99999 PR PBB SHADOW E&M-EST. PATIENT-LVL III: CPT | Mod: PBBFAC,,, | Performed by: INTERNAL MEDICINE

## 2025-05-07 PROCEDURE — 99213 OFFICE O/P EST LOW 20 MIN: CPT | Mod: PBBFAC,PN | Performed by: INTERNAL MEDICINE

## 2025-05-07 NOTE — PROGRESS NOTES
GI Clinic Note    HPI  Rohan Abarca is a very pleasant 80 y.o.  male with hx of pacemaker, HTN, REGAN, hx of prostate cancer, here for f/u visit to discuss management of pancreatic cyst.  Patient has been undergoing EUS with me since 3/2021 for surveillance of pancreatic uncinate cyst.  Last EUS was in 3/2025- cyst measured [id number] at that time. FNA was performed. He is here to review results. He feels well and has no complaints.      Past Medical History  Past Medical History:   Diagnosis Date    Arthritis     Bilateral cataracts     hx    Cardiac pacemaker in situ 5/20/2024    Colon polyp     benign    Gross hematuria     HBP (high blood pressure)     Hearing loss     History of detached retina repair     left    Kidney stones     Macular degeneration     REGAN (obstructive sleep apnea)     uses CPAP    Prostate cancer 2012    RBBB     Rosacea     Squamous cell carcinoma of skin     SVT (supraventricular tachycardia)        Past Surgical History  Past Surgical History:   Procedure Laterality Date    A-V CARDIAC PACEMAKER INSERTION N/A 2/19/2024    Procedure: INSERTION, CARDIAC PACEMAKER, DUAL CHAMBER;  Surgeon: Tom Patiño MD;  Location: [facility] EP LAB;  Service: Cardiology;  Laterality: N/A;  TBS, Dual PPM w/LBAP lead, MDT, MAC, SK, 3 Prep    BUNIONECTOMY Right     CATARACT EXTRACTION BILATERAL W/ ANTERIOR VITRECTOMY Bilateral     COLONOSCOPY N/A 3/20/2017    Procedure: COLONOSCOPY;  Surgeon: Carl Marcelino MD;  Location: [facility] ENDO;  Service: Endoscopy;  Laterality: N/A;    COLONOSCOPY N/A 10/4/2022    Procedure: COLONOSCOPY;  Surgeon: Carl Marcelino MD;  Location: [facility] ENDO;  Service: Endoscopy;  Laterality: N/A;    CYSTOSCOPY N/A 12/30/2020    Procedure: CYSTOSCOPY;  Surgeon: Kevin De Leon Jr., MD;  [facility] OR;  Service: Urology;  Laterality: N/A;    CYSTOURETEROSCOPY WITH RETROGRADE PYELOGRAPHY AND INSERTION OF STENT INTO URETER Right 11/8/2022    Procedure: CYSTOURETEROSCOPY, WITH  RETROGRADE PYELOGRAM AND URETERAL STENT INSERTION;  Surgeon: FEI Rangel MD;  Location: [facility] OR;  Service: Urology;  Laterality: Right;    DIAGNOSTIC CARDIAC ELECTROPHYSIOLOGY STUDY N/A 7/7/2022    Procedure: CARDIAC ELECTROPHYSIOLOGY STUDY, DIAGNOSTIC;  Surgeon: Tom Patiño MD;  Location: [facility] EP LAB;  Service: Cardiology;  Laterality: N/A;  SVT, RFA, LENNY, MAC, SK, 3 Prep    ENDOSCOPIC ULTRASOUND OF UPPER GASTROINTESTINAL TRACT Left 3/12/2021    Procedure: ULTRASOUND, UPPER GI TRACT, ENDOSCOPIC;  Surgeon: Carlton Oliveros MD;  Location: [facility] ENDO;  Service: Endoscopy;  Laterality: Left;  Linear    ENDOSCOPIC ULTRASOUND OF UPPER GASTROINTESTINAL TRACT Left 10/20/2021    Procedure: ULTRASOUND, UPPER GI TRACT, ENDOSCOPIC;  Surgeon: Carlton Oliveros MD;  Location: [facility] ENDO;  Service: Endoscopy;  Laterality: Left;  Linear scope    ENDOSCOPIC ULTRASOUND OF UPPER GASTROINTESTINAL TRACT Left 11/30/2022    Procedure: ULTRASOUND, UPPER GI TRACT, ENDOSCOPIC;  Surgeon: Carlton Oliveros MD;  Location: [facility] ENDO;  Service: Endoscopy;  Laterality: Left;  Linear    ENDOSCOPIC ULTRASOUND OF UPPER GASTROINTESTINAL TRACT Left 7/5/2023    Procedure: ULTRASOUND, UPPER GI TRACT, ENDOSCOPIC;  Surgeon: Carlton Oliveros MD;  Location: [facility] ENDO;  Service: Endoscopy;  Laterality: Left;    ENDOSCOPIC ULTRASOUND OF UPPER GASTROINTESTINAL TRACT Left 3/26/2025    Procedure: ULTRASOUND, UPPER GI TRACT, ENDOSCOPIC;  Surgeon: Carlton Oliveros MD;  Location: [facility] ENDO;  Service: Endoscopy;  Laterality: Left;    ESOPHAGOGASTRODUODENOSCOPY N/A 3/12/2021    Procedure: EGD (ESOPHAGOGASTRODUODENOSCOPY);  Surgeon: Carlton Oliveros MD;  Location: [facility] ENDO;  Service: Endoscopy;  Laterality: N/A;    ESOPHAGOGASTRODUODENOSCOPY N/A 10/20/2021    Procedure: EGD (ESOPHAGOGASTRODUODENOSCOPY);  Surgeon: Carlton Oliveros MD;  Location: [facility] ENDO;  Service: Endoscopy;  Laterality: N/A;    ESOPHAGOGASTRODUODENOSCOPY N/A  11/30/2022    Procedure: EGD (ESOPHAGOGASTRODUODENOSCOPY);  Surgeon: Carlton Oliveros MD;  Location: [facility] ENDO;  Service: Endoscopy;  Laterality: N/A;    ESOPHAGOGASTRODUODENOSCOPY N/A 7/5/2023    Procedure: EGD (ESOPHAGOGASTRODUODENOSCOPY);  Surgeon: Carlton Oliveros MD;  Location: [facility] ENDO;  Service: Endoscopy;  Laterality: N/A;    ESOPHAGOGASTRODUODENOSCOPY N/A 3/26/2025    Procedure: EGD (ESOPHAGOGASTRODUODENOSCOPY);  Surgeon: Carlton Oliveros MD;  Location: [facility] ENDO;  Service: Endoscopy;  Laterality: N/A;    HERNIA REPAIR      x 2    JOINT REPLACEMENT Right 2012    per Dr. Heriberto Colón    JOINT REPLACEMENT Left 2004    per Dr. Heriberto Colón    KNEE SURGERY      left , right    LASER LITHOTRIPSY Right 11/8/2022    Procedure: LITHOTRIPSY, USING LASER-THULIUM;  Surgeon: FEI Rangel MD;  Location: [facility];  Service: Urology;  Laterality: Right;    MASTOID SURGERY Right     right    PROSTATECTOMY  2012    RETINAL DETACHMENT SURGERY      left    ROTATOR CUFF REPAIR      bilateral    SHOULDER SURGERY Left     RCR per Dr. Heriberto Colón    SHOULDER SURGERY Right     RCR per Dr. Heriberto Colón    TONSILLECTOMY, ADENOIDECTOMY      TREATMENT OF CARDIAC ARRHYTHMIA  7/7/2022    Procedure: Cardioversion or Defibrillation;  Surgeon: Tom Patiño MD;  Location: [facility] EP LAB;  Service: Cardiology;;       Medications  Current Outpatient Medications   Medication Instructions    aspirin (ECOTRIN) 81 mg, Oral, Daily    atorvastatin (LIPITOR) 20 mg, Oral    chlorthalidone (HYGROTEN) 25 mg, Oral, Daily    doxycycline 50 mg    fluticasone propionate (FLONASE) 100 mcg, Each Nostril, Daily    furosemide (LASIX) 20 mg, Daily PRN    ketoconazole (NIZORAL) 2 % shampoo APPLY TOPICALLY 3 TIMES A WEEK    metoprolol succinate (TOPROL-XL) 25 mg, Oral, Daily    olmesartan (BENICAR) 40 mg, Oral, Daily    VIT A/VIT C/VIT E/ZINC/COPPER (PRESERVISION AREDS ORAL) 1 capsule, 2 times daily        Allergies  Review of patient's allergies  indicates:  No Known Allergies      Review of Systems     ROS negative except as otherwise mentioned in the HPI        Physical Exam    Vitals:    05/07/25 1418   BP: 121/82   Pulse: 88   Resp: 15   Temp: 97.8 °F (36.6 °C)     Physical Examination:     General: well developed, well nourished  Eyes: conjunctivae/corneas clear. PERRL..  HENT: Head:normocephalic, atraumatic. Ears:hearing grossly normal bilaterally. Nose: Nares normal. Septum midline. Mucosa normal. No drainage or sinus tenderness., no discharge. Throat: lips, mucosa, and tongue normal; teeth and gums normal and no throat erythema.  Neck: supple, symmetrical, trachea midline, no JVD and thyroid not enlarged, symmetric, no tenderness/mass/nodules  Lungs:  clear to auscultation bilaterally and normal respiratory effort  Cardiovascular: Heart: regular rate and rhythm, S1, S2 normal, no murmur, click, rub or gallop. Chest Wall: no tenderness. Extremities: no cyanosis or edema, or clubbing. Pulses: 2+ and symmetric.  Abdomen/Rectal: Abdomen: soft, non-tender non-distented; bowel sounds normal; no masses,  no organomegaly. Rectal: not examined  Skin: Skin color, texture, turgor normal. No rashes or lesions  Musculoskeletal:normal gait and no clubbing, cyanosis  Lymph Nodes: No cervical or supraclavicular adenopathy  Neurologic: Normal strength and tone. No focal numbness or weakness  Psych/Behavioral:  Normal. and Alert and oriented, appropriate affect.              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 Has remained stable in size and appearance.  Last EUS 3/2025 measured [id number] in size.  -recent EUS results reviewed today. Cyst was unchanged in size. FNA was performed, cytology showed benign cellularity only.  CEA had decreased to 90 and Amylase was elevated.    -based on results, still suspect this is a large side branch IPMN. However, it has remained stable and has no high risk features except for its  size.  He has seen surgical oncology and observation only was recommended due to his age and co-morbidities.    -recommend continued observation with MRI in 1 year.   -of note, patient has strong fam hx of pancreatic cancer in 2 sisters and 1 nephew.     RTC 1 year after MR       There are no diagnoses linked to this encounter.

## 2025-05-12 ENCOUNTER — TELEPHONE (OUTPATIENT)
Dept: ELECTROPHYSIOLOGY | Facility: CLINIC | Age: 80
End: 2025-05-12
Payer: MEDICARE

## 2025-05-12 NOTE — TELEPHONE ENCOUNTER
Returned call to wife. Patient needs to reschedule appointments on 5/27. Patients wife agreed to appointment on 5/29.

## 2025-05-12 NOTE — TELEPHONE ENCOUNTER
----- Message from Dilcia sent at 5/12/2025  3:47 PM CDT -----  Type: Reschedule Appointment Request/ RescheduleCaller is requesting a sooner appointment.  Name of Caller:pts wife, [place] When is the pt's appointment?05/27/2025 Would the patient rather a call back or a response via [facility]? [facility] Call Back Number:[phone] Information: Pt has a scheduling conflict on this date and needs to reschedule all three of these appts for the same day please. Please call back to advise. Thanks!

## 2025-05-20 NOTE — PROGRESS NOTES
Electrophysiology Clinic Progress Note     EP Attending: Dr. Tom Powell, Rosa Mayo DO  Cardiology: Dr. Rodrigo Stevenson    Patient was last seen in clinic on 5/20/2024 by Jade French NP.    Subjective:   Patient ID:   Rohan Abarca is a 80 y.o. male with past medical history of:  SVT/AT, PACs, tachy-bradycardia syndrome, PPM with LBBAP who presents for follow up of PPM check    HPI:    Background:    Presented with sustained palpitations 5/17/22, noted to be in SVT with RBBB aberrancy. Notes indicate DCCVx3, followed by recurrence triggered by PAC's. Briefly put on amiodarone. Discharged on metoprolol.  Returned 5/29/22 with palpitations, headaches and chest pain. ECG revealed nsr. He reports symptoms improved prior to presentation.  Echo 5/17/22 normal biventricular structure and function severe LAE  Spect stress 5/17/22 negative     Baseline ECG revealed sinus bradycardia at 47 bpm RBBB, no pre-excitation  EPS 7/7/22 HV interval 75 msec. Induced sustained irregular rapid atrial tachycardia requiring DCCV.   Bardy patch revealed 9 episodes of nonsustained atrial tachycardia.  Recommendation was dual chamber PPM implantation with conduction system pacing and increasing beta blocker. They elected to defer.     1/22/2024: Notes occasional episodes of weakness, manifesting more in the chest area. Also notes increasing dyspnea on exertion.  Metoprolol discontinued due to bradycardia.  Notes occasional dizziness with standing.     MRI is scheduled for 2/5/24 for monitoring of his pancreas.  Tachy-bassem syndrome, symptomatic.  Echo with color flow.  Recommend dual chamber PPM with conduction system pacing. Defer until after MRI 2/5/24.  Risks and benefits discussed, he would like to proceed.     2/19/2024: Successful implantation of PPM Dual with left bundle branch area pacing      5/20/2024: Device Interrogation (5/20/2024) reveals an intrinsic SR with stable lead and device function.   EKG  were recorded in bipolar and unipolar pacing configurations at high and low outputs and reviewed with Dr. Hills  LBBA pacing lead impedance unipolar 399 Ohms, bipolar 589 Ohms; LBBA capture threshold unipolar 0.5V @0.5ms, bipolar 0.75V @ 0.4ms. No atrial arrhythmias. NSVTx3, max 3 seconds.  He paces 33% in the RA and <1% in the RV. Estimated battery longevity 14.7 years. Rate response added. EKG today, which shows APVP at 85bpm. NY interval is 230. QRS is 118. QTc is 485.  He is 3 months s/p implantation of PPM with LBBA pacing for tachy-bassem syndrome. Mr. Abarca is doing well from a device perspective with stable lead and device function. LBBA testing stable. ECGs reviewed by Dr. Hills. No sustained arrhythmia noted. He remains off metoprolol due to prior bradycardia but it is still on his list. Said he can restart this but to watch his BPs. Rate response added. OK to restart toprol 25mg daily. Continue current medication regimen and device settings.     Update (5/29/2025):    Today he tells me he feels great. He denied any palpitations, chest pain, or SOB.     Device Interrogation (5/29/2025) reveals an intrinsic sinus bradycardia at 57 bpm with stable lead and device function. No atrial arrhythmias or treated episodes were noted. One episode of NSVT that was one second in duration on 5/17/2025. He paces 89.3% in the RA and 0.1% in the RV. Estimated battery longevity 12.6 years.     I have personally reviewed the patient's EKG today, which shows AP-VS with PACs and PVCs at 66 bpm. NY interval is 250 ms. QRS is 168 ms. QTc is 471 ms.  Second ECG with PPM testing shows  - QRSd 126 ms    Relevant Cardiac Test Results:    Limited Echo (2/19/2024):    Left Ventricle: There is normal systolic function with a visually estimated ejection fraction of 60 - 65%. There is normal diastolic function.    Right Ventricle: Normal right ventricular cavity size. Wall thickness is normal. Right ventricle wall motion  is normal.  Systolic function is normal.    Aortic Valve: The aortic valve is a trileaflet valve. There is mild aortic valve sclerosis.    Mitral Valve: There is mild bileaflet sclerosis. There is moderate mitral annular calcification present.    IVC/SVC: Intermediate venous pressure at 8 mmHg.    Pericardium: There is a small anterior, partially organized effusion.  I cannot differentaite bettwen epicardial fat pad or possibly pericardial thrombus.  There is variation on the mitral and tricuspid inflows, but there is no RV or RA diastolic collapse. Suggest serial studies.    TTE 1/26/2024:     Left Ventricle: The left ventricle is normal in size. Normal wall thickness. Normal wall motion. There is normal systolic function. Ejection fraction by visual approximation is 65%. There is normal diastolic function.    Right Ventricle: Normal right ventricular cavity size. Wall thickness is normal. Right ventricle wall motion  is normal. Systolic function is normal.    Aortic Valve: The aortic valve is a trileaflet valve. There is mild aortic valve sclerosis without stenosis.    Mitral Valve: There is mild mitral annular calcification present.    IVC/SVC: IVC was not well visualized due to poor acoustic window.     Current Outpatient Medications   Medication Sig    aspirin (ECOTRIN) 81 MG EC tablet Take 1 tablet (81 mg total) by mouth once daily.    furosemide (LASIX) 20 MG tablet Take 20 mg by mouth daily as needed.    metoprolol succinate (TOPROL-XL) 25 MG 24 hr tablet Take 1 tablet (25 mg total) by mouth once daily.    olmesartan (BENICAR) 40 MG tablet Take 1 tablet (40 mg total) by mouth once daily.    VIT A/VIT C/VIT E/ZINC/COPPER (PRESERVISION AREDS ORAL) Take 1 capsule by mouth 2 (two) times a day.    atorvastatin (LIPITOR) 20 MG tablet TAKE 1 TABLET(20 MG) BY MOUTH EVERY DAY    chlorthalidone (HYGROTEN) 25 MG Tab Take 1 tablet (25 mg total) by mouth once daily.    doxycycline 50 MG capsule Take 50 mg by mouth.    fluticasone  "propionate (FLONASE) 50 mcg/actuation nasal spray 2 sprays (100 mcg total) by Each Nostril route once daily. (Patient not taking: Reported on 5/29/2025)    ketoconazole (NIZORAL) 2 % shampoo APPLY TOPICALLY 3 TIMES A WEEK (Patient not taking: Reported on 5/29/2025)     No current facility-administered medications for this visit.       Review of Systems   Constitutional: Positive for malaise/fatigue (no worse than usual). Negative for chills and fever.   HENT:  Negative for nosebleeds.    Cardiovascular:  Negative for chest pain, dyspnea on exertion, irregular heartbeat, palpitations and syncope.   Respiratory:  Negative for shortness of breath.    Hematologic/Lymphatic: Bruises/bleeds easily.   Musculoskeletal: Negative.    Gastrointestinal:  Negative for hematochezia and melena.   Genitourinary:  Negative for hematuria.   Neurological:  Negative for dizziness and light-headedness.     Objective:     /67 (BP Location: Right arm, Patient Position: Sitting)   Pulse 89   Ht 5' 9" (1.753 m)   Wt 104.3 kg (230 lb)   BMI 33.97 kg/m²     Physical Exam  Vitals and nursing note reviewed.   Constitutional:       General: He is not in acute distress.     Appearance: Normal appearance.   HENT:      Head: Normocephalic.      Mouth/Throat:      Mouth: Mucous membranes are moist.   Eyes:      Extraocular Movements: Extraocular movements intact.   Cardiovascular:      Rate and Rhythm: Normal rate and regular rhythm.   Pulmonary:      Effort: Pulmonary effort is normal. No respiratory distress.   Musculoskeletal:         General: Normal range of motion.      Cervical back: Normal range of motion.   Neurological:      General: No focal deficit present.      Mental Status: He is alert.   Psychiatric:         Mood and Affect: Mood normal.         Behavior: Behavior normal.         Thought Content: Thought content normal.         Judgment: Judgment normal.     Lab Results   Component Value Date     01/16/2025    K 3.8 " 01/16/2025     01/16/2025    CO2 31 (H) 01/16/2025    BUN 21 01/16/2025    CREATININE 1.0 01/16/2025    MG 2.0 05/18/2022    TSH 1.583 07/09/2024    ALT 26 07/09/2024    AST 24 07/09/2024       Lab Results   Component Value Date    HGBA1C 5.3 07/09/2024    Lab Results   Component Value Date    WBC 11.04 01/02/2025    HGB 15.2 01/02/2025    HCT 45.5 01/02/2025     01/02/2025    GRAN 7.4 01/02/2025    GRAN 67.4 01/02/2025    INR 1.1 01/26/2024    APTT 29.3 01/26/2024                Assessment:     1. Cardiac pacemaker in situ    2. SVT (supraventricular tachycardia)    3. Tachy-bassem syndrome    4. Essential hypertension    5. Coronary artery disease involving native coronary artery of native heart without angina pectoris        Plan:     In summary,  Rohan Abarca is a 80 y.o. male with past medical history of:  SVT/AT, PACs, tachy-bradycardia syndrome, PPM with LBBAP who presents for follow up of PPM check    Patient is stable from an arrhythmia standpoint. Device interrogation today shows stable lead and device function. He is AP 89.3% RV pacing 0.1%.  LVAT stable. Will plan to RTC in one year, sooner if needed. Continue with remote transmission q 3 months.     *A copy of this note has been sent to Dr. Patiño*    Follow up in about 1 year (around 5/29/2026).    ------------------------------------------------------------------    Gerda Lora, JASMYNE, NP-C  Cardiac Electrophysiology

## 2025-05-22 ENCOUNTER — PATIENT MESSAGE (OUTPATIENT)
Dept: OBSTETRICS AND GYNECOLOGY | Facility: CLINIC | Age: 80
End: 2025-05-22
Payer: MEDICARE

## 2025-05-29 ENCOUNTER — CLINICAL SUPPORT (OUTPATIENT)
Dept: CARDIOLOGY | Facility: HOSPITAL | Age: 80
End: 2025-05-29
Attending: INTERNAL MEDICINE
Payer: MEDICARE

## 2025-05-29 ENCOUNTER — HOSPITAL ENCOUNTER (OUTPATIENT)
Dept: CARDIOLOGY | Facility: CLINIC | Age: 80
Discharge: HOME | End: 2025-05-29
Payer: MEDICARE

## 2025-05-29 ENCOUNTER — OFFICE VISIT (OUTPATIENT)
Dept: ELECTROPHYSIOLOGY | Facility: CLINIC | Age: 80
End: 2025-05-29
Payer: MEDICARE

## 2025-05-29 VITALS
HEIGHT: 69 IN | HEART RATE: 89 BPM | DIASTOLIC BLOOD PRESSURE: 67 MMHG | WEIGHT: 230 LBS | BODY MASS INDEX: 34.07 KG/M2 | SYSTOLIC BLOOD PRESSURE: 133 MMHG

## 2025-05-29 DIAGNOSIS — I25.10 CORONARY ARTERY DISEASE INVOLVING NATIVE CORONARY ARTERY OF NATIVE HEART WITHOUT ANGINA PECTORIS: ICD-10-CM

## 2025-05-29 DIAGNOSIS — I49.5 TACHY-BRADY SYNDROME: ICD-10-CM

## 2025-05-29 DIAGNOSIS — I45.10 RBBB: ICD-10-CM

## 2025-05-29 DIAGNOSIS — I47.10 SVT (SUPRAVENTRICULAR TACHYCARDIA): ICD-10-CM

## 2025-05-29 DIAGNOSIS — Z95.0 CARDIAC PACEMAKER IN SITU: Primary | ICD-10-CM

## 2025-05-29 DIAGNOSIS — I10 ESSENTIAL HYPERTENSION: ICD-10-CM

## 2025-05-29 DIAGNOSIS — I49.9 CARDIAC ARRHYTHMIA, UNSPECIFIED CARDIAC ARRHYTHMIA TYPE: ICD-10-CM

## 2025-05-29 LAB
OHS QRS DURATION: 126 MS
OHS QRS DURATION: 168 MS
OHS QTC CALCULATION: 471 MS
OHS QTC CALCULATION: 528 MS

## 2025-05-29 PROCEDURE — 99214 OFFICE O/P EST MOD 30 MIN: CPT | Mod: S$PBB,,, | Performed by: NURSE PRACTITIONER

## 2025-05-29 PROCEDURE — 99213 OFFICE O/P EST LOW 20 MIN: CPT | Mod: PBBFAC,25 | Performed by: NURSE PRACTITIONER

## 2025-05-29 PROCEDURE — 93005 ELECTROCARDIOGRAM TRACING: CPT | Mod: PBBFAC | Performed by: INTERNAL MEDICINE

## 2025-05-29 PROCEDURE — 93010 ELECTROCARDIOGRAM REPORT: CPT | Mod: S$PBB,,, | Performed by: INTERNAL MEDICINE

## 2025-05-29 PROCEDURE — 93280 PM DEVICE PROGR EVAL DUAL: CPT

## 2025-05-29 PROCEDURE — 93010 ELECTROCARDIOGRAM REPORT: CPT | Mod: S$PBB,76,, | Performed by: INTERNAL MEDICINE

## 2025-05-29 PROCEDURE — 99999 PR PBB SHADOW E&M-EST. PATIENT-LVL III: CPT | Mod: PBBFAC,,, | Performed by: NURSE PRACTITIONER

## 2025-06-12 ENCOUNTER — PATIENT MESSAGE (OUTPATIENT)
Dept: FAMILY MEDICINE | Facility: CLINIC | Age: 80
End: 2025-06-12
Payer: MEDICARE

## 2025-06-12 RX ORDER — FUROSEMIDE 20 MG/1
20 TABLET ORAL DAILY PRN
Qty: 90 TABLET | Refills: 3 | Status: SHIPPED | OUTPATIENT
Start: 2025-06-12

## 2025-06-12 NOTE — TELEPHONE ENCOUNTER
Care Due:                  Date            Visit Type   Department     Provider  --------------------------------------------------------------------------------                                EP -                              PRIMARY      [facility]  Last Visit: 01-      CARE ([facility])   MARK Powell                              EP -                              PRIMARY      [facility]  Next Visit: 07-      CARE ([facility])   [facility]       Rosa Powell                                                            Last  Test          Frequency    Reason                     Performed    Due Date  --------------------------------------------------------------------------------    CMP.........  12 months..  atorvastatin.............  07- 07-    Lipid Panel.  12 months..  atorvastatin.............  07- 07-    [facility] Embedded Care Due Messages. Reference number: [id number].   6/12/2025 2:56:12 PM CDT

## 2025-06-18 ENCOUNTER — CLINICAL SUPPORT (OUTPATIENT)
Dept: CARDIOLOGY | Facility: HOSPITAL | Age: 80
End: 2025-06-18
Attending: INTERNAL MEDICINE
Payer: MEDICARE

## 2025-06-18 ENCOUNTER — CLINICAL SUPPORT (OUTPATIENT)
Dept: CARDIOLOGY | Facility: HOSPITAL | Age: 80
End: 2025-06-18
Payer: MEDICARE

## 2025-06-18 DIAGNOSIS — Z95.0 PRESENCE OF CARDIAC PACEMAKER: ICD-10-CM

## 2025-06-18 DIAGNOSIS — R00.1 BRADYCARDIA, UNSPECIFIED: ICD-10-CM

## 2025-06-18 PROCEDURE — 93294 REM INTERROG EVL PM/LDLS PM: CPT | Mod: ,,, | Performed by: INTERNAL MEDICINE

## 2025-07-15 ENCOUNTER — LAB VISIT (OUTPATIENT)
Dept: LAB | Facility: HOSPITAL | Age: 80
End: 2025-07-15
Attending: INTERNAL MEDICINE
Payer: MEDICARE

## 2025-07-15 ENCOUNTER — PATIENT MESSAGE (OUTPATIENT)
Dept: FAMILY MEDICINE | Facility: CLINIC | Age: 80
End: 2025-07-15
Payer: MEDICARE

## 2025-07-15 DIAGNOSIS — Z79.899 MEDICATION MANAGEMENT: ICD-10-CM

## 2025-07-15 DIAGNOSIS — I10 PRIMARY HYPERTENSION: ICD-10-CM

## 2025-07-15 DIAGNOSIS — Z85.46 HISTORY OF PROSTATE CANCER: ICD-10-CM

## 2025-07-15 LAB
ABSOLUTE EOSINOPHIL (OHS): 0.19 K/UL
ABSOLUTE MONOCYTE (OHS): 0.77 K/UL (ref 0.3–1)
ABSOLUTE NEUTROPHIL COUNT (OHS): 2.77 K/UL (ref 1.8–7.7)
ALBUMIN SERPL BCP-MCNC: 4.2 G/DL (ref 3.5–5.2)
ALP SERPL-CCNC: 72 UNIT/L (ref 40–150)
ALT SERPL W/O P-5'-P-CCNC: 30 UNIT/L (ref 10–44)
ANION GAP (OHS): 11 MMOL/L (ref 8–16)
AST SERPL-CCNC: 27 UNIT/L (ref 11–45)
BASOPHILS # BLD AUTO: 0.04 K/UL
BASOPHILS NFR BLD AUTO: 0.7 %
BILIRUB SERPL-MCNC: 0.7 MG/DL (ref 0.1–1)
BUN SERPL-MCNC: 25 MG/DL (ref 8–23)
CALCIUM SERPL-MCNC: 9.7 MG/DL (ref 8.7–10.5)
CHLORIDE SERPL-SCNC: 102 MMOL/L (ref 95–110)
CHOLEST SERPL-MCNC: 110 MG/DL (ref 120–199)
CHOLEST/HDLC SERPL: 2.9 {RATIO} (ref 2–5)
CO2 SERPL-SCNC: 30 MMOL/L (ref 23–29)
CREAT SERPL-MCNC: 1 MG/DL (ref 0.5–1.4)
EAG (OHS): 111 MG/DL (ref 68–131)
ERYTHROCYTE [DISTWIDTH] IN BLOOD BY AUTOMATED COUNT: 12.8 % (ref 11.5–14.5)
GFR SERPLBLD CREATININE-BSD FMLA CKD-EPI: >60 ML/MIN/1.73/M2
GLUCOSE SERPL-MCNC: 103 MG/DL (ref 70–110)
HBA1C MFR BLD: 5.5 % (ref 4–5.6)
HCT VFR BLD AUTO: 46.7 % (ref 40–54)
HDLC SERPL-MCNC: 38 MG/DL (ref 40–75)
HDLC SERPL: 34.5 % (ref 20–50)
HGB BLD-MCNC: 16 GM/DL (ref 14–18)
IMM GRANULOCYTES # BLD AUTO: 0.01 K/UL (ref 0–0.04)
IMM GRANULOCYTES NFR BLD AUTO: 0.2 % (ref 0–0.5)
LDLC SERPL CALC-MCNC: 59.2 MG/DL (ref 63–159)
LYMPHOCYTES # BLD AUTO: 1.92 K/UL (ref 1–4.8)
MCH RBC QN AUTO: 31.4 PG (ref 27–31)
MCHC RBC AUTO-ENTMCNC: 34.3 G/DL (ref 32–36)
MCV RBC AUTO: 92 FL (ref 82–98)
NONHDLC SERPL-MCNC: 72 MG/DL
NUCLEATED RBC (/100WBC) (OHS): 0 /100 WBC
PLATELET # BLD AUTO: 194 K/UL (ref 150–450)
PMV BLD AUTO: 11.5 FL (ref 9.2–12.9)
POTASSIUM SERPL-SCNC: 3.8 MMOL/L (ref 3.5–5.1)
PROT SERPL-MCNC: 6.9 GM/DL (ref 6–8.4)
PSA SERPL-MCNC: <0.01 NG/ML
RBC # BLD AUTO: 5.09 M/UL (ref 4.6–6.2)
RELATIVE EOSINOPHIL (OHS): 3.3 %
RELATIVE LYMPHOCYTE (OHS): 33.7 % (ref 18–48)
RELATIVE MONOCYTE (OHS): 13.5 % (ref 4–15)
RELATIVE NEUTROPHIL (OHS): 48.6 % (ref 38–73)
SODIUM SERPL-SCNC: 143 MMOL/L (ref 136–145)
TRIGL SERPL-MCNC: 64 MG/DL (ref 30–150)
TSH SERPL-ACNC: 2.23 UIU/ML (ref 0.4–4)
WBC # BLD AUTO: 5.7 K/UL (ref 3.9–12.7)

## 2025-07-15 PROCEDURE — 85025 COMPLETE CBC W/AUTO DIFF WBC: CPT

## 2025-07-15 PROCEDURE — 36415 COLL VENOUS BLD VENIPUNCTURE: CPT | Mod: PO

## 2025-07-15 PROCEDURE — 84443 ASSAY THYROID STIM HORMONE: CPT

## 2025-07-15 PROCEDURE — 83036 HEMOGLOBIN GLYCOSYLATED A1C: CPT

## 2025-07-15 PROCEDURE — 84295 ASSAY OF SERUM SODIUM: CPT

## 2025-07-15 PROCEDURE — 80061 LIPID PANEL: CPT

## 2025-07-15 PROCEDURE — 84153 ASSAY OF PSA TOTAL: CPT

## 2025-07-16 LAB
OHS CV AF BURDEN PERCENT: < 1
OHS CV DC REMOTE DEVICE TYPE: NORMAL
OHS CV RV PACING PERCENT: 0.16 %

## 2025-07-22 ENCOUNTER — OFFICE VISIT (OUTPATIENT)
Dept: FAMILY MEDICINE | Facility: CLINIC | Age: 80
End: 2025-07-22
Payer: MEDICARE

## 2025-07-22 VITALS
RESPIRATION RATE: 17 BRPM | TEMPERATURE: 99 F | SYSTOLIC BLOOD PRESSURE: 120 MMHG | BODY MASS INDEX: 34.65 KG/M2 | DIASTOLIC BLOOD PRESSURE: 90 MMHG | HEART RATE: 84 BPM | WEIGHT: 233.94 LBS | OXYGEN SATURATION: 97 % | HEIGHT: 69 IN

## 2025-07-22 DIAGNOSIS — I49.5 TACHY-BRADY SYNDROME: ICD-10-CM

## 2025-07-22 DIAGNOSIS — E78.5 HYPERLIPIDEMIA, UNSPECIFIED HYPERLIPIDEMIA TYPE: ICD-10-CM

## 2025-07-22 DIAGNOSIS — I65.23 BILATERAL CAROTID ARTERY STENOSIS: ICD-10-CM

## 2025-07-22 DIAGNOSIS — I87.2 VENOUS INSUFFICIENCY: ICD-10-CM

## 2025-07-22 DIAGNOSIS — G56.01 CARPAL TUNNEL SYNDROME ON RIGHT: ICD-10-CM

## 2025-07-22 DIAGNOSIS — R73.09 ELEVATED GLUCOSE: ICD-10-CM

## 2025-07-22 DIAGNOSIS — Z85.46 HISTORY OF PROSTATE CANCER: ICD-10-CM

## 2025-07-22 DIAGNOSIS — H35.3223 EXUDATIVE AGE-RELATED MACULAR DEGENERATION, LEFT EYE, WITH INACTIVE SCAR: ICD-10-CM

## 2025-07-22 DIAGNOSIS — G47.33 OSA (OBSTRUCTIVE SLEEP APNEA): ICD-10-CM

## 2025-07-22 DIAGNOSIS — I10 PRIMARY HYPERTENSION: ICD-10-CM

## 2025-07-22 DIAGNOSIS — L71.9 ROSACEA: ICD-10-CM

## 2025-07-22 DIAGNOSIS — G31.84 MCI (MILD COGNITIVE IMPAIRMENT): ICD-10-CM

## 2025-07-22 DIAGNOSIS — D49.0 IPMN (INTRADUCTAL PAPILLARY MUCINOUS NEOPLASM): ICD-10-CM

## 2025-07-22 DIAGNOSIS — I50.32 CHRONIC DIASTOLIC HEART FAILURE: ICD-10-CM

## 2025-07-22 DIAGNOSIS — I70.0 AORTIC ATHEROSCLEROSIS: ICD-10-CM

## 2025-07-22 DIAGNOSIS — E66.812 CLASS 2 SEVERE OBESITY WITH SERIOUS COMORBIDITY AND BODY MASS INDEX (BMI) OF 36.0 TO 36.9 IN ADULT, UNSPECIFIED OBESITY TYPE: ICD-10-CM

## 2025-07-22 DIAGNOSIS — J30.9 CHRONIC ALLERGIC RHINITIS: ICD-10-CM

## 2025-07-22 DIAGNOSIS — I35.0 MILD AORTIC STENOSIS: ICD-10-CM

## 2025-07-22 DIAGNOSIS — I25.10 CORONARY ARTERY DISEASE INVOLVING NATIVE CORONARY ARTERY OF NATIVE HEART WITHOUT ANGINA PECTORIS: Primary | ICD-10-CM

## 2025-07-22 DIAGNOSIS — E66.01 CLASS 2 SEVERE OBESITY WITH SERIOUS COMORBIDITY AND BODY MASS INDEX (BMI) OF 36.0 TO 36.9 IN ADULT, UNSPECIFIED OBESITY TYPE: ICD-10-CM

## 2025-07-22 DIAGNOSIS — Z95.0 PACEMAKER: ICD-10-CM

## 2025-07-22 PROCEDURE — G2211 COMPLEX E/M VISIT ADD ON: HCPCS | Mod: S$GLB,,, | Performed by: INTERNAL MEDICINE

## 2025-07-22 PROCEDURE — 99214 OFFICE O/P EST MOD 30 MIN: CPT | Mod: S$GLB,,, | Performed by: INTERNAL MEDICINE

## 2025-07-22 NOTE — PROGRESS NOTES
Subjective:       Patient ID: Rohan Abarca is a 80 y.o. male.    Medication List with Changes/Refills   Current Medications    ASPIRIN (ECOTRIN) 81 MG EC TABLET    Take 1 tablet (81 mg total) by mouth once daily.    ATORVASTATIN (LIPITOR) 20 MG TABLET    TAKE 1 TABLET(20 MG) BY MOUTH EVERY DAY    CHLORTHALIDONE (HYGROTEN) 25 MG TAB    Take 1 tablet (25 mg total) by mouth once daily.    DOXYCYCLINE 50 MG CAPSULE    Take 50 mg by mouth.    FUROSEMIDE (LASIX) 20 MG TABLET    Take 1 tablet (20 mg total) by mouth daily as needed.    METOPROLOL SUCCINATE (TOPROL-XL) 25 MG 24 HR TABLET    Take 1 tablet (25 mg total) by mouth once daily.    OLMESARTAN (BENICAR) 40 MG TABLET    Take 1 tablet (40 mg total) by mouth once daily.    VIT A/VIT C/VIT E/ZINC/COPPER (PRESERVISION AREDS ORAL)    Take 1 capsule by mouth 2 (two) times a day.   Discontinued Medications    KETOCONAZOLE (NIZORAL) 2 % SHAMPOO    APPLY TOPICALLY 3 TIMES A WEEK       Chief Complaint: Follow-up  He is here today to f/u on chronic medical issues.      He has tachy-bassem syndrome with runs of SVT. He was seen by EP who felt he would benefit from RFA for SVT but during procedure on 7/2022 was unable to induce SVT. He was diagnosed with tachy-bassem syndrome from a holter that showed non-sustained atrial tachycardia with HR ranging from 38 to 92. On 1/2024 he had a pacer placed. He denies any palpitations or racing heart.      He has AS with echo on 5/2022 that was mild. Repeat echo on 2/2024 showed EF 60%, no diastolic dysfunction,  mild aortic valve sclerosis without stenosis,  moderate mitral annular valvular calcifications. He was seen by cardiology on 5/2025 and advised to f/u in one year.  Advised to repeat echo in one year.      He has hypertension and is taking olmesartan 40 mg daily, metoprolol 25 mg once a day and chlorthalidone 25 mg daily. He is unable to take amlodipine due to lower leg edema and spironolactone caused hyperkalemia.   He denies  chest pain or shortness of breath. He has no known CAD but  a CT urogram showed calcification in coronary arteries.     He has chronic venous insufficiency and is taking lasix 20 mg daily.  Venous u/s on 12/2024 showed significant reflux in the bilateral greater and lesser saphenous veins, right popliteal vein, and left common femoral, femoral, and popliteal veins.  Arterial u/s on 12/2024 was negative for stenosis.  He reports the swelling is much better after stopping amlodipine and taking lasix daily.      He had a carotid u/s in 2014 that showed 50% stenosis at carotid bifurcations but no significant stenosis.  Repeat u/s on 10/2020 showed no significant stenosis.      He has hyperlipidemia and is taking atorvastatin 20 mg qday. HIs lipids on 7/2025 were 110/64/38/59.  He is taking aspirin daily.        Incidental finding on CT urogram was cystic lesion with complexity in the pancreas.MRI on 2/2021 showed Multilocular cystic mass centered in the uncinate process of the pancreas, similar in size as compared to the prior CT on 12/28/2020, with imaging features most suggestive of a side branch intraductal papillary mucinous neoplasm.  He had EUS on 3/2021 that showed gastritis and biopsied cystic lesion in uncinate process of pancreas that was 40 mm. Biopsy was negative. MRI on 5/2021 showed  Increase size of the lesion and then had subsequent EUS on 10/2021 showing cystic uncinate process lesion of pancreas without any duct abnormalities. He was seen by surgical oncology and advised repeat  MRI in 6 months. MRI on 8/2022 showed multi-septate cystic lesion with dilatation downstream consistent with a stable IPMN. He had a EUS on 11/2022 that showed increasing uncinated cystic mass (from 4.3 to 4.5 cm) and increasing dilatation of pancreatic duct (from 5 mm to 6.2 mm).  Repeat EUS on 7/2023 showed stable cystic IPMN, no intraductal lesions (common bile duct or pancreatic duct) and advised to repeat EUS vs MRCP in 6  months. He was seen by surgical oncology on 4/2023 who agrees with surveillance at this time. MRCP on 2/2024 showed minimal increase in size of the proximal pancreatic cyst and no change in size of the pancreatic tail cyst. EUS on 3/2025 showed a stable pancreatic cyst 41 x 30 mm IPMN.  FNA was benign and CEA was decreased to 90.  Advised by GI to repeat MRI in one year.       He has known renal stones on CT urogram on 12/2020 and a CT on 5/2022. CT stone protocol on 7/2022 showed There is right hydronephrosis, hydroureter with inflammatory stranding demonstrated about the proximal ureter, renal pelvis.  This correlates with a persistent calculus at the renal pelvis, caliceal junction albeit a proximal ureteral calculus measuring 3 x 4 x 3 mm.  Some superimposed early inflammation could also present in this fashion. MRI on 8/2022 showed Mild right hydro nephrosis.  There is low signal intensity stone in the proximal right ureter, most likely secondary to ureterolithiasis. He was seen by urology and underwent ureteroscopy with fragmentation of right kidney stone on 11/2022. He has not had any further f/u with urology or repeat imaging.      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 He had multiple excisions but bx still showed residual cancer.  He was treated with fluorouracil and did very well. He continues to follow with dermatology every 4 months.      He has rosacea  and continues on doxycycline 100 mg daily.  He does have some discoloration around his neck and on his face due to minocycline that is slowly resolving since stopping this medication.      He continues with memory issues. He was seen by neurology in 6/2017 who ordered MRI (chronic microvascular ischemic changes) and neuropsych testing which was normal.  He was advised to start statin and aspirin.  He continues to struggle with remembering short term conversations and names.      He has REGAN but is not using cpap. Sleep  study showed moderate sleep apnea. He was seen by sleep medicine and switched from cpap to auto-cpap on 6/2025.  However he has not yet gotten his auto-pap machine.       He has history of prostate cancer s/p radical prostatectomy in 2012.  He did not go on hormonal treatment. He no longer follows with urology.  His last PSA was undetectable on 7/2024.      He has right wrist CTS and gets full relief with wrist splints which he wears nightly. He had a right steroid injection with significant relief of symptoms on 12/2023.  He denies any active pain.      He lives with his wife and feels safe at home. He does exercise with walking. He tries to eat healthy.  He denies any balance issues or recent falls.     Colonoscopy----10/2022 repeat in 10 years    Tdap---more than 10 years  Pneumovax---12/2018  Prevnar----5/2018  Influenza vaccine----10/2024  Shingrex vaccine----8/2020, 12/2020  Covid vaccine---4 doses   RSV vaccine-----none      Review of Systems   Constitutional:  Negative for appetite change, fatigue, fever and unexpected weight change.   HENT:  Negative for congestion, ear pain, hearing loss, sore throat and trouble swallowing.    Eyes:  Negative for pain and visual disturbance.   Respiratory:  Negative for cough, chest tightness, shortness of breath and wheezing.    Cardiovascular:  Positive for leg swelling. Negative for chest pain and palpitations.   Gastrointestinal:  Negative for abdominal pain, blood in stool, constipation, diarrhea, nausea and vomiting.   Endocrine: Negative for polyuria.   Genitourinary:  Negative for difficulty urinating, dysuria, frequency, hematuria and urgency.   Musculoskeletal:  Positive for arthralgias. Negative for back pain and myalgias.   Skin:  Negative for rash.   Neurological:  Negative for dizziness, weakness, numbness and headaches.   Hematological:  Does not bruise/bleed easily.   Psychiatric/Behavioral:  Negative for dysphoric mood, sleep disturbance and suicidal  "ideas. The patient is not nervous/anxious.        Objective:      Vitals:    07/22/25 1038   BP: (!) 120/90   BP Location: Right arm   Patient Position: Sitting   Pulse: 84   Resp: 17   Temp: 99.1 °F (37.3 °C)   TempSrc: Temporal   SpO2: 97%   Weight: 106.1 kg (233 lb 14.5 oz)   Height: 5' 9" (1.753 m)     Body mass index is 34.54 kg/m².  Physical Exam    General appearance: No acute distress, cooperative  Eyes: PERRL, EOMI, conjunctiva clear  Ears: normal external ear and pinna, tm clear without drainage, canals clear  Nose: Normal mucosa without drainage  Throat: no exudates or erythema, tonsils not enlarged  Mouth: no sores or lesions, moist mucous membranes  Neck: FROM, soft, supple, no thyromegaly, no bruits  Lymph: no anterior or posterior cervical adenopathy  Heart::  Regular rate and rhythm, no murmur  Lung: Clear to ascultation bilaterally, no wheezing, no rales, no rhonchi, no distress  Abdomen: Soft, nontender, no distention, no hepatosplenomegaly, bowel sounds normal, no guarding, no rebound, no peritoneal signs  Skin: no rashes, no lesions, hyperpigmentation around his neck and on his forehead  Extremities: no edema  Neuro: CN 2-12 intact, 5/5 muscle strength upper and lower extremity bilaterally, 2+ DTRs UE and LE bilaterally, normal gait  Peripheral pulses: 1+ pedal pulses bilaterally, varicose veins  Musculoskeletal: FROM, good strenth, no tenderness  Joint: normal appearance, no swelling, no warmth, no deformity in all joints    Assessment:       1. Coronary artery disease involving native coronary artery of native heart without angina pectoris    2. Tachy-bassem syndrome    3. Pacemaker    4. Mild aortic stenosis    5. Chronic diastolic heart failure    6. Venous insufficiency    7. Primary hypertension    8. Hyperlipidemia, unspecified hyperlipidemia type    9. Aortic atherosclerosis    10. Bilateral carotid artery stenosis    11. Chronic allergic rhinitis    12. IPMN (intraductal papillary " mucinous neoplasm)    13. Rosacea    14. MCI (mild cognitive impairment)    15. History of prostate cancer    16. Exudative age-related macular degeneration, left eye, with inactive scar    17. Carpal tunnel syndrome on right    18. REGAN (obstructive sleep apnea)    19. Class 2 severe obesity with serious comorbidity and body mass index (BMI) of 36.0 to 36.9 in adult, unspecified obesity type    20. Elevated glucose        Plan:       Coronary artery disease involving native coronary artery of native heart without angina pectoris  STable and no active symptoms    Tachy-bassem syndrome /Pacemaker  He is doing well and continue to follow in interrogation clinic    Mild aortic stenosis  Due to recheck echo in 2/2026.  He is asymptomatic.   -     Echo; Future    Chronic diastolic heart failure  Well compensated on lasix daily    Venous insufficiency  Improved on lasix daily    Primary hypertension  Well controlled and continue current regimen.   -     CBC Auto Differential; Future; Expected date: 07/22/2025  -     Basic Metabolic Panel; Future; Expected date: 07/22/2025    Hyperlipidemia, unspecified hyperlipidemia type  Good control on atorvastatin and aspirin    Aortic atherosclerosis  Continue statin and aspirin    Bilateral carotid artery stenosis  Continue statin and aspirin    Chronic allergic rhinitis  No complaints today    IPMN (intraductal papillary mucinous neoplasm)  Large IPMN but stable on EUS.  Due to repeat MRI in 3/2026.    Rosacea  Good control on doxycycline    MCI (mild cognitive impairment)  Stable    History of prostate cancer  No recurrence    Exudative age-related macular degeneration, left eye, with inactive scar  Continue to follow with ophthalmology    Carpal tunnel syndrome on right  No complaints today    REGAN (obstructive sleep apnea)  He is waiting on his auto-pap to start therapy. Follow up with sleep medicine in August    Class 2 severe obesity with serious comorbidity and body mass index  (BMI) of 36.0 to 36.9 in adult, unspecified obesity type  Long discussion on the benefits of healthy eating and regular exercise to help lose weight and help control REGAN, cad, hypertension and hyperlipidemia.     Elevated glucose  -     Hemoglobin A1C; Future; Expected date: 07/22/2025    Follow up in about 6 months (around 1/22/2026) for chronic medical issues.

## (undated) DEVICE — PAD DEFIB CADENCE ADULT R2

## (undated) DEVICE — SHEATH SAFE ULTRA 7FR

## (undated) DEVICE — ADHESIVE DERMABOND ADVANCED

## (undated) DEVICE — DRAPE INCISE IOBAN 2 23X17IN

## (undated) DEVICE — GUIDEWIRE EMERALD 150CM PTFE

## (undated) DEVICE — CATH RESPONSE QPLR JSN 6F 120

## (undated) DEVICE — INTRODUCER HEMOSTASIS 7.5F

## (undated) DEVICE — TOWEL OR DISP STRL BLUE 4/PK

## (undated) DEVICE — CATH BIDIRECTIONAL DF CRV 7FR

## (undated) DEVICE — INTRO 8.5FR 63CM SRO

## (undated) DEVICE — CATH SUPREME QPLR CRD-2 6F 120

## (undated) DEVICE — ELECTRODE REM PLYHSV RETURN 9

## (undated) DEVICE — COVER INSTR ELASTIC BAND 40X20

## (undated) DEVICE — CATH SAFIRE 7FR 4CC LG SWEEP

## (undated) DEVICE — CATH HIS OCTAPOLAR 7FRX115CM

## (undated) DEVICE — PACK EP DRAPE

## (undated) DEVICE — KIT ENSITE ELECTRODE SURFACE

## (undated) DEVICE — PACK PACER PERMANENT OMC

## (undated) DEVICE — CATH ANGIO RIGHTSITE HIS 7X43

## (undated) DEVICE — SLING SWATHE UNIVERSAL FOAM

## (undated) DEVICE — PAD GROUND UNIV STYLE CORD 9IN

## (undated) DEVICE — SLITTER UNIVERSAL

## (undated) DEVICE — PAD RADI FEMORAL